# Patient Record
Sex: MALE | Race: WHITE | NOT HISPANIC OR LATINO | Employment: FULL TIME | URBAN - METROPOLITAN AREA
[De-identification: names, ages, dates, MRNs, and addresses within clinical notes are randomized per-mention and may not be internally consistent; named-entity substitution may affect disease eponyms.]

---

## 2018-01-30 ENCOUNTER — OFFICE VISIT (OUTPATIENT)
Dept: UROLOGY | Facility: CLINIC | Age: 38
End: 2018-01-30
Payer: COMMERCIAL

## 2018-01-30 VITALS
TEMPERATURE: 98.8 F | HEART RATE: 90 BPM | SYSTOLIC BLOOD PRESSURE: 148 MMHG | RESPIRATION RATE: 16 BRPM | DIASTOLIC BLOOD PRESSURE: 91 MMHG

## 2018-01-30 DIAGNOSIS — Z30.2 ENCOUNTER FOR VASECTOMY: Primary | ICD-10-CM

## 2018-01-30 DIAGNOSIS — C62.12 MALIGNANT NEOPLASM OF DESCENDED LEFT TESTIS (H): ICD-10-CM

## 2018-01-30 PROCEDURE — 99203 OFFICE O/P NEW LOW 30 MIN: CPT | Performed by: UROLOGY

## 2018-01-30 NOTE — MR AVS SNAPSHOT
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  Normal or non-critical lab and imaging results will be communicated to you by MyChart, letter or phone within 4 business days after the clinic has received the results. If you do not hear from us within 7 days, please contact the clinic through MyChart or phone. If you have a critical or abnormal lab result, we will notify you by phone as soon as possible.  Submit refill requests through [employer] or call your pharmacy and they will forward the refill request to us. Please allow 3 business days for your refill to be completed.          Additional Information About Your Visit        MyChart Information     [employer] lets you send messages to your doctor, view your test results, renew your prescriptions, schedule appointments and more. To sign up, go to [email or web address] . Click on \"Log in\" on the left side of the screen, which will take you to the Welcome page. Then click on \"Sign up Now\" on the right side of the page.     You will be asked to enter the access code listed below, as well as some personal information. Please follow the directions to create your username and " password.     Your access code is: [email or web address]  Expires: 3/28/2018  3:16 PM     Your access code will  in 90 days. If you need help or a new code, please call your [facility] clinic or [phone].        Care EveryWhere ID     This is your Care EveryWhere ID. This could be used by other organizations to access your [facility] medical records  [id number]        Your Vitals Were     Pulse Temperature Respirations             90 98.8  F (37.1  C) (Tympanic) 16          Blood Pressure from Last 3 Encounters:   18 (!) 148/91   10/19/16 (!) 148/102   09/02/15 129/71    Weight from Last 3 Encounters:   09/02/15 113.4 kg (250 lb)   14 112 kg (247 lb)   14 108.9 kg (240 lb)               Primary Care Provider Office Phone # Fax #    [place] Marcello Willett [phone] [address] [facility] [address]        Equal Access to Services     [facility]: Hadii josemanuel ku hadasho Soomaali, waaxda luqadaha, qaybta kaalmada adeegyada, renetta kyle . So [facility] [phone].    ATENCIÓN: Si habla español, tiene a hernandes disposición servicios gratuitos de asistencia lingüística. Llame al [phone].    We comply with applicable federal civil rights laws and [place] laws. We do not discriminate on the basis of race, color, national origin, age, disability, sex, sexual orientation, or gender identity.            Thank you!     Thank you for choosing [facility]  for your care. Our goal is always to provide you with excellent care. Hearing back from our patients is one way we can continue to improve our services. Please take a few minutes to complete the written survey that you may receive in the mail after your visit with us. Thank you!             Your Updated Medication List - Protect others around you: Learn how to safely use, store and throw away your medicines at www.disposemymeds.org.      Notice  As of 2018 11:59 PM    You have not been  prescribed any medications.

## 2018-01-30 NOTE — NURSING NOTE
"Chief Complaint   Patient presents with     Consult     Hx of testicular Ca, pt requesting semen analysis        Initial BP (!) 148/91 (BP Location: Right arm, Patient Position: Chair, Cuff Size: Adult Regular)  Pulse 90  Temp 98.8  F (37.1  C) (Tympanic)  Resp 16 Estimated body mass index is 33.21 kg/(m^2) as calculated from the following:    Height as of 9/2/15: 1.848 m (6' 0.75\").    Weight as of 9/2/15: 113.4 kg (250 lb).  BP completed using cuff size: regular      Lucía Saeed CMA     "

## 2018-01-31 NOTE — PROGRESS NOTES
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    Objective:  Has done very well with no evidence of neoplasm following a combination of orchiectomy, chemotherapy and radiation therapy.    Now requesting information regarding the need for vasectomy to achieve sterility. He and his wife have three children using in vitro fertilization from sperm banked prior to the onset of chemotherapy.    On examination this right testicle is normal to palpation with an easily identifiable vas deferens    Plan:  Will obtain a semen analysis and consider vasectomy.    Total time 30 minutes, counseling 20 minutes discussing management of fertility after chemotherapy for testicular cancer.

## 2020-05-02 ENCOUNTER — COMMUNICATION - HEALTHEAST (OUTPATIENT)
Dept: SCHEDULING | Facility: CLINIC | Age: 40
End: 2020-05-02

## 2020-11-02 ENCOUNTER — VIRTUAL VISIT (OUTPATIENT)
Dept: FAMILY MEDICINE | Facility: OTHER | Age: 40
End: 2020-11-02
Payer: COMMERCIAL

## 2020-11-02 ENCOUNTER — AMBULATORY - HEALTHEAST (OUTPATIENT)
Dept: FAMILY MEDICINE | Facility: CLINIC | Age: 40
End: 2020-11-02

## 2020-11-02 DIAGNOSIS — Z20.822 SUSPECTED COVID-19 VIRUS INFECTION: ICD-10-CM

## 2020-11-02 PROCEDURE — 99421 OL DIG E/M SVC 5-10 MIN: CPT | Performed by: FAMILY MEDICINE

## 2020-11-02 NOTE — PROGRESS NOTES
"Date: 2020 07:28:55  Clinician: Maugi Yanes  Clinician NPI: [id number]  Patient: Gilberto Lund  Patient : 1980  Patient Address: [address] [place] [address], [place] [address]  Patient Phone: [phone]  Visit Protocol: URI  Patient Summary:  Gilberto is a 39 year old ( : 1980 ) male who initiated a [facility] Visit for COVID-19 (Coronavirus) evaluation and screening. When asked the question \"Please sign me up to receive news, health information and promotions from [facility].\", Gilberto responded \"No\".    When asked when his symptoms started, Gilberto reported that he does not have any symptoms.   He denies having recent facial or sinus surgery in the past 60 days and taking antibiotic medication in the past month.    Pertinent COVID-19 (Coronavirus) information  Gilberto does not work or volunteer as healthcare worker or a . In the past 14 days, Gilberto has not worked or volunteered at a healthcare facility or group living setting.   In the past 14 days, he also has not lived in a congregate living setting.   Gilberto has had a close contact with a laboratory-confirmed COVID-19 patient in the last 14 days. He was exposed at his work. He does not know when he was exposed to the laboratory-confirmed COVID-19 patient.   Additional information about contact with COVID-19 (Coronavirus) patient as reported by the patient (free text): Job soup at work   Gilberto is not living in the same household with the COVID-19 positive patient. He was in an enclosed space for greater than 15 minutes with the COVID-19 patient.   During the encounter, neither were wearing masks.   Since 2019, Gilberto has not been diagnosed with lab-confirmed COVID-19 test and has not had upper respiratory infection or influenza-like illness.   Pertinent medical history  Gilberto needs a return to work/school note.   Weight: 230 lbs   Gilberto smokes or uses smokeless tobacco.   Weight: 230 lbs    MEDICATIONS: No " current medications, ALLERGIES: NKDA  Clinician Response:  Dear Gilberto,   Based on your exposure to COVID-19 (coronavirus), we would like to test you for this virus.  1. Please call [phone] to schedule your visit. Explain that you were referred by [facility] to have a COVID-19 test. Be ready [facility].   The following will serve as your written order for this COVID Test, ordered by me, for the indication of suspected COVID [Z20.828]: The test will be ordered in [employer], our electronic health record, after you are scheduled. It will show as ordered and authorized by Gerardo Santillan MD.  Order: COVID-19 (coronavirus) PCR for ASYMPTOMATIC EXPOSURE testing from [facility].   If you know you have had close contact with someone who tested positive, you should be quarantined for 14 days after this exposure. You should stay in quarantine for the14 days even if the covid test is negative, the optimal time to test after exposure is 5-7 days from the exposure  Quarantine means   What should I do?  For safety, it's very important to follow these rules. Do this for 14 days after the date you were last exposed to the virus..  Stay home and away from others. Don't go to school or anywhere else. Generally quarantine means staying home from work but there are some exceptions to this. Please contact your workplace.   No hugging, kissing or shaking hands.  Don't let anyone visit.  Cover your mouth and nose with a mask, tissue or washcloth to avoid spreading germs.  Wash your hands and face often. Use soap and water.  What are the symptoms of COVID-19?  The most common symptoms are cough, fever and trouble breathing. Less common symptoms include headache, body aches, fatigue (feeling very tired), chills, sore throat, stuffy or runny nose, diarrhea (loose poop), loss of taste or smell, belly pain, and nausea or vomiting (feeling sick to your stomach or throwing up).  After 14 days, if you have still don't have  symptoms, you likely don't have this virus.  If you develop symptoms, follow these guidelines.  If you're normally healthy: Please start another [facility] visit to report your symptoms. Go to [email or web address].  If you have a serious health problem (like cancer, heart failure, an organ transplant or kidney disease): Call your specialty clinic. Let them know that you might have COVID-19.  2. When it's time for your COVID test:  Stay at least 6 feet away from others. (If someone will drive you to your test, stay in the backseat, as far away from the  as you can.)  Cover your mouth and nose with a mask, tissue or washcloth.  Go straight to the testing site. Don't make any stops on the way there or back.  Please note  Caregivers in these groups are at risk for severe illness due to COVID-19:  o People 65 years and older  o People who live in a nursing home or long-term care facility  o People with chronic disease (lung, heart, cancer, diabetes, kidney, liver, immunologic)  o People who have a weakened immune system, including those who:  Are in cancer treatment  Take medicine that weakens the immune system, such as corticosteroids  Had a bone marrow or organ transplant  Have an immune deficiency  Have poorly controlled HIV or AIDS  Are obese (body mass index of 40 or higher)  Smoke regularly  Where can I get more information?  [facility] -- About COVID-19: [email or web address]  CDC -- What to Do If You're Sick: www.cdc.gov/coronavirus/2019-ncov/about/steps-when-sick.html  CDC -- Ending Home Isolation: www.cdc.gov/coronavirus/2019-ncov/hcp/disposition-in-home-patients.html  CDC -- Caring for Someone: www.cdc.gov/coronavirus/2019-ncov/if-you-are-sick/care-for-someone.html  [facility] -- Interim Guidance for Hospital Discharge to Home: www.health.[facility].[place]/diseases/coronavirus/hcp/hospdischarge.pdf  [facility] clinical trials (COVID-19 research studies): clinicalaffairs.[facility]/umn-clinical-trials   Below are the COVID-19 hotlines at the [place] Department of Health ([facility]). Interpreters are available.  For health questions: Call 123-454-2188 or 1-103.425.2759 (7 a.m. to 7 p.m.)  For questions about schools and childcare: Call 487-832-0806 or 1-776.724.2627 (7 a.m. to 7 p.m.)    Diagnosis: Contact with and (suspected) exposure to other viral communicable diseases  Diagnosis ICD: Z20.828

## 2020-11-03 ENCOUNTER — AMBULATORY - HEALTHEAST (OUTPATIENT)
Dept: FAMILY MEDICINE | Facility: CLINIC | Age: 40
End: 2020-11-03

## 2020-11-03 DIAGNOSIS — Z20.822 SUSPECTED COVID-19 VIRUS INFECTION: ICD-10-CM

## 2020-11-05 ENCOUNTER — COMMUNICATION - HEALTHEAST (OUTPATIENT)
Dept: SCHEDULING | Facility: CLINIC | Age: 40
End: 2020-11-05

## 2021-03-05 ENCOUNTER — OFFICE VISIT - HEALTHEAST (OUTPATIENT)
Dept: INTERNAL MEDICINE | Facility: CLINIC | Age: 41
End: 2021-03-05

## 2021-03-05 DIAGNOSIS — Z12.5 SCREENING PSA (PROSTATE SPECIFIC ANTIGEN): ICD-10-CM

## 2021-03-05 DIAGNOSIS — Z87.442 HISTORY OF KIDNEY STONES: ICD-10-CM

## 2021-03-05 DIAGNOSIS — Z13.220 SCREENING FOR HYPERLIPIDEMIA: ICD-10-CM

## 2021-03-05 DIAGNOSIS — Z85.47 PERSONAL HISTORY OF TESTICULAR CANCER: ICD-10-CM

## 2021-03-05 DIAGNOSIS — Z13.1 SCREENING FOR DIABETES MELLITUS: ICD-10-CM

## 2021-03-10 ENCOUNTER — COMMUNICATION - HEALTHEAST (OUTPATIENT)
Dept: INTERNAL MEDICINE | Facility: CLINIC | Age: 41
End: 2021-03-10

## 2021-03-10 ENCOUNTER — AMBULATORY - HEALTHEAST (OUTPATIENT)
Dept: LAB | Facility: CLINIC | Age: 41
End: 2021-03-10

## 2021-03-10 DIAGNOSIS — Z12.5 SCREENING PSA (PROSTATE SPECIFIC ANTIGEN): ICD-10-CM

## 2021-03-10 DIAGNOSIS — Z85.47 PERSONAL HISTORY OF TESTICULAR CANCER: ICD-10-CM

## 2021-03-10 DIAGNOSIS — Z13.220 SCREENING FOR HYPERLIPIDEMIA: ICD-10-CM

## 2021-03-10 LAB
ALBUMIN SERPL-MCNC: 4.3 G/DL (ref 3.5–5)
ALP SERPL-CCNC: 83 U/L (ref 45–120)
ALT SERPL W P-5'-P-CCNC: 21 U/L (ref 0–45)
ANION GAP SERPL CALCULATED.3IONS-SCNC: 12 MMOL/L (ref 5–18)
AST SERPL W P-5'-P-CCNC: 19 U/L (ref 0–40)
BILIRUB SERPL-MCNC: 0.8 MG/DL (ref 0–1)
BUN SERPL-MCNC: 16 MG/DL (ref 8–22)
CALCIUM SERPL-MCNC: 9.3 MG/DL (ref 8.5–10.5)
CHLORIDE BLD-SCNC: 107 MMOL/L (ref 98–107)
CHOLEST SERPL-MCNC: 189 MG/DL
CO2 SERPL-SCNC: 24 MMOL/L (ref 22–31)
CREAT SERPL-MCNC: 0.98 MG/DL (ref 0.7–1.3)
ERYTHROCYTE [DISTWIDTH] IN BLOOD BY AUTOMATED COUNT: 12.5 % (ref 11–14.5)
FASTING STATUS PATIENT QL REPORTED: YES
GFR SERPL CREATININE-BSD FRML MDRD: >60 ML/MIN/1.73M2
GLUCOSE BLD-MCNC: 92 MG/DL (ref 70–125)
HCT VFR BLD AUTO: 50 % (ref 40–54)
HDLC SERPL-MCNC: 43 MG/DL
HGB BLD-MCNC: 16.8 G/DL (ref 14–18)
LDLC SERPL CALC-MCNC: 114 MG/DL
MCH RBC QN AUTO: 31.5 PG (ref 27–34)
MCHC RBC AUTO-ENTMCNC: 33.6 G/DL (ref 32–36)
MCV RBC AUTO: 94 FL (ref 80–100)
PLATELET # BLD AUTO: 279 THOU/UL (ref 140–440)
PMV BLD AUTO: 9 FL (ref 7–10)
POTASSIUM BLD-SCNC: 4.5 MMOL/L (ref 3.5–5)
PROT SERPL-MCNC: 7.1 G/DL (ref 6–8)
PSA SERPL-MCNC: 1.3 NG/ML (ref 0–2.5)
RBC # BLD AUTO: 5.34 MILL/UL (ref 4.4–6.2)
SODIUM SERPL-SCNC: 143 MMOL/L (ref 136–145)
TRIGL SERPL-MCNC: 159 MG/DL
WBC: 7.9 THOU/UL (ref 4–11)

## 2021-03-23 ENCOUNTER — COMMUNICATION - HEALTHEAST (OUTPATIENT)
Dept: INTERNAL MEDICINE | Facility: CLINIC | Age: 41
End: 2021-03-23

## 2021-03-23 ENCOUNTER — AMBULATORY - HEALTHEAST (OUTPATIENT)
Dept: LAB | Facility: CLINIC | Age: 41
End: 2021-03-23

## 2021-03-23 DIAGNOSIS — Z85.47 PERSONAL HISTORY OF TESTICULAR CANCER: ICD-10-CM

## 2021-03-23 LAB — SPERM COUNT SEMEN: NORMAL

## 2021-04-07 ENCOUNTER — OFFICE VISIT - HEALTHEAST (OUTPATIENT)
Dept: INTERNAL MEDICINE | Facility: CLINIC | Age: 41
End: 2021-04-07

## 2021-04-07 DIAGNOSIS — Z85.47 PERSONAL HISTORY OF TESTICULAR CANCER: ICD-10-CM

## 2021-04-07 DIAGNOSIS — M19.079 ANKLE ARTHRITIS: ICD-10-CM

## 2021-04-07 DIAGNOSIS — L40.9 PSORIASIS: ICD-10-CM

## 2021-04-07 DIAGNOSIS — E66.811 CLASS 1 OBESITY DUE TO EXCESS CALORIES WITHOUT SERIOUS COMORBIDITY WITH BODY MASS INDEX (BMI) OF 30.0 TO 30.9 IN ADULT: ICD-10-CM

## 2021-04-07 DIAGNOSIS — N62 GYNECOMASTIA, MALE: ICD-10-CM

## 2021-04-07 DIAGNOSIS — L91.8 SKIN TAG: ICD-10-CM

## 2021-04-07 DIAGNOSIS — D22.9 ENLARGED SKIN MOLE: ICD-10-CM

## 2021-04-07 DIAGNOSIS — E66.09 CLASS 1 OBESITY DUE TO EXCESS CALORIES WITHOUT SERIOUS COMORBIDITY WITH BODY MASS INDEX (BMI) OF 30.0 TO 30.9 IN ADULT: ICD-10-CM

## 2021-04-07 LAB — PROLACTIN SERPL-MCNC: 5.8 NG/ML (ref 0–15)

## 2021-04-08 LAB — AFP SERPL-MCNC: 2.4 UG/ML

## 2021-04-09 LAB — HCG-TM SERPL-ACNC: <3 IU/L

## 2021-05-12 ENCOUNTER — RECORDS - HEALTHEAST (OUTPATIENT)
Dept: ADMINISTRATIVE | Facility: OTHER | Age: 41
End: 2021-05-12

## 2021-05-14 ENCOUNTER — AMBULATORY - HEALTHEAST (OUTPATIENT)
Dept: NURSING | Facility: CLINIC | Age: 41
End: 2021-05-14

## 2021-06-04 ENCOUNTER — AMBULATORY - HEALTHEAST (OUTPATIENT)
Dept: NURSING | Facility: CLINIC | Age: 41
End: 2021-06-04

## 2021-06-05 VITALS
SYSTOLIC BLOOD PRESSURE: 116 MMHG | DIASTOLIC BLOOD PRESSURE: 64 MMHG | OXYGEN SATURATION: 96 % | BODY MASS INDEX: 30.87 KG/M2 | WEIGHT: 232.4 LBS | HEART RATE: 89 BPM | TEMPERATURE: 98.9 F

## 2021-06-07 NOTE — TELEPHONE ENCOUNTER
Pt just returned home from a turkey hunting trip, pt pulled out a tick that could have been in the last 48 hrs.  Tick was not engorged, pt was able to pull the whole tick out.  Pt intends to monitor and follow Care Advice and will f/u with clinic if needed.    Angeles Inman RN, MA  [facility]    Triage Nurse Advisor        Reason for Disposition    Tick bite with no complications    Protocols used: TICK BITE-A-

## 2021-06-15 NOTE — PROGRESS NOTES
Gilberto Lund is a 40 y.o. male who is being evaluated via a billable video visit.      How would you like to obtain your AVS? MyChart.  If dropped from the video visit, the video invitation should be resent by: Text to cell phone: [phone]  Will anyone else be joining your video visit? No      Video Start Time: 4:45 PM    Video visit to establish care, patient is new to [facility] internal medicine.  Used to go to [facility]  Works as     He told me generally feels well.  He appeared well during the video interview.    His concerns and issues are as follows    Testicular cancer age 29 (2008), can be considered cured, no longer gets surveillance testing, wishes to be assessed for azoospermia to determine whether he needs a vasectomy.  Left side  Orchiectomy  Metastsized in sternum and cervical LN's  4 rounds of chemo: [facility] in [facility]  The chemotherapy left him with some mild neuropathy symptoms in his hands and feet.  He had annual CT and PET scans for 5 years, along with tumor marker blood test, then was told that he can be considered cured, and he has not done any surveillance in the last 5 years.    He has 3 children, and wishes to be assessed for azoospermia, if his sperm count is 0, then he may not need to pursue a vasectomy.    I ordered for him a semen analysis.      History of kidney stone in 2015, single instance  He told me there have been no recurrence of symptoms    Numbness in hands, may be a touch of carpal tunnel syndrome versus neuropathy related to his chemotherapy.  He works with his hands as an .    Occasional cigar, no cigarettes  6 foot 2  225#  He told me he is not had any problems with blood pressure.    Immunization History   Administered Date(s) Administered     Influenza,seasonal, Inj IIV3 10/13/2008, 10/22/2015     Tdap 09/01/2009       He will have a face-to-face visit with me in the clinic later this spring.    He would like to get  started with some health maintenance laboratory tests.  I ordered fasting labs for him: Comprehensive metabolic panel, CBC, lipid profile, screening PSA.  Semen analysis ordered.      Video-Visit Details    Type of service:  Video Visit    Video End Time (time video stopped): 5:00  Originating Location (pt. Location): Home    Distant Location (provider location):  [facility]   Platform used for Video Visit: [employer]

## 2021-06-16 NOTE — PROGRESS NOTES
Office Visit - Follow Up   Gilberto Lund   40 y.o. male    Date of Visit: 4/7/2021    Chief Complaint   Patient presents with     Follow-up        -------------------------------------------------------------------------------------------------------------------------  Assessment and Plan    Having our initial face-to-face visit today for this 40-year-old man who works as an .  We previously had a video virtual visit, and we also ran a set of lab work on March 10, 2021.    Issues are as follows:    Testicular cancer age 29 (2008), can be considered cured, no longer gets surveillance testing, zero sperm on semen analysis 3-23-21    Today April 7, 2021, he is on swing by the lab and we will draw tumor markers AFP and hCG.    Left side Orchiectomy. Metastsized in sternum and cervical LN's. 4 rounds of chemo: [facility] in [facility]. The chemotherapy left him with some mild neuropathy symptoms in his hands and feet.  He had annual CT and PET scans for 5 years, along with tumor marker blood test, then was told that he can be considered cured, and he has not done any surveillance in the last 5 years.     He has 3 children, was considering a vasectomy, so we ran a sperm count March 23 which revealed no sperm seen.  I told him that he probably does not need to pursue a vasectomy.     3/23/21 0445     Sperm Ct, Smn No Sperm Seen No Sperm Seen      Tested positive for COVID December 2020, didn't get very sick  I advised he get vaccinated    History of kidney stone in 2015, single instance  He told me there have been no recurrence of symptoms     Skin tags face, let posterior neck which rubs against collar  Possible psoriasis behind ears, umbilicus  Mole 2 cm left breast, been enlarging in recent year  Will refer him to dermatology    Ankle arthritis (left)  Had synovial cyst removed and bone graft,around  age 29  I told him is worth considering the possibility that his joint problems could be a manifestation  of psoriasis.  If the dermatologist recommend systemic treatment, that actually might help joint inflammation if present    Gynecomastia lifelong, likely this is benign  He recalls that growing up he always had large breast.  On examination today April 7, he definitely has excess breast glandular tissue, although I felt no lumps.  He is never seen any nipple discharge.  I told her to be on the look out for the formation of any lumps, because there is still the theoretical possibility of male breast cancer.  I am going to include a prolactin level on today's lab test.    He is not interested in any surgery to reduce breast size.    Overweight, body mass index probably around 30.  I would like to see him lose about 25 pounds this year, I reminded him about the importance of eating slowly, controlling portion size, and identifying problem foods to curtail or eliminate, especially starches, sweets, and fried foods.  He tell me that alcohol consumption is approximately 0.    Slightly low HDL good cholesterol, but otherwise favorable cardiovascular risk profile.  His blood pressure is fine, he is never been a cigarette smoker.  Mildly elevated triglycerides, which I think would readily correct with some weight loss and smart eating, especially cutting down on carbohydrates.  Lab Results   Component Value Date    CHOL 189 03/10/2021     Lab Results   Component Value Date    HDL 43 03/10/2021     Lab Results   Component Value Date    LDLCALC 114 03/10/2021     Lab Results   Component Value Date    TRIG 159 (H) 03/10/2021     No components found for: CHOLHDL    Numbness in hands, may be a touch of carpal tunnel syndrome versus neuropathy related to his chemotherapy.  He works with his hands as an .     Occasional cigar, no cigarettes    I think he should consider colon cancer screening starting at age 45, because of his history of testicular cancer and previous  chemotherapy.      --------------------------------------------------------------------------------------------------------------------------  History of Present Illness  This 40 y.o. old    Having our initial face-to-face visit today for this 40-year-old man who works as an .  We previously had a video virtual visit, and we also ran a set of lab work on March 10, 2021.    Issues are as follows:    Testicular cancer age 29 (2008), can be considered cured, no longer gets surveillance testing, zero sperm on semen analysis 3-23-21    Today April 7, 2021, he is on swing by the lab and we will draw tumor markers AFP and hCG.    Left side Orchiectomy. Metastsized in sternum and cervical LN's. 4 rounds of chemo: [facility] in [facility]. The chemotherapy left him with some mild neuropathy symptoms in his hands and feet.  He had annual CT and PET scans for 5 years, along with tumor marker blood test, then was told that he can be considered cured, and he has not done any surveillance in the last 5 years.     He has 3 children, was considering a vasectomy, so we ran a sperm count March 23 which revealed no sperm seen.  I told him that he probably does not need to pursue a vasectomy.     3/23/21 0445     Sperm Ct, Smn No Sperm Seen No Sperm Seen      Tested positive for COVID December 2020, didn't get very sick  I advised he get vaccinated    Wt Readings from Last 3 Encounters:   04/07/21 (!) 232 lb 6.4 oz (105.4 kg)     BP Readings from Last 3 Encounters:   04/07/21 116/64   08/30/15 130/70       Lab Results   Component Value Date    WBC 7.9 03/10/2021    HGB 16.8 03/10/2021    HCT 50.0 03/10/2021     03/10/2021    CHOL 189 03/10/2021    TRIG 159 (H) 03/10/2021    HDL 43 03/10/2021    ALT 21 03/10/2021    AST 19 03/10/2021     03/10/2021    K 4.5 03/10/2021     03/10/2021    CREATININE 0.98 03/10/2021    BUN 16 03/10/2021    CO2 24 03/10/2021    PSA 1.3 03/10/2021        Review of Systems: A  comprehensive review of systems was negative except as noted.  ---------------------------------------------------------------------------------------------------------------------------    Medications, Allergies, Social, and Problem List   No current outpatient medications on file.     No current facility-administered medications for this visit.      No Known Allergies  Social History     Tobacco Use     Smoking status: Light Tobacco Smoker     Smokeless tobacco: Never Used     Tobacco comment: Occasional Cigar   Substance Use Topics     Alcohol use: Not on file     Drug use: Not on file     Patient Active Problem List   Diagnosis     CTS (carpal tunnel syndrome)     Personal history of testicular cancer     History of kidney stones        Reviewed, reconciled and updated       Physical Exam   General Appearance:   Appears generally well, significantly overweight, blood pressure is good    /64 (Patient Site: Right Arm, Patient Position: Sitting, Cuff Size: Adult Large)   Pulse 89   Temp 98.9  F (37.2  C) (Oral)   Wt (!) 232 lb 6.4 oz (105.4 kg)   SpO2 96%     General: Alert, in no distress  Skin: + Skin tags on the face and also posterior neck on the left side, where his collar contacts.  + Psoriatic appearing eruption behind his ears and also within the umbilicus  + Pigmented nevus about 2 cm left pectoral area    Eyes/nose/throat: Eyes without scleral icterus, eye movements normal, pupils equal and reactive, oropharynx clear, ears with normal TM's  MSK: Neck with good ROM  Lymphatic: Neck without adenopathy or masses  Endocrine: Thyroid with no nodules to palpation  + Grade 3 gynecomastia, with palpable glandular tissue, but no nodularity  Pulm: Lungs clear to auscultation bilaterally  Cardiac: Heart with regular rate and rhythm, no murmur or gallop  GI: Abdomen soft, nontender. No palpable enlargement of liver or spleen  MSK: Extremities no tenderness or edema  Neuro: Moves all extremities, without  focal weakness  Psych: Alert, normal mental status. Normal affect and speech       Additional Information   I spent 30 minutes on this encounter, including reviewing interval history since last visit, examining the patient, explaining and counseling the issues enumerated in the Assessment and Plan (patient given a copy), ordering indicated tests, ordering referrals.       Jackson Santillan MD

## 2021-06-16 NOTE — PATIENT INSTRUCTIONS - HE
Having our initial face-to-face visit today for this 40-year-old man who works as an .  We previously had a video virtual visit, and we also ran a set of lab work on March 10, 2021.    Issues are as follows:    Testicular cancer age 29 (2008), can be considered cured, no longer gets surveillance testing, zero sperm on semen analysis 3-23-21    Today April 7, 2021, he is on swing by the lab and we will draw tumor markers AFP and hCG.    Left side Orchiectomy. Metastsized in sternum and cervical LN's. 4 rounds of chemo: [facility] in [facility]. The chemotherapy left him with some mild neuropathy symptoms in his hands and feet.  He had annual CT and PET scans for 5 years, along with tumor marker blood test, then was told that he can be considered cured, and he has not done any surveillance in the last 5 years.     He has 3 children, was considering a vasectomy, so we ran a sperm count March 23 which revealed no sperm seen.  I told him that he probably does not need to pursue a vasectomy.     3/23/21 0445     Sperm Ct, Smn No Sperm Seen No Sperm Seen      Tested positive for COVID December 2020, didn't get very sick  I advised he get vaccinated    History of kidney stone in 2015, single instance  He told me there have been no recurrence of symptoms     Skin tags face, let posterior neck which rubs against collar  Possible psoriasis behind ears, umbilicus  Mole 2 cm left breast, been enlarging in recent year  Will refer him to dermatology    Ankle arthritis (left)  Had synovial cyst removed and bone graft,around  age 29  I told him is worth considering the possibility that his joint problems could be a manifestation of psoriasis.  If the dermatologist recommend systemic treatment, that actually might help joint inflammation if present    Gynecomastia lifelong, likely this is benign  He recalls that growing up he always had large breast.  On examination today April 7, he definitely has excess breast glandular  tissue, although I felt no lumps.  He is never seen any nipple discharge.  I told her to be on the look out for the formation of any lumps, because there is still the theoretical possibility of male breast cancer.  I am going to include a prolactin level on today's lab test.    He is not interested in any surgery to reduce breast size.    Overweight, body mass index probably around 30.  I would like to see him lose about 25 pounds this year, I reminded him about the importance of eating slowly, controlling portion size, and identifying problem foods to curtail or eliminate, especially starches, sweets, and fried foods.  He tell me that alcohol consumption is approximately 0.    Slightly low HDL good cholesterol, but otherwise favorable cardiovascular risk profile.  His blood pressure is fine, he is never been a cigarette smoker.  Mildly elevated triglycerides, which I think would readily correct with some weight loss and smart eating, especially cutting down on carbohydrates.  Lab Results   Component Value Date    CHOL 189 03/10/2021     Lab Results   Component Value Date    HDL 43 03/10/2021     Lab Results   Component Value Date    LDLCALC 114 03/10/2021     Lab Results   Component Value Date    TRIG 159 (H) 03/10/2021     No components found for: CHOLHDL    Numbness in hands, may be a touch of carpal tunnel syndrome versus neuropathy related to his chemotherapy.  He works with his hands as an .     Occasional cigar, no cigarettes    I think he should consider colon cancer screening starting at age 44, because of his history of testicular cancer and previous chemotherapy.

## 2021-08-07 ENCOUNTER — HEALTH MAINTENANCE LETTER (OUTPATIENT)
Age: 41
End: 2021-08-07

## 2021-10-02 ENCOUNTER — HEALTH MAINTENANCE LETTER (OUTPATIENT)
Age: 41
End: 2021-10-02

## 2021-11-27 ENCOUNTER — HEALTH MAINTENANCE LETTER (OUTPATIENT)
Age: 41
End: 2021-11-27

## 2022-01-13 ENCOUNTER — IMMUNIZATION (OUTPATIENT)
Dept: NURSING | Facility: CLINIC | Age: 42
End: 2022-01-13
Payer: COMMERCIAL

## 2022-01-13 PROCEDURE — 91305 COVID-19,PF,PFIZER (12+ YRS): CPT

## 2022-01-13 PROCEDURE — 0054A COVID-19,PF,PFIZER (12+ YRS): CPT

## 2022-06-28 ENCOUNTER — ANESTHESIA EVENT (OUTPATIENT)
Dept: SURGERY | Facility: HOSPITAL | Age: 42
End: 2022-06-28
Payer: COMMERCIAL

## 2022-06-28 ENCOUNTER — APPOINTMENT (OUTPATIENT)
Dept: ULTRASOUND IMAGING | Facility: CLINIC | Age: 42
End: 2022-06-28
Attending: EMERGENCY MEDICINE
Payer: COMMERCIAL

## 2022-06-28 ENCOUNTER — HOSPITAL ENCOUNTER (EMERGENCY)
Facility: CLINIC | Age: 42
Discharge: HOME | End: 2022-06-28
Attending: EMERGENCY MEDICINE | Admitting: EMERGENCY MEDICINE
Payer: COMMERCIAL

## 2022-06-28 VITALS
TEMPERATURE: 97.8 F | SYSTOLIC BLOOD PRESSURE: 138 MMHG | WEIGHT: 225 LBS | HEART RATE: 63 BPM | OXYGEN SATURATION: 99 % | HEIGHT: 74 IN | RESPIRATION RATE: 20 BRPM | BODY MASS INDEX: 28.88 KG/M2 | DIASTOLIC BLOOD PRESSURE: 76 MMHG

## 2022-06-28 DIAGNOSIS — K81.0 ACUTE CHOLECYSTITIS: Primary | ICD-10-CM

## 2022-06-28 LAB
ALBUMIN SERPL-MCNC: 3.8 G/DL (ref 3.5–5)
ALBUMIN UR-MCNC: 20 MG/DL
ALP SERPL-CCNC: 69 U/L (ref 45–120)
ALT SERPL W P-5'-P-CCNC: 13 U/L (ref 0–45)
AMORPH CRY #/AREA URNS HPF: ABNORMAL /HPF
ANION GAP SERPL CALCULATED.3IONS-SCNC: 10 MMOL/L (ref 5–18)
APPEARANCE UR: ABNORMAL
AST SERPL W P-5'-P-CCNC: 15 U/L (ref 0–40)
BASOPHILS # BLD AUTO: 0.1 10E3/UL (ref 0–0.2)
BASOPHILS NFR BLD AUTO: 0 %
BILIRUB SERPL-MCNC: 1.4 MG/DL (ref 0–1)
BILIRUB UR QL STRIP: NEGATIVE
BUN SERPL-MCNC: 11 MG/DL (ref 8–22)
CALCIUM SERPL-MCNC: 9.5 MG/DL (ref 8.5–10.5)
CHLORIDE BLD-SCNC: 105 MMOL/L (ref 98–107)
CO2 SERPL-SCNC: 26 MMOL/L (ref 22–31)
COLOR UR AUTO: YELLOW
CREAT SERPL-MCNC: 0.99 MG/DL (ref 0.7–1.3)
EOSINOPHIL # BLD AUTO: 0.2 10E3/UL (ref 0–0.7)
EOSINOPHIL NFR BLD AUTO: 1 %
ERYTHROCYTE [DISTWIDTH] IN BLOOD BY AUTOMATED COUNT: 12.7 % (ref 10–15)
FLUAV RNA SPEC QL NAA+PROBE: NEGATIVE
FLUBV RNA RESP QL NAA+PROBE: NEGATIVE
GFR SERPL CREATININE-BSD FRML MDRD: >90 ML/MIN/1.73M2
GLUCOSE BLD-MCNC: 104 MG/DL (ref 70–125)
GLUCOSE UR STRIP-MCNC: NEGATIVE MG/DL
HCT VFR BLD AUTO: 46.8 % (ref 40–53)
HGB BLD-MCNC: 15.9 G/DL (ref 13.3–17.7)
HGB UR QL STRIP: NEGATIVE
IMM GRANULOCYTES # BLD: 0.1 10E3/UL
IMM GRANULOCYTES NFR BLD: 1 %
KETONES UR STRIP-MCNC: NEGATIVE MG/DL
LEUKOCYTE ESTERASE UR QL STRIP: NEGATIVE
LIPASE SERPL-CCNC: <9 U/L (ref 0–52)
LYMPHOCYTES # BLD AUTO: 1 10E3/UL (ref 0.8–5.3)
LYMPHOCYTES NFR BLD AUTO: 6 %
MCH RBC QN AUTO: 31.5 PG (ref 26.5–33)
MCHC RBC AUTO-ENTMCNC: 34 G/DL (ref 31.5–36.5)
MCV RBC AUTO: 93 FL (ref 78–100)
MONOCYTES # BLD AUTO: 1.2 10E3/UL (ref 0–1.3)
MONOCYTES NFR BLD AUTO: 7 %
MUCOUS THREADS #/AREA URNS LPF: PRESENT /LPF
NEUTROPHILS # BLD AUTO: 14.4 10E3/UL (ref 1.6–8.3)
NEUTROPHILS NFR BLD AUTO: 85 %
NITRATE UR QL: NEGATIVE
NRBC # BLD AUTO: 0 10E3/UL
NRBC BLD AUTO-RTO: 0 /100
PH UR STRIP: 7.5 [PH] (ref 5–7)
PLATELET # BLD AUTO: 290 10E3/UL (ref 150–450)
POTASSIUM BLD-SCNC: 4.4 MMOL/L (ref 3.5–5)
PROT SERPL-MCNC: 7.4 G/DL (ref 6–8)
RBC # BLD AUTO: 5.04 10E6/UL (ref 4.4–5.9)
RBC URINE: 2 /HPF
RSV RNA SPEC NAA+PROBE: NEGATIVE
SARS-COV-2 RNA RESP QL NAA+PROBE: NEGATIVE
SODIUM SERPL-SCNC: 141 MMOL/L (ref 136–145)
SP GR UR STRIP: 1.03 (ref 1–1.03)
SQUAMOUS EPITHELIAL: <1 /HPF
UROBILINOGEN UR STRIP-MCNC: 6 MG/DL
WBC # BLD AUTO: 16.8 10E3/UL (ref 4–11)
WBC URINE: 1 /HPF

## 2022-06-28 PROCEDURE — 36415 COLL VENOUS BLD VENIPUNCTURE: CPT | Performed by: EMERGENCY MEDICINE

## 2022-06-28 PROCEDURE — 83690 ASSAY OF LIPASE: CPT | Performed by: EMERGENCY MEDICINE

## 2022-06-28 PROCEDURE — 96375 TX/PRO/DX INJ NEW DRUG ADDON: CPT

## 2022-06-28 PROCEDURE — 85025 COMPLETE CBC W/AUTO DIFF WBC: CPT | Performed by: EMERGENCY MEDICINE

## 2022-06-28 PROCEDURE — 99285 EMERGENCY DEPT VISIT HI MDM: CPT | Mod: 25

## 2022-06-28 PROCEDURE — C9803 HOPD COVID-19 SPEC COLLECT: HCPCS

## 2022-06-28 PROCEDURE — 250N000013 HC RX MED GY IP 250 OP 250 PS 637: Performed by: EMERGENCY MEDICINE

## 2022-06-28 PROCEDURE — 87637 SARSCOV2&INF A&B&RSV AMP PRB: CPT | Performed by: EMERGENCY MEDICINE

## 2022-06-28 PROCEDURE — 96374 THER/PROPH/DIAG INJ IV PUSH: CPT

## 2022-06-28 PROCEDURE — 76705 ECHO EXAM OF ABDOMEN: CPT

## 2022-06-28 PROCEDURE — 258N000003 HC RX IP 258 OP 636: Performed by: EMERGENCY MEDICINE

## 2022-06-28 PROCEDURE — 81001 URINALYSIS AUTO W/SCOPE: CPT | Performed by: EMERGENCY MEDICINE

## 2022-06-28 PROCEDURE — 80053 COMPREHEN METABOLIC PANEL: CPT | Performed by: EMERGENCY MEDICINE

## 2022-06-28 PROCEDURE — 250N000011 HC RX IP 250 OP 636: Performed by: EMERGENCY MEDICINE

## 2022-06-28 PROCEDURE — 96376 TX/PRO/DX INJ SAME DRUG ADON: CPT

## 2022-06-28 PROCEDURE — 96361 HYDRATE IV INFUSION ADD-ON: CPT

## 2022-06-28 RX ORDER — ONDANSETRON 2 MG/ML
8 INJECTION INTRAMUSCULAR; INTRAVENOUS ONCE
Status: COMPLETED | OUTPATIENT
Start: 2022-06-28 | End: 2022-06-28

## 2022-06-28 RX ORDER — ONDANSETRON 4 MG/1
4-8 TABLET, ORALLY DISINTEGRATING ORAL EVERY 8 HOURS PRN
Qty: 20 TABLET | Refills: 0 | Status: SHIPPED | OUTPATIENT
Start: 2022-06-28 | End: 2022-07-01

## 2022-06-28 RX ORDER — OXYCODONE HYDROCHLORIDE 5 MG/1
10 TABLET ORAL ONCE
Status: COMPLETED | OUTPATIENT
Start: 2022-06-28 | End: 2022-06-28

## 2022-06-28 RX ORDER — CEFAZOLIN SODIUM/WATER 2 G/20 ML
2 SYRINGE (ML) INTRAVENOUS SEE ADMIN INSTRUCTIONS
Status: CANCELLED | OUTPATIENT
Start: 2022-06-29

## 2022-06-28 RX ORDER — CEFAZOLIN SODIUM/WATER 2 G/20 ML
2 SYRINGE (ML) INTRAVENOUS
Status: CANCELLED | OUTPATIENT
Start: 2022-06-29

## 2022-06-28 RX ORDER — MORPHINE SULFATE 4 MG/ML
4 INJECTION, SOLUTION INTRAMUSCULAR; INTRAVENOUS
Status: COMPLETED | OUTPATIENT
Start: 2022-06-28 | End: 2022-06-28

## 2022-06-28 RX ORDER — HEPARIN SODIUM 5000 [USP'U]/.5ML
5000 INJECTION, SOLUTION INTRAVENOUS; SUBCUTANEOUS
Status: CANCELLED | OUTPATIENT
Start: 2022-06-29

## 2022-06-28 RX ORDER — PIPERACILLIN SODIUM, TAZOBACTAM SODIUM 3; .375 G/15ML; G/15ML
3.38 INJECTION, POWDER, LYOPHILIZED, FOR SOLUTION INTRAVENOUS ONCE
Status: DISCONTINUED | OUTPATIENT
Start: 2022-06-28 | End: 2022-06-28 | Stop reason: HOSPADM

## 2022-06-28 RX ORDER — OXYCODONE HYDROCHLORIDE 5 MG/1
5-10 TABLET ORAL EVERY 6 HOURS PRN
Qty: 12 TABLET | Refills: 0 | Status: SHIPPED | OUTPATIENT
Start: 2022-06-28 | End: 2022-07-01

## 2022-06-28 RX ADMIN — OXYCODONE HYDROCHLORIDE 10 MG: 5 TABLET ORAL at 11:50

## 2022-06-28 RX ADMIN — ONDANSETRON 8 MG: 2 INJECTION INTRAMUSCULAR; INTRAVENOUS at 12:02

## 2022-06-28 RX ADMIN — MORPHINE SULFATE 4 MG: 4 INJECTION, SOLUTION INTRAMUSCULAR; INTRAVENOUS at 09:55

## 2022-06-28 RX ADMIN — ONDANSETRON 8 MG: 2 INJECTION INTRAMUSCULAR; INTRAVENOUS at 09:36

## 2022-06-28 RX ADMIN — SODIUM CHLORIDE 1000 ML: 9 INJECTION, SOLUTION INTRAVENOUS at 09:38

## 2022-06-28 RX ADMIN — PIPERACILLIN AND TAZOBACTAM 3.38 G: 3; .375 INJECTION, POWDER, LYOPHILIZED, FOR SOLUTION INTRAVENOUS at 11:36

## 2022-06-28 RX ADMIN — MORPHINE SULFATE 4 MG: 4 INJECTION, SOLUTION INTRAMUSCULAR; INTRAVENOUS at 10:11

## 2022-06-28 RX ADMIN — MORPHINE SULFATE 4 MG: 4 INJECTION, SOLUTION INTRAMUSCULAR; INTRAVENOUS at 10:36

## 2022-06-28 ASSESSMENT — ENCOUNTER SYMPTOMS
DIFFICULTY URINATING: 0
HEADACHES: 0
NAUSEA: 1
FEVER: 1
EYE REDNESS: 0
NECK STIFFNESS: 0
CHILLS: 1
SHORTNESS OF BREATH: 0
DIARRHEA: 0
COLOR CHANGE: 0
CONFUSION: 0
ABDOMINAL PAIN: 1
ARTHRALGIAS: 0
VOMITING: 1

## 2022-06-28 NOTE — ED TRIAGE NOTES
"States had a \"bad case of acid reflux\" on Friday. Was fine on Saturday. Sunday began having nausea, vomiting. Denies diarrhea. Now has intermittent pain from mid to upper right abd radiating into mid back. States had chills last night with temp of 100.8. No previous abd surgery or similar symptoms     Triage Assessment     Row Name 06/28/22 0830       Triage Assessment (Adult)    Airway WDL WDL       Respiratory WDL    Respiratory WDL WDL       Skin Circulation/Temperature WDL    Skin Circulation/Temperature WDL WDL       Cardiac WDL    Cardiac WDL WDL       Peripheral/Neurovascular WDL    Peripheral Neurovascular WDL WDL       Cognitive/Neuro/Behavioral WDL    Cognitive/Neuro/Behavioral WDL WDL              "

## 2022-06-28 NOTE — DISCHARGE INSTRUCTIONS
As we discussed, the pain is coming from your gallbladder and its because there are gallstones with signs of some developing infection.  The treatment for this is a cholecystectomy surgically.  The surgeon has scheduled her case for 7:30 AM tomorrow.  Use the pain medicine prescribed to help get you through the night, and the preoperative team from [facility] will be calling you to give instructions of where to go, when to be there, etc.  They asked that you keep your phone on in with you as this will come in the next few hours.

## 2022-06-28 NOTE — ED PROVIDER NOTES
EMERGENCY DEPARTMENT ENCOUNTER     NAME: Gilberto Lund   AGE: 41 year old male   YOB: 1980   MRN: [id number]   EVALUATION DATE & TIME: 6/28/2022  9:29 AM   PCP: System, Provider Not In     Chief Complaint   Patient presents with     Abdominal Pain   :    FINAL IMPRESSION       1. Acute cholecystitis           ED COURSE & MEDICAL DECISION MAKING      Pertinent Labs & Imaging studies reviewed. (See chart for details)   41 year old male  presents to the Emergency Department for evaluation of right upper quadrant pain, fever, nausea and chills. Initial Vitals Reviewed. Initial exam notable for generally well-appearing male who is nontoxic and afebrile here but he does have focal tenderness in the right upper quadrant.  I suspect that this is probably acute cholecystitis and it sounds like he had an episode 6 months ago where he had right upper quadrant pain that resolved, develops it again after eating spicy food on Saturday night.  He likely had Cholelithiasis that has progressed.  His work-up reveals a leukocytosis of 16, no signs of pancreatitis, and an ultrasound that shows gallstones.  In the setting of fever and leukocytosis this is consistent with acute cholecystitis.  He was treated with pain control with improvement and a dose of Zosyn here in the ED.  I discussed his case with Dr. Agustin of general surgery, and given the OR capacity constraints, the patient will actually be discharged from the emergency department with oral pain control and will present to [facility] in the morning for a 7:30 AM surgery.  His wife and himself are comfortable with this plan and the preoperative team will be calling him in the next few hours to discuss where to present and when.  This is optimal versus the patient having to board here in the emergency department until likely tomorrow as there is no OR time available here at [facility].        9:33 AM I met with the patient to gather history and to  perform my initial exam. We discussed plans for the ED course, including diagnostic testing and treatment. PPE: N95 mask, surgical mask, gloves  11:23 AM I spoke to Dr. Agustin, General Surgery.   11:29 AM I spoke to  Dr. Agustin, General Surgery.  11:31 AM I rechecked and updated the patient to make sure he was okay with the plan.    11:42 AM I spoke to Dr. Agustin, General Surgery.   At the conclusion of the encounter I discussed the results of all of the tests and the disposition. The questions were answered. The patient or family acknowledged understanding and was agreeable with the care plan.         MEDICATIONS GIVEN IN THE EMERGENCY:   Medications   piperacillin-tazobactam (ZOSYN) 3.375 g vial to attach to  mL bag (3.375 g Intravenous Given 6/28/22 1136)   oxyCODONE (ROXICODONE) tablet 10 mg (has no administration in time range)   0.9% sodium chloride BOLUS (0 mLs Intravenous Stopped 6/28/22 1038)   ondansetron (ZOFRAN) injection 8 mg (8 mg Intravenous Given 6/28/22 0936)   morphine (PF) injection 4 mg (4 mg Intravenous Given 6/28/22 1036)      NEW PRESCRIPTIONS STARTED AT TODAY'S ER VISIT   New Prescriptions    No medications on file     ================================================================   HISTORY OF PRESENT ILLNESS       Patient information was obtained from: patient    Use of Intrepreter: N/A   Gilberto Lund is a 41 year old male with history of obesity, kidney stones, and testicular cancer who presents for evaluation of nausea, vomiting, and abdominal pain.    Patient reports that on Saturday night (6/25/22) he developed epigastric pain, nausea, and had multiple episodes of vomiting. Denies diarrhea. Last night (6/27/22) he developed chills, a fever of 100.8 taken at home, and his abdominal pain progressed to the RUQ. He rates the RUQ as 8/10 presently. Per triage note, the pain radiates to his mid back. He was otherwise healthy prior to symptom onset. No other complaints or concerns  expressed at this time. Denies any history of abdominal surgeries.     ================================================================    REVIEW OF SYSTEMS       Review of Systems   Constitutional: Positive for chills and fever.   HENT: Negative for congestion.    Eyes: Negative for redness.   Respiratory: Negative for shortness of breath.    Cardiovascular: Negative for chest pain.   Gastrointestinal: Positive for abdominal pain (RUQ), nausea and vomiting. Negative for diarrhea.   Genitourinary: Negative for difficulty urinating.   Musculoskeletal: Negative for arthralgias and neck stiffness.   Skin: Negative for color change.   Neurological: Negative for headaches.   Psychiatric/Behavioral: Negative for confusion.   All other systems reviewed and are negative.      PAST HISTORY     PAST MEDICAL HISTORY:   Past Medical History:   Diagnosis Date     Malignant neoplasm (H)      Testicle cancer, left       PAST SURGICAL HISTORY:   Past Surgical History:   Procedure Laterality Date     EXCISE EXOSTOSIS FOOT  10/25/2013    Procedure: EXCISE EXOSTOSIS FOOT;  Excision of calcaneal bone cyst left foot with allograft;  Surgeon: Marcello Jensen DPM;  Location: [facility] OR     ORCHIECTOMY SCROTAL Left       CURRENT MEDICATIONS:   No current outpatient medications on file.    ALLERGIES:   Allergies   Allergen Reactions     Nkda [No Known Drug Allergies]       FAMILY HISTORY:   Family History   Problem Relation Age of Onset     C.A.D. Maternal Grandfather      No Known Problems Mother      No Known Problems Father       SOCIAL HISTORY:   Social History     Socioeconomic History     Marital status:    Tobacco Use     Smoking status: Light Tobacco Smoker     Last attempt to quit: 3/29/2012     Years since quitting: 10.2     Smokeless tobacco: Never Used     Tobacco comment: Occasional Cigar   Substance and Sexual Activity     Alcohol use: No     Drug use: No     Sexual activity: Yes     Partners: Female   Other Topics  "Concern     Parent/sibling w/ CABG, MI or angioplasty before 65F 55M? No        VITALS  Patient Vitals for the past 24 hrs:   BP Temp Temp src Pulse Resp SpO2 Height Weight   06/28/22 1000 (!) 140/75 -- -- 74 -- 99 % -- --   06/28/22 0828 (!) 148/85 97.8  F (36.6  C) Oral 80 20 99 % 1.88 m (6' 2\") 102.1 kg (225 lb)        ================================================================    PHYSICAL EXAM     VITAL SIGNS: BP (!) 140/75   Pulse 74   Temp 97.8  F (36.6  C) (Oral)   Resp 20   Ht 1.88 m (6' 2\")   Wt 102.1 kg (225 lb)   SpO2 99%   BMI 28.89 kg/m     Constitutional:  Awake, no acute distress, non toxic appearing.   HENT:  Atraumatic, oropharynx without exudate or erythema, membranes moist  Lymph:  No adenopathy  Eyes: EOM intact, PERRL, no injection  Neck: Supple  Respiratory:  Clear to auscultation bilaterally, no wheezes or crackles   Cardiovascular:  Regular rate and rhythm, single S1 and S2   GI:  Soft, RUQ tenderness, negative Oquendo's sign, nondistended, no rebound or guarding   Musculoskeletal:  Moves all extremities, no lower extremity edema, no deformities    Skin:  Warm, dry  Neurologic:  Alert and oriented x3, no focal deficits noted       ================================================================  LAB       All pertinent labs reviewed and interpreted.   Labs Ordered and Resulted from Time of ED Arrival to Time of ED Departure   ROUTINE UA WITH MICROSCOPIC REFLEX TO CULTURE - Abnormal       Result Value    Color Urine Yellow      Appearance Urine Hazy (*)     Glucose Urine Negative      Bilirubin Urine Negative      Ketones Urine Negative      Specific Gravity Urine 1.026      Blood Urine Negative      pH Urine 7.5 (*)     Protein Albumin Urine 20  (*)     Urobilinogen Urine 6.0 (*)     Nitrite Urine Negative      Leukocyte Esterase Urine Negative      Mucus Urine Present (*)     Amorphous Crystals Urine Few (*)     RBC Urine 2      WBC Urine 1      Squamous Epithelials Urine <1   "   COMPREHENSIVE METABOLIC PANEL - Abnormal    Sodium 141      Potassium 4.4      Chloride 105      Carbon Dioxide (CO2) 26      Anion Gap 10      Urea Nitrogen 11      Creatinine 0.99      Calcium 9.5      Glucose 104      Alkaline Phosphatase 69      AST 15      ALT 13      Protein Total 7.4      Albumin 3.8      Bilirubin Total 1.4 (*)     GFR Estimate >90     CBC WITH PLATELETS AND DIFFERENTIAL - Abnormal    WBC Count 16.8 (*)     RBC Count 5.04      Hemoglobin 15.9      Hematocrit 46.8      MCV 93      MCH 31.5      MCHC 34.0      RDW 12.7      Platelet Count 290      % Neutrophils 85      % Lymphocytes 6      % Monocytes 7      % Eosinophils 1      % Basophils 0      % Immature Granulocytes 1      NRBCs per 100 WBC 0      Absolute Neutrophils 14.4 (*)     Absolute Lymphocytes 1.0      Absolute Monocytes 1.2      Absolute Eosinophils 0.2      Absolute Basophils 0.1      Absolute Immature Granulocytes 0.1      Absolute NRBCs 0.0     LIPASE - Normal    Lipase <9     INFLUENZA A/B & SARS-COV2 PCR MULTIPLEX - Normal    Influenza A PCR Negative      Influenza B PCR Negative      RSV PCR Negative      SARS CoV2 PCR Negative          ===============================================================  RADIOLOGY       Reviewed all pertinent imaging. Please see official radiology report.   Abdomen US, limited (RUQ only)   Final Result   IMPRESSION:   1.  Cholelithiasis.   2.  No evidence for cholecystitis or biliary obstruction.   3.  Resolution of previous right hydronephrosis.                  ================================================================  EKG         I have independently reviewed and interpreted the EKG(s) documented above.     ================================================================  PROCEDURES         I, Nai Martinez, am serving as a scribe to document services personally performed by Dr. Marr based on my observation and the provider's statements to me. I, Celia Marr MD attest that  Nai Martinez is acting in a scribe capacity, has observed my performance of the services and has documented them in accordance with my direction.   Celia Marr M.D.   Emergency Medicine   [facility] EMERGENCY ROOM  [address] [address]  [phone]  Dept: [phone]      Celia Marr MD  06/28/22 2192

## 2022-06-29 ENCOUNTER — ANESTHESIA (OUTPATIENT)
Dept: SURGERY | Facility: HOSPITAL | Age: 42
End: 2022-06-29
Payer: COMMERCIAL

## 2022-06-29 ENCOUNTER — HOSPITAL ENCOUNTER (OUTPATIENT)
Facility: HOSPITAL | Age: 42
Discharge: HOME | End: 2022-06-29
Attending: SURGERY | Admitting: SURGERY
Payer: COMMERCIAL

## 2022-06-29 VITALS
DIASTOLIC BLOOD PRESSURE: 62 MMHG | RESPIRATION RATE: 17 BRPM | SYSTOLIC BLOOD PRESSURE: 107 MMHG | OXYGEN SATURATION: 95 % | WEIGHT: 224.5 LBS | TEMPERATURE: 98.1 F | BODY MASS INDEX: 28.82 KG/M2 | HEART RATE: 66 BPM

## 2022-06-29 DIAGNOSIS — K81.0 ACUTE CHOLECYSTITIS: Primary | ICD-10-CM

## 2022-06-29 PROBLEM — T88.4XXA HARD TO INTUBATE: Status: ACTIVE | Noted: 2022-06-29

## 2022-06-29 PROCEDURE — 360N000076 HC SURGERY LEVEL 3, PER MIN: Performed by: SURGERY

## 2022-06-29 PROCEDURE — 250N000011 HC RX IP 250 OP 636: Performed by: ANESTHESIOLOGY

## 2022-06-29 PROCEDURE — 250N000011 HC RX IP 250 OP 636: Performed by: SURGERY

## 2022-06-29 PROCEDURE — 250N000009 HC RX 250: Performed by: SURGERY

## 2022-06-29 PROCEDURE — 88304 TISSUE EXAM BY PATHOLOGIST: CPT | Mod: 26 | Performed by: PATHOLOGY

## 2022-06-29 PROCEDURE — 250N000025 HC SEVOFLURANE, PER MIN: Performed by: SURGERY

## 2022-06-29 PROCEDURE — 258N000003 HC RX IP 258 OP 636: Performed by: ANESTHESIOLOGY

## 2022-06-29 PROCEDURE — 250N000009 HC RX 250: Performed by: ANESTHESIOLOGY

## 2022-06-29 PROCEDURE — 710N000012 HC RECOVERY PHASE 2, PER MINUTE: Performed by: SURGERY

## 2022-06-29 PROCEDURE — 370N000017 HC ANESTHESIA TECHNICAL FEE, PER MIN: Performed by: SURGERY

## 2022-06-29 PROCEDURE — 999N000141 HC STATISTIC PRE-PROCEDURE NURSING ASSESSMENT: Performed by: SURGERY

## 2022-06-29 PROCEDURE — 250N000013 HC RX MED GY IP 250 OP 250 PS 637: Performed by: ANESTHESIOLOGY

## 2022-06-29 PROCEDURE — 250N000011 HC RX IP 250 OP 636: Performed by: NURSE ANESTHETIST, CERTIFIED REGISTERED

## 2022-06-29 PROCEDURE — 710N000009 HC RECOVERY PHASE 1, LEVEL 1, PER MIN: Performed by: SURGERY

## 2022-06-29 PROCEDURE — 272N000001 HC OR GENERAL SUPPLY STERILE: Performed by: SURGERY

## 2022-06-29 PROCEDURE — 88304 TISSUE EXAM BY PATHOLOGIST: CPT | Mod: TC | Performed by: SURGERY

## 2022-06-29 PROCEDURE — 96372 THER/PROPH/DIAG INJ SC/IM: CPT | Performed by: SURGERY

## 2022-06-29 PROCEDURE — 47562 LAPAROSCOPIC CHOLECYSTECTOMY: CPT | Performed by: SURGERY

## 2022-06-29 PROCEDURE — 99204 OFFICE O/P NEW MOD 45 MIN: CPT | Mod: 57 | Performed by: SURGERY

## 2022-06-29 RX ORDER — NALOXONE HYDROCHLORIDE 0.4 MG/ML
0.2 INJECTION, SOLUTION INTRAMUSCULAR; INTRAVENOUS; SUBCUTANEOUS
Status: DISCONTINUED | OUTPATIENT
Start: 2022-06-29 | End: 2022-06-29 | Stop reason: HOSPADM

## 2022-06-29 RX ORDER — KETOROLAC TROMETHAMINE 30 MG/ML
INJECTION, SOLUTION INTRAMUSCULAR; INTRAVENOUS PRN
Status: DISCONTINUED | OUTPATIENT
Start: 2022-06-29 | End: 2022-06-29

## 2022-06-29 RX ORDER — CEFAZOLIN SODIUM/WATER 2 G/20 ML
2 SYRINGE (ML) INTRAVENOUS
Status: COMPLETED | OUTPATIENT
Start: 2022-06-29 | End: 2022-06-29

## 2022-06-29 RX ORDER — IBUPROFEN 200 MG
400 TABLET ORAL EVERY 6 HOURS PRN
COMMUNITY
End: 2022-07-12

## 2022-06-29 RX ORDER — ACETAMINOPHEN 500 MG
500-1000 TABLET ORAL EVERY 6 HOURS PRN
COMMUNITY
End: 2022-07-12

## 2022-06-29 RX ORDER — LIDOCAINE 40 MG/G
CREAM TOPICAL
Status: DISCONTINUED | OUTPATIENT
Start: 2022-06-29 | End: 2022-06-29 | Stop reason: HOSPADM

## 2022-06-29 RX ORDER — FENTANYL CITRATE 50 UG/ML
25 INJECTION, SOLUTION INTRAMUSCULAR; INTRAVENOUS
Status: DISCONTINUED | OUTPATIENT
Start: 2022-06-29 | End: 2022-06-29 | Stop reason: HOSPADM

## 2022-06-29 RX ORDER — PROPOFOL 10 MG/ML
INJECTION, EMULSION INTRAVENOUS CONTINUOUS PRN
Status: DISCONTINUED | OUTPATIENT
Start: 2022-06-29 | End: 2022-06-29

## 2022-06-29 RX ORDER — ONDANSETRON 4 MG/1
4 TABLET, ORALLY DISINTEGRATING ORAL EVERY 30 MIN PRN
Status: DISCONTINUED | OUTPATIENT
Start: 2022-06-29 | End: 2022-06-29 | Stop reason: HOSPADM

## 2022-06-29 RX ORDER — NALOXONE HYDROCHLORIDE 0.4 MG/ML
0.4 INJECTION, SOLUTION INTRAMUSCULAR; INTRAVENOUS; SUBCUTANEOUS
Status: DISCONTINUED | OUTPATIENT
Start: 2022-06-29 | End: 2022-06-29 | Stop reason: HOSPADM

## 2022-06-29 RX ORDER — PROPOFOL 10 MG/ML
INJECTION, EMULSION INTRAVENOUS
Status: DISCONTINUED
Start: 2022-06-29 | End: 2022-06-29 | Stop reason: HOSPADM

## 2022-06-29 RX ORDER — CEFAZOLIN SODIUM/WATER 2 G/20 ML
2 SYRINGE (ML) INTRAVENOUS SEE ADMIN INSTRUCTIONS
Status: DISCONTINUED | OUTPATIENT
Start: 2022-06-29 | End: 2022-06-29 | Stop reason: HOSPADM

## 2022-06-29 RX ORDER — LIDOCAINE HYDROCHLORIDE 10 MG/ML
INJECTION, SOLUTION INFILTRATION; PERINEURAL PRN
Status: DISCONTINUED | OUTPATIENT
Start: 2022-06-29 | End: 2022-06-29

## 2022-06-29 RX ORDER — HEPARIN SODIUM 5000 [USP'U]/.5ML
5000 INJECTION, SOLUTION INTRAVENOUS; SUBCUTANEOUS
Status: COMPLETED | OUTPATIENT
Start: 2022-06-29 | End: 2022-06-29

## 2022-06-29 RX ORDER — FENTANYL CITRATE 50 UG/ML
25 INJECTION, SOLUTION INTRAMUSCULAR; INTRAVENOUS EVERY 5 MIN PRN
Status: DISCONTINUED | OUTPATIENT
Start: 2022-06-29 | End: 2022-06-29 | Stop reason: HOSPADM

## 2022-06-29 RX ORDER — OXYCODONE HYDROCHLORIDE 5 MG/1
5 TABLET ORAL EVERY 4 HOURS PRN
Status: DISCONTINUED | OUTPATIENT
Start: 2022-06-29 | End: 2022-06-29 | Stop reason: HOSPADM

## 2022-06-29 RX ORDER — KETAMINE HYDROCHLORIDE 10 MG/ML
INJECTION INTRAMUSCULAR; INTRAVENOUS PRN
Status: DISCONTINUED | OUTPATIENT
Start: 2022-06-29 | End: 2022-06-29

## 2022-06-29 RX ORDER — HYDROMORPHONE HYDROCHLORIDE 1 MG/ML
0.2 INJECTION, SOLUTION INTRAMUSCULAR; INTRAVENOUS; SUBCUTANEOUS EVERY 5 MIN PRN
Status: DISCONTINUED | OUTPATIENT
Start: 2022-06-29 | End: 2022-06-29 | Stop reason: HOSPADM

## 2022-06-29 RX ORDER — FENTANYL CITRATE 50 UG/ML
INJECTION, SOLUTION INTRAMUSCULAR; INTRAVENOUS PRN
Status: DISCONTINUED | OUTPATIENT
Start: 2022-06-29 | End: 2022-06-29

## 2022-06-29 RX ORDER — MEPERIDINE HYDROCHLORIDE 25 MG/ML
12.5 INJECTION INTRAMUSCULAR; INTRAVENOUS; SUBCUTANEOUS
Status: DISCONTINUED | OUTPATIENT
Start: 2022-06-29 | End: 2022-06-29 | Stop reason: HOSPADM

## 2022-06-29 RX ORDER — DOCUSATE SODIUM 100 MG/1
100 CAPSULE, LIQUID FILLED ORAL 2 TIMES DAILY
Qty: 30 CAPSULE | Refills: 0 | Status: SHIPPED | OUTPATIENT
Start: 2022-06-29 | End: 2022-07-12

## 2022-06-29 RX ORDER — SODIUM CHLORIDE, SODIUM LACTATE, POTASSIUM CHLORIDE, CALCIUM CHLORIDE 600; 310; 30; 20 MG/100ML; MG/100ML; MG/100ML; MG/100ML
INJECTION, SOLUTION INTRAVENOUS CONTINUOUS
Status: DISCONTINUED | OUTPATIENT
Start: 2022-06-29 | End: 2022-06-29 | Stop reason: HOSPADM

## 2022-06-29 RX ORDER — PROPOFOL 10 MG/ML
INJECTION, EMULSION INTRAVENOUS PRN
Status: DISCONTINUED | OUTPATIENT
Start: 2022-06-29 | End: 2022-06-29

## 2022-06-29 RX ORDER — HYDROCODONE BITARTRATE AND ACETAMINOPHEN 5; 325 MG/1; MG/1
1 TABLET ORAL EVERY 6 HOURS PRN
Qty: 18 TABLET | Refills: 0 | Status: SHIPPED | OUTPATIENT
Start: 2022-06-29 | End: 2022-07-02

## 2022-06-29 RX ORDER — ONDANSETRON 2 MG/ML
INJECTION INTRAMUSCULAR; INTRAVENOUS PRN
Status: DISCONTINUED | OUTPATIENT
Start: 2022-06-29 | End: 2022-06-29

## 2022-06-29 RX ORDER — LIDOCAINE HYDROCHLORIDE AND EPINEPHRINE 10; 10 MG/ML; UG/ML
INJECTION, SOLUTION INFILTRATION; PERINEURAL PRN
Status: DISCONTINUED | OUTPATIENT
Start: 2022-06-29 | End: 2022-06-29 | Stop reason: HOSPADM

## 2022-06-29 RX ORDER — ONDANSETRON 2 MG/ML
4 INJECTION INTRAMUSCULAR; INTRAVENOUS EVERY 30 MIN PRN
Status: DISCONTINUED | OUTPATIENT
Start: 2022-06-29 | End: 2022-06-29 | Stop reason: HOSPADM

## 2022-06-29 RX ADMIN — SODIUM CHLORIDE, POTASSIUM CHLORIDE, SODIUM LACTATE AND CALCIUM CHLORIDE: 600; 310; 30; 20 INJECTION, SOLUTION INTRAVENOUS at 08:12

## 2022-06-29 RX ADMIN — KETOROLAC TROMETHAMINE 30 MG: 30 INJECTION, SOLUTION INTRAMUSCULAR at 08:46

## 2022-06-29 RX ADMIN — HEPARIN SODIUM 5000 UNITS: 10000 INJECTION, SOLUTION INTRAVENOUS; SUBCUTANEOUS at 06:51

## 2022-06-29 RX ADMIN — SODIUM CHLORIDE, POTASSIUM CHLORIDE, SODIUM LACTATE AND CALCIUM CHLORIDE: 600; 310; 30; 20 INJECTION, SOLUTION INTRAVENOUS at 06:23

## 2022-06-29 RX ADMIN — PHENYLEPHRINE HYDROCHLORIDE 100 MCG: 10 INJECTION INTRAVENOUS at 07:51

## 2022-06-29 RX ADMIN — FENTANYL CITRATE 25 MCG: 50 INJECTION, SOLUTION INTRAMUSCULAR; INTRAVENOUS at 10:01

## 2022-06-29 RX ADMIN — PROPOFOL 200 MCG/KG/MIN: 10 INJECTION, EMULSION INTRAVENOUS at 07:36

## 2022-06-29 RX ADMIN — ONDANSETRON 4 MG: 2 INJECTION INTRAMUSCULAR; INTRAVENOUS at 08:01

## 2022-06-29 RX ADMIN — FENTANYL CITRATE 25 MCG: 50 INJECTION, SOLUTION INTRAMUSCULAR; INTRAVENOUS at 10:16

## 2022-06-29 RX ADMIN — FENTANYL CITRATE 100 MCG: 50 INJECTION, SOLUTION INTRAMUSCULAR; INTRAVENOUS at 07:30

## 2022-06-29 RX ADMIN — PROPOFOL 250 MG: 10 INJECTION, EMULSION INTRAVENOUS at 07:30

## 2022-06-29 RX ADMIN — ROCURONIUM BROMIDE 40 MG: 50 INJECTION, SOLUTION INTRAVENOUS at 07:30

## 2022-06-29 RX ADMIN — PROPOFOL 30 MG: 10 INJECTION, EMULSION INTRAVENOUS at 08:50

## 2022-06-29 RX ADMIN — ROCURONIUM BROMIDE 10 MG: 50 INJECTION, SOLUTION INTRAVENOUS at 07:42

## 2022-06-29 RX ADMIN — ROCURONIUM BROMIDE 20 MG: 50 INJECTION, SOLUTION INTRAVENOUS at 08:15

## 2022-06-29 RX ADMIN — MIDAZOLAM 2 MG: 1 INJECTION INTRAMUSCULAR; INTRAVENOUS at 07:26

## 2022-06-29 RX ADMIN — KETAMINE HYDROCHLORIDE 50 MG: 10 INJECTION, SOLUTION INTRAMUSCULAR; INTRAVENOUS at 07:43

## 2022-06-29 RX ADMIN — FENTANYL CITRATE 25 MCG: 50 INJECTION, SOLUTION INTRAMUSCULAR; INTRAVENOUS at 09:40

## 2022-06-29 RX ADMIN — Medication 2 G: at 07:27

## 2022-06-29 RX ADMIN — OXYCODONE HYDROCHLORIDE 5 MG: 5 TABLET ORAL at 10:33

## 2022-06-29 RX ADMIN — SUGAMMADEX 200 MG: 100 INJECTION, SOLUTION INTRAVENOUS at 09:06

## 2022-06-29 RX ADMIN — FENTANYL CITRATE 25 MCG: 50 INJECTION, SOLUTION INTRAMUSCULAR; INTRAVENOUS at 09:50

## 2022-06-29 RX ADMIN — LIDOCAINE HYDROCHLORIDE 5 ML: 10 INJECTION, SOLUTION INFILTRATION; PERINEURAL at 07:30

## 2022-06-29 NOTE — ANESTHESIA PROCEDURE NOTES
Airway       Patient location during procedure: OR       Procedure Start/Stop Times: 6/29/2022 7:34 AM  Staff -        Anesthesiologist:  Raul Santillan MD       CRNA: Som Beltran APRN CRNA       Performed By: CRNAIndications and Patient Condition       Indications for airway management: palmira-procedural       Induction type:intravenous       Mask difficulty assessment: 2 - vent by mask + OA or adjuvant +/- NMBA    Final Airway Details       Final airway type: endotracheal airway       Successful airway: ETT - single  Endotracheal Airway Details        ETT size (mm): 8.0       Cuffed: yes       Successful intubation technique: direct laryngoscopy       DL Blade Type: Saeed 2       Grade View of Cords: 1       Adjucts: stylet and tooth guard       Position: Right       Measured from: gums/teeth       Secured at (cm): 23    Post intubation assessment        Placement verified by: capnometry, equal breath sounds and chest rise        Number of attempts at approach: 1       Number of other approaches attempted: 0       Secured with: silk tape       Ease of procedure: difficult       Dentition: Intact and Unchanged    Medication(s) Administered   Medication Administration Time: 6/29/2022 7:34 AM    Additional Comments       Small mouth opening, short thyromental distance. Glidescope recommended for future intubation

## 2022-06-29 NOTE — ANESTHESIA POSTPROCEDURE EVALUATION
Patient: Gilberto Lund    Procedure: Procedure(s):  CHOLECYSTECTOMY, LAPAROSCOPIC       Anesthesia Type:  General    Note:  Disposition: Outpatient   Postop Pain Control: Uneventful            Sign Out: Well controlled pain   PONV: No   Neuro/Psych: Uneventful            Sign Out: Acceptable/Baseline neuro status   Airway/Respiratory: Uneventful            Sign Out: Acceptable/Baseline resp. status   CV/Hemodynamics: Uneventful            Sign Out: Acceptable CV status; No obvious hypovolemia; No obvious fluid overload   Other NRE: NONE   DID A NON-ROUTINE EVENT OCCUR? No           Last vitals:  Vitals Value Taken Time   /60 06/29/22 1015   Temp 37.1  C (98.8  F) 06/29/22 0945   Pulse 62 06/29/22 1028   Resp 18 06/29/22 1028   SpO2 96 % 06/29/22 1028   Vitals shown include unvalidated device data.    Electronically Signed By: Raul Santillan MD  June 29, 2022  11:10 AM

## 2022-06-29 NOTE — H&P
General Surgery H&P  Gilberto Lund MRN# [id number]   Age/Sex: 41 year old male YOB: 1980     Reason for visit: Acute cholecystitis       Referring physician: Celia Marr MD                   Assessment and Plan:   Assessment:  1.  Acute cholecystitis    Plan:  -To the OR today for laparoscopic cholecystectomy, possible open  - The risks and benefits of the procedure were explained detail to the patient. The risks include infection, bleeding, damage to the surrounding structures. Patient verbalized understanding provided consent to undergo the procedure above.            Chief Complaint:   Here for surgery     History is obtained from the patient    HPI:   Gilberto Lund is a 41 year old male who presents to the hospital today for laparoscopic cholecystectomy.  The patient was seen yesterday in [facility] ER for opal pain.  Unfortunately we were unable to coordinate a OR schedule for surgery.  As result we brought him in today as an outpatient for laparoscopic cholecystectomy.    The patient states that he has had this about 6 months ago.  The abdominal pain subsided.  The patient started to have more abdominal pain starting Friday and worsening over the weekend.  He came in for further evaluation.  He was found to have acute cholecystitis with a leukocytosis.  General surgery team was consulted to evaluate this patient.  Patient did have nausea and vomiting over the weekend.  Currently patient has no further complaints.  He was noted to have subjective fevers at home.          Past Medical History:     Past Medical History:   Diagnosis Date     Kidney stone      Malignant neoplasm (H)      Testicle cancer, left               Past Surgical History:     Past Surgical History:   Procedure Laterality Date     EXCISE EXOSTOSIS FOOT  10/25/2013    Procedure: EXCISE EXOSTOSIS FOOT;  Excision of calcaneal bone cyst left foot with allograft;  Surgeon: Marcello Jensen DPM;  Location: [facility]      ORCHIECTOMY SCROTAL Left              Social History:    reports that he has been smoking. He has never used smokeless tobacco. He reports current alcohol use. He reports that he does not use drugs.           Family History:     Family History   Problem Relation Age of Onset     C.A.D. Maternal Grandfather      No Known Problems Mother      No Known Problems Father               Allergies:     Allergies   Allergen Reactions     Nkda [No Known Drug Allergies]               Medications:     Prior to Admission medications    Medication Sig Start Date End Date Taking? Authorizing Provider   acetaminophen (TYLENOL) 500 MG tablet Take 500-1,000 mg by mouth every 6 hours as needed for mild pain   Yes Reported, Patient   ibuprofen (ADVIL/MOTRIN) 200 MG tablet Take 400 mg by mouth every 6 hours as needed for mild pain   Yes Reported, Patient   ondansetron (ZOFRAN ODT) 4 MG ODT tab Take 1-2 tablets (4-8 mg) by mouth every 8 hours as needed for nausea 6/28/22 7/1/22 Yes Celia Marr MD   oxyCODONE (ROXICODONE) 5 MG tablet Take 1-2 tablets (5-10 mg) by mouth every 6 hours as needed for pain 6/28/22 7/1/22 Yes Celia Marr MD              Review of Systems:   A 12 point Review of Systems is negative other than noted in the HPI            Physical Exam:     Patient Vitals for the past 24 hrs:   BP Temp Temp src Pulse Resp SpO2 Weight   06/29/22 0547 130/61 98.4  F (36.9  C) Oral 91 18 97 % 101.8 kg (224 lb 8 oz)        No intake or output data in the 24 hours ending 06/29/22 0718   Constitutional:   awake, alert, cooperative, no apparent distress, and appears stated age       Eyes:   PERRL, conjunctiva/corneas clear, EOM's intact; no scleral edema or icterus noted        ENT:   Normocephalic, without obvious abnormality, atraumatic, Lips, mucosa, and tongue normal        Hematologic / Lymphatic:   No lymphadenopathy       Lungs:   Normal respiratory effort, no accessory muscle use, breath sounds bilaterally on  auscultation       Cardiovascular:   Regular rate and rhythm       Abdomen:   Soft, nondistended, tender to palpation over the right upper quadrant       Musculoskeletal:   No obvious swelling, bruising or deformity       Skin:   Skin color and texture normal for patient, no rashes or lesions              Data:         All imaging studies reviewed by me. I reviewed the images, and I agree with acute cholecystitis.    DO Cholo Arroyo DO  General Surgeon  [facility]  Surgery [address] [address]?  Office: [phone]  Employed by - [facility]  Pager: [phone]

## 2022-06-29 NOTE — DISCHARGE INSTRUCTIONS
Follow up: It is our practice to have all patients follow up with us 2-3 weeks after their surgery to ensure they are recovering well.  For straightforwardlaparoscopic procedures, this can be done either in clinic as a scheduled follow up appointment, or over the phone.  If you would like a scheduled follow up appointment in clinic, please call us at [phone] to schedule an appointment at your convenience.  If you would prefer to follow up with us by phone please let us know so that we may contact you 2-3 weeks following your procedure.         Diet: Regular diet. Patients can have difficulty with constipation following surgery, due in part to the administration of narcotic medications.  If you are suffering with constipation, you should avoid foods such as hard cheeses or red meat.  Foods high infiber are recommended.       Activity: You should continue to be active at home, including ambulating frequently.  If possible try to limit the amount of time spent in bed.     Restrictions: You have no liftingrestrictions post operatively, but may wish to avoid strenuous physical activity for 1-2 weeks.  You should limit your physical activity if it causes you discomfort; however, this should resolve within 1-2 weeks.   Walking does not count as strenuous physical activity.  You are safe to walk up and down stairs.  Following 2 weeks you may resume all normal physical activity.     Wound / drain care: Your incisions are closed using absorbale sutures.  The skin is sealed with a surgical glue.  Do not peal the glue off.  Please allow the glue to peal off on its own.      It is normal to have a smallrim of red present around the incisions. This should not, however, extend beyond 1/4 inch from the incision.  If your incisions become increasingly tender, red, or draining, please contact us.        Bathing: Youmay shower after 24 hours from surgery.  It is ok to get your incisions wet, but avoid rubbing them.  Avoid  soaking in bath tubs, or swimming in lakes, pools, or streams for 4 weeks following surgery.

## 2022-06-29 NOTE — ANESTHESIA CARE TRANSFER NOTE
Patient: Gilberto Lund    Procedure: Procedure(s):  CHOLECYSTECTOMY, LAPAROSCOPIC       Diagnosis: Acute cholecystitis [K81.0]  Diagnosis Additional Information: No value filed.    Anesthesia Type:   General     Note:    Oropharynx: oropharynx clear of all foreign objects  Level of Consciousness: awake  Oxygen Supplementation: face mask    Independent Airway: airway patency satisfactory and stable  Dentition: dentition unchanged  Vital Signs Stable: post-procedure vital signs reviewed and stable  Report to RN Given: handoff report given  Patient transferred to: PACU    Handoff Report: Identifed the Patient, Identified the Reponsible Provider, Reviewed the pertinent medical history, Discussed the surgical course, Reviewed Intra-OP anesthesia mangement and issues during anesthesia, Set expectations for post-procedure period and Allowed opportunity for questions and acknowledgement of understanding      Vitals:  Vitals Value Taken Time   /72 06/29/22 0915   Temp 36.4  C (97.6  F) 06/29/22 0915   Pulse 80 06/29/22 0918   Resp 24 06/29/22 0918   SpO2 100 % 06/29/22 0918   Vitals shown include unvalidated device data.    Electronically Signed By: LJ Barry CRNA  June 29, 2022  9:20 AM

## 2022-06-29 NOTE — OP NOTE
[facility]    Operative Note    Pre-operative diagnosis: Acute cholecystitis [K81.0]  Post-operative diagnosis Same as pre-operative diagnosis    Procedure: Procedure(s):  CHOLECYSTECTOMY, LAPAROSCOPIC  Surgeon: Surgeon(s) and Role:     * Cholo Agustin DO - Primary     * Amalia Solomon PA-C - Assisting  Anesthesia: General   Estimated Blood Loss: 15 mL    Drains: None  Specimens:   ID Type Source Tests Collected by Time Destination   1 :  Tissue Gallbladder with Stone(s) SURGICAL PATHOLOGY EXAM Cholo Agustin DO 6/29/2022  8:42 AM      Findings:   - acute cholecystitis   Complications: None.  Implants: * No implants in log *      Indication: 41-year-old male who presented with abdominal pain to the ED on 6/28/2022.  Patient was found to have acute cholecystitis.  Offer the patient a procedure of laparoscopic cholecystectomy. The risks and benefits of the procedure were explained detail to the patient. The risks include infection, bleeding, damage to the surrounding structures. Patient verbalized understanding provided consent to undergo the procedure above.     Procedure: Patient was brought back to the operating room and was placed on the operating table in the supine position.  The patient's extremities were padded and positioned in the usual fashion.  The patient then underwent anesthesia sedation and intubation.  The patient's abdomen was prepped and draped in the usual sterile fashion.  Prior to initiating the procedure, a timeout was completed.  All present were in agreement.    1% lidocaine with epinephrine was instilled in Kramer's point over the left upper quadrant.  An 11 blade was then used to make a small skin incision at Kramer's point.  A Veress needle was then inserted into the patient's abdomen.  A saline drop test was then completed.  Insufflation was then initiated.  A 5 mm trocar was inserted at the infraumbilical port site with a Visiport.  Once insufflation was  completed, a general survey was completed.  I could identify that the patient had an inflamed gallbladder distended.     A 12 mm port was placed in the epigastric region.  Two 5 mm ports were placed in the right upper quadrant.  These ports were placed under direct visualization.  The distention of the gallbladder made it difficult to grasp it.  As result a puncture suction device was used to suction out all of the gallbladder.  The gallbladder was then grasped with gator graspers and retracted cephalad.  The cystic duct and cystic artery were then carefully dissected and isolated with a combination of blunt dissection with the [place] graspers as well as electrocautery.  Once the cystic artery and cystic duct were isolated the critical view was obtained.  An Endo Clip was used to clip across the cystic artery and duct.  Endoscopic lacey were then used to transect across the cystic duct and cystic artery.  The gallbladder was removed off of the liver bed using electrocautery.  The gallbladder was then removed from the abdomen using an Endo Catch bag through the 12 mm epigastric port site.      Inspection of the liver bed revealed good hemostasis.  The fascia of the 12 mm port site was then closed using an 0 Vicryl suture with a suture passer.  A 3-0 Vicryl suture was used to perform deep dermal sutures at the 12 mm port site.  All surgical sites were then closed with a 4-0 Vicryl suture in a subcuticular fashion.  Surgical glue was used to reinforce all surgical skin incisions.  At the end of the procedure, a final count was completed.  I was told all sharps, sponges, instruments were accounted for.  The patient tolerated the procedure with no complications.  The patient was then extubated and brought back to the PACU in stable condition.    Cholo Agustin DO  General Surgeon  [facility]  Surgery [facility]  [address] [place] [id number]?  Office:  [phone]  Employed by - [employer]  Pager: [phone]

## 2022-06-29 NOTE — ANESTHESIA PROCEDURE NOTES
Airway       Patient location during procedure: OR       Procedure Start/Stop Times: 6/29/2022 7:34 AM  Staff -        Anesthesiologist:  Raul Santillan MD       CRNA: Som Beltran APRN CRNA       Performed By: CRNAIndications and Patient Condition       Indications for airway management: palmira-procedural       Induction type:intravenous       Mask difficulty assessment: 2 - vent by mask + OA or adjuvant +/- NMBA (easy mask)    Final Airway Details       Final airway type: endotracheal airway       Successful airway: ETT - single  Endotracheal Airway Details        ETT size (mm): 8.0       Cuffed: yes       Successful intubation technique: direct laryngoscopy       DL Blade Type: Saeed 2       Grade View of Cords: 1       Adjucts: stylet       Position: Right       Measured from: gums/teeth       Secured at (cm): 23    Post intubation assessment        Placement verified by: capnometry, equal breath sounds and chest rise        Number of attempts at approach: 1       Number of other approaches attempted: 0       Secured with: silk tape       Ease of procedure: difficult (small mouth opening, possibly the result of inadequate paralytic)       Dentition: Intact and Unchanged    Medication(s) Administered   Medication Administration Time: 6/29/2022 7:34 AM    Additional Comments       Small mouth opening, short thyromental distance, TMJ mobility limited. Intubation could have been facilitated by giving additional paralytic (40 mg rocuronium given on this induction) or using a glidescope

## 2022-06-29 NOTE — ANESTHESIA PREPROCEDURE EVALUATION
Anesthesia Pre-Procedure Evaluation    Patient: Gilberto Lund   MRN: [id number] : 1980        Procedure : Procedure(s):  CHOLECYSTECTOMY, LAPAROSCOPIC          Past Medical History:   Diagnosis Date     Kidney stone      Malignant neoplasm (H)      Testicle cancer, left       Past Surgical History:   Procedure Laterality Date     EXCISE EXOSTOSIS FOOT  10/25/2013    Procedure: EXCISE EXOSTOSIS FOOT;  Excision of calcaneal bone cyst left foot with allograft;  Surgeon: Marcello Jensen DPM;  Location: [facility] OR     ORCHIECTOMY SCROTAL Left       Allergies   Allergen Reactions     Nkda [No Known Drug Allergies]       Social History     Tobacco Use     Smoking status: Light Tobacco Smoker     Last attempt to quit: 3/29/2012     Years since quitting: 10.2     Smokeless tobacco: Never Used     Tobacco comment: Occasional Cigar   Substance Use Topics     Alcohol use: Yes     Comment: social      Wt Readings from Last 1 Encounters:   22 101.8 kg (224 lb 8 oz)        Anesthesia Evaluation            ROS/MED HX  ENT/Pulmonary:  - neg pulmonary ROS     Neurologic:  - neg neurologic ROS     Cardiovascular:  - neg cardiovascular ROS     METS/Exercise Tolerance:     Hematologic:  - neg hematologic  ROS     Musculoskeletal:  - neg musculoskeletal ROS     GI/Hepatic:  - neg GI/hepatic ROS     Renal/Genitourinary:     (+) renal disease,     Endo:  - neg endo ROS     Psychiatric/Substance Use:  - neg psychiatric ROS     Infectious Disease:  - neg infectious disease ROS     Malignancy: Comment: testicular      Other:  - neg other ROS          Physical Exam    Airway  airway exam normal           Respiratory Devices and Support         Dental  no notable dental history         Cardiovascular   cardiovascular exam normal          Pulmonary   pulmonary exam normal                OUTSIDE LABS:  CBC:   Lab Results   Component Value Date    WBC 16.8 (H) 2022    WBC 7.9 03/10/2021    HGB 15.9 2022    HGB 16.8  03/10/2021    HCT 46.8 06/28/2022    HCT 50.0 03/10/2021     06/28/2022     03/10/2021     BMP:   Lab Results   Component Value Date     06/28/2022     03/10/2021    POTASSIUM 4.4 06/28/2022    POTASSIUM 4.5 03/10/2021    CHLORIDE 105 06/28/2022    CHLORIDE 107 03/10/2021    CO2 26 06/28/2022    CO2 24 03/10/2021    BUN 11 06/28/2022    BUN 16 03/10/2021    CR 0.99 06/28/2022    CR 0.98 03/10/2021     06/28/2022    GLC 92 03/10/2021     COAGS: No results found for: PTT, INR, FIBR  POC: No results found for: BGM, HCG, HCGS  HEPATIC:   Lab Results   Component Value Date    ALBUMIN 3.8 06/28/2022    PROTTOTAL 7.4 06/28/2022    ALT 13 06/28/2022    AST 15 06/28/2022    ALKPHOS 69 06/28/2022    BILITOTAL 1.4 (H) 06/28/2022     OTHER:   Lab Results   Component Value Date    CANDICE 9.5 06/28/2022    LIPASE <9 06/28/2022       Anesthesia Plan    ASA Status:  2      Anesthesia Type: General.     - Airway: ETT   Induction: Intravenous.           Consents    Anesthesia Plan(s) and associated risks, benefits, and realistic alternatives discussed. Questions answered and patient/representative(s) expressed understanding.     - Discussed: Risks, Benefits and Alternatives for BOTH SEDATION and the PROCEDURE were discussed     - Discussed with:  Patient         Postoperative Care    Pain management: Multi-modal analgesia.   PONV prophylaxis: Ondansetron (or other 5HT-3), Dexamethasone or Solumedrol     Comments:                Raul Santillan MD

## 2022-06-30 LAB
PATH REPORT.COMMENTS IMP SPEC: NORMAL
PATH REPORT.COMMENTS IMP SPEC: NORMAL
PATH REPORT.FINAL DX SPEC: NORMAL
PATH REPORT.GROSS SPEC: NORMAL
PATH REPORT.MICROSCOPIC SPEC OTHER STN: NORMAL
PATH REPORT.RELEVANT HX SPEC: NORMAL
PHOTO IMAGE: NORMAL

## 2022-07-12 ENCOUNTER — OFFICE VISIT (OUTPATIENT)
Dept: SURGERY | Facility: CLINIC | Age: 42
End: 2022-07-12
Payer: COMMERCIAL

## 2022-07-12 VITALS — DIASTOLIC BLOOD PRESSURE: 66 MMHG | SYSTOLIC BLOOD PRESSURE: 118 MMHG

## 2022-07-12 DIAGNOSIS — Z48.89 POSTOPERATIVE VISIT: Primary | ICD-10-CM

## 2022-07-12 PROCEDURE — 99024 POSTOP FOLLOW-UP VISIT: CPT | Performed by: SURGERY

## 2022-07-12 NOTE — PROGRESS NOTES
General Surgery Clinic - Postop follow up  Gilberto Lund MRN# [id number]   Age/Sex: 41 year old male YOB: 1980     Reason for visit: Post op visit                           Assessment and Plan:   Assessment:  1.  History of acute cholecystitis, status post laparoscopic cholecystectomy on 6/29/2022    Plan:  -No further acute surgical intervention from my standpoint.  -Surgical pathology revealed gangrenous gallbladder  -Patient can follow-up as needed in the future.          Chief Complaint:     Chief Complaint   Patient presents with     Post-Op - General Surgery     PO SAM 6/29        History is obtained from the patient    HPI:   Gilberto Lund is a 41 year old male who presents the general surgery team today for evaluation postoperatively after lap sam.  Patient is status post lap sam on 6/9 2022.  Patient has been recovering uneventfully.  Patient states that he has some mild tugging sensation over the right upper quadrant.  Otherwise patient has no further complaints.  No nausea no vomiting.  Tolerating p.o. diet.  Passing flatus and bowel moods.          Past Medical History:     Past Medical History:   Diagnosis Date     Hard to intubate 6/29/2022     Kidney stone      Malignant neoplasm (H)      Testicle cancer, left               Past Surgical History:     Past Surgical History:   Procedure Laterality Date     EXCISE EXOSTOSIS FOOT  10/25/2013    Procedure: EXCISE EXOSTOSIS FOOT;  Excision of calcaneal bone cyst left foot with allograft;  Surgeon: Marcello Jensen DPM;  Location: [facility] OR     LAPAROSCOPIC CHOLECYSTECTOMY N/A 6/29/2022    Procedure: CHOLECYSTECTOMY, LAPAROSCOPIC;  Surgeon: Cholo Agustin DO;  Location: [facility] OR     ORCHIECTOMY SCROTAL Left              Social History:    reports that he has been smoking. He has never used smokeless tobacco. He reports current alcohol use. He reports that he does not use drugs.           Family History:     Family  History   Problem Relation Age of Onset     C.A.D. Maternal Grandfather      No Known Problems Mother      No Known Problems Father               Allergies:   No Known Allergies           Medications:     Prior to Admission medications    Not on File              Review of Systems:   A 12 point Review of Systems is negative other than noted in the HPI            Physical Exam:     Patient Vitals for the past 24 hrs:   BP   07/12/22 0909 118/66        [unfilled]   Constitutional:   awake, alert, cooperative, no apparent distress, and appears stated age       Eyes:   PERRL, conjunctiva/corneas clear, EOM's intact; no scleral edema or icterus noted        ENT:   Normocephalic, without obvious abnormality, atraumatic, Lips, mucosa, and tongue normal        Hematologic / Lymphatic:   No lymphadenopathy       Lungs:   Normal respiratory effort, no accessory muscle use, breath sounds bilaterally on auscultation       Cardiovascular:   Regular rate and rhythm       Abdomen:   Soft, nondistended, nontender to palpation.  Incisions are well-healed.       Musculoskeletal:   No obvious swelling, bruising or deformity       Skin:   Skin color and texture normal for patient, no rashes or lesions              Data:          DO Cholo Arroyo DO  General Surgeon  [facility]  Surgery [address] [address]?  Office: [phone]  Employed by - [employer]  Pager: [phone]

## 2022-07-12 NOTE — LETTER
7/12/2022         RE: Gilberto Lund  [address] [facility] [address]        Dear Colleague,    Thank you for referring your patient, Gilberto Lund, to the [facility] [place]. Please see a copy of my visit note below.    General Surgery Clinic - Postop follow up  Gilberto Lund MRN# [id number]   Age/Sex: 41 year old male YOB: 1980     Reason for visit: Post op visit                           Assessment and Plan:   Assessment:  1.  History of acute cholecystitis, status post laparoscopic cholecystectomy on 6/29/2022    Plan:  -No further acute surgical intervention from my standpoint.  -Surgical pathology revealed gangrenous gallbladder  -Patient can follow-up as needed in the future.          Chief Complaint:     Chief Complaint   Patient presents with     Post-Op - General Surgery     PO SAM 6/29        History is obtained from the patient    HPI:   Gilberto Lund is a 41 year old male who presents the general surgery team today for evaluation postoperatively after lap sam.  Patient is status post lap sam on 6/9 2022.  Patient has been recovering uneventfully.  Patient states that he has some mild tugging sensation over the right upper quadrant.  Otherwise patient has no further complaints.  No nausea no vomiting.  Tolerating p.o. diet.  Passing flatus and bowel moods.          Past Medical History:     Past Medical History:   Diagnosis Date     Hard to intubate 6/29/2022     Kidney stone      Malignant neoplasm (H)      Testicle cancer, left               Past Surgical History:     Past Surgical History:   Procedure Laterality Date     EXCISE EXOSTOSIS FOOT  10/25/2013    Procedure: EXCISE EXOSTOSIS FOOT;  Excision of calcaneal bone cyst left foot with allograft;  Surgeon: Marcello Jensen DPM;  Location: [facility] OR     LAPAROSCOPIC CHOLECYSTECTOMY N/A 6/29/2022    Procedure: CHOLECYSTECTOMY, LAPAROSCOPIC;  Surgeon:  Cholo Agustin DO;  Location: [facility] OR     ORCHIECTOMY SCROTAL Left              Social History:    reports that he has been smoking. He has never used smokeless tobacco. He reports current alcohol use. He reports that he does not use drugs.           Family History:     Family History   Problem Relation Age of Onset     C.A.D. Maternal Grandfather      No Known Problems Mother      No Known Problems Father               Allergies:   No Known Allergies           Medications:     Prior to Admission medications    Not on File              Review of Systems:   A 12 point Review of Systems is negative other than noted in the HPI            Physical Exam:     Patient Vitals for the past 24 hrs:   BP   07/12/22 0909 118/66        [unfilled]   Constitutional:   awake, alert, cooperative, no apparent distress, and appears stated age       Eyes:   PERRL, conjunctiva/corneas clear, EOM's intact; no scleral edema or icterus noted        ENT:   Normocephalic, without obvious abnormality, atraumatic, Lips, mucosa, and tongue normal        Hematologic / Lymphatic:   No lymphadenopathy       Lungs:   Normal respiratory effort, no accessory muscle use, breath sounds bilaterally on auscultation       Cardiovascular:   Regular rate and rhythm       Abdomen:   Soft, nondistended, nontender to palpation.  Incisions are well-healed.       Musculoskeletal:   No obvious swelling, bruising or deformity       Skin:   Skin color and texture normal for patient, no rashes or lesions              Data:          DO Cholo Arroyo DO  General Surgeon  [facility]  Surgery [address] [address]?  Office: [phone]  Employed by - [employer] [facility] [employer]  Pager: [phone]             Again, thank you for allowing me to participate in the care of your patient.        Sincerely,        Cholo Agustin DO

## 2022-09-03 ENCOUNTER — HEALTH MAINTENANCE LETTER (OUTPATIENT)
Age: 42
End: 2022-09-03

## 2023-09-30 ENCOUNTER — HEALTH MAINTENANCE LETTER (OUTPATIENT)
Age: 43
End: 2023-09-30

## 2023-11-01 ENCOUNTER — TRANSFERRED RECORDS (OUTPATIENT)
Dept: HEALTH INFORMATION MANAGEMENT | Facility: CLINIC | Age: 43
End: 2023-11-01

## 2024-09-30 SDOH — HEALTH STABILITY: PHYSICAL HEALTH: ON AVERAGE, HOW MANY DAYS PER WEEK DO YOU ENGAGE IN MODERATE TO STRENUOUS EXERCISE (LIKE A BRISK WALK)?: 5 DAYS

## 2024-09-30 SDOH — HEALTH STABILITY: PHYSICAL HEALTH: ON AVERAGE, HOW MANY MINUTES DO YOU ENGAGE IN EXERCISE AT THIS LEVEL?: 150+ MIN

## 2024-09-30 ASSESSMENT — SOCIAL DETERMINANTS OF HEALTH (SDOH): HOW OFTEN DO YOU GET TOGETHER WITH FRIENDS OR RELATIVES?: ONCE A WEEK

## 2024-10-02 ENCOUNTER — ANCILLARY PROCEDURE (OUTPATIENT)
Dept: GENERAL RADIOLOGY | Facility: CLINIC | Age: 44
End: 2024-10-02
Attending: FAMILY MEDICINE
Payer: COMMERCIAL

## 2024-10-02 ENCOUNTER — OFFICE VISIT (OUTPATIENT)
Dept: FAMILY MEDICINE | Facility: CLINIC | Age: 44
End: 2024-10-02
Payer: COMMERCIAL

## 2024-10-02 VITALS
RESPIRATION RATE: 16 BRPM | BODY MASS INDEX: 29.93 KG/M2 | TEMPERATURE: 97.9 F | SYSTOLIC BLOOD PRESSURE: 132 MMHG | WEIGHT: 221 LBS | HEIGHT: 72 IN | OXYGEN SATURATION: 96 % | HEART RATE: 64 BPM | DIASTOLIC BLOOD PRESSURE: 82 MMHG

## 2024-10-02 DIAGNOSIS — Z13.1 SCREENING FOR DIABETES MELLITUS: ICD-10-CM

## 2024-10-02 DIAGNOSIS — C62.90 MALIGNANT NEOPLASM OF TESTICLE, UNSPECIFIED LATERALITY, UNSPECIFIED WHETHER DESCENDED OR UNDESCENDED (H): ICD-10-CM

## 2024-10-02 DIAGNOSIS — M25.551 HIP PAIN, RIGHT: ICD-10-CM

## 2024-10-02 DIAGNOSIS — Z11.4 SCREENING FOR HIV (HUMAN IMMUNODEFICIENCY VIRUS): ICD-10-CM

## 2024-10-02 DIAGNOSIS — Z11.59 NEED FOR HEPATITIS C SCREENING TEST: ICD-10-CM

## 2024-10-02 DIAGNOSIS — Z13.220 SCREENING FOR LIPOID DISORDERS: ICD-10-CM

## 2024-10-02 DIAGNOSIS — Z00.00 ROUTINE GENERAL MEDICAL EXAMINATION AT A HEALTH CARE FACILITY: Primary | ICD-10-CM

## 2024-10-02 LAB — ERYTHROCYTE [SEDIMENTATION RATE] IN BLOOD BY WESTERGREN METHOD: 6 MM/HR (ref 0–15)

## 2024-10-02 PROCEDURE — 85652 RBC SED RATE AUTOMATED: CPT

## 2024-10-02 PROCEDURE — 73502 X-RAY EXAM HIP UNI 2-3 VIEWS: CPT | Mod: TC | Performed by: RADIOLOGY

## 2024-10-02 PROCEDURE — 86618 LYME DISEASE ANTIBODY: CPT

## 2024-10-02 PROCEDURE — 86803 HEPATITIS C AB TEST: CPT

## 2024-10-02 PROCEDURE — 36415 COLL VENOUS BLD VENIPUNCTURE: CPT

## 2024-10-02 PROCEDURE — 99213 OFFICE O/P EST LOW 20 MIN: CPT | Mod: 25 | Performed by: FAMILY MEDICINE

## 2024-10-02 PROCEDURE — 86200 CCP ANTIBODY: CPT

## 2024-10-02 PROCEDURE — 86038 ANTINUCLEAR ANTIBODIES: CPT

## 2024-10-02 PROCEDURE — 90471 IMMUNIZATION ADMIN: CPT | Performed by: FAMILY MEDICINE

## 2024-10-02 PROCEDURE — 99386 PREV VISIT NEW AGE 40-64: CPT | Mod: 25 | Performed by: FAMILY MEDICINE

## 2024-10-02 PROCEDURE — 80061 LIPID PANEL: CPT

## 2024-10-02 PROCEDURE — 86140 C-REACTIVE PROTEIN: CPT

## 2024-10-02 PROCEDURE — 87389 HIV-1 AG W/HIV-1&-2 AB AG IA: CPT

## 2024-10-02 PROCEDURE — 90715 TDAP VACCINE 7 YRS/> IM: CPT | Performed by: FAMILY MEDICINE

## 2024-10-02 PROCEDURE — 86039 ANTINUCLEAR ANTIBODIES (ANA): CPT

## 2024-10-02 PROCEDURE — 80048 BASIC METABOLIC PNL TOTAL CA: CPT

## 2024-10-02 PROCEDURE — 86431 RHEUMATOID FACTOR QUANT: CPT

## 2024-10-02 ASSESSMENT — PAIN SCALES - GENERAL: PAINLEVEL: MODERATE PAIN (4)

## 2024-10-02 NOTE — PROGRESS NOTES
Preventive Care Visit  [facility]  YOLIS SYED MD, Family Medicine  Oct 2, 2024      Assessment & Plan     Routine general medical examination at a health care facility       Screening for HIV (human immunodeficiency virus)     - HIV Antigen Antibody Combo; Future    Need for hepatitis C screening test     - Hepatitis C Screen Reflex to HCV RNA Quant and Genotype; Future    Malignant neoplasm of testicle, unspecified laterality, unspecified whether descended or undescended (H)     - Basic metabolic panel; Future    Screening for lipoid disorders     - Lipid panel reflex to direct LDL Non-fasting; Future    Screening for diabetes mellitus     - Basic metabolic panel; Future    Hip pain, right     - XR Hip Right 2-3 Views; Future  - ESR: Erythrocyte sedimentation rate; Future  - CRP, inflammation; Future  - Anti Nuclear Jane IgG by IFA with Reflex; Future  - LYME DISEASE TOTAL ANTIBODIES WITH REFLEX TO CONFIRMATION; Future  - Rheumatoid factor; Future  - Cyclic Citrullinated Peptide Antibody IgG; Future            BMI  Estimated body mass index is 29.97 kg/m  as calculated from the following:    Height as of this encounter: 1.829 m (6').    Weight as of this encounter: 100.2 kg (221 lb).       Counseling  Appropriate preventive services were addressed with this patient via screening, questionnaire, or discussion as appropriate for fall prevention, nutrition, physical activity, Tobacco-use cessation, social engagement, weight loss and cognition.  Checklist reviewing preventive services available has been given to the patient.          Xochilt Daniels is a 43 year old, presenting for the following:  Physical and Musculoskeletal Problem (Right hip pain into leg, sx started a long time ago, getting worse. No known injury or fall )        10/2/2024     4:11 PM   Additional Questions   Roomed by nato ley cma        Health Care Directive  Patient does not have a Health Care Directive or Living  Will    1)  Right hip/leg pain-   has been seeing chiro with little relief.   Chiro did MRI, fairly unremarkable.   2)  h/o testicular cancer 20 years ago.    3)      Musculoskeletal Problem                   9/30/2024   General Health   How would you rate your overall physical health? (!) FAIR   Feel stress (tense, anxious, or unable to sleep) Only a little      (!) STRESS CONCERN      9/30/2024   Nutrition   Three or more servings of calcium each day? Yes   Diet: Regular (no restrictions)   How many servings of fruit and vegetables per day? (!) 2-3   How many sweetened beverages each day? (!) 2            9/30/2024   Exercise   Days per week of moderate/strenous exercise 5 days   Average minutes spent exercising at this level 150+ min            9/30/2024   Social Factors   Frequency of gathering with friends or relatives Once a week   Worry food won't last until get money to buy more No   Food not last or not have enough money for food? No   Do you have housing? (Housing is defined as stable permanent housing and does not include staying ouside in a car, in a tent, in an abandoned building, in an overnight shelter, or couch-surfing.) Yes   Are you worried about losing your housing? No   Lack of transportation? No   Unable to get utilities (heat,electricity)? No            9/30/2024   Dental   Dentist two times every year? (!) NO            9/30/2024   TB Screening   Were you born outside of the [place]? No            Today's PHQ-2 Score:       10/1/2024     5:03 PM   PHQ-2 ( 1999 [employer])   Q1: Little interest or pleasure in doing things 0   Q2: Feeling down, depressed or hopeless 0   PHQ-2 Score 0   Q1: Little interest or pleasure in doing things Not at all   Q2: Feeling down, depressed or hopeless Not at all   PHQ-2 Score 0           9/30/2024   Substance Use   Alcohol more than 3/day or more than 7/wk No   Do you use any other substances recreationally? No        Social History     Tobacco Use    Smoking status:  Former    Smokeless tobacco: Former     Types: Chew    Tobacco comments:     Occasional Cigar   Vaping Use    Vaping status: Never Used   Substance Use Topics    Alcohol use: Yes     Comment: social    Drug use: No             9/30/2024   One time HIV Screening   Previous HIV test? No          9/30/2024   STI Screening   New sexual partner(s) since last STI/HIV test? No      ASCVD Risk   The 10-year ASCVD risk score (aGbe PERERA, et al., 2019) is: 2.1%    Values used to calculate the score:      Age: 43 years      Sex: Male      Is Non- : No      Diabetic: No      Tobacco smoker: No      Systolic Blood Pressure: 132 mmHg      Is BP treated: No      HDL Cholesterol: 43 mg/dL      Total Cholesterol: 189 mg/dL        9/30/2024   Contraception/Family Planning   Questions about contraception or family planning No           Reviewed and updated as needed this visit by Provider                          Review of Systems  Constitutional, HEENT, cardiovascular, pulmonary, gi and gu systems are negative, except as otherwise noted.     Objective    Exam  /82 (BP Location: Left arm, Patient Position: Sitting, Cuff Size: Adult Regular)   Pulse 64   Temp 97.9  F (36.6  C)   Resp 16   Ht 1.829 m (6')   Wt 100.2 kg (221 lb)   SpO2 96%   BMI 29.97 kg/m     Estimated body mass index is 29.97 kg/m  as calculated from the following:    Height as of this encounter: 1.829 m (6').    Weight as of this encounter: 100.2 kg (221 lb).    Physical Exam  GENERAL: alert and no distress  NECK: no adenopathy, no asymmetry, masses, or scars  RESP: lungs clear to auscultation - no rales, rhonchi or wheezes  CV: regular rate and rhythm, normal S1 S2, no S3 or S4, no murmur, click or rub, no peripheral edema  ABDOMEN: soft, nontender, no hepatosplenomegaly, no masses and bowel sounds normal  MS: no gross musculoskeletal defects noted, no edema        Signed Electronically by: YOLIS SYDE MD

## 2024-10-03 LAB
ANION GAP SERPL CALCULATED.3IONS-SCNC: 12 MMOL/L (ref 7–15)
BUN SERPL-MCNC: 17 MG/DL (ref 6–20)
CALCIUM SERPL-MCNC: 9.3 MG/DL (ref 8.8–10.4)
CHLORIDE SERPL-SCNC: 104 MMOL/L (ref 98–107)
CHOLEST SERPL-MCNC: 173 MG/DL
CREAT SERPL-MCNC: 1.1 MG/DL (ref 0.67–1.17)
CRP SERPL-MCNC: 5.21 MG/L
EGFRCR SERPLBLD CKD-EPI 2021: 85 ML/MIN/1.73M2
FASTING STATUS PATIENT QL REPORTED: ABNORMAL
FASTING STATUS PATIENT QL REPORTED: NORMAL
GLUCOSE SERPL-MCNC: 94 MG/DL (ref 70–99)
HCO3 SERPL-SCNC: 24 MMOL/L (ref 22–29)
HCV AB SERPL QL IA: NONREACTIVE
HDLC SERPL-MCNC: 42 MG/DL
HIV 1+2 AB+HIV1 P24 AG SERPL QL IA: NONREACTIVE
LDLC SERPL CALC-MCNC: 98 MG/DL
NONHDLC SERPL-MCNC: 131 MG/DL
POTASSIUM SERPL-SCNC: 4.1 MMOL/L (ref 3.4–5.3)
RHEUMATOID FACT SERPL-ACNC: <10 IU/ML
SODIUM SERPL-SCNC: 140 MMOL/L (ref 135–145)
TRIGL SERPL-MCNC: 163 MG/DL

## 2024-10-04 ENCOUNTER — TELEPHONE (OUTPATIENT)
Dept: FAMILY MEDICINE | Facility: CLINIC | Age: 44
End: 2024-10-04
Payer: COMMERCIAL

## 2024-10-04 DIAGNOSIS — M25.551 HIP PAIN, RIGHT: Primary | ICD-10-CM

## 2024-10-04 LAB
ANA PAT SER IF-IMP: ABNORMAL
ANA SER QL IF: ABNORMAL
ANA TITR SER IF: ABNORMAL {TITER}
B BURGDOR IGG+IGM SER QL: 0.07

## 2024-10-04 NOTE — TELEPHONE ENCOUNTER
Patient called and provider message was relayed. MRI questions answered and pended.    Niles Zapata RN  [facility]

## 2024-10-04 NOTE — TELEPHONE ENCOUNTER
"Please let patient know that hip x-ray showed some degenerative changes.   Radiologist also commented on \"lucency\" or decreased bone signal.    Given patient's symptoms and these findings, would recommend follow-up MRI.  Patient agreeable, please go through screening question, specifically is a claustrophobic and would need premedication      "

## 2024-10-06 LAB — CCP AB SER IA-ACNC: 0.9 U/ML

## 2024-10-14 NOTE — PATIENT INSTRUCTIONS
Patient Education   Preventive Care Advice   This is general advice given by our system to help you stay healthy. However, your care team may have specific advice just for you. Please talk to your care team about your preventive care needs.  Nutrition  Eat 5 or more servings of fruits and vegetables each day.  Try wheat bread, brown rice and whole grain pasta (instead of white bread, rice, and pasta).  Get enough calcium and vitamin D. Check the label on foods and aim for 100% of the RDA (recommended daily allowance).  Lifestyle  Exercise at least 150 minutes each week  (30 minutes a day, 5 days a week).  Do muscle strengthening activities 2 days a week. These help control your weight and prevent disease.  No smoking.  Wear sunscreen to prevent skin cancer.  Have a dental exam and cleaning every 6 months.  Yearly exams  See your health care team every year to talk about:  Any changes in your health.  Any medicines your care team has prescribed.  Preventive care, family planning, and ways to prevent chronic diseases.  Shots (vaccines)   HPV shots (up to age 26), if you've never had them before.  Hepatitis B shots (up to age 59), if you've never had them before.  COVID-19 shot: Get this shot when it's due.  Flu shot: Get a flu shot every year.  Tetanus shot: Get a tetanus shot every 10 years.  Pneumococcal, hepatitis A, and RSV shots: Ask your care team if you need these based on your risk.  Shingles shot (for age 50 and up)  General health tests  Diabetes screening:  Starting at age 35, Get screened for diabetes at least every 3 years.  If you are younger than age 35, ask your care team if you should be screened for diabetes.  Cholesterol test: At age 39, start having a cholesterol test every 5 years, or more often if advised.  Bone density scan (DEXA): At age 50, ask your care team if you should have this scan for osteoporosis (brittle bones).  Hepatitis C: Get tested at least once in your life.  STIs (sexually  transmitted infections)  Before age 24: Ask your care team if you should be screened for STIs.  After age 24: Get screened for STIs if you're at risk. You are at risk for STIs (including HIV) if:  You are sexually active with more than one person.  You don't use condoms every time.  You or a partner was diagnosed with a sexually transmitted infection.  If you are at risk for HIV, ask about PrEP medicine to prevent HIV.  Get tested for HIV at least once in your life, whether you are at risk for HIV or not.  Cancer screening tests  Cervical cancer screening: If you have a cervix, begin getting regular cervical cancer screening tests starting at age 21.  Breast cancer scan (mammogram): If you've ever had breasts, begin having regular mammograms starting at age 40. This is a scan to check for breast cancer.  Colon cancer screening: It is important to start screening for colon cancer at age 45.  Have a colonoscopy test every 10 years (or more often if you're at risk) Or, ask your provider about stool tests like a FIT test every year or Cologuard test every 3 years.  To learn more about your testing options, visit:   .  For help making a decision, visit:   https://bit.ly/vp93108.  Prostate cancer screening test: If you have a prostate, ask your care team if a prostate cancer screening test (PSA) at age 55 is right for you.  Lung cancer screening: If you are a current or former smoker ages 50 to 80, ask your care team if ongoing lung cancer screenings are right for you.  For informational purposes only. Not to replace the advice of your health care provider. Copyright   2023 [employer]. All rights reserved. Clinically reviewed by the [facility]itions Program. [employer] 720995 - REV 01/24.

## 2024-10-22 ENCOUNTER — HOSPITAL ENCOUNTER (OUTPATIENT)
Dept: MRI IMAGING | Facility: CLINIC | Age: 44
Discharge: HOME | End: 2024-10-22
Attending: FAMILY MEDICINE | Admitting: FAMILY MEDICINE
Payer: COMMERCIAL

## 2024-10-22 DIAGNOSIS — M25.551 HIP PAIN, RIGHT: ICD-10-CM

## 2024-10-22 PROCEDURE — A9585 GADOBUTROL INJECTION: HCPCS | Performed by: FAMILY MEDICINE

## 2024-10-22 PROCEDURE — 73723 MRI JOINT LWR EXTR W/O&W/DYE: CPT | Mod: RT

## 2024-10-22 PROCEDURE — 255N000002 HC RX 255 OP 636: Performed by: FAMILY MEDICINE

## 2024-10-22 RX ORDER — GADOBUTROL 604.72 MG/ML
10 INJECTION INTRAVENOUS ONCE
Status: COMPLETED | OUTPATIENT
Start: 2024-10-22 | End: 2024-10-22

## 2024-10-22 RX ADMIN — GADOBUTROL 10 ML: 604.72 INJECTION INTRAVENOUS at 16:09

## 2024-10-23 ENCOUNTER — TELEPHONE (OUTPATIENT)
Dept: FAMILY MEDICINE | Facility: CLINIC | Age: 44
End: 2024-10-23
Payer: COMMERCIAL

## 2024-10-23 ENCOUNTER — MYC MEDICAL ADVICE (OUTPATIENT)
Dept: FAMILY MEDICINE | Facility: CLINIC | Age: 44
End: 2024-10-23
Payer: COMMERCIAL

## 2024-10-23 DIAGNOSIS — M25.551 HIP PAIN, RIGHT: Primary | ICD-10-CM

## 2024-10-23 DIAGNOSIS — C62.90 MALIGNANT NEOPLASM OF TESTICLE, UNSPECIFIED LATERALITY, UNSPECIFIED WHETHER DESCENDED OR UNDESCENDED (H): ICD-10-CM

## 2024-10-23 DIAGNOSIS — N40.0 ENLARGED PROSTATE: ICD-10-CM

## 2024-10-23 NOTE — TELEPHONE ENCOUNTER
Called pt to discuss MRI results (see report)  Referral entered.    Pt to call with additional questions.

## 2024-10-24 ENCOUNTER — TELEPHONE (OUTPATIENT)
Dept: ORTHOPEDICS | Facility: CLINIC | Age: 44
End: 2024-10-24
Payer: COMMERCIAL

## 2024-10-24 NOTE — TELEPHONE ENCOUNTER
Patient has a refrral to Ortho Oncology for     right hip pain, see MRI       Please assist patient with scheduling appt.

## 2024-10-25 ENCOUNTER — LAB (OUTPATIENT)
Dept: LAB | Facility: CLINIC | Age: 44
End: 2024-10-25
Payer: COMMERCIAL

## 2024-10-25 DIAGNOSIS — C62.90 MALIGNANT NEOPLASM OF TESTICLE, UNSPECIFIED LATERALITY, UNSPECIFIED WHETHER DESCENDED OR UNDESCENDED (H): ICD-10-CM

## 2024-10-25 DIAGNOSIS — N40.0 ENLARGED PROSTATE: ICD-10-CM

## 2024-10-25 DIAGNOSIS — M25.551 HIP PAIN, RIGHT: ICD-10-CM

## 2024-10-25 LAB
AFP SERPL-MCNC: 2.7 NG/ML
ERYTHROCYTE [DISTWIDTH] IN BLOOD BY AUTOMATED COUNT: 12.4 % (ref 10–15)
HCT VFR BLD AUTO: 47.5 % (ref 40–53)
HGB BLD-MCNC: 15.9 G/DL (ref 13.3–17.7)
MCH RBC QN AUTO: 30.9 PG (ref 26.5–33)
MCHC RBC AUTO-ENTMCNC: 33.5 G/DL (ref 31.5–36.5)
MCV RBC AUTO: 92 FL (ref 78–100)
PLATELET # BLD AUTO: 311 10E3/UL (ref 150–450)
PSA SERPL DL<=0.01 NG/ML-MCNC: 1.34 NG/ML (ref 0–2.5)
RBC # BLD AUTO: 5.15 10E6/UL (ref 4.4–5.9)
WBC # BLD AUTO: 6.5 10E3/UL (ref 4–11)

## 2024-10-25 PROCEDURE — 85027 COMPLETE CBC AUTOMATED: CPT

## 2024-10-25 PROCEDURE — 82105 ALPHA-FETOPROTEIN SERUM: CPT

## 2024-10-25 PROCEDURE — 84702 CHORIONIC GONADOTROPIN TEST: CPT

## 2024-10-25 PROCEDURE — 99000 SPECIMEN HANDLING OFFICE-LAB: CPT

## 2024-10-25 PROCEDURE — 36415 COLL VENOUS BLD VENIPUNCTURE: CPT

## 2024-10-25 PROCEDURE — 86301 IMMUNOASSAY TUMOR CA 19-9: CPT | Mod: 90

## 2024-10-25 PROCEDURE — G0103 PSA SCREENING: HCPCS

## 2024-10-25 NOTE — TELEPHONE ENCOUNTER
Triaged with Dr. Yañez who states that patient should be seen sooner with Dr. Bravo. Patient called and appointment moved up to Monday at 10:20.    Candida Diallo LPN

## 2024-10-27 LAB — CANCER AG19-9 SERPL IA-ACNC: 9 U/ML

## 2024-10-28 ENCOUNTER — OFFICE VISIT (OUTPATIENT)
Dept: ORTHOPEDICS | Facility: CLINIC | Age: 44
End: 2024-10-28
Attending: FAMILY MEDICINE
Payer: COMMERCIAL

## 2024-10-28 ENCOUNTER — LAB (OUTPATIENT)
Dept: LAB | Facility: CLINIC | Age: 44
End: 2024-10-28
Payer: COMMERCIAL

## 2024-10-28 ENCOUNTER — PRE VISIT (OUTPATIENT)
Dept: ORTHOPEDICS | Facility: CLINIC | Age: 44
End: 2024-10-28

## 2024-10-28 VITALS — HEIGHT: 73 IN | WEIGHT: 232 LBS | BODY MASS INDEX: 30.75 KG/M2

## 2024-10-28 DIAGNOSIS — M89.9 BONE LESION: ICD-10-CM

## 2024-10-28 DIAGNOSIS — M25.551 HIP PAIN, RIGHT: ICD-10-CM

## 2024-10-28 DIAGNOSIS — M89.9 BONE LESION: Primary | ICD-10-CM

## 2024-10-28 LAB
ALP SERPL-CCNC: 132 U/L (ref 40–150)
BUN SERPL-MCNC: 15.4 MG/DL (ref 6–20)
CALCIUM SERPL-MCNC: 9.6 MG/DL (ref 8.8–10.4)
CREAT SERPL-MCNC: 0.94 MG/DL (ref 0.67–1.17)
EGFRCR SERPLBLD CKD-EPI 2021: >90 ML/MIN/1.73M2
ERYTHROCYTE [DISTWIDTH] IN BLOOD BY AUTOMATED COUNT: 12.5 % (ref 10–15)
ERYTHROCYTE [SEDIMENTATION RATE] IN BLOOD BY WESTERGREN METHOD: 5 MM/HR (ref 0–15)
HCG-TM SERPL-ACNC: <3 IU/L
HCT VFR BLD AUTO: 47.4 % (ref 40–53)
HGB BLD-MCNC: 15.9 G/DL (ref 13.3–17.7)
LDH SERPL L TO P-CCNC: 153 U/L (ref 0–250)
MCH RBC QN AUTO: 30.2 PG (ref 26.5–33)
MCHC RBC AUTO-ENTMCNC: 33.5 G/DL (ref 31.5–36.5)
MCV RBC AUTO: 90 FL (ref 78–100)
PLATELET # BLD AUTO: 305 10E3/UL (ref 150–450)
PSA SERPL DL<=0.01 NG/ML-MCNC: 1.51 NG/ML (ref 0–2.5)
RBC # BLD AUTO: 5.26 10E6/UL (ref 4.4–5.9)
TOTAL PROTEIN SERUM FOR ELP: 7.1 G/DL (ref 6.4–8.3)
WBC # BLD AUTO: 7.9 10E3/UL (ref 4–11)

## 2024-10-28 PROCEDURE — 85027 COMPLETE CBC AUTOMATED: CPT | Performed by: PATHOLOGY

## 2024-10-28 PROCEDURE — 85652 RBC SED RATE AUTOMATED: CPT | Performed by: PATHOLOGY

## 2024-10-28 PROCEDURE — 84165 PROTEIN E-PHORESIS SERUM: CPT | Mod: 26 | Performed by: STUDENT IN AN ORGANIZED HEALTH CARE EDUCATION/TRAINING PROGRAM

## 2024-10-28 PROCEDURE — 84165 PROTEIN E-PHORESIS SERUM: CPT | Mod: TC | Performed by: STUDENT IN AN ORGANIZED HEALTH CARE EDUCATION/TRAINING PROGRAM

## 2024-10-28 PROCEDURE — 84520 ASSAY OF UREA NITROGEN: CPT | Performed by: PATHOLOGY

## 2024-10-28 PROCEDURE — 83521 IG LIGHT CHAINS FREE EACH: CPT | Performed by: ORTHOPAEDIC SURGERY

## 2024-10-28 PROCEDURE — 83615 LACTATE (LD) (LDH) ENZYME: CPT | Performed by: PATHOLOGY

## 2024-10-28 PROCEDURE — 36415 COLL VENOUS BLD VENIPUNCTURE: CPT | Performed by: PATHOLOGY

## 2024-10-28 PROCEDURE — 84075 ASSAY ALKALINE PHOSPHATASE: CPT | Performed by: PATHOLOGY

## 2024-10-28 PROCEDURE — 84155 ASSAY OF PROTEIN SERUM: CPT | Performed by: PATHOLOGY

## 2024-10-28 PROCEDURE — 99204 OFFICE O/P NEW MOD 45 MIN: CPT | Performed by: ORTHOPAEDIC SURGERY

## 2024-10-28 PROCEDURE — 82310 ASSAY OF CALCIUM: CPT | Performed by: PATHOLOGY

## 2024-10-28 PROCEDURE — G0103 PSA SCREENING: HCPCS | Performed by: PATHOLOGY

## 2024-10-28 PROCEDURE — 99000 SPECIMEN HANDLING OFFICE-LAB: CPT | Performed by: PATHOLOGY

## 2024-10-28 PROCEDURE — 82565 ASSAY OF CREATININE: CPT | Performed by: PATHOLOGY

## 2024-10-28 NOTE — LETTER
2024     Seen today: Yes    Patient:  Gilberto Lund  :   1980  MRN:     [id number]  Physician: JOY BREWERerica Lund may return to work with the following restrictions:  No full weightbearing on the right lower extremity  Use Crutches at all times  These are in place until further notice.        Please call the clinic with any questions.      Electronically signed by Joy Brewer MD

## 2024-10-28 NOTE — PROGRESS NOTES
"    [facility], Orthopaedic Oncology Surgery Consultation  by Juan Jose Bravo M.D.    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  Differential diagnosis would include neoplasm, benign or malignant as well as primary bone diseases.  The presence of a osteolytic lesion, albeit smaller, in 2011 and 2015 argue against a metastatic lesion.  Fibrous dysplasia could account for polyostotic disease, given the findings in the left proximal femur and prior history of surgery on a \"bone cyst\" of his right calcaneus in 2013.      MRI of entire pelvis  CT of chest abdomen and pelvis  Metastatic screening laboratory survey  Whole body bone scan Tc99  Biopsy pending review of above imaging procedures.  Rx for crutches/walker  Refrain from work and full weight bearing until further notice  Virtual follow up after above to review results.       Juan Jose Bravo MD  [facility] Family Professor  Oncology and Adult Reconstructive Surgery  Dept Orthopaedic Surgery, [facility]  [phone] office, [phone] pager  [email or web address]    This note was created using dictation software and may contain errors.  Please contact the creator for any clarifications that are needed.            History of Present Illness:   43 year old male  chief complaint    Current symptoms:  Problem: RIght hip lesion   Onset and duration: Pain for a year. MRI noted lesion   Awakens from sleep due to sx's:  Yes  Precipitating Injury:  No    Other joints or sites painful:  No  Fever: No  Appetite change or weight loss: No  History of prior or existing cancer: Yes, testicular     Background history:  DX:  Testicular CA  R pelvis mass    TREATMENTS:  2008, Testicular CA excision and " "chemotherapy  10/25/2013, Excision of calcaneal bone cyst left foot with allograft, (Jose Luis), [facility]   date, treatment, (surgeon), hospital             Physical Exam:     EXAMINATION pertinent findings:   PSYCH: Pleasant, healthy-appearing, alert, oriented x3, cooperative. Normal mood and affect.  VITAL SIGNS: Height 1.85 m (6' 0.84\"), weight 105.2 kg (232 lb)..  Reviewed nursing intake notes.   Body mass index is 30.75 kg/m .  RESP: non labored breathing   ABD: benign, soft, non-tender, no acute peritoneal findings  SKIN: grossly normal   LYMPHATIC: grossly normal, no adenopathy, no extremity edema  NEURO: grossly normal , no motor deficits  VASCULAR: satisfactory perfusion of all extremities   MUSCULOSKELETAL:   Independently ambulatory with gait.  No limp.  Full range of motion of right hip joint but with some pain at the extremes of motion.  Symmetric range of motion with the left hip joint consisting of 0 extension, 120 flexion, 45 abduction, 20 adduction, 20 internal rotation, 45 external rotation.    Knee examination is normal bilaterally.                 Data:   All laboratory data reviewed  All imaging studies reviewed by me    MRI examination demonstrates marrow replacing lesion of the right supra-acetabular bone as well as extending to the right superior pubic ramus.  Comparison to CT scans of 2011 and 2015 demonstrate a smaller osteolytic lesion in these locations as well.  Furthermore, calcified lesion of the left proximal femur is also noted.      DATA for DOCUMENTATION:         Past Medical History:     Patient Active Problem List   Diagnosis    Testicular cancer (H)    CTS (carpal tunnel syndrome)    DJD (degenerative joint disease), ankle and foot    Calculus of ureter    CARDIOVASCULAR SCREENING; LDL GOAL LESS THAN 160    Hard to intubate     Past Medical History:   Diagnosis Date    Arthritis     Hard to intubate 06/29/2022    Kidney stone     Malignant neoplasm (H)     Testicle cancer, " left        Also see scanned health assessment forms.       Past Surgical History:     Past Surgical History:   Procedure Laterality Date    ABDOMEN SURGERY      BIOPSY      EXCISE EXOSTOSIS FOOT  10/25/2013    Procedure: EXCISE EXOSTOSIS FOOT;  Excision of calcaneal bone cyst left foot with allograft;  Surgeon: Marcello Jensen DPM;  Location: [facility] OR    LAPAROSCOPIC CHOLECYSTECTOMY N/A 06/29/2022    Procedure: CHOLECYSTECTOMY, LAPAROSCOPIC;  Surgeon: Cholo Agustin DO;  Location: [facility]    ORCHIECTOMY SCROTAL Left             Social History:     Social History     Socioeconomic History    Marital status:      Spouse name: Not on file    Number of children: Not on file    Years of education: Not on file    Highest education level: Not on file   Occupational History    Not on file   Tobacco Use    Smoking status: Former    Smokeless tobacco: Former     Types: Chew    Tobacco comments:     Occasional Cigar   Vaping Use    Vaping status: Never Used   Substance and Sexual Activity    Alcohol use: Yes     Alcohol/week: 2.0 standard drinks of alcohol     Types: 2 Standard drinks or equivalent per week    Drug use: No    Sexual activity: Yes     Partners: Female   Other Topics Concern    Parent/sibling w/ CABG, MI or angioplasty before 65F 55M? No   Social History Narrative    Not on file     Social Drivers of Health     Financial Resource Strain: Low Risk  (9/30/2024)    Financial Resource Strain     Within the past 12 months, have you or your family members you live with been unable to get utilities (heat, electricity) when it was really needed?: No   Food Insecurity: Low Risk  (9/30/2024)    Food Insecurity     Within the past 12 months, did you worry that your food would run out before you got money to buy more?: No     Within the past 12 months, did the food you bought just not last and you didn t have money to get more?: No   Transportation Needs: Low Risk  (9/30/2024)    Transportation Needs      Within the past 12 months, has lack of transportation kept you from medical appointments, getting your medicines, non-medical meetings or appointments, work, or from getting things that you need?: No   Physical Activity: Sufficiently Active (9/30/2024)    Exercise Vital Sign     Days of Exercise per Week: 5 days     Minutes of Exercise per Session: 150+ min   Stress: No Stress Concern Present (9/30/2024)    [employer] - Occupational Stress Questionnaire     Feeling of Stress : Only a little   Social Connections: Unknown (9/30/2024)    Social Connection and Isolation Panel [NHANES]     Frequency of Communication with Friends and Family: Not on file     Frequency of Social Gatherings with Friends and Family: Once a week     Attends [employer] Services: Not on file     Active Member of Clubs or Organizations: Not on file     Attends Club or Organization Meetings: Not on file     Marital Status: Not on file   Interpersonal Safety: Low Risk  (10/2/2024)    Interpersonal Safety     Do you feel physically and emotionally safe where you currently live?: Yes     Within the past 12 months, have you been hit, slapped, kicked or otherwise physically hurt by someone?: No     Within the past 12 months, have you been humiliated or emotionally abused in other ways by your partner or ex-partner?: No   Housing Stability: Low Risk  (9/30/2024)    Housing Stability     Do you have housing? : Yes     Are you worried about losing your housing?: No            Family History:       Family History   Problem Relation Age of Onset    No Known Problems Mother     Hypertension Father     Atrial fibrillation Father     C.A.D. Maternal Grandfather             Medications:     No current outpatient medications on file.     No current facility-administered medications for this visit.              Review of Systems:   A comprehensive 10 point review of systems (constitutional, ENT, cardiac, peripheral vascular,  lymphatic, respiratory, GI, , Musculoskeletal, skin, Neurological) was performed and found to be negative except as described in this note.     See intake form completed by patient

## 2024-10-28 NOTE — TELEPHONE ENCOUNTER
DIAGNOSIS:   Hip pain, right [M25.551]  - Primary   APPOINTMENT DATE: 10/28/2024   NOTES STATUS DETAILS   OFFICE NOTE from referring provider Internal 10/02/2024 - Marcello Abreu MD - [facility]   MRI Internal 10/22/2024 - RT Hip   CT SCAN Internal 08/30/2015, 04/05/2013, 04/22/2011 - Abd/Pel   XRAYS (IMAGES & REPORTS) Internal 10/02/2024 - RT Hip

## 2024-10-28 NOTE — LETTER
"10/28/2024      Gilberto Lund  [address] [address]      Dear Colleague,    Thank you for referring your patient, Gilberto Lund, to the [facility] [place]. Please see a copy of my visit note below.        [facility], Orthopaedic Oncology Surgery Consultation  by Juan Jose Bravo M.D.    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  Differential diagnosis would include neoplasm, benign or malignant as well as primary bone diseases.  The presence of a osteolytic lesion, albeit smaller, in 2011 and 2015 argue against a metastatic lesion.  Fibrous dysplasia could account for polyostotic disease, given the findings in the left proximal femur and prior history of surgery on a \"bone cyst\" of his right calcaneus in 2013.      MRI of entire pelvis  CT of chest abdomen and pelvis  Metastatic screening laboratory survey  Whole body bone scan Tc99  Biopsy pending review of above imaging procedures.  Rx for crutches/walker  Refrain from work and full weight bearing until further notice  Virtual follow up after above to review results.       Juan Jose Bravo MD  [facility] Family Professor  Oncology and Adult Reconstructive Surgery  Dept Orthopaedic Surgery, [facility]  [phone] office, [phone] pager  [email or web address]    This note was created using dictation software and may contain errors.  Please contact the creator for any clarifications that are needed.            History of Present Illness:   43 year old male  chief complaint    Current symptoms:  Problem: RIght hip lesion   Onset and duration: Pain for a year. MRI noted lesion   Awakens from sleep due to sx's:  Yes  Precipitating Injury:  No  " "  Other joints or sites painful:  No  Fever: No  Appetite change or weight loss: No  History of prior or existing cancer: Yes, testicular     Background history:  DX:  Testicular CA  R pelvis mass    TREATMENTS:  2008, Testicular CA excision and chemotherapy  10/25/2013, Excision of calcaneal bone cyst left foot with allograft, (Jose Luis), [facility]   date, treatment, (surgeon), hospital             Physical Exam:     EXAMINATION pertinent findings:   PSYCH: Pleasant, healthy-appearing, alert, oriented x3, cooperative. Normal mood and affect.  VITAL SIGNS: Height 1.85 m (6' 0.84\"), weight 105.2 kg (232 lb)..  Reviewed nursing intake notes.   Body mass index is 30.75 kg/m .  RESP: non labored breathing   ABD: benign, soft, non-tender, no acute peritoneal findings  SKIN: grossly normal   LYMPHATIC: grossly normal, no adenopathy, no extremity edema  NEURO: grossly normal , no motor deficits  VASCULAR: satisfactory perfusion of all extremities   MUSCULOSKELETAL:   Independently ambulatory with gait.  No limp.  Full range of motion of right hip joint but with some pain at the extremes of motion.  Symmetric range of motion with the left hip joint consisting of 0 extension, 120 flexion, 45 abduction, 20 adduction, 20 internal rotation, 45 external rotation.    Knee examination is normal bilaterally.                 Data:   All laboratory data reviewed  All imaging studies reviewed by me    MRI examination demonstrates marrow replacing lesion of the right supra-acetabular bone as well as extending to the right superior pubic ramus.  Comparison to CT scans of 2011 and 2015 demonstrate a smaller osteolytic lesion in these locations as well.  Furthermore, calcified lesion of the left proximal femur is also noted.      DATA for DOCUMENTATION:         Past Medical History:     Patient Active Problem List   Diagnosis     Testicular cancer (H)     CTS (carpal tunnel syndrome)     DJD (degenerative joint disease), ankle and foot "     Calculus of ureter     CARDIOVASCULAR SCREENING; LDL GOAL LESS THAN 160     Hard to intubate     Past Medical History:   Diagnosis Date     Arthritis      Hard to intubate 06/29/2022     Kidney stone      Malignant neoplasm (H)      Testicle cancer, left        Also see scanned health assessment forms.       Past Surgical History:     Past Surgical History:   Procedure Laterality Date     ABDOMEN SURGERY       BIOPSY       EXCISE EXOSTOSIS FOOT  10/25/2013    Procedure: EXCISE EXOSTOSIS FOOT;  Excision of calcaneal bone cyst left foot with allograft;  Surgeon: Marcello Jensen DPM;  Location: [facility] OR     LAPAROSCOPIC CHOLECYSTECTOMY N/A 06/29/2022    Procedure: CHOLECYSTECTOMY, LAPAROSCOPIC;  Surgeon: Cholo Agustin DO;  [facility]     ORCHIECTOMY SCROTAL Left             Social History:     Social History     Socioeconomic History     Marital status:      Spouse name: Not on file     Number of children: Not on file     Years of education: Not on file     Highest education level: Not on file   Occupational History     Not on file   Tobacco Use     Smoking status: Former     Smokeless tobacco: Former     Types: Chew     Tobacco comments:     Occasional Cigar   Vaping Use     Vaping status: Never Used   Substance and Sexual Activity     Alcohol use: Yes     Alcohol/week: 2.0 standard drinks of alcohol     Types: 2 Standard drinks or equivalent per week     Drug use: No     Sexual activity: Yes     Partners: Female   Other Topics Concern     Parent/sibling w/ CABG, MI or angioplasty before 65F 55M? No   Social History Narrative     Not on file     Social Drivers of Health     Financial Resource Strain: Low Risk  (9/30/2024)    Financial Resource Strain      Within the past 12 months, have you or your family members you live with been unable to get utilities (heat, electricity) when it was really needed?: No   Food Insecurity: Low Risk  (9/30/2024)    Food Insecurity      Within the past 12  months, did you worry that your food would run out before you got money to buy more?: No      Within the past 12 months, did the food you bought just not last and you didn t have money to get more?: No   Transportation Needs: Low Risk  (9/30/2024)    Transportation Needs      Within the past 12 months, has lack of transportation kept you from medical appointments, getting your medicines, non-medical meetings or appointments, work, or from getting things that you need?: No   Physical Activity: Sufficiently Active (9/30/2024)    Exercise Vital Sign      Days of Exercise per Week: 5 days      Minutes of Exercise per Session: 150+ min   Stress: No Stress Concern Present (9/30/2024)    [employer] - Occupational Stress Questionnaire      Feeling of Stress : Only a little   Social Connections: Unknown (9/30/2024)    Social Connection and Isolation Panel [NHANES]      Frequency of Communication with Friends and Family: Not on file      Frequency of Social Gatherings with Friends and Family: Once a week      Attends [employer] Services: Not on file      Active Member of Clubs or Organizations: Not on file      Attends Club or Organization Meetings: Not on file      Marital Status: Not on file   Interpersonal Safety: Low Risk  (10/2/2024)    Interpersonal Safety      Do you feel physically and emotionally safe where you currently live?: Yes      Within the past 12 months, have you been hit, slapped, kicked or otherwise physically hurt by someone?: No      Within the past 12 months, have you been humiliated or emotionally abused in other ways by your partner or ex-partner?: No   Housing Stability: Low Risk  (9/30/2024)    Housing Stability      Do you have housing? : Yes      Are you worried about losing your housing?: No            Family History:       Family History   Problem Relation Age of Onset     No Known Problems Mother      Hypertension Father      Atrial fibrillation Father      C.A.D.  Maternal Grandfather             Medications:     No current outpatient medications on file.     No current facility-administered medications for this visit.              Review of Systems:   A comprehensive 10 point review of systems (constitutional, ENT, cardiac, peripheral vascular, lymphatic, respiratory, GI, , Musculoskeletal, skin, Neurological) was performed and found to be negative except as described in this note.     See intake form completed by patient      Again, thank you for allowing me to participate in the care of your patient.        Sincerely,        Juan Jose Bravo MD

## 2024-10-29 LAB
ALBUMIN SERPL ELPH-MCNC: 4.4 G/DL (ref 3.7–5.1)
ALPHA1 GLOB SERPL ELPH-MCNC: 0.3 G/DL (ref 0.2–0.4)
ALPHA2 GLOB SERPL ELPH-MCNC: 0.6 G/DL (ref 0.5–0.9)
B-GLOBULIN SERPL ELPH-MCNC: 0.8 G/DL (ref 0.6–1)
GAMMA GLOB SERPL ELPH-MCNC: 1 G/DL (ref 0.7–1.6)
KAPPA LC FREE SER-MCNC: 2 MG/DL (ref 0.33–1.94)
KAPPA LC FREE/LAMBDA FREE SER NEPH: 1.17 {RATIO} (ref 0.26–1.65)
LAMBDA LC FREE SERPL-MCNC: 1.71 MG/DL (ref 0.57–2.63)
LOCATION OF TASK: NORMAL
M PROTEIN SERPL ELPH-MCNC: 0 G/DL
PROT PATTERN SERPL ELPH-IMP: NORMAL

## 2024-10-31 ENCOUNTER — MYC MEDICAL ADVICE (OUTPATIENT)
Dept: FAMILY MEDICINE | Facility: CLINIC | Age: 44
End: 2024-10-31

## 2024-11-01 NOTE — TELEPHONE ENCOUNTER
"Message from Kenyatta Arrington PA-C covering for Dr. Abreu today:   \"Please have patient reach out to Dr. Castillo since he has most recently followed with him. If he will not certainly let us know.\"  "

## 2024-11-01 NOTE — TELEPHONE ENCOUNTER
"S-(situation): [employer] message:   \"Dr. Lois Mays I had my apr with Dr. Castillo and will not be seeing him till the 18th of Nov. In the meantime I have been having trouble dealing  with my constant leg and hip pain especially during sleeping hours.  The alive and melatonin I have been taking has been doing little to solve the issues.  Would you be willing to prescribe something for me to help elevate my night time pain? Thank you, Gilberto\"    B-(background): Diagnoses include testicular cancer, DJD.     Patient seen by Dr. Abreu on 10/2/24. Right hip pain present. XR of right hip completed.   MRI completed on 10/22/24 and referred to orthopedic surgery.     Seen by Dr. Bravo with orthopedic surgery on 10/28/24.   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  Differential diagnosis would include neoplasm, benign or malignant as well as primary bone diseases.  The presence of a osteolytic lesion, albeit smaller, in 2011 and 2015 argue against a metastatic lesion.  Fibrous dysplasia could account for polyostotic disease, given the findings in the left proximal femur and prior history of surgery on a \"bone cyst\" of his right calcaneus in 2013.\"     Has follow up with Dr. Bravo scheduled on 11/18/24 with imaging.     A-(assessment): Patient reports hip/leg pain is the same type as when he saw Dr. Abreu on 10/2, but is gradually worsening especially at night.     Rates pain at a 2-3/10 now and during the day.   Rates pain 7/10 at nigh when laying flat.     Patient reports taking ibuprofen or Aleve that does relieve pain some during the day, but is not helpful at night. Also taking melatonin with little effect.     Reports he has a high pain tolerance, but is needing more than over-the-counter medications to help with pain at night.     R-(recommendations): Patient requesting pain medication for right hip/leg pain.     Routing " to provider to review and advise.     Advised patient to call back with any new or worsening symptoms in the meantime.     Bethany Robles RN  [facility]

## 2024-11-01 NOTE — TELEPHONE ENCOUNTER
Spoke to patient and relayed provider message. Patient verbalized understanding and is in agreement with plan.     Patient states he will call Dr. Bravo's office and get back to us with any further questions or concerns.     Bethany Robles RN  [facility]

## 2024-11-18 ENCOUNTER — HOSPITAL ENCOUNTER (OUTPATIENT)
Dept: NUCLEAR MEDICINE | Facility: CLINIC | Age: 44
Discharge: HOME | End: 2024-11-18
Attending: ORTHOPAEDIC SURGERY
Payer: COMMERCIAL

## 2024-11-18 ENCOUNTER — HOSPITAL ENCOUNTER (OUTPATIENT)
Dept: CT IMAGING | Facility: CLINIC | Age: 44
Discharge: HOME | End: 2024-11-18
Attending: ORTHOPAEDIC SURGERY
Payer: COMMERCIAL

## 2024-11-18 ENCOUNTER — HOSPITAL ENCOUNTER (OUTPATIENT)
Dept: MRI IMAGING | Facility: CLINIC | Age: 44
Discharge: HOME | End: 2024-11-18
Attending: ORTHOPAEDIC SURGERY
Payer: COMMERCIAL

## 2024-11-18 DIAGNOSIS — M89.9 BONE LESION: ICD-10-CM

## 2024-11-18 DIAGNOSIS — M25.551 HIP PAIN, RIGHT: ICD-10-CM

## 2024-11-18 PROCEDURE — 255N000002 HC RX 255 OP 636: Performed by: ORTHOPAEDIC SURGERY

## 2024-11-18 PROCEDURE — 250N000011 HC RX IP 250 OP 636: Performed by: ORTHOPAEDIC SURGERY

## 2024-11-18 PROCEDURE — A9503 TC99M MEDRONATE: HCPCS | Performed by: ORTHOPAEDIC SURGERY

## 2024-11-18 PROCEDURE — 71260 CT THORAX DX C+: CPT

## 2024-11-18 PROCEDURE — 74177 CT ABD & PELVIS W/CONTRAST: CPT

## 2024-11-18 PROCEDURE — 343N000001 HC RX 343 MED OP 636: Performed by: ORTHOPAEDIC SURGERY

## 2024-11-18 PROCEDURE — 78306 BONE IMAGING WHOLE BODY: CPT

## 2024-11-18 PROCEDURE — A9585 GADOBUTROL INJECTION: HCPCS | Performed by: ORTHOPAEDIC SURGERY

## 2024-11-18 PROCEDURE — 72197 MRI PELVIS W/O & W/DYE: CPT

## 2024-11-18 RX ORDER — GADOBUTROL 604.72 MG/ML
10 INJECTION INTRAVENOUS ONCE
Status: COMPLETED | OUTPATIENT
Start: 2024-11-18 | End: 2024-11-18

## 2024-11-18 RX ORDER — TC 99M MEDRONATE 20 MG/10ML
20-30 INJECTION, POWDER, LYOPHILIZED, FOR SOLUTION INTRAVENOUS ONCE
Status: COMPLETED | OUTPATIENT
Start: 2024-11-18 | End: 2024-11-18

## 2024-11-18 RX ORDER — IOPAMIDOL 755 MG/ML
90 INJECTION, SOLUTION INTRAVASCULAR ONCE
Status: COMPLETED | OUTPATIENT
Start: 2024-11-18 | End: 2024-11-18

## 2024-11-18 RX ADMIN — GADOBUTROL 10 ML: 604.72 INJECTION INTRAVENOUS at 11:40

## 2024-11-18 RX ADMIN — TC 99M MEDRONATE 27 MILLICURIE: 20 INJECTION, POWDER, LYOPHILIZED, FOR SOLUTION INTRAVENOUS at 11:00

## 2024-11-18 RX ADMIN — IOPAMIDOL 90 ML: 755 INJECTION, SOLUTION INTRAVENOUS at 11:12

## 2024-11-20 NOTE — PROGRESS NOTES
[facility], Orthopaedic Oncology Surgery Consultation  by Juan Jose Bravo M.D.    Gilberto Lund MRN# [id number]    YOB: 1980     Requesting physician: Marcello Abreu  System, Provider Not In     Background history:  DX:  Testicular CA  R pelvis mass    TREATMENTS:  2008, Testicular CA excision and chemotherapy  10/25/2013, Excision of calcaneal bone cyst left foot with allograft,  [facility]      I met with Jeremiahgloria stauffer to review the results of his screening studies which showed no evidence of myeloma or other intra-abdominal or intrathoracic tumor present.  No other bony sites of abnormality identified his bone scan.  The lesion of his right periacetabular bone does appear to be solitary and may represent a primary neoplasm.    Recommendations:  Proceed with core needle biopsy under CT guidance with interventional radiology.  If sample is nondiagnostic then open surgical biopsy will become necessary.  Meanwhile, he will refrain from full weightbearing as he is currently doing.          MD Chuck Hummel Family Professor  Oncology and Adult Reconstructive Surgery  Dept Orthopaedic Surgery, [facility]  [phone] office, [phone] pager  [email or web address]        Virtual-Visit Details    Type of service:  Video Visit  Visit total duration (including visit time, pre and post visit work time as documented above on the same day of service): 30  min  Video start time: 1430  Video end time:  1500  Originating Location (pt. Location): Home  Distant Location (provider location):  [facility]   Platform used for Virtual Visit: ShopGo

## 2024-11-21 ENCOUNTER — VIRTUAL VISIT (OUTPATIENT)
Dept: ORTHOPEDICS | Facility: CLINIC | Age: 44
End: 2024-11-21
Attending: ORTHOPAEDIC SURGERY
Payer: COMMERCIAL

## 2024-11-21 VITALS — HEIGHT: 73 IN | BODY MASS INDEX: 30.48 KG/M2 | WEIGHT: 230 LBS

## 2024-11-21 DIAGNOSIS — M25.551 HIP PAIN, RIGHT: Primary | ICD-10-CM

## 2024-11-21 DIAGNOSIS — M89.9 BONE LESION: ICD-10-CM

## 2024-11-21 ASSESSMENT — PAIN SCALES - GENERAL: PAINLEVEL_OUTOF10: MODERATE PAIN (5)

## 2024-11-21 NOTE — LETTER
11/21/2024      Gilberto Lund  [address] [address]      Dear Colleague,    Thank you for referring your patient, Gilberto Lund, to the [facility]. Please see a copy of my visit note below.        [facility], Orthopaedic Oncology Surgery Consultation  by Juan Jose Bravo M.D.    Gilberto Lund MRN# [id number]    YOB: 1980     Requesting physician: Marcello Abreu  System, Provider Not In     Background history:  DX:  Testicular CA  R pelvis mass    TREATMENTS:  2008, Testicular CA excision and chemotherapy  10/25/2013, Excision of calcaneal bone cyst left foot with [facility]ograft[facility]      I met with Jeremiahgloria stauffer to review the results of his screening studies which showed no evidence of myeloma or other intra-abdominal or intrathoracic tumor present.  No other bony sites of abnormality identified his bone scan.  The lesion of his right periacetabular bone does appear to be solitary and may represent a primary neoplasm.    Recommendations:  Proceed with core needle biopsy under CT guidance with interventional radiology.  If sample is nondiagnostic then open surgical biopsy will become necessary.  Meanwhile, he will refrain from full weightbearing as he is currently doing.          MD Chuck Hummel Family Professor  Oncology and Adult Reconstructive Surgery  Dept Orthopaedic Surgery, [facility]  [phone] office, [phone] pager  [email or web address]        Virtual-Visit Details    Type of service:  Video Visit  Visit total duration (including visit time, pre and post visit work time as documented above on the same day of service): 30  min  Video start time: 1430  Video end time:  1500  Originating Location (pt. Location): Home  Distant Location (provider location):  [facility]   Platform used for Virtual Visit: Tomás        Again, thank you for allowing me to  participate in the care of your patient.        Sincerely,        Juan Jose Bravo MD

## 2024-11-21 NOTE — NURSING NOTE
Current patient location: [address] [place] [id number]    Is the patient currently in the state of [place]? YES    Visit mode:VIDEO    If the visit is dropped, the patient can be reconnected by:VIDEO VISIT: Text to cell phone:   Telephone Information:   Mobile [phone]       Will anyone else be joining the visit? Yes  (If patient encounters technical issues they should call [phone]    Are changes needed to the allergy or medication list? No    Are refills needed on medications prescribed by this physician? NO    Rooming Documentation:  Questionnaire(s) completed    Reason for visit: DWAINE HOLT

## 2024-11-22 ENCOUNTER — MYC MEDICAL ADVICE (OUTPATIENT)
Dept: INTERVENTIONAL RADIOLOGY/VASCULAR | Facility: CLINIC | Age: 44
End: 2024-11-22
Payer: COMMERCIAL

## 2024-12-02 ENCOUNTER — APPOINTMENT (OUTPATIENT)
Dept: MEDSURG UNIT | Facility: CLINIC | Age: 44
End: 2024-12-02
Attending: STUDENT IN AN ORGANIZED HEALTH CARE EDUCATION/TRAINING PROGRAM
Payer: COMMERCIAL

## 2024-12-02 ENCOUNTER — APPOINTMENT (OUTPATIENT)
Dept: INTERVENTIONAL RADIOLOGY/VASCULAR | Facility: CLINIC | Age: 44
End: 2024-12-02
Attending: ORTHOPAEDIC SURGERY
Payer: COMMERCIAL

## 2024-12-02 ENCOUNTER — HOSPITAL ENCOUNTER (OUTPATIENT)
Facility: CLINIC | Age: 44
Discharge: HOME | End: 2024-12-02
Attending: STUDENT IN AN ORGANIZED HEALTH CARE EDUCATION/TRAINING PROGRAM | Admitting: STUDENT IN AN ORGANIZED HEALTH CARE EDUCATION/TRAINING PROGRAM
Payer: COMMERCIAL

## 2024-12-02 VITALS
BODY MASS INDEX: 30.74 KG/M2 | TEMPERATURE: 98.4 F | DIASTOLIC BLOOD PRESSURE: 83 MMHG | SYSTOLIC BLOOD PRESSURE: 114 MMHG | RESPIRATION RATE: 13 BRPM | HEART RATE: 58 BPM | OXYGEN SATURATION: 96 % | WEIGHT: 233 LBS

## 2024-12-02 DIAGNOSIS — M89.9 BONE LESION: ICD-10-CM

## 2024-12-02 DIAGNOSIS — M25.551 HIP PAIN, RIGHT: ICD-10-CM

## 2024-12-02 LAB
ERYTHROCYTE [DISTWIDTH] IN BLOOD BY AUTOMATED COUNT: 12.9 % (ref 10–15)
HCT VFR BLD AUTO: 46.1 % (ref 40–53)
HGB BLD-MCNC: 15.8 G/DL (ref 13.3–17.7)
INR PPP: 0.94 (ref 0.85–1.15)
MCH RBC QN AUTO: 31 PG (ref 26.5–33)
MCHC RBC AUTO-ENTMCNC: 34.3 G/DL (ref 31.5–36.5)
MCV RBC AUTO: 91 FL (ref 78–100)
PATH REPORT.COMMENTS IMP SPEC: NORMAL
PATH REPORT.FINAL DX SPEC: NORMAL
PATH REPORT.MICROSCOPIC SPEC OTHER STN: NORMAL
PATH REPORT.RELEVANT HX SPEC: NORMAL
PLATELET # BLD AUTO: 315 10E3/UL (ref 150–450)
RBC # BLD AUTO: 5.09 10E6/UL (ref 4.4–5.9)
WBC # BLD AUTO: 7.5 10E3/UL (ref 4–11)

## 2024-12-02 PROCEDURE — 250N000011 HC RX IP 250 OP 636

## 2024-12-02 PROCEDURE — 999N000142 HC STATISTIC PROCEDURE PREP ONLY

## 2024-12-02 PROCEDURE — 88307 TISSUE EXAM BY PATHOLOGIST: CPT | Mod: 26 | Performed by: PATHOLOGY

## 2024-12-02 PROCEDURE — 272N000505 HC NEEDLE CR5

## 2024-12-02 PROCEDURE — 88185 FLOWCYTOMETRY/TC ADD-ON: CPT | Performed by: ORTHOPAEDIC SURGERY

## 2024-12-02 PROCEDURE — 20225 BONE BIOPSY TROCAR/NDL DEEP: CPT | Mod: RT | Performed by: RADIOLOGY

## 2024-12-02 PROCEDURE — 999N000132 HC STATISTIC PP CARE STAGE 1

## 2024-12-02 PROCEDURE — 88112 CYTOPATH CELL ENHANCE TECH: CPT | Mod: TC | Performed by: ORTHOPAEDIC SURGERY

## 2024-12-02 PROCEDURE — 250N000009 HC RX 250

## 2024-12-02 PROCEDURE — 36415 COLL VENOUS BLD VENIPUNCTURE: CPT | Performed by: NURSE PRACTITIONER

## 2024-12-02 PROCEDURE — 85610 PROTHROMBIN TIME: CPT | Performed by: NURSE PRACTITIONER

## 2024-12-02 PROCEDURE — 99152 MOD SED SAME PHYS/QHP 5/>YRS: CPT

## 2024-12-02 PROCEDURE — 88184 FLOWCYTOMETRY/ TC 1 MARKER: CPT | Performed by: PATHOLOGY

## 2024-12-02 PROCEDURE — 85027 COMPLETE CBC AUTOMATED: CPT | Performed by: NURSE PRACTITIONER

## 2024-12-02 PROCEDURE — 88305 TISSUE EXAM BY PATHOLOGIST: CPT | Mod: 26 | Performed by: PATHOLOGY

## 2024-12-02 PROCEDURE — 77012 CT SCAN FOR NEEDLE BIOPSY: CPT | Mod: 26 | Performed by: RADIOLOGY

## 2024-12-02 PROCEDURE — 88188 FLOWCYTOMETRY/READ 9-15: CPT | Mod: GC | Performed by: PATHOLOGY

## 2024-12-02 PROCEDURE — 88342 IMHCHEM/IMCYTCHM 1ST ANTB: CPT | Mod: 26 | Performed by: PATHOLOGY

## 2024-12-02 PROCEDURE — 88342 IMHCHEM/IMCYTCHM 1ST ANTB: CPT | Mod: TC | Performed by: ORTHOPAEDIC SURGERY

## 2024-12-02 PROCEDURE — 20225 BONE BIOPSY TROCAR/NDL DEEP: CPT

## 2024-12-02 PROCEDURE — 99152 MOD SED SAME PHYS/QHP 5/>YRS: CPT | Mod: GC | Performed by: RADIOLOGY

## 2024-12-02 RX ORDER — NALOXONE HYDROCHLORIDE 0.4 MG/ML
0.2 INJECTION, SOLUTION INTRAMUSCULAR; INTRAVENOUS; SUBCUTANEOUS
Status: DISCONTINUED | OUTPATIENT
Start: 2024-12-02 | End: 2024-12-02 | Stop reason: HOSPADM

## 2024-12-02 RX ORDER — FENTANYL CITRATE 50 UG/ML
25-50 INJECTION, SOLUTION INTRAMUSCULAR; INTRAVENOUS EVERY 5 MIN PRN
Status: DISCONTINUED | OUTPATIENT
Start: 2024-12-02 | End: 2024-12-02 | Stop reason: HOSPADM

## 2024-12-02 RX ORDER — NALOXONE HYDROCHLORIDE 0.4 MG/ML
0.4 INJECTION, SOLUTION INTRAMUSCULAR; INTRAVENOUS; SUBCUTANEOUS
Status: DISCONTINUED | OUTPATIENT
Start: 2024-12-02 | End: 2024-12-02 | Stop reason: HOSPADM

## 2024-12-02 RX ORDER — LIDOCAINE 40 MG/G
CREAM TOPICAL
Status: DISCONTINUED | OUTPATIENT
Start: 2024-12-02 | End: 2024-12-02 | Stop reason: HOSPADM

## 2024-12-02 RX ORDER — FLUMAZENIL 0.1 MG/ML
0.2 INJECTION, SOLUTION INTRAVENOUS
Status: DISCONTINUED | OUTPATIENT
Start: 2024-12-02 | End: 2024-12-02 | Stop reason: HOSPADM

## 2024-12-02 RX ADMIN — MIDAZOLAM 1 MG: 1 INJECTION INTRAMUSCULAR; INTRAVENOUS at 09:19

## 2024-12-02 RX ADMIN — FENTANYL CITRATE 50 MCG: 50 INJECTION, SOLUTION INTRAMUSCULAR; INTRAVENOUS at 09:07

## 2024-12-02 RX ADMIN — MIDAZOLAM 1 MG: 1 INJECTION INTRAMUSCULAR; INTRAVENOUS at 09:07

## 2024-12-02 RX ADMIN — FENTANYL CITRATE 50 MCG: 50 INJECTION, SOLUTION INTRAMUSCULAR; INTRAVENOUS at 09:27

## 2024-12-02 RX ADMIN — FENTANYL CITRATE 50 MCG: 50 INJECTION, SOLUTION INTRAMUSCULAR; INTRAVENOUS at 09:32

## 2024-12-02 RX ADMIN — LIDOCAINE HYDROCHLORIDE 9 ML: 10 INJECTION, SOLUTION EPIDURAL; INFILTRATION; INTRACAUDAL; PERINEURAL at 09:33

## 2024-12-02 RX ADMIN — MIDAZOLAM 1 MG: 1 INJECTION INTRAMUSCULAR; INTRAVENOUS at 09:27

## 2024-12-02 RX ADMIN — FENTANYL CITRATE 50 MCG: 50 INJECTION, SOLUTION INTRAMUSCULAR; INTRAVENOUS at 09:19

## 2024-12-02 RX ADMIN — MIDAZOLAM 1 MG: 1 INJECTION INTRAMUSCULAR; INTRAVENOUS at 09:32

## 2024-12-02 ASSESSMENT — ACTIVITIES OF DAILY LIVING (ADL)
ADLS_ACUITY_SCORE: 41

## 2024-12-02 NOTE — IR NOTE
Patient Name: Gilberto Lund  Medical Record Number: [id number]  Today's Date: 12/2/2024    Procedure: Right pelvic bone lesion biopsy  Proceduralist: Dr. Cantu  Pathology present: No    Procedure Start: 0925  Procedure end: 0946  Sedation medications administered: 200 mcg fentanyl and 4 mg versed     Report given to: Mildred DENT   : HECTOR    Other Notes: Pt arrived to IR room CT2 from . Consent reviewed. Pt denies any questions or concerns regarding procedure. Pt positioned supine and monitored per protocol. Pt tolerated procedure without any noted complications. Pt transferred back to .

## 2024-12-02 NOTE — DISCHARGE INSTRUCTIONS
[facility]    Interventional Radiology  Patient Instructions Following Biopsy    AFTER YOU GO HOME  If you were given sedation, for the first 24 hours: we recommend that an adult stay with you, DO NOT drive or operate machinery at home or at work, DO NOT smoke, DO NOT make any important or legal decisions.  DO NOT drink alcoholic beverages the day of your procedure  Drink plenty of fluids   Resume your regular diet, unless otherwise instructed by your Primary Physician  Relax and take it easy for 48 hours  DO NOT do any strenuous exercise or lifting (> 10 lbs) for at least 7 days following your procedure  Keep the dressing dry and in place for 24 hours. Replace with Band aid for 2 days.  Never leave a wet dressing in place.  Do not take a shower for at least 12 hours following your procedure. No tub bath, hot tub, or swimming for 5 days.  There should be minimum drainage from the biopsy site    CALL THE PHYSICIAN IF:  You start bleeding from the procedure site.  If you do start to bleed from that site, lie down flat and hold pressure on the site for a minimum of 10 minutes.  Your physician will tell you if you need to return to the hospital  You develop nausea or vomiting  You have excessive swelling, redness, or tenderness at the site  You have drainage that looks like it is infected.  You experience severe pain  You develop hives or a rash or unexplained itching  You develop shortness of breath  You develop a temperature of 101 degrees F or greater    [facility] INTERVENTIONAL RADIOLOGY DEPARTMENT  Procedure Physician: Dr. Tim and Dr. Davis                                     Date of procedure: December 2, 2024  Telephone Numbers: [phone]      Monday-Friday 7:30 am to 4:00 pm  [phone] After 4:00 pm Monday-Friday, Weekends & Holidays.   Ask for the Interventional Radiologist on call.  Someone is on call 24 hrs/day    [facility] toll free number: [phone] Monday-Friday 8:00 am to 4:30  pm  [facility] Emergency Dept: [phone]

## 2024-12-02 NOTE — PRE-PROCEDURE
GENERAL PRE-PROCEDURE:   Procedure:  Right pelvic bone lesion biopsy  Date/Time:  12/2/2024 7:25 AM    Written consent obtained?: Yes    Risks and benefits: Risks, benefits and alternatives were discussed    Consent given by:  Patient  Patient states understanding of procedure being performed: Yes    Patient's understanding of procedure matches consent: Yes    Procedure consent matches procedure scheduled: Yes    Expected level of sedation:  Moderate  Appropriately NPO:  Yes  ASA Class:  2  Mallampati  :  Grade 1- soft palate, uvula, tonsillar pillars, and posterior pharyngeal wall visible  Lungs:  Lungs clear with good breath sounds bilaterally  Heart:  Normal heart sounds and rate  History & Physical reviewed:  History and physical reviewed and no updates needed  Statement of review:  I have reviewed the lab findings, diagnostic data, medications, and the plan for sedation

## 2024-12-02 NOTE — PROCEDURES
[facility]    Procedure: IR Procedure Note    Date/Time: 12/2/2024 9:51 AM    Performed by: Abhi Davis MD  Authorized by: Rehana Cantu MD  IR Fellow Physician:    Pre Procedure Diagnosis: testicular cancer  Post Procedure Diagnosis: same    UNIVERSAL PROTOCOL   Site Marked: Yes  Prior Images Obtained and Reviewed:  Yes  Required items: Required blood products, implants, devices and special equipment available    Patient identity confirmed:  Verbally with patient, arm band, hospital-assigned identification number and provided demographic data  Patient was reevaluated immediately before administering moderate or deep sedation or anesthesia  Confirmation Checklist:  Patient's identity using two indicators, procedure was appropriate and matched the consent or emergent situation, relevant allergies and correct equipment/implants were available  Time out: Immediately prior to the procedure a time out was called    Universal Protocol: the Joint Commission Universal Protocol was followed    Preparation: Patient was prepped and draped in usual sterile fashion    ESBL (mL):  2     ANESTHESIA    Local Anesthetic:  Lidocaine 1% without epinephrine  Anesthetic Total (mL):  20      SEDATION  Patient Sedated: Yes    Sedation Type:  Moderate (conscious) sedation  Sedation:  Fentanyl and midazolam  Vital signs: Vital signs monitored during sedation    See dictated procedure note for full details.  Findings: Lesional bone biopsy    Specimens: core needle biopsy specimens sent for pathological analysis    Procedural Complications: None    Condition: Stable    Plan: 1 hour bedrest  Discharge when ready  Samples sent for pathological analysis      PROCEDURE  Describe Procedure: CT guided core needle biopsy of R pelvic bone lesion  Patient Tolerance:  Patient tolerated the procedure well with no immediate complications  Length of time physician/provider present for 1:1 monitoring  during sedation:  23-37 min

## 2024-12-02 NOTE — PROGRESS NOTES
Condition is stable s/p bone biopsy-right pelvis. Vital Signs are stable/WNL. Right hip site clean, dry and intact, cms intact. Discharge instructions reviewed with patient and questions answered. Patient verbalizes understanding. IV removed. Pain under control. Patient is ambulating and voiding spontaneously. Patient is tolerating regular diet and denies any N/V. Patient to be discharged to home via wife. Patient has all belongings

## 2024-12-02 NOTE — PROGRESS NOTES
Pt prepped for R pelvic bone lesion biopsy. PIV placed, labs in process, consent signed. Pt needs updated H&P. Pt wife, Mary Ellen, will be ride home.

## 2024-12-03 LAB
PATH REPORT.COMMENTS IMP SPEC: NORMAL
PATH REPORT.COMMENTS IMP SPEC: NORMAL
PATH REPORT.FINAL DX SPEC: NORMAL
PATH REPORT.GROSS SPEC: NORMAL
PATH REPORT.MICROSCOPIC SPEC OTHER STN: NORMAL
PATH REPORT.RELEVANT HX SPEC: NORMAL

## 2024-12-05 ENCOUNTER — PREP FOR PROCEDURE (OUTPATIENT)
Dept: MEDSURG UNIT | Facility: CLINIC | Age: 44
End: 2024-12-05
Payer: COMMERCIAL

## 2024-12-05 DIAGNOSIS — M89.9 BONE LESION: Primary | ICD-10-CM

## 2024-12-05 LAB
PATH REPORT.COMMENTS IMP SPEC: ABNORMAL
PATH REPORT.COMMENTS IMP SPEC: YES
PATH REPORT.FINAL DX SPEC: ABNORMAL
PATH REPORT.GROSS SPEC: ABNORMAL
PATH REPORT.MICROSCOPIC SPEC OTHER STN: ABNORMAL
PATH REPORT.RELEVANT HX SPEC: ABNORMAL
PHOTO IMAGE: ABNORMAL

## 2024-12-06 ENCOUNTER — APPOINTMENT (OUTPATIENT)
Dept: GENERAL RADIOLOGY | Facility: CLINIC | Age: 44
End: 2024-12-06
Attending: ORTHOPAEDIC SURGERY
Payer: COMMERCIAL

## 2024-12-06 ENCOUNTER — HOSPITAL ENCOUNTER (OUTPATIENT)
Facility: CLINIC | Age: 44
Discharge: HOME | End: 2024-12-06
Attending: ORTHOPAEDIC SURGERY | Admitting: ORTHOPAEDIC SURGERY
Payer: COMMERCIAL

## 2024-12-06 VITALS
TEMPERATURE: 98.1 F | WEIGHT: 228.4 LBS | RESPIRATION RATE: 16 BRPM | SYSTOLIC BLOOD PRESSURE: 117 MMHG | DIASTOLIC BLOOD PRESSURE: 73 MMHG | OXYGEN SATURATION: 98 % | BODY MASS INDEX: 30.94 KG/M2 | HEART RATE: 73 BPM | HEIGHT: 72 IN

## 2024-12-06 DIAGNOSIS — M89.9 LESION OF PELVIC BONE: Primary | ICD-10-CM

## 2024-12-06 PROCEDURE — 88307 TISSUE EXAM BY PATHOLOGIST: CPT | Mod: 26 | Performed by: PATHOLOGY

## 2024-12-06 PROCEDURE — 710N000010 HC RECOVERY PHASE 1, LEVEL 2, PER MIN: Performed by: ORTHOPAEDIC SURGERY

## 2024-12-06 PROCEDURE — 999N000141 HC STATISTIC PRE-PROCEDURE NURSING ASSESSMENT: Performed by: ORTHOPAEDIC SURGERY

## 2024-12-06 PROCEDURE — 360N000083 HC SURGERY LEVEL 3 W/ FLUORO, PER MIN: Performed by: ORTHOPAEDIC SURGERY

## 2024-12-06 PROCEDURE — 370N000017 HC ANESTHESIA TECHNICAL FEE, PER MIN: Performed by: ORTHOPAEDIC SURGERY

## 2024-12-06 PROCEDURE — 250N000011 HC RX IP 250 OP 636: Performed by: INTERNAL MEDICINE

## 2024-12-06 PROCEDURE — 250N000013 HC RX MED GY IP 250 OP 250 PS 637: Performed by: PHYSICIAN ASSISTANT

## 2024-12-06 PROCEDURE — 999N000180 XR SURGERY CARM FLUORO LESS THAN 5 MIN: Mod: TC

## 2024-12-06 PROCEDURE — 272N000001 HC OR GENERAL SUPPLY STERILE: Performed by: ORTHOPAEDIC SURGERY

## 2024-12-06 PROCEDURE — 88311 DECALCIFY TISSUE: CPT | Mod: 26 | Performed by: PATHOLOGY

## 2024-12-06 PROCEDURE — 88311 DECALCIFY TISSUE: CPT | Mod: TC | Performed by: ORTHOPAEDIC SURGERY

## 2024-12-06 PROCEDURE — 250N000009 HC RX 250: Performed by: ORTHOPAEDIC SURGERY

## 2024-12-06 PROCEDURE — 710N000012 HC RECOVERY PHASE 2, PER MINUTE: Performed by: ORTHOPAEDIC SURGERY

## 2024-12-06 PROCEDURE — 250N000025 HC SEVOFLURANE, PER MIN: Performed by: ORTHOPAEDIC SURGERY

## 2024-12-06 RX ORDER — ACETAMINOPHEN 325 MG/1
975 TABLET ORAL ONCE
Status: COMPLETED | OUTPATIENT
Start: 2024-12-06 | End: 2024-12-06

## 2024-12-06 RX ORDER — DEXAMETHASONE SODIUM PHOSPHATE 4 MG/ML
4 INJECTION, SOLUTION INTRA-ARTICULAR; INTRALESIONAL; INTRAMUSCULAR; INTRAVENOUS; SOFT TISSUE
Status: DISCONTINUED | OUTPATIENT
Start: 2024-12-06 | End: 2024-12-06 | Stop reason: HOSPADM

## 2024-12-06 RX ORDER — FENTANYL CITRATE 50 UG/ML
25 INJECTION, SOLUTION INTRAMUSCULAR; INTRAVENOUS EVERY 5 MIN PRN
Status: DISCONTINUED | OUTPATIENT
Start: 2024-12-06 | End: 2024-12-06 | Stop reason: HOSPADM

## 2024-12-06 RX ORDER — CEFAZOLIN SODIUM/WATER 2 G/20 ML
2 SYRINGE (ML) INTRAVENOUS SEE ADMIN INSTRUCTIONS
Status: DISCONTINUED | OUTPATIENT
Start: 2024-12-06 | End: 2024-12-06 | Stop reason: HOSPADM

## 2024-12-06 RX ORDER — ACETAMINOPHEN 325 MG/1
650 TABLET ORAL
Status: CANCELLED | OUTPATIENT
Start: 2024-12-06

## 2024-12-06 RX ORDER — ONDANSETRON 2 MG/ML
4 INJECTION INTRAMUSCULAR; INTRAVENOUS EVERY 30 MIN PRN
Status: DISCONTINUED | OUTPATIENT
Start: 2024-12-06 | End: 2024-12-06 | Stop reason: HOSPADM

## 2024-12-06 RX ORDER — HYDROMORPHONE HYDROCHLORIDE 1 MG/ML
0.2 INJECTION, SOLUTION INTRAMUSCULAR; INTRAVENOUS; SUBCUTANEOUS EVERY 5 MIN PRN
Status: DISCONTINUED | OUTPATIENT
Start: 2024-12-06 | End: 2024-12-06 | Stop reason: HOSPADM

## 2024-12-06 RX ORDER — ONDANSETRON 4 MG/1
4 TABLET, ORALLY DISINTEGRATING ORAL EVERY 8 HOURS PRN
Qty: 4 TABLET | Refills: 0 | Status: SHIPPED | OUTPATIENT
Start: 2024-12-06

## 2024-12-06 RX ORDER — SODIUM CHLORIDE, SODIUM LACTATE, POTASSIUM CHLORIDE, CALCIUM CHLORIDE 600; 310; 30; 20 MG/100ML; MG/100ML; MG/100ML; MG/100ML
INJECTION, SOLUTION INTRAVENOUS CONTINUOUS
Status: DISCONTINUED | OUTPATIENT
Start: 2024-12-06 | End: 2024-12-06 | Stop reason: HOSPADM

## 2024-12-06 RX ORDER — OXYCODONE HYDROCHLORIDE 5 MG/1
10 TABLET ORAL
Status: DISCONTINUED | OUTPATIENT
Start: 2024-12-06 | End: 2024-12-06 | Stop reason: HOSPADM

## 2024-12-06 RX ORDER — CEFAZOLIN SODIUM/WATER 2 G/20 ML
2 SYRINGE (ML) INTRAVENOUS
Status: COMPLETED | OUTPATIENT
Start: 2024-12-06 | End: 2024-12-06

## 2024-12-06 RX ORDER — AMOXICILLIN 250 MG
1-2 CAPSULE ORAL 2 TIMES DAILY
Qty: 30 TABLET | Refills: 0 | Status: SHIPPED | OUTPATIENT
Start: 2024-12-06

## 2024-12-06 RX ORDER — NALOXONE HYDROCHLORIDE 0.4 MG/ML
0.1 INJECTION, SOLUTION INTRAMUSCULAR; INTRAVENOUS; SUBCUTANEOUS
Status: DISCONTINUED | OUTPATIENT
Start: 2024-12-06 | End: 2024-12-06 | Stop reason: HOSPADM

## 2024-12-06 RX ORDER — ONDANSETRON 4 MG/1
4 TABLET, ORALLY DISINTEGRATING ORAL EVERY 30 MIN PRN
Status: DISCONTINUED | OUTPATIENT
Start: 2024-12-06 | End: 2024-12-06 | Stop reason: HOSPADM

## 2024-12-06 RX ORDER — OXYCODONE HYDROCHLORIDE 5 MG/1
5-10 TABLET ORAL EVERY 4 HOURS PRN
Qty: 10 TABLET | Refills: 0 | Status: SHIPPED | OUTPATIENT
Start: 2024-12-06

## 2024-12-06 RX ORDER — ACETAMINOPHEN 325 MG/1
650 TABLET ORAL EVERY 4 HOURS PRN
Qty: 50 TABLET | Refills: 0 | Status: SHIPPED | OUTPATIENT
Start: 2024-12-06

## 2024-12-06 RX ORDER — BUPIVACAINE HYDROCHLORIDE AND EPINEPHRINE 2.5; 5 MG/ML; UG/ML
INJECTION, SOLUTION INFILTRATION; PERINEURAL PRN
Status: DISCONTINUED | OUTPATIENT
Start: 2024-12-06 | End: 2024-12-06 | Stop reason: HOSPADM

## 2024-12-06 RX ORDER — FENTANYL CITRATE 50 UG/ML
50 INJECTION, SOLUTION INTRAMUSCULAR; INTRAVENOUS EVERY 5 MIN PRN
Status: DISCONTINUED | OUTPATIENT
Start: 2024-12-06 | End: 2024-12-06 | Stop reason: HOSPADM

## 2024-12-06 RX ORDER — OXYCODONE HYDROCHLORIDE 5 MG/1
5 TABLET ORAL
Status: CANCELLED | OUTPATIENT
Start: 2024-12-06

## 2024-12-06 RX ORDER — HYDROMORPHONE HYDROCHLORIDE 1 MG/ML
0.4 INJECTION, SOLUTION INTRAMUSCULAR; INTRAVENOUS; SUBCUTANEOUS EVERY 5 MIN PRN
Status: DISCONTINUED | OUTPATIENT
Start: 2024-12-06 | End: 2024-12-06 | Stop reason: HOSPADM

## 2024-12-06 RX ORDER — OXYCODONE HYDROCHLORIDE 5 MG/1
5 TABLET ORAL
Status: DISCONTINUED | OUTPATIENT
Start: 2024-12-06 | End: 2024-12-06 | Stop reason: HOSPADM

## 2024-12-06 RX ADMIN — HYDROMORPHONE HYDROCHLORIDE 0.4 MG: 1 INJECTION, SOLUTION INTRAMUSCULAR; INTRAVENOUS; SUBCUTANEOUS at 13:50

## 2024-12-06 RX ADMIN — FENTANYL CITRATE 50 MCG: 50 INJECTION INTRAMUSCULAR; INTRAVENOUS at 13:00

## 2024-12-06 RX ADMIN — ACETAMINOPHEN 975 MG: 325 TABLET, FILM COATED ORAL at 09:46

## 2024-12-06 RX ADMIN — FENTANYL CITRATE 50 MCG: 50 INJECTION INTRAMUSCULAR; INTRAVENOUS at 13:15

## 2024-12-06 ASSESSMENT — ACTIVITIES OF DAILY LIVING (ADL)
ADLS_ACUITY_SCORE: 38

## 2024-12-06 NOTE — DISCHARGE INSTRUCTIONS
To contact a doctor, call Dr Bravo  or:     [phone] and ask for the Resident On Call for:          Orthopedics (answered 24 hours a day)   Emergency Departments:  [facility] Adult Emergency Department: [phone]     [facility] Children's Emergency Department: [phone]

## 2024-12-06 NOTE — BRIEF OP NOTE
[facility]    Brief Operative Note    Pre-operative diagnosis: Bone lesion [M89.9]  Post-operative diagnosis Same as pre-operative diagnosis    Procedure: BIOPSY, BONE, PELVIS, Right - Pelvis    Surgeon: Surgeons and Role:     * Juan Jose Bravo MD - Primary     * Joon Crockett MD - Assisting     * Silas Shrestha PA-C - Assisting  Anesthesia: General   Estimated Blood Loss: Less than 10 ml    Drains: None  Specimens:   ID Type Source Tests Collected by Time Destination   1 : Right Pelvis Tissue Pelvis, Right SURGICAL PATHOLOGY EXAM Juan Jose Bravo MD 12/6/2024 12:27 PM      Findings:   See op note .  Complications: None.  Implants: * No implants in log *    Plan:  - Same day surgery  - Continue partial weight bearing on RLE as he has been doing  - Tylenol, ibuprofen, oxycodone for pain control  - Keep dressing on x1 week   - Can shower POD3  - Will follow up pathology results  - Follow up in clinic 2 weeks

## 2024-12-10 ENCOUNTER — HOSPITAL ENCOUNTER (OUTPATIENT)
Dept: PET IMAGING | Facility: HOSPITAL | Age: 44
Discharge: HOME | End: 2024-12-10
Attending: ORTHOPAEDIC SURGERY
Payer: COMMERCIAL

## 2024-12-10 DIAGNOSIS — C49.9 SARCOMA (H): ICD-10-CM

## 2024-12-10 DIAGNOSIS — C41.4 PRIMARY CHONDROSARCOMA OF BONE OF PELVIS (H): ICD-10-CM

## 2024-12-10 DIAGNOSIS — C41.9 CHONDROSARCOMA (H): ICD-10-CM

## 2024-12-10 LAB
GLUCOSE BLDC GLUCOMTR-MCNC: 86 MG/DL (ref 70–99)
PATH REPORT.COMMENTS IMP SPEC: ABNORMAL
PATH REPORT.COMMENTS IMP SPEC: ABNORMAL
PATH REPORT.COMMENTS IMP SPEC: YES
PATH REPORT.FINAL DX SPEC: ABNORMAL
PATH REPORT.GROSS SPEC: ABNORMAL
PATH REPORT.MICROSCOPIC SPEC OTHER STN: ABNORMAL
PATH REPORT.RELEVANT HX SPEC: ABNORMAL
PHOTO IMAGE: ABNORMAL

## 2024-12-10 PROCEDURE — 78816 PET IMAGE W/CT FULL BODY: CPT | Mod: PI

## 2024-12-10 PROCEDURE — 82962 GLUCOSE BLOOD TEST: CPT

## 2024-12-10 PROCEDURE — 343N000001 HC RX 343 MED OP 636: Performed by: ORTHOPAEDIC SURGERY

## 2024-12-10 PROCEDURE — A9552 F18 FDG: HCPCS | Performed by: ORTHOPAEDIC SURGERY

## 2024-12-10 RX ORDER — FLUDEOXYGLUCOSE F 18 200 MCI/ML
8-18 INJECTION, SOLUTION INTRAVENOUS ONCE
Status: COMPLETED | OUTPATIENT
Start: 2024-12-10 | End: 2024-12-10

## 2024-12-10 RX ADMIN — FLUDEOXYGLUCOSE F 18 13.67 MILLICURIE: 200 INJECTION, SOLUTION INTRAVENOUS at 14:25

## 2024-12-12 ENCOUNTER — TELEPHONE (OUTPATIENT)
Dept: ORTHOPEDICS | Facility: CLINIC | Age: 44
End: 2024-12-12

## 2024-12-12 ENCOUNTER — CARE COORDINATION (OUTPATIENT)
Dept: ORTHOPEDICS | Facility: CLINIC | Age: 44
End: 2024-12-12

## 2024-12-12 ENCOUNTER — ANCILLARY PROCEDURE (OUTPATIENT)
Dept: CT IMAGING | Facility: CLINIC | Age: 44
End: 2024-12-12
Attending: ORTHOPAEDIC SURGERY
Payer: COMMERCIAL

## 2024-12-12 DIAGNOSIS — C41.9 CHONDROSARCOMA (H): ICD-10-CM

## 2024-12-12 DIAGNOSIS — C41.4 PRIMARY CHONDROSARCOMA OF BONE OF PELVIS (H): ICD-10-CM

## 2024-12-12 DIAGNOSIS — C41.9 CHONDROSARCOMA (H): Primary | ICD-10-CM

## 2024-12-12 DIAGNOSIS — C49.9 SARCOMA (H): ICD-10-CM

## 2024-12-12 PROCEDURE — 72192 CT PELVIS W/O DYE: CPT | Performed by: RADIOLOGY

## 2024-12-12 NOTE — PROGRESS NOTES
- A call was placed to the patient.     - Let him know we want to get CT done as soon as possible. CT scheduled for 3:25 today. Patient agreed that worked for him.     - He is keeping his appointment on Monday to discuss options with wife.     - Patient verbalized understanding of plan and all questions were answered. Call back number to clinic was given and patient was told to call if they had an further questions.

## 2024-12-12 NOTE — TELEPHONE ENCOUNTER
Pt Is requesting a call back from Eveline regarding prep before CT scan today    Could we send this information to you in [employer] or would you prefer to receive a phone call?:   Patient would prefer a phone call   Okay to leave a detailed message?: Yes at Cell number on file:    Telephone Information:   Mobile [phone]

## 2024-12-13 NOTE — PROGRESS NOTES
[facility] Physicians, Orthopaedic Oncology Surgery Consultation  by Juan Jose Bravo M.D.    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  We reviewed the nature of his disease, overall prognosis, the PET/CT findings of nonpulmonary metastasis, small area of hypermetabolic activity in the contralateral left femur and hypermetabolic thyroid nodule.  I believe he most likely has nonmetastatic disease and the findings of hypermetabolic activity in the left femur and thyroid are unrelated.    Examination reveals his biopsy to be healing well.  Some nodularity underneath the skin is notable.    Impression and plan:  After discussion today, I reviewed the risk benefits alternatives to wide local excision of his right pelvis and performing internal modified hemipelvectomy.  We reviewed the high risk of perioperative complications, namely an infection risk of greater than 20% and my recommendation to proceed with tumor excision and placement of antibiotic spacer of the temporary nature involving the right innominate bone.  For reconstruction of his pelvis, we have a CT scan and can initialize plans for a custom hemipelvic implant, however post resection we will perform repeat CT scan and finalize plans and manufacture of a custom  Shantanu pelvis for him.  Therefore I would anticipate several months between wide local resection and second stage reconstruction at a later date.      Plan for:  Physiotherapy and Occupational Therapy for gait training with nonweightbearing status left lower extremity after tumors excised, sliding board transfers and usage of wheelchair  Prescription for hospital bed for home usage  Advised patient to prepare home for handicap situation with appropriate ramps, handrails, bathroom modifications, etc.  Endocrinology referral regarding thyroid nodule evaluation  Once thyroid nodule evaluated, consider genetics consultation given his prior history of testicular carcinoma  Regarding timing of surgery I advised patient that we should proceed as soon as is reasonably possible although this tumor, probably benign form, has been present on previous CT scans of 4/22/2011 and 4/5/2013.  At what time malignancy developed is unknown however.  Patient is requested we defer surgery until he completes his family vacation by 1/21/2025 and therefore we will make arrangements to proceed with surgery and possible repeat imaging as necessary afterwards.    MD Chuck Hummel Family Professor  Oncology and Adult Reconstructive Surgery  Dept Orthopaedic Surgery, [facility]  [phone] office, [phone] pager  [email or web address]    Total combined visit time and work time before and after clinic visit, independent of trainee, on encounter date = 50 min

## 2024-12-16 ENCOUNTER — OFFICE VISIT (OUTPATIENT)
Dept: ORTHOPEDICS | Facility: CLINIC | Age: 44
End: 2024-12-16
Payer: COMMERCIAL

## 2024-12-16 DIAGNOSIS — C41.4 PRIMARY CHONDROSARCOMA OF BONE OF PELVIS (H): ICD-10-CM

## 2024-12-16 DIAGNOSIS — E04.1 THYROID NODULE: Primary | ICD-10-CM

## 2024-12-16 DIAGNOSIS — C41.9 CHONDROSARCOMA (H): ICD-10-CM

## 2024-12-16 PROCEDURE — 99215 OFFICE O/P EST HI 40 MIN: CPT | Performed by: ORTHOPAEDIC SURGERY

## 2024-12-16 NOTE — NURSING NOTE
I went in and introduced my self to patient.     Patient was accompanied by:   Wife, Mary Ellen    I let them know while sarcomas are rare, many people [place] in the [place] have been on this journey that they are beginning. [employer] is an organization made of other men and women who have been affected by sarcoma. The people involved in this organization are dedicated to helping others like them. Our teams work closely with this organization to get our patients connected with resources that can help along the way. I told them we are here to support them in any way possible as they confront and conquer this disease.       Some resources from [employer] include:      [employer] Booklet: is designed specifically for newly diagnosed sarcoma patients. It includes comprehensive information about treatment options and taking charge of your journey. I provided them a copy of this inc clinic. I also provided them with a rein in sarcoma provided bag. This is also available digitally on the website.     www.reininsarcoma.org: Their website is a good place to start in getting good and up to date information about local and national resources available to you.      [employer] Gatherings: These are events where people who have been affected by sarcoma can connect, share their story, and fundraise for future research. For more information see the website for up-to-date events. Two of their yearly events include Party in the Park and the [employer].      Peer to Peer Support: Reach out to be linked locally or nationally with sarcoma survivors that have faced similar journeys to yours. https://www.reininsarcoma.org/patient-and-family-support/       ---------------------------------------    Pre-Op Teaching was done in person at the clinic.    Teaching Flowsheet   Relevant Diagnosis: Pre-Op Teaching  Teaching Topic: tumor excision and placement of antibiotic spacer of the temporary nature involving the right  innominate bone      Person(s) involved in teaching:   Patient and Wife     Motivation Level:  Asks Questions: Yes  Eager to Learn: Yes  Cooperative: Yes  Receptive (willing/able to accept information): Yes  Any cultural factors/Muslim beliefs that may influence understanding or compliance? No     Patient demonstrates understanding of the following:  Reason for the appointment, diagnosis and treatment plan: Yes  Knowledge of proper use of medications and conditions for which they are ordered (with special attention to potential side effects or drug interactions): Yes  Which situations necessitate calling provider and whom to contact: Yes- discussed the stoplight tool to help assist with this.      Teaching Concerns Addressed:      Proper use of surgical scrub explain: Yes    Nutritional needs and diet plan: Yes  Pain management techniques: Yes  Wound Care: Yes  How and/when to access community resources: Yes  Need for pre-op with in 30 days: Yes  -I asked them to ensure they go over their daily medications during this visit and discuss what medications need to be stopped before surgery and when. If you are doing a pre-op with your PCP and they are not within the [facility], I ask them to fax it to our pre-op office. Patient verbalized understanding.      Instructional Materials Used/Given:  a surgery packet given to patient in clinic. Instructed patient to buy or get 8 ounces of antiseptic surgical soap called 4% CHG. Common name brand of this soap are Hibiclens and Exidine. I told them they can find this at their local pharmacy, clinic or retail store. If they have trouble finding it, I told them to ask their pharmacist to help them find a substitute.      - Important contact info/ phone numbers: emphasizing clinic number and after hours number  - Map/ location of surgery  - Showering instructions  - Stop light tool    Additionally the following was discussed with patient:  - WifeMary Ellen will be driving  the patient to surgery and staying with them for 24 hours.       -Next step: Schedule a surgery date and schedule a Pre-Op appointment with PAC    Time spent with patient: 15 minutes.

## 2024-12-16 NOTE — LETTER
12/16/2024      Gilberto Lund  [address] [facility] [address]      Dear Colleague,    Thank you for referring your patient, Gilberto Lund, to the [facility] [place]. Please see a copy of my visit note below.        [facility], Orthopaedic Oncology Surgery Consultation  by Juan Jose Bravo M.D.    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  Remote history of testicular CA with chemotherapy, no radiation      TREATMENTS:  2008, Testicular CA excision and chemotherapy  10/25/2013, Excision of calcaneal bone cyst left foot with allograft,  [facility]  12/2/24, CT guided core needle biopsy R innominate bone (Jean-Pierre) [facility]  12/6/24, Right pelvis open biopsy, (Law[place]), [facility]      I met with Jeremiah and his wife for an extensive discussion today regarding his biopsy findings of intermediate grade chondrosarcoma and the need for surgical resection of his right pelvis.  We reviewed the nature of his disease, overall prognosis, the PET/CT findings of nonpulmonary metastasis, small area of hypermetabolic activity in the contralateral left femur and hypermetabolic thyroid nodule.  I believe he most likely has nonmetastatic disease and the findings of hypermetabolic activity in the left femur and thyroid are unrelated.    Examination reveals his biopsy to be healing well.  Some nodularity underneath the skin is notable.    Impression and plan:  After discussion today, I reviewed the risk benefits alternatives to wide local excision of his right pelvis and performing internal modified hemipelvectomy.  We reviewed the high risk of perioperative complications, namely an infection risk of greater than 20% and my recommendation to proceed with tumor excision and placement of antibiotic spacer of the  temporary nature involving the right innominate bone.  For reconstruction of his pelvis, we have a CT scan and can initialize plans for a custom hemipelvic implant, however post resection we will perform repeat CT scan and finalize plans and manufacture of a custom Shantanu pelvis for him.  Therefore I would anticipate several months between wide local resection and second stage reconstruction at a later date.      Plan for:  Physiotherapy and Occupational Therapy for gait training with nonweightbearing status left lower extremity after tumors excised, sliding board transfers and usage of wheelchair  Prescription for hospital bed for home usage  Advised patient to prepare home for handicap situation with appropriate ramps, handrails, bathroom modifications, etc.  Endocrinology referral regarding thyroid nodule evaluation  Once thyroid nodule evaluated, consider genetics consultation given his prior history of testicular carcinoma  Regarding timing of surgery I advised patient that we should proceed as soon as is reasonably possible although this tumor, probably benign form, has been present on previous CT scans of 4/22/2011 and 4/5/2013.  At what time malignancy developed is unknown however.  Patient is requested we defer surgery until he completes his family vacation by 1/21/2025 and therefore we will make arrangements to proceed with surgery and possible repeat imaging as necessary afterwards.    MD Chuck Hummel Family Professor  Oncology and Adult Reconstructive Surgery  Dept Orthopaedic Surgery, [facility]  [phone] office, [phone] pager  [email or web address]    Total combined visit time and work time before and after clinic visit, independent of trainee, on encounter date = 50 min      Again, thank you for allowing me to participate in the care of your patient.        Sincerely,        Juan Jose Bravo MD

## 2024-12-17 NOTE — OP NOTE
Operative Note    12/06/2024     Pre-operative diagnosis:         Bone lesion [M89.9]  Post-operative diagnosis        Same as pre-operative diagnosis     Procedure:      Open right pelvic innominate bone BIOPSY, BONE     Surgeon:         Surgeons and Role:     * Juan Jose Bravo MD - Primary     * Joon Crockett MD - Assisting     * Silas Shrestha PA-C - Assisting  Anesthesia:     General             Estimated Blood Loss: Less than 10 ml     Drains: None  Specimens:       ID Type Source Tests Collected by Time Destination   1 : Right Pelvis Tissue Pelvis, Right SURGICAL PATHOLOGY EXAM Juan Jose Bravo MD 12/6/2024 12:27 PM        Findings:                     See op note .  Complications:            None.  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    Procedure details:  The patient was placed on operative table supine position.  After induction of general anesthesia fluoroscopic imaging was used to confirm the area of the biopsy to be performed within the supra-acetabular region.  A longitudinal incision was then made directly over the anterior inferior iliac spine.  I placed a seating K wire into the area to confirm proper targeting.  This was confirmed with biplanar fluoroscopic imaging.  I then made a 6 mm trephine hole into the tumor directing this into the supra-acetabular bone.  Pituitary rongeurs were then passed within the bony cavity and medial lateral directions to ensure complete sampling.  A large amount of myxoid type tissue was evacuated from the tumor area.  A large portion of the specimen was then sent to pathology department for evaluation.  The wound was then thoroughly cleansed and  reapproximated with observable sutures upon completion.  Sterile dressing was applied.      Plan:  - Same day surgery  - Continue partial weight bearing on RLE as he has been doing  - Tylenol, ibuprofen, oxycodone for pain control  - Keep dressing on x1 week   - Can shower POD3  - Will follow up pathology results  - Follow up in clinic 2 weeks    MD Chuck Hummel Family Professor  Oncology and Adult Reconstructive Surgery  Dept Orthopaedic Surgery, [facility]  [phone] office, [phone] pager  [email or web address]

## 2024-12-18 ENCOUNTER — TELEPHONE (OUTPATIENT)
Dept: ENDOCRINOLOGY | Facility: CLINIC | Age: 44
End: 2024-12-18
Payer: COMMERCIAL

## 2024-12-18 NOTE — TELEPHONE ENCOUNTER
DX, Referring NOTES: Thyroid Nodule    For Cancer Patients: Need the original operative and surgical pathology reports and all imaging reports/images related to the disease (includes all thyroid US, nuclear thyroid and total body scans, PET scans, chest CT reports since prior to the diagnosis ).   APPT DATE: 1/7/2025   NOTES (FOR ALL VISITS) STATUS DETAILS   OFFICE NOTES from referring provider [facility]:  12/16/24 - ORTHO OV with Dr. Bravo   OFFICE NOTES from other specialist N/A    ED NOTES N/A    OPERATIVE REPORT  (thyroid, pituitary, adrenal, parathyroid)  (All op notes related to diagnoses) N/A    MEDICATION LIST Internal    IMAGING      CT (HEAD/NECK/CHEST/ABDOMEN) [facility]:  12/10/24 - PET/CT  11/18/24 - CT Chest/Abd/Pelvis   LABS     DIABETES: HBGA1C, CREATININE, FASTING LIPIDS, MICROALBUMIN URINE, POTASSIUM, TSH, T4    THYROID: TSH, T4, CBC, THYRODLONULIN, TOTAL T3, FREE T4, CALCITONIN, CEA [facility]:  12/10/24 - Glucose  12/2/24 - CBC  10/28/24 - Creatinine  10/2/24 - BMP  10/2/24 - Lipid  6/28/22 - CMP

## 2024-12-18 NOTE — TELEPHONE ENCOUNTER
Patient confirmed scheduled appointment:  Date: 0107/25  Time: 5:00  Visit type: NEW ENDOCRINE  Provider: Saritha Lindquist MD  Location: [facility]  Testing/imaging: N/A  Additional notes: Scheduled per pt, will be in [place] for visit.    Saritha Lindquist MD  [id number]; Juan Jose Bravo MD; Saritha Lindquist MD  To schedulers : please offer to move forward on schedule with either  Dr Haines, Ronald,  Lázaro,  Dipak Thapa, Fang, Lexa Borrero,  Cristal Kenyon Bantle, Damián, Bennie Navarro using NON-CALL week open new/BALAJI (60 minutes) or 2 back to back 30 minute BALAJI spaces.      Haleigh Doll on 12/18/2024 at 11:22 AM

## 2024-12-19 ENCOUNTER — THERAPY VISIT (OUTPATIENT)
Dept: PHYSICAL THERAPY | Facility: REHABILITATION | Age: 44
End: 2024-12-19
Attending: ORTHOPAEDIC SURGERY
Payer: COMMERCIAL

## 2024-12-19 DIAGNOSIS — C41.4 PRIMARY CHONDROSARCOMA OF BONE OF PELVIS (H): ICD-10-CM

## 2024-12-19 DIAGNOSIS — C41.9 CHONDROSARCOMA (H): ICD-10-CM

## 2024-12-19 NOTE — PROGRESS NOTES
PHYSICAL THERAPY EVALUATION  Type of Visit: Evaluation       Fall Risk Screen:  Fall screen completed by: PT  Have you fallen 2 or more times in the past year?: (Patient-Rptd) No  Have you fallen and had an injury in the past year?: (Patient-Rptd) No  Is patient a fall risk?: No    Subjective   He is planning to have a surgery to excise a tumor in his right pelvis. He is coming to therapy for training on using a slide board for transfers, wheelchair use, and maintaining nonweightbearing status on the surgical side.      Presenting condition or subjective complaint: (Patient-Rptd) Info and training for upcoming surgery and recovery  Date of onset: 12/16/24 (Date of referral)    Relevant medical history:     Dates & types of surgery:      Prior diagnostic imaging/testing results: (Patient-Rptd) MRI; CT scan; X-ray; Bone scan     Prior therapy history for the same diagnosis, illness or injury: (Patient-Rptd) No      Living Environment  Social support: (Patient-Rptd) With a significant other or spouse   Type of home: (Patient-Rptd) House; Multi-level   Stairs to enter the home: (Patient-Rptd) Yes (Patient-Rptd) 2 Is there a railing: (Patient-Rptd) No     Ramp: (Patient-Rptd) No   Stairs inside the home: (Patient-Rptd) Yes (Patient-Rptd) 10 Is there a railing: (Patient-Rptd) Yes     Help at home: (Patient-Rptd) None  Equipment owned: (Patient-Rptd) Walker with wheels; Crutches     Employment: (Patient-Rptd) Yes (Patient-Rptd)   Hobbies/Interests: (Patient-Rptd) Outdoor activities    Patient goals for therapy:         Objective      Cognitive Status Examination  Orientation: Oriented to person, place and time   Level of Consciousness: Alert  Follows Commands and Answers Questions: 100% of the time  Personal Safety and Judgement: Intact    RANGE OF MOTION: LE ROM WFL  UE ROM WNL  STRENGTH: LE Strength WFL  UE Strength WNL    All mobility and transfer testing listed below is based on pt's ability to do so safely  while NWB on his right side.    BED MOBILITY: Independent    TRANSFERS: Verbal Cues, Non-Verbal Cues (demo/gestures), Was able to perform well after cueing. Needed cues to reach for chair while sitting and to push from chair while standing.    WHEELCHAIR MOBILITY: Needed education to perform it, but was able to complete without issues and without cueing during.    GAIT:   Level of [place]: Independent  Assistive Device(s): Walker (front wheeled)  Stairs: Education on NWB status during stairs using crutch and railing    BALANCE: [facility]    Assessment & Plan   CLINICAL IMPRESSIONS  Medical Diagnosis: Chondrosarcoma (H), Primary chondrosarcoma of bone of pelvis (H)    Treatment Diagnosis: Chondrosarcoma (H), Primary chondrosarcoma of bone of pelvis (H)   Impression/Assessment:  Pt is a pleasant 43 y.o. male presenting to therapy for assessment and education on the use of assistive devices to properly maintain NWB status on his right LE in preparation for his upcoming surgery. By the end of the session he was able to perform the transfers, WC mobility, and bed mobility safely and without cueing. He already has crutches at home, but will need a hospital bed, wheelchair, slide board, front-wheel walker, and large commode for home use prior to his surgery. These items will be ordered today. He will likely not require any follow up PT visits but his case will remain open until the date of his surgery to ensure he has the opportunity to train and practice as needed.    Clinical Decision Making (Complexity):  Clinical Presentation: Stable/Uncomplicated  Clinical Presentation Rationale: based on medical and personal factors listed in PT evaluation  Clinical Decision Making (Complexity): High complexity    PLAN OF CARE  Treatment Interventions:  Interventions: Gait Training, Neuromuscular Re-education, Therapeutic Activity, Therapeutic Exercise, Self-Care/Home Management    Long Term Goals     PT Goal 1  Goal Identifier:  Transfers  Goal Description: Jeremiah will demonstrate improvement in their symptoms and function as shown by being able to transfer WC to bed and WC to chair with FWW and with slide board while NWB on right in order to ensure readiness for surgery.  Goal Progress: Able  Target Date: 12/19/24  Date Met: 12/19/24  PT Goal 2  Goal Identifier: Stairs  Goal Description: Jeremiah will be able to ascend/descend one flight of stairs while NWB on right to ensure readiness for surgery.  Goal Progress: Met  Target Date: 12/19/24  Date Met: 12/19/24  PT Goal 3  Goal Identifier: WC management  Goal Description: Jeremiah will demonstrate the ability to navigate a wheelchair while maintaining NWB status on his right side in order to demonstrate readiness for surgery.  Goal Progress: Met  Target Date: 12/19/24  Date Met: 12/19/24  PT Goal 4  Goal Identifier: Walking with walker  Goal Description: Jeremiah will demonstrate mastery with gait using a FWW while maintaining NWB status on his right side in order to demonstrate readiness for surgery.  Goal Progress: Met  Target Date: 12/19/24  Date Met: 12/19/24      Frequency of Treatment: 1-2 visits  Duration of Treatment: 30 days    Education Assessment:        Risks and benefits of evaluation/treatment have been explained.   Patient/Family/caregiver agrees with Plan of Care.     Evaluation Time:     PT Migel Low Complexity Minutes (00790): 10  Evaluation Only  Wheelchair Mgmt/Training Minutes (79861): 9     Signing Clinician: CARLOS DEJESUS, PT

## 2024-12-26 NOTE — TELEPHONE ENCOUNTER
FUTURE VISIT INFORMATION      SURGERY INFORMATION:  Date: 1/28/25  Location: [facility] OR  Surgeon:  Juan Jose Bravo MD   Anesthesia Type:  general  Procedure: HEMIPELVECTOMY, modified internal, USING OPTICAL TRACKING SYSTEM INSERTION, ANTIBIOTIC BEADS OR ANTIBIOTIC SPACER, pelvis   Consult: ov 12/16/24    RECORDS REQUESTED FROM:       Primary Care Provider: [facility]    Pertinent Medical History: hard to intubate

## 2024-12-30 ENCOUNTER — DOCUMENTATION ONLY (OUTPATIENT)
Dept: ORTHOPEDICS | Facility: CLINIC | Age: 44
End: 2024-12-30
Payer: COMMERCIAL

## 2024-12-30 NOTE — PROGRESS NOTES
Received Completed forms Yes   Faxed Forms Faxed To: ang  Fax Number: [phone]   Sent to HIM (Date) 12/30/24     Received Completed forms Yes   Faxed Forms Faxed To: Thomas Ryan  Fax Number: [phone]   Sent to HIM (Date) 12/30/24

## 2025-01-03 NOTE — PROGRESS NOTES
[facility], Orthopaedic Oncology Surgery Consultation  by Juan Jose Bravo M.D.    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  Repeat scans are pending.    Jeremiah's wife had numerous questions related to the planned surgery in terms of the perioperative care, recovery, rehabilitation, and prognosis.  All questions were answered today.    Impression:  Right periacetabular intermediate grade chondrosarcoma, localized.      PLAN:  Repeat CT/MRI for surgical planning  CT for thyroid eval can be done at time of pelvic CT. thyroid needle biopsy may be done before or after his planned tumor excision.  Need to coordinate with implant  for cutting guides for stage 1 (resection) and OR and Dr Sanchez, urologist.  I had a conference with Dr. Lyman regarding surgical assistance and the technical issues surrounding why I have asked for his expertise.    MD Chuck Hummel Family Professor  Oncology and Adult Reconstructive Surgery  Dept Orthopaedic Surgery, [facility]  [phone] office, [phone] pager  [email or web address]    Virtual-Visit Details    Type of service:  Video Visit  Visit total duration (including visit time, pre and post visit work time as documented above on the same day of  service): 40  min  Video start time: 1230  Video end time:  1310  Originating Location (pt. Location): Home  Distant Location (provider location):  [facility] ORTHOPEDIC [facility]   Platform used for Virtual Visit: Nightpro

## 2025-01-07 ENCOUNTER — PRE VISIT (OUTPATIENT)
Dept: ENDOCRINOLOGY | Facility: CLINIC | Age: 45
End: 2025-01-07

## 2025-01-07 ENCOUNTER — VIRTUAL VISIT (OUTPATIENT)
Dept: ENDOCRINOLOGY | Facility: CLINIC | Age: 45
End: 2025-01-07
Attending: ORTHOPAEDIC SURGERY
Payer: COMMERCIAL

## 2025-01-07 VITALS — BODY MASS INDEX: 30.52 KG/M2 | WEIGHT: 225 LBS

## 2025-01-07 DIAGNOSIS — R93.89 ABNORMAL IMAGING OF THYROID: ICD-10-CM

## 2025-01-07 DIAGNOSIS — C41.9 CHONDROSARCOMA (H): ICD-10-CM

## 2025-01-07 DIAGNOSIS — C41.4 PRIMARY CHONDROSARCOMA OF BONE OF PELVIS (H): ICD-10-CM

## 2025-01-07 DIAGNOSIS — E04.1 THYROID NODULE: Primary | ICD-10-CM

## 2025-01-07 PROCEDURE — 98002 SYNCH AUDIO-VIDEO NEW MOD 45: CPT

## 2025-01-07 NOTE — LETTER
1/7/2025       RE: Gilberto Lund  [address] [facility] [place] [address]  [facility] [address]     Dear Colleague,    Thank you for referring your patient, Gilberto Lund, to the [facility] [place] at [facility]. Please see a copy of my visit note below.    Endocrine Consult Video visit     Attending Assessment/Plan :     High FDG right thyroid nodule incidentally noted on PET  Thyroid US followed by FNAB of right thyroid nodule    History of kidney stones    Chondrosarcoma involving right innominate bone .      Due to the COVID 19 pandemic this visit was a video visit in order to help prevent spread of infection in this patient and the general population. The patient gave verbal consent for the visit today. I have independently reviewed and interpreted labs, imaging as indicated.     Distant Location (provider location):  Off-site  Mode of Communication:  Video Conference via [employer]  Chart review/prep time 1  [id number]   Visit Start time 1652   Visit Stop time  1702   _54_ minutes spent on the date of the encounter doing chart review, history and exam, documentation and further activities as noted above.      Chief complaint:  Gilberto is a 44 year old male seen in consultation at the request of Dr Nick Bravo for thyroid nodule   I have reviewed Care Everywhere including  Health lab reports, imaging reports and provider notes as indicated.      HISTORY OF PRESENT ILLNESS    Thyroid nodule was incidentally noted on recent CT and PET scan which were being obtained in the staging follow up related to diagnosis chondrosarcoma.  It had high FDG on the PET scan. He has not had further thyroid imaging.    He has passed kidney stones x 2 in the past 5 years.       Surgery for the chondrosarcoma will be 2/4/2025      Endocrine relevant labs are as follows:  4/8/11 beta HCG < 3, AFT 2.6  4/7/21 prolactin 5.8  10/2/24 cCRP inflammation 5.21, ESR 6   10/25/24  Ca 9.6, creatinine 0.94 beta HCG < 3, AFP 2.7  10/28/24 SPEP no monoclonal; Kappa free light chain 2 (0.33-1.94), lambda 1.71,    Relevant imaging is as follows: (as read by me as it pertains to endocrine relevant organs)  11/18/24 CT CAP with contrast  right thyroid nodule is subtl, 2.6 x 2.2 x   12/10/24 PET/CT: right thyroid nodule with high FDG   He has not had thyroid US    No known childhood radiation exposure     REVIEW OF SYSTEMS  Voice normal  Swallow normal   No throat/ neck symptoms   No head position symptoms  Cardiac: negative  GI: negative   Pain in the right hip where the sarcoma is located - x 1.5-2 years. Its intermittent but mostly constant.    10 system ROS otherwise as per the HPI or negative    Past Medical History  Past Medical History:   Diagnosis Date     Arthritis      Chondrosarcoma (H)      CTS (carpal tunnel syndrome)      Gynecomastia, male      Hard to intubate 06/29/2022     Infection due to 2019 novel coronavirus 12/2020     Kidney stone 2015    90% calcium oxalate monohydrate, and  10% calcium oxalate dihydrate.     Psoriasis      Skin tag      Testicle cancer, left 2008    surgery and chemotherapy     Thyroid nodule 12/10/2024    high FDG, noted on PET     Past Surgical History:   Procedure Laterality Date     ABDOMEN SURGERY       BIOPSY       BIOPSY BONE PELVIS Right 12/6/2024    Procedure: BIOPSY, BONE, PELVIS;  Surgeon: Juan Jose Bravo MD;  Location[facility]     EXCISE EXOSTOSIS FOOT  10/25/2013    Procedure: EXCISE EXOSTOSIS FOOT;  Excision of calcaneal bone cyst left foot with allograft;  Surgeon: Marcello Jensen DPM;  Location: [facility] OR     LAPAROSCOPIC CHOLECYSTECTOMY N/A 06/29/2022    Procedure: CHOLECYSTECTOMY, LAPAROSCOPIC;  Surgeon: Cholo Agustin DO;  Location: [facility] OR     ORCHIECTOMY SCROTAL Left        Medications  Current Outpatient Medications   Medication Sig Dispense Refill     acetaminophen (TYLENOL) 325 MG tablet Take 2 tablets (650 mg) by mouth every 4  hours as needed for mild pain. 50 tablet 0     ondansetron (ZOFRAN ODT) 4 MG ODT tab Take 1 tablet (4 mg) by mouth every 8 hours as needed for nausea. 4 tablet 0     oxyCODONE (ROXICODONE) 5 MG tablet Take 1-2 tablets (5-10 mg) by mouth every 4 hours as needed for moderate to severe pain. 10 tablet 0     senna-docusate (SENOKOT-S/PERICOLACE) 8.6-50 MG tablet Take 1-2 tablets by mouth 2 times daily. 30 tablet 0       Allergies  No Known Allergies    Family History  Family History   Problem Relation Age of Onset     No Known Problems Mother      Hypertension Father      Atrial fibrillation Father      C.A.D. Maternal Grandfather        Social History  Social History     Tobacco Use     Smoking status: Former     Types: Cigarettes     Smokeless tobacco: Former     Types: Chew     Tobacco comments:     Occasional Cigar   Vaping Use     Vaping status: Never Used   Substance Use Topics     Alcohol use: Yes     Alcohol/week: 2.0 standard drinks of alcohol     Types: 2 Standard drinks or equivalent per week     Drug use: No     ; lives in [place] near [facility]    Physical Exam  There is no height or weight on file to calculate BMI.  BP Readings from Last 1 Encounters:   12/06/24 117/73      Pulse Readings from Last 1 Encounters:   12/06/24 73      Resp Readings from Last 1 Encounters:   12/06/24 16      Temp Readings from Last 1 Encounters:   12/06/24 98.1  F (36.7  C) (Oral)      SpO2 Readings from Last 1 Encounters:   12/06/24 98%      Wt Readings from Last 1 Encounters:   12/06/24 103.6 kg (228 lb 6.3 oz)      Ht Readings from Last 1 Encounters:   12/06/24 1.829 m (6')     GENERAL: no distress; I can see from mid chest up; normal voice  SKIN: Visible skin clear.   EYES: Eyes grossly normal to inspection.    NECK: visible goiter is not present; no visible neck masses  RESP: No audible wheeze, cough, or increased work of breathing.    NEURO: Awake, alert, responds appropriately to questions.  Mentation  and speech fluent.  PSYCH:affect normal and appearance well-groomed.    DATA REVIEW    EXAM: PET ONCOLOGY WHOLE BODY  LOCATION: [facility]  DATE: 12/10/2024  INDICATION: Initial treatment planning and staging for malignant neoplasm of pelvic bones, sacrum, coccyx. Chondrosarcoma  COMPARISON: CT the abdomen pelvis dated 12/2/2024  CONTRAST: None  TECHNIQUE: Serum glucose level 86 mg/dL. One hour post intravenous administration of 13.7 mCi F-18 FDG, PET imaging was performed from the skull vertex to feet, utilizing attenuation correction with concurrent axial CT and PET/CT image fusion. Dose   reduction techniques were used.  PET/CT FINDINGS: Ill-defined infiltrative FDG avid lesion diffusely involving the right aspect of the pelvis with areas of osseous thinning/dehiscence and extension into the adjacent soft tissues (Max SUV 6.9) suspicious for the biopsy-proven   chondrosarcoma without metastasis.  FDG avid right thyroid nodule measuring up to 1.5 cm (max SUV 25.5), a third of which represent primary thyroid neoplasms. Degenerative shoulder and hip synovitis. Additional mildly FDG avid lesion with in situ mineralization in the marrow space of the   distal left femoral diaphysis (Max SUV 2.5)  CT FINDINGS: Mild senescent intracranial changes. Calcified granuloma in the superior segment of the right lower lobe. Mild coronary artery calcium. Mild diffuse hepatic steatosis. Cholecystectomy. Renal cysts. Nonobstructing punctate renal calculi.   Sigmoid diverticulosis. Multilevel degenerative changes of the spine.                                                           IMPRESSION:  1. Findings suspicious for the biopsy-proven chondrosarcoma without metastasis  2. FDG avid right thyroid nodule measuring up to 1.5 cm, a third of which represent primary thyroid neoplasms. This is amenable to ultrasound-guided histologic sampling if desired.  3. Additional mildly FDG avid lesion with in-situ  "mineralizati      Virtual Visit Details    Type of service:  Video Visit     Originating Location (pt. Location): {video visit patient location:019640::\"Home\"}  {PROVIDER LOCATION On-site should be selected for visits conducted from your clinic location or adjoining [facility] hospital, academic office, or other nearby [facility] building. Off-site should be selected for all other provider locations, including home:[address]}  Distant Location (provider location):  {virtual location provider:120267}  Platform used for Video Visit: {Virtual Visit Platforms:535548::\"[employer]\"}          Again, thank you for allowing me to participate in the care of your patient.      Sincerely,    Saritah Lindquist MD    "

## 2025-01-07 NOTE — NURSING NOTE
Current patient location: [address] [place] [address] S  [facility] [address]    Is the patient currently in the state of [place]? YES    Visit mode:VIDEO    If the visit is dropped, the patient can be reconnected by: VIDEO VISIT: Text to cell phone:   Telephone Information:   Mobile [phone]       Will anyone else be joining the visit? NO  (If patient encounters technical issues they should call [phone] :[phone])    Are changes needed to the allergy or medication list? Pt stated no changes to allergies and Pt stated no med changes    Are refills needed on medications prescribed by this physician? NO    Reason for visit: Consult    Mary Alice HOLT

## 2025-01-07 NOTE — PATIENT INSTRUCTIONS
Thyroid ultrasound and needle biopsy right thyroid nodule  You can schedule by calling [phone]. Tell them you want to have it done at [facility]      INFORMATION FOR PATIENTS  THYROID NODULES AND   FINE NEEDLE ASPIRATION BIOPSY OF THE THYROID    The finding of a thyroid nodule is almost never an emergency.  Thyroid nodules are common, occurring in up to 50% of patients over the age of 50 years.      Innocent (not important enough to have been detected during life) thyroid cancer may be found in around 5% of people.   Likewise, about 5-15% of patients with demonstrated thyroid nodules will prove to have thyroid cancer if subjected to aggressive diagnostic measures.  As a rule, thyroid cancer has an excellent prognosis.  Therefore, in each patient with thyroid nodules, the decision has to be made about how important it is to identify and treat a cancer, if present.      When we find nodules on the thyroid we use ultrasound and either palpation or ultrasound guided biopsy to help determine which patients, from the large number with nodules on the thyroid, are in the group containing an important thyroid cancer.      Ultrasound Guided Fine Needle Aspiration Biopsy of the Thyroid uses the ultrasound eye to see the nodule and the needle during the biopsy procedure.  This procedure is performed in the radiology department.  The radiologist uses a small needle to remove cells from the specific area of the thyroid. The cells are then analyzed under the microscope to determine whether or not they might be cancer.      The results of thyroid biopsy come in 4 categories    Benign or negative for cancer.  This is most common result, found in approximately 60-70% of biopsy specimens.  There remains a < 6 % chance that this result is wrong.    Positive for cancer.  This is found about 5 % of the time. There remains a  < 1% chance that cancer is not present when we make this diagnosis by biopsy. This result leads to a  recommendation for surgery to make the final diagnosis of cancer.     Indeterminate, or the grey zone.  This is found in approximately 20-30% of patients.  This diagnosis identifies a higher risk group, often resulting in diagnostic surgery to establish the final diagnosis.  If all patients in this group go to surgery, only 10-30%, on average, prove to have cancer.  This group is subdivided into 3 subcategories: atypia of undetermined significance, follicular neoplasm and suspicious, with increasing risk.      Insufficient for diagnosis. This is found in 5-10% of biopsies. When this occurs we need to repeat the biopsy.      The greatest risk of thyroid biopsy is that the result is not clear (that it is in the indeterminate grey zone group), resulting in future thyroid surgery for what is likely to be benign thyroid disease.  There is a small risk of bleeding or infection.      If your doctor has recommended you have an ultrasound guided fine needle aspiration biopsy of the thyroid, your appointment may be scheduled by calling [phone].  Be sure to specify that the procedure is to be ultrasound-guided and that it is to be scheduled in radiology

## 2025-01-07 NOTE — PROGRESS NOTES
Endocrine Consult Video visit     Attending Assessment/Plan :     High FDG right thyroid nodule incidentally noted on PET  Thyroid US followed by FNAB of right thyroid nodule    History of kidney stones    Chondrosarcoma involving right innominate bone .      Due to the COVID 19 pandemic this visit was a video visit in order to help prevent spread of infection in this patient and the general population. The patient gave verbal consent for the visit today. I have independently reviewed and interpreted labs, imaging as indicated.     Distant Location (provider location):  Off-site  Mode of Communication:  Video Conference via [employer]  Chart review/prep time 1  0716-0745   Visit Start time 1652   Visit Stop time  1702   _54_ minutes spent on the date of the encounter doing chart review, history and exam, documentation and further activities as noted above.      Chief complaint:  Gilberto is a 44 year old male seen in consultation at the request of Dr Nick Bravo for thyroid nodule   I have reviewed Care Everywhere including  Health lab reports, imaging reports and provider notes as indicated.      HISTORY OF PRESENT ILLNESS    Thyroid nodule was incidentally noted on recent CT and PET scan which were being obtained in the staging follow up related to diagnosis chondrosarcoma.  It had high FDG on the PET scan. He has not had further thyroid imaging.    He has passed kidney stones x 2 in the past 5 years.       Surgery for the chondrosarcoma will be 2/4/2025      Endocrine relevant labs are as follows:  4/8/11 beta HCG < 3, AFT 2.6  4/7/21 prolactin 5.8  10/2/24 cCRP inflammation 5.21, ESR 6   10/25/24 Ca 9.6, creatinine 0.94 beta HCG < 3, AFP 2.7  10/28/24 SPEP no monoclonal; Kappa free light chain 2 (0.33-1.94), lambda 1.71,    Relevant imaging is as follows: (as read by me as it pertains to endocrine relevant organs)  11/18/24 CT CAP with contrast  right thyroid nodule is subtl, 2.6 x 2.2 x   12/10/24 PET/CT: right  thyroid nodule with high FDG   He has not had thyroid US    No known childhood radiation exposure     REVIEW OF SYSTEMS  Voice normal  Swallow normal   No throat/ neck symptoms   No head position symptoms  Cardiac: negative  GI: negative   Pain in the right hip where the sarcoma is located - x 1.5-2 years. Its intermittent but mostly constant.    10 system ROS otherwise as per the HPI or negative    Past Medical History  Past Medical History:   Diagnosis Date    Arthritis     Chondrosarcoma (H)     CTS (carpal tunnel syndrome)     Gynecomastia, male     Hard to intubate 06/29/2022    Infection due to 2019 novel coronavirus 12/2020    Kidney stone 2015    90% calcium oxalate monohydrate, and  10% calcium oxalate dihydrate.    Psoriasis     Skin tag     Testicle cancer, left 2008    surgery and chemotherapy    Thyroid nodule 12/10/2024    high FDG, noted on PET     Past Surgical History:   Procedure Laterality Date    ABDOMEN SURGERY      BIOPSY      BIOPSY BONE PELVIS Right 12/6/2024    Procedure: BIOPSY, BONE, PELVIS;  Surgeon: Juan Jose Bravo MD;  Location[facility] OR    EXCISE EXOSTOSIS FOOT  10/25/2013    Procedure: EXCISE EXOSTOSIS FOOT;  Excision of calcaneal bone cyst left foot with allograft;  Surgeon: Marcello Jensen DPM;  Location: [facility] OR    LAPAROSCOPIC CHOLECYSTECTOMY N/A 06/29/2022    Procedure: CHOLECYSTECTOMY, LAPAROSCOPIC;  Surgeon: Cholo Agustin DO;  [facility]: [facility] OR    ORCHIECTOMY SCROTAL Left        Medications  Current Outpatient Medications   Medication Sig Dispense Refill    acetaminophen (TYLENOL) 325 MG tablet Take 2 tablets (650 mg) by mouth every 4 hours as needed for mild pain. 50 tablet 0    ondansetron (ZOFRAN ODT) 4 MG ODT tab Take 1 tablet (4 mg) by mouth every 8 hours as needed for nausea. 4 tablet 0    oxyCODONE (ROXICODONE) 5 MG tablet Take 1-2 tablets (5-10 mg) by mouth every 4 hours as needed for moderate to severe pain. 10 tablet 0    senna-docusate  (SENOKOT-S/PERICOLACE) 8.6-50 MG tablet Take 1-2 tablets by mouth 2 times daily. 30 tablet 0       Allergies  No Known Allergies    Family History  Family History   Problem Relation Age of Onset    No Known Problems Mother     Hypertension Father     Atrial fibrillation Father     C.A.D. Maternal Grandfather        Social History  Social History     Tobacco Use    Smoking status: Former     Types: Cigarettes    Smokeless tobacco: Former     Types: Chew    Tobacco comments:     Occasional Cigar   Vaping Use    Vaping status: Never Used   Substance Use Topics    Alcohol use: Yes     Alcohol/week: 2.0 standard drinks of alcohol     Types: 2 Standard drinks or equivalent per week    Drug use: No     ; lives in [place] near [facility]    Physical Exam  There is no height or weight on file to calculate BMI.  BP Readings from Last 1 Encounters:   12/06/24 117/73      Pulse Readings from Last 1 Encounters:   12/06/24 73      Resp Readings from Last 1 Encounters:   12/06/24 16      Temp Readings from Last 1 Encounters:   12/06/24 98.1  F (36.7  C) (Oral)      SpO2 Readings from Last 1 Encounters:   12/06/24 98%      Wt Readings from Last 1 Encounters:   12/06/24 103.6 kg (228 lb 6.3 oz)      Ht Readings from Last 1 Encounters:   12/06/24 1.829 m (6')     GENERAL: no distress; I can see from mid chest up; normal voice  SKIN: Visible skin clear.   EYES: Eyes grossly normal to inspection.    NECK: visible goiter is not present; no visible neck masses  RESP: No audible wheeze, cough, or increased work of breathing.    NEURO: Awake, alert, responds appropriately to questions.  Mentation and speech fluent.  PSYCH:affect normal and appearance well-groomed.    DATA REVIEW    EXAM: PET ONCOLOGY WHOLE BODY  LOCATION: [facility]  DATE: 12/10/2024  INDICATION: Initial treatment planning and staging for malignant neoplasm of pelvic bones, sacrum, coccyx. Chondrosarcoma  COMPARISON: CT the abdomen  pelvis dated 12/2/2024  CONTRAST: None  TECHNIQUE: Serum glucose level 86 mg/dL. One hour post intravenous administration of 13.7 mCi F-18 FDG, PET imaging was performed from the skull vertex to feet, utilizing attenuation correction with concurrent axial CT and PET/CT image fusion. Dose   reduction techniques were used.  PET/CT FINDINGS: Ill-defined infiltrative FDG avid lesion diffusely involving the right aspect of the pelvis with areas of osseous thinning/dehiscence and extension into the adjacent soft tissues (Max SUV 6.9) suspicious for the biopsy-proven   chondrosarcoma without metastasis.  FDG avid right thyroid nodule measuring up to 1.5 cm (max SUV 25.5), a third of which represent primary thyroid neoplasms. Degenerative shoulder and hip synovitis. Additional mildly FDG avid lesion with in situ mineralization in the marrow space of the   distal left femoral diaphysis (Max SUV 2.5)  CT FINDINGS: Mild senescent intracranial changes. Calcified granuloma in the superior segment of the right lower lobe. Mild coronary artery calcium. Mild diffuse hepatic steatosis. Cholecystectomy. Renal cysts. Nonobstructing punctate renal calculi.   Sigmoid diverticulosis. Multilevel degenerative changes of the spine.                                                           IMPRESSION:  1. Findings suspicious for the biopsy-proven chondrosarcoma without metastasis  2. FDG avid right thyroid nodule measuring up to 1.5 cm, a third of which represent primary thyroid neoplasms. This is amenable to ultrasound-guided histologic sampling if desired.  3. Additional mildly FDG avid lesion with in-situ mineralizati

## 2025-01-08 ENCOUNTER — VIRTUAL VISIT (OUTPATIENT)
Dept: SURGERY | Facility: CLINIC | Age: 45
End: 2025-01-08
Payer: COMMERCIAL

## 2025-01-08 ENCOUNTER — PRE VISIT (OUTPATIENT)
Dept: SURGERY | Facility: CLINIC | Age: 45
End: 2025-01-08

## 2025-01-08 VITALS — WEIGHT: 230 LBS | BODY MASS INDEX: 31.15 KG/M2 | HEIGHT: 72 IN

## 2025-01-08 DIAGNOSIS — Z01.818 PRE-OP EVALUATION: Primary | ICD-10-CM

## 2025-01-08 LAB
ABO + RH BLD: NORMAL
BLD GP AB SCN SERPL QL: NEGATIVE
SPECIMEN EXP DATE BLD: NORMAL

## 2025-01-08 PROCEDURE — 98000 SYNCH AUDIO-VIDEO NEW SF 15: CPT | Performed by: PHYSICIAN ASSISTANT

## 2025-01-08 ASSESSMENT — PAIN SCALES - GENERAL: PAINLEVEL_OUTOF10: NO PAIN (0)

## 2025-01-08 ASSESSMENT — LIFESTYLE VARIABLES: TOBACCO_USE: 0

## 2025-01-08 ASSESSMENT — ENCOUNTER SYMPTOMS: SEIZURES: 0

## 2025-01-08 NOTE — H&P
Pre-Operative H & P     CC:  Preoperative exam to assess for increased cardiopulmonary risk while undergoing surgery and anesthesia.    Date of Encounter: 1/8/2025  Primary Care Physician:  Marcello Abreu     Reason for visit:   Encounter Diagnosis   Name Primary?    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 Lund was recently diagnosed with an  intermediate grade chondrosarcoma. He has been following with Dr. Bravo. He is now scheduled for surgery as above.     History is obtained from the patient and chart review    Hx of abnormal bleeding or anti-platelet use: denies      Past Medical History  Past Medical History:   Diagnosis Date    Arthritis     Chondrosarcoma (H)     CTS (carpal tunnel syndrome)     Gynecomastia, male     Hard to intubate 06/29/2022    Infection due to 2019 novel coronavirus 12/2020    Kidney stone 2015    90% calcium oxalate monohydrate, and  10% calcium oxalate dihydrate.    Psoriasis     Skin tag     Testicle cancer, left 2008    surgery and chemotherapy    Thyroid nodule 12/10/2024    high FDG, noted on PET       Past Surgical History  Past Surgical History:   Procedure Laterality Date    ABDOMEN SURGERY      BIOPSY      BIOPSY BONE PELVIS Right 12/6/2024    Procedure: BIOPSY, BONE, PELVIS;  Surgeon: Lawrence  Juan Jose ROGERS MD;  [facility]    EXCISE EXOSTOSIS FOOT  10/25/2013    Procedure: EXCISE EXOSTOSIS FOOT;  Excision of calcaneal bone cyst left foot with allograft;  Surgeon: Marcello Jensen DPM;  Location: [facility] OR    LAPAROSCOPIC CHOLECYSTECTOMY N/A 06/29/2022    Procedure: CHOLECYSTECTOMY, LAPAROSCOPIC;  Surgeon: Cholo Agustin DO;  Location: [facility] OR    ORCHIECTOMY SCROTAL Left        Prior to Admission Medications  Current Outpatient Medications   Medication Sig Dispense Refill    acetaminophen (TYLENOL) 325 MG tablet Take 2 tablets (650 mg) by mouth every 4 hours as needed for mild pain. 50 tablet 0    ondansetron (ZOFRAN ODT) 4 MG ODT tab Take 1 tablet (4 mg) by mouth every 8 hours as needed for nausea. 4 tablet 0    oxyCODONE (ROXICODONE) 5 MG tablet Take 1-2 tablets (5-10 mg) by mouth every 4 hours as needed for moderate to severe pain. 10 tablet 0    senna-docusate (SENOKOT-S/PERICOLACE) 8.6-50 MG tablet Take 1-2 tablets by mouth 2 times daily. 30 tablet 0       Allergies  No Known Allergies    Social History  Social History     Socioeconomic History    Marital status:      Spouse name: Not on file    Number of children: Not on file    Years of education: Not on file    Highest education level: Not on file   Occupational History    Not on file   Tobacco Use    Smoking status: Former     Types: Cigarettes    Smokeless tobacco: Former     Types: Chew    Tobacco comments:     Occasional Cigar   Vaping Use    Vaping status: Never Used   Substance and Sexual Activity    Alcohol use: Not Currently    Drug use: No    Sexual activity: Yes     Partners: Female   Other Topics Concern    Parent/sibling w/ CABG, MI or angioplasty before 65F 55M? No   Social History Narrative    Not on file     Social Drivers of Health     Financial Resource Strain: Low Risk  (9/30/2024)    Financial Resource Strain     Within the past 12 months, have you or your family members you live with been unable to get utilities  (heat, electricity) when it was really needed?: No   Food Insecurity: Low Risk  (9/30/2024)    Food Insecurity     Within the past 12 months, did you worry that your food would run out before you got money to buy more?: No     Within the past 12 months, did the food you bought just not last and you didn t have money to get more?: No   Transportation Needs: Low Risk  (9/30/2024)    Transportation Needs     Within the past 12 months, has lack of transportation kept you from medical appointments, getting your medicines, non-medical meetings or appointments, work, or from getting things that you need?: No   Physical Activity: Sufficiently Active (9/30/2024)    Exercise Vital Sign     Days of Exercise per Week: 5 days     Minutes of Exercise per Session: 150+ min   Stress: No Stress Concern Present (9/30/2024)    [employer] [place] [employer] - Occupational Stress Questionnaire     Feeling of Stress : Only a little   Social Connections: Unknown (9/30/2024)    Social Connection and Isolation Panel [NHANES]     Frequency of Communication with Friends and Family: Not on file     Frequency of Social Gatherings with Friends and Family: Once a week     Attends [employer] Services: Not on file     Active Member of Clubs or Organizations: Not on file     Attends Club or Organization Meetings: Not on file     Marital Status: Not on file   Interpersonal Safety: Low Risk  (12/6/2024)    Interpersonal Safety     Do you feel physically and emotionally safe where you currently live?: Yes     Within the past 12 months, have you been hit, slapped, kicked or otherwise physically hurt by someone?: No     Within the past 12 months, have you been humiliated or emotionally abused in other ways by your partner or ex-partner?: No   Housing Stability: Low Risk  (9/30/2024)    Housing Stability     Do you have housing? : Yes     Are you worried about losing your housing?: No       Family History  Family History   Problem Relation Age  of Onset    No Known Problems Mother     Hypertension Father     Atrial fibrillation Father     Thyroid Disease Father     Nephrolithiasis Father     Pulmonary Embolism Father     C.A.D. Maternal Grandfather     Thyroid Cancer No family hx of     Diabetes No family hx of     Anesthesia Reaction No family hx of        Review of Systems  The complete review of systems is negative other than noted in the HPI or here.   Anesthesia Evaluation   Pt has had prior anesthetic.     History of anesthetic complications  - difficult airway.      ROS/MED HX  ENT/Pulmonary:  - neg pulmonary ROS  (-) tobacco use   Neurologic:  - neg neurologic ROS  (-) no seizures and no CVA   Cardiovascular:    (-) taking anticoagulants/antiplatelets   METS/Exercise Tolerance: >4 METS Comment:  on feet all day long, denies exertional symptoms    Hematologic:  - neg hematologic  ROS  (-) history of blood clots and history of blood transfusion   Musculoskeletal: Comment: Chondrosarcoma of pelvis       GI/Hepatic:  - neg GI/hepatic ROS  (-) GERD   Renal/Genitourinary:       Endo:     (+)          thyroid problem, thyroid nodule,           Psychiatric/Substance Use:       Infectious Disease:  - neg infectious disease ROS     Malignancy:   (+) Malignancy, History of Other.Other CA testicular cancer Remission status post Surgery and Chemo.    Other:            Virtual visit -  No vitals were obtained    Physical Exam  Constitutional: Awake, alert, cooperative, no apparent distress, and appears stated age.  HENT: Normocephalic  Respiratory: non labored breathing   Neurologic: Awake, alert, oriented to name, place and time.   Neuropsychiatric: Calm, cooperative. Normal affect.      Prior Labs/Diagnostic Studies   All labs and imaging personally reviewed     EKG/ stress test - if available please see in ROS above   No results found.       No data to display                  The patient's records and results personally reviewed by this  provider.     Outside records reviewed from: Care Everywhere      Assessment    Gilberto Lund is a 44 year old male seen as a PAC referral for risk assessment and optimization for anesthesia.    Plan/Recommendations  Pt will be optimized for the proposed procedure.  See below for details on the assessment, risk, and preoperative recommendations    NEUROLOGY  - No history of TIA, CVA or seizure  -Post Op delirium risk factors:  No risk identified    ENT  -h/o difficult airway in 2022:    Placement Date: 06/29/22; Placement Time: 0734 (created via procedure documentation); Mask Ventilation: 2 (easy mask); Induction Type: Intravenous; Ease of Intubation: Difficult (abnormal) (small mouth opening, possibly the result of inadequate paralytic); Technique: Direct laryngoscopy; ETT Type: Single; Tube Size: 8 mm; DL Blade Size: Saeed 2; Grade View: 1; Adjucts: Stylet; Placement Person: CRNA; Attempts: 1; Depth: 23 cm     Since that time, had general with supraglottic airway 12/6/24.   Mallampati: Unable to assess  TM: Unable to assess    CARDIAC  - No history of CAD, Hypertension, and Afib  -denies cardiac symptoms   - METS (Metabolic Equivalents)  Patient performs 4 or more METS exercise without symptoms             Total Score: 0      RCRI-Low risk: Class 2 0.9% complication rate             Total Score: 1    RCRI: High Risk Surgery        PULMONARY  ELLIE Low Risk             Total Score: 1    ELLIE: Male      - Denies asthma or inhaler use  - Tobacco History    History   Smoking Status    Former    Types: Cigarettes   Smokeless Tobacco    Former    Types: Chew       GI  PONV Medium Risk  Total Score: 2           1 AN PONV: Patient is not a current smoker    1 AN PONV: Intended Post Op Opioids        /RENAL  - Baseline Creatinine WNL    ENDOCRINE    - BMI: Estimated body mass index is 31.19 kg/m  as calculated from the following:    Height as of this encounter: 1.829 m (6').    Weight as of this encounter: 104.3 kg  (230 lb).  Obesity (BMI >30)  - No history of Diabetes Mellitus    HEME  VTE High Risk 3%             Total Score: 11    VTE: Male    VTE: Family Hx of VTE    VTE: Current cancer      - No history of abnormal bleeding or antiplatelet use.    MSK  -osteochondroma of right pelvis with the above procedure planned   Patient is NOT Frail             Total Score: 0      -PM&R referral not indicated     Different anesthesia methods/types have been discussed with the patient, but they are aware that the final plan will be decided by the assigned anesthesia provider on the date of service.    The patient is optimized for their procedure. AVS with information on surgery time/arrival time, meds and NPO status given by nursing staff. No further diagnostic testing indicated.    Please refer to the physical examination documented by the anesthesiologist in the anesthesia record on the day of surgery.    Video-Visit Details    Type of service:  Video Visit    Provider received verbal consent for a Video Visit from the patient? Yes     Originating Location (pt. Location): Home    Distant Location (provider location):  Off-site  Mode of Communication:  Video Conference via [employer]  On the day of service:     Prep time: 8 minutes  Visit time: 7 minutes  Documentation time: 4 minutes  ------------------------------------------  Total time: 19 minutes      Sue Martin PA-C  Preoperative Assessment Center  [facility]  Clinic and Surgery Center  Phone: [phone]  Fax: [phone]

## 2025-01-08 NOTE — PROGRESS NOTES
Jeremiah is a 44 year old who is being evaluated via a billable video visit.    How would you like to obtain your AVS? MyChart  If the video visit is dropped, the invitation should be resent by: Text to cell phone: [phone]

## 2025-01-08 NOTE — PATIENT INSTRUCTIONS
Preparing for Your Surgery      Name:  Gilberto Lund   MRN:  [id number]   :  1980   Today's Date:  2025       Arriving for surgery:  Surgery date:  25  Arrival time:  06:00 am        Please come to:      [facility] [address]   [address] [place]  [address]   The Green Ramp for patients and visitors is beneath the [facility]. The parking facility entrance is at the intersection of [address] [place] [address] and [address]. Patients and visitors who self-park will receive the reduced hospital parking rate (no ticket validation needed).     [employer] parking, located at the [facility] main entrance on [address] [place] [address], is available Monday - Friday from 7 am to 3:30 pm.     Discounted parking pass options can be purchased from  attendants during business hours.     -Check in at the security desk in the [facility] ([facility])   Lobby. They will direct you to the correct elevators.   -Proceed to the 3rd floor, Adult Surgery Waiting Lounge. [phone]     If you need directions, a wheelchair or escort please stop at the Information Desk in the lobby.  Inform the information person you are here for surgery; a wheelchair and escort to [address] will be provided.   An escort to the Adult Surgery Waiting Lounge will be provided. .    What can I eat or drink?  -  You may eat and drink normally up to 8 hours prior to arrival time.  -  You may have clear liquids until 2 hours prior to arrival time.    Examples of clear liquids:  Water  Clear broth  Juices (apple, white grape, white cranberry  and cider) without pulp  Noncarbonated, powder based beverages  (lemonade and Joseph-Aid)  Sodas (Sprite, 7-Up, ginger ale and seltzer)  Coffee or tea (without milk or cream)  Gatorade    -  No Alcohol or cannabis products for at least 24 hours before surgery.     Which medicines  can I take?    Hold Aspirin for 7 days before surgery.   Hold Multivitamins for 7 days before surgery.  Hold Supplements for 7 days before surgery.  Hold Ibuprofen (Advil, Motrin) for 1 day(s) before surgery--unless otherwise directed by surgeon.  Hold Naproxen (Aleve) for 4 days before surgery.    -  PLEASE TAKE these medications the day of surgery:  Tylenol if needed.    How do I prepare myself?  - Please take 2 showers (one the night prior to surgery and one the morning of surgery) using Scrubcare or Hibiclens soap.    Use this soap only from the neck to your toes. Avoid genital area      Leave the soap on your skin for one minute--then rinse thoroughly.      You may use your own shampoo and conditioner. No other hair products.   - Please remove all jewelry and body piercings.  - No lotions, deodorants or fragrance.  - No makeup or fingernail polish.   - Bring your ID and insurance card.    -If you use a CPAP machine, please bring the CPAP machine, tubing, and mask to hospital.    -If you have a Deep Brain Stimulator, Spinal Cord Stimulator, or any Neuro Stimulator device---you must bring the remote control to the hospital.      ALL PATIENTS GOING HOME THE SAME DAY OF SURGERY ARE REQUIRED TO HAVE A RESPONSIBLE ADULT TO DRIVE AND BE IN ATTENDANCE WITH THEM FOR 24 HOURS FOLLOWING SURGERY.    Covid testing policy as of 12/06/2022  Your surgeon will notify and schedule you for a COVID test if one is needed before surgery--please direct any questions or COVID symptoms to your surgeon      Questions or Concerns:    - For any questions regarding the day of surgery or your hospital stay, please contact the Pre Admission Nursing Office at [phone].       - If you have health changes between today and your surgery, please call your surgeon.       - For questions after surgery, please call your surgeons office.           Current Visitor Guidelines    You may have 2 visitors in the pre op area.    Visiting hours: 8 a.m.  to 8:30 p.m.    Patients confirmed or suspected to have symptoms of COVID 19 or flu:     No visitors allowed for adult patients.   Children (under age 18) can have 1 named visitor.     People who are sick or showing symptoms of COVID 19 or flu:    Are not allowed to visit patients--we can only make exceptions in special situations.       Please follow these guidelines for your visit:          Please maintain social distance          Masking is optional--however at times you may be asked to wear a mask for the safety of yourself and others     Clean your hands with alcohol hand . Do this when you arrive at and leave the building and patient room,    And again after you touch your mask or anything in the room.     Go directly to and from the room you are visiting.     Stay in the patient s room during your visit. Limit going to other places in the hospital as much as possible     Leave bags and jackets at home or in the car.     For everyone s health, please don t come and go during your visit. That includes for smoking   during your visit.

## 2025-01-09 ENCOUNTER — LAB (OUTPATIENT)
Dept: LAB | Facility: CLINIC | Age: 45
End: 2025-01-09

## 2025-01-09 DIAGNOSIS — Z01.818 PRE-OP EVALUATION: ICD-10-CM

## 2025-01-09 LAB
ANION GAP SERPL CALCULATED.3IONS-SCNC: 11 MMOL/L (ref 7–15)
BUN SERPL-MCNC: 15.8 MG/DL (ref 6–20)
CALCIUM SERPL-MCNC: 9.5 MG/DL (ref 8.8–10.4)
CHLORIDE SERPL-SCNC: 104 MMOL/L (ref 98–107)
CREAT SERPL-MCNC: 1.1 MG/DL (ref 0.67–1.17)
EGFRCR SERPLBLD CKD-EPI 2021: 84 ML/MIN/1.73M2
GLUCOSE SERPL-MCNC: 123 MG/DL (ref 70–99)
HCO3 SERPL-SCNC: 27 MMOL/L (ref 22–29)
POTASSIUM SERPL-SCNC: 4.2 MMOL/L (ref 3.4–5.3)
SODIUM SERPL-SCNC: 142 MMOL/L (ref 135–145)

## 2025-01-13 ENCOUNTER — VIRTUAL VISIT (OUTPATIENT)
Dept: ORTHOPEDICS | Facility: CLINIC | Age: 45
End: 2025-01-13
Payer: COMMERCIAL

## 2025-01-13 VITALS — BODY MASS INDEX: 31.15 KG/M2 | WEIGHT: 230 LBS | HEIGHT: 72 IN

## 2025-01-13 DIAGNOSIS — C41.9 CHONDROSARCOMA (H): Primary | ICD-10-CM

## 2025-01-13 DIAGNOSIS — M25.551 HIP PAIN, RIGHT: ICD-10-CM

## 2025-01-13 DIAGNOSIS — C41.4 PRIMARY CHONDROSARCOMA OF BONE OF PELVIS (H): ICD-10-CM

## 2025-01-13 PROCEDURE — 98007 SYNCH AUDIO-VIDEO EST HI 40: CPT | Performed by: ORTHOPAEDIC SURGERY

## 2025-01-13 ASSESSMENT — PAIN SCALES - GENERAL: PAINLEVEL_OUTOF10: NO PAIN (0)

## 2025-01-13 NOTE — NURSING NOTE
Current patient location:  work    Is the patient currently in the state of [place]? YES    Visit mode: VIDEO    If the visit is dropped, the patient can be reconnected by:VIDEO VISIT: Text to cell phone:   Telephone Information:   Mobile [phone]    and VIDEO VISIT: Send to e-mail at: [email or web address]    Will anyone else be joining the visit? Pts spouse  (If patient encounters technical issues they should call [phone])    Are changes needed to the allergy or medication list?  Medications flagged for removal, please review/remove    Patient denies any changes since echeck-in completion and states all information entered during echeck-in remains accurate.    Are refills needed on medications prescribed by this physician? NO    Rooming Documentation:  Questionnaire(s) completed    No other vitals to report today    Reason for visit: DWAINE Stringer MA VVF

## 2025-01-13 NOTE — LETTER
1/13/2025      Gilberto Lund  [address]  [place] [address]      Dear Colleague,    Thank you for referring your patient, Gilberto Lund, to the [facility] [place]. Please see a copy of my visit note below.          [facility], Orthopaedic Oncology Surgery Consultation  by Juan Jose Bravo M.D.    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  Remote history of testicular CA with chemotherapy, no radiation      TREATMENTS:  2008, Testicular CA excision and chemotherapy  10/25/2013, Excision of calcaneal bone cyst left foot with allograft,  [facility]  12/2/24, CT guided core needle biopsy R innominate bone (Jean-Pierre) [facility]  12/6/24, Right pelvis open biopsy, (Lawrence), [facility]      I had a virtual visit with Jeremiah and his wife Mary Ellen and we had an extensive discussion again regarding the upcoming planned surgery for his chondrosarcoma resection of the pelvis.  Repeat scans are pending.    Jeremiah's wife had numerous questions related to the planned surgery in terms of the perioperative care, recovery, rehabilitation, and prognosis.  All questions were answered today.    Impression:  Right periacetabular intermediate grade chondrosarcoma, localized.      PLAN:  Repeat CT/MRI for surgical planning  CT for thyroid eval can be done at time of pelvic CT. thyroid needle biopsy may be done before or after his planned tumor excision.  Need to coordinate with implant  for cutting guides for stage 1 (resection) and OR and Dr Sanchez, urologist.  I had a conference with Dr. Lyman regarding surgical assistance and the technical issues surrounding why I have asked for his expertise.    Juan Jose Bravo MD  [facility] Family Professor  Oncology and Adult Reconstructive Surgery  Dept Orthopaedic Surgery,  [facility]  [phone] office, [phone] pager  [email or web address]    Virtual-Visit Details    Type of service:  Video Visit  Visit total duration (including visit time, pre and post visit work time as documented above on the same day of service): 40  min  Video start time: 1230  Video end time:  1310  Originating Location (pt. Location): Home  Distant Location (provider location):  [facility]   Platform used for Virtual Visit: SRCH2         Again, thank you for allowing me to participate in the care of your patient.        Sincerely,        Juan Jose Bravo MD    Electronically signed

## 2025-01-14 ENCOUNTER — ANCILLARY PROCEDURE (OUTPATIENT)
Dept: CT IMAGING | Facility: CLINIC | Age: 45
End: 2025-01-14
Attending: ORTHOPAEDIC SURGERY
Payer: COMMERCIAL

## 2025-01-14 ENCOUNTER — ANCILLARY PROCEDURE (OUTPATIENT)
Dept: MRI IMAGING | Facility: CLINIC | Age: 45
End: 2025-01-14
Attending: ORTHOPAEDIC SURGERY
Payer: COMMERCIAL

## 2025-01-14 DIAGNOSIS — C41.9 CHONDROSARCOMA (H): ICD-10-CM

## 2025-01-14 DIAGNOSIS — M25.551 HIP PAIN, RIGHT: ICD-10-CM

## 2025-01-14 DIAGNOSIS — C41.4 PRIMARY CHONDROSARCOMA OF BONE OF PELVIS (H): ICD-10-CM

## 2025-01-14 RX ORDER — GADOBUTROL 604.72 MG/ML
10 INJECTION INTRAVENOUS ONCE
Status: COMPLETED | OUTPATIENT
Start: 2025-01-14 | End: 2025-01-14

## 2025-01-14 RX ADMIN — GADOBUTROL 10 ML: 604.72 INJECTION INTRAVENOUS at 11:00

## 2025-01-21 ENCOUNTER — MYC MEDICAL ADVICE (OUTPATIENT)
Dept: ORTHOPEDICS | Facility: CLINIC | Age: 45
End: 2025-01-21
Payer: COMMERCIAL

## 2025-01-21 DIAGNOSIS — C41.4 PRIMARY CHONDROSARCOMA OF BONE OF PELVIS (H): ICD-10-CM

## 2025-01-21 DIAGNOSIS — C41.9 CHONDROSARCOMA (H): Primary | ICD-10-CM

## 2025-02-03 NOTE — TELEPHONE ENCOUNTER
Patient is scheduled for surgery with Dr. Bravo    Spoke with: patient    Date of Surgery: 2/14/25    [facility]    Post op: 3/3/25    H&P: PAC 2/6/25    Additional imaging/appointments: N/A    Surgery packet: received     Additional comments: N/A    Isamar  Perioperative   [phone]

## 2025-02-04 ENCOUNTER — TELEPHONE (OUTPATIENT)
Dept: ORTHOPEDICS | Facility: CLINIC | Age: 45
End: 2025-02-04
Payer: COMMERCIAL

## 2025-02-04 NOTE — TELEPHONE ENCOUNTER
FUTURE VISIT INFORMATION      SURGERY INFORMATION:  Date: 2/14/25  [facility]  Surgeon:  Juan Jose Bravo MD Elliott, Sean Patrick, MD   Anesthesia Type:  general  Procedure: HEMIPELVECTOMY, MODIFIED INTERNAL, USING OPTICAL TRACKING SYSTEM INSERTION, ANTIBIOTIC BEADS OR ANTIBIOTIC SPACER, PELVIS     RECORDS REQUESTED FROM:       [facility]    Pertinent Medical History: hard to intubate

## 2025-02-04 NOTE — TELEPHONE ENCOUNTER
- A call was placed to the patient.     - She said [facility] medical would not cover bed. I let her know she should reach out to insurance to see where she could get this that would be covered by insurance. But they may run into issue with insurance approval as patient has not had surgery yet and there may not be sufficient documentation to support need at this time. I let her know it may be more likely to get this approved after surgery.     - She said she would reach out and let us know where this can be sent.     - PAC cannot create an addendum and a new visit is needed.        - Patient verbalized understanding of plan and all questions were answered. Call back number to clinic was given and patient was told to call if they had an further questions.

## 2025-02-04 NOTE — TELEPHONE ENCOUNTER
Patient Returning Call    Reason for call:  patients wife calling regarding handy medical bed delivery. Was told insurance is out of network and needing order to be sent else where so they can obtain one. Also to go over prep for surgery. Requesting callback     Information relayed to patient:  te sent to clinic     Patient has additional questions:  No      Could we [facility] this information to you [facility] or would you prefer to receive a phone call?:   Patient would prefer a phone call   Okay to leave a detailed message?: Yes at Cell number on file:    Telephone Information:   Mobile [phone]

## 2025-02-05 ENCOUNTER — PREP FOR PROCEDURE (OUTPATIENT)
Dept: OTHER | Facility: CLINIC | Age: 45
End: 2025-02-05
Payer: COMMERCIAL

## 2025-02-05 NOTE — PROGRESS NOTES
SURGERY PLAN (PRE-OP PLAN)    Patient Position (indicated by x):    Supine     Supine with torso rolled up on a bump     Floppy lateral on torso length bean bag   X  Lateral decubitus, bean bag, torso lenth     Lateral decubitus, Wixson hip positioner     Safety paddle side supports x 2 clamped to side rail     Lithotomy, both legs in yellow padded leg red     Lithotomy, single leg in yellow padded leg red     Prone on blanket rolls/round gel pad     Prone on Americo (arched) frame on Amarjit table     Single thigh in orange arthroscopy clamp     Beach chair semi recumbent     Spider limb positioner     Arm out on radiolucent arm table   X  Split drape with top bar     Revision DEVONTE drape with plastic side bags for leg     Extremity drape     Shoulder pack drape     Laparotomy drape   X  Michelle catheter   X  Type and Cross, 2 units ready for OR     General Equipment Requests (indicated by x):    X  C-Arm with C-Armor drape     C-Arm (video capable, Portafare00 model)     O-Arm with Stealth imaging   X  Fracture Table     Amarjit XR Table     SurgiGraphic 6000 (diving board) fluoro table     Cell saver     Lawrence Biopsy trephine set w/ K-wire & pituitary rongeurs   X  Small pituitary rongeur     Lawrence's angled curettes, narrow shaft     Bone graft, kapner gouges     Midas Pedro Medtronic taya, electric motor     Phenol 5%     Pioneer BMAC stem cell     Vancomycin 1 gram powder     Zometa 4 mg vials     Depo Medrol steroid   X  Blunt Pelvic Retractor (.55, Blunt Hohmann with  slight bend)     (1) Portable hand held radiation detector machine for sentinel node biopsy and (2) Lymphazurin   X  Lambotte Osteotomes     Trauma/Fixation Requests (indicated by x):    Large Frag AO plates     Small Frag AO plates     Locking periarticular plates - AO     Locking periarticular prox humerus plate - Elizabeth/Nephew   X  Nia Pelvic recon plates (curved & straight), 3.5 & 4.5 mm (all lengths)   X  Nia quadrilateral surface  plate (all lengths)   X  Nia 4.5, 6.5, 7.0 cannulated screws (pevlic set)     Rupert Gamma nail     AO, DCS 95 degree plate/screw     AO, 95 degree condylar blade plate   X  AO, 3.0, 3.5, 4.0, 4.5 mm Cannulated screws   X  AO, 6.5, 7.0, 7.3 mm Cannulated screws- Stainless Steel     AO, 6.5 mm Cannulated screws- Titanium     Lobito cerclage cable plates     AO IM nails     AO Large Ex Fix     AO Small Ex Fix   X  Large pelvic bone clamps     Large fx reduction forcepts - AO   X  Bone clamps - Large (Julia Ocasio, Chantell)   X  Onkos custom cutting jigs       Specimens and cultures (indicated by x):     Tissue cultures, aerobic and anaerobic without gram stain     Frozen section     pathology specimens - fresh   X  pathology specimens - formalin     Plan:  Internal hemipelvectomy with cement spacer      George Boone MD  Adult Joint Reconstruction Fellow  Dept Orthopaedic Surgery, [facility]

## 2025-02-06 ENCOUNTER — PATIENT OUTREACH (OUTPATIENT)
Dept: CARE COORDINATION | Facility: CLINIC | Age: 45
End: 2025-02-06

## 2025-02-06 ENCOUNTER — PRE VISIT (OUTPATIENT)
Dept: SURGERY | Facility: CLINIC | Age: 45
End: 2025-02-06

## 2025-02-06 ENCOUNTER — VIRTUAL VISIT (OUTPATIENT)
Dept: SURGERY | Facility: CLINIC | Age: 45
End: 2025-02-06
Payer: COMMERCIAL

## 2025-02-06 VITALS — HEIGHT: 73 IN | WEIGHT: 230 LBS | BODY MASS INDEX: 30.48 KG/M2

## 2025-02-06 DIAGNOSIS — Z01.818 PREOP EXAMINATION: Primary | ICD-10-CM

## 2025-02-06 DIAGNOSIS — C41.9 CHONDROSARCOMA (H): ICD-10-CM

## 2025-02-06 RX ORDER — COVID-19 ANTIGEN TEST
220 KIT MISCELLANEOUS 2 TIMES DAILY WITH MEALS
COMMUNITY

## 2025-02-06 ASSESSMENT — LIFESTYLE VARIABLES: TOBACCO_USE: 0

## 2025-02-06 ASSESSMENT — PAIN SCALES - GENERAL: PAINLEVEL_OUTOF10: NO PAIN (0)

## 2025-02-06 ASSESSMENT — ENCOUNTER SYMPTOMS
SEIZURES: 0
DYSRHYTHMIAS: 0

## 2025-02-06 NOTE — H&P
Pre-Operative H & P     CC:  Preoperative exam to assess for increased cardiopulmonary risk while undergoing surgery and anesthesia.    Date of Encounter: 2/6/2025  Primary Care Physician:  Marcello Abreu     Reason for visit:   Encounter Diagnoses   Name Primary?    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 Bravo, and diagnosed with an intermediate grade chondrosarcoma.  He has now been counseled for above surgical procedure.  His history is otherwise significant for difficult intubation, testicular cancer status post scrotal orchiectomy, and chemotherapy, kidney stone, arthritis, and psoriasis.    The patient was initially seen in the Preop Assessment Center for an earlier surgery date which had to be changed.    He returns today virtually for an updated preop evaluation.    He has had no changes in this interim    Hx of abnormal bleeding or anti-platelet use: Denies      Past Medical History  Past Medical History:   Diagnosis Date    Arthritis     Chondrosarcoma (H)     CTS (carpal tunnel syndrome)     Gynecomastia, male     Hard to intubate 06/29/2022    Infection due to 2019 novel coronavirus 12/2020    Kidney stone 2015    90% calcium oxalate monohydrate, and  10% calcium oxalate dihydrate.    Primary chondrosarcoma  of bone of pelvis (H)     Psoriasis     Skin tag     Testicle cancer, left 2008    surgery and chemotherapy    Thyroid nodule 12/10/2024    high FDG, noted on PET       Past Surgical History  Past Surgical History:   Procedure Laterality Date    ABDOMEN SURGERY      BIOPSY      BIOPSY BONE PELVIS Right 12/6/2024    Procedure: BIOPSY, BONE, PELVIS;  Surgeon: Juan Jose Bravo MD;  [facility]    EXCISE EXOSTOSIS FOOT  10/25/2013    Procedure: EXCISE EXOSTOSIS FOOT;  Excision of calcaneal bone cyst left foot with allograft;  Surgeon: Marcello Jensen DPM;  Location: [facility]    LAPAROSCOPIC CHOLECYSTECTOMY N/A 06/29/2022    Procedure: CHOLECYSTECTOMY, LAPAROSCOPIC;  Surgeon: Cholo Agustin DO;  Location: [facility]    ORCHIECTOMY SCROTAL Left        Prior to Admission Medications  Current Outpatient Medications   Medication Sig Dispense Refill    naproxen sodium 220 MG capsule Take 220 mg by mouth 2 times daily (with meals).         Allergies  No Known Allergies    Social History  Social History     Socioeconomic History    Marital status:      Spouse name: Not on file    Number of children: Not on file    Years of education: Not on file    Highest education level: Not on file   Occupational History    Not on file   Tobacco Use    Smoking status: Former     Types: Cigarettes    Smokeless tobacco: Former     Types: Chew   Vaping Use    Vaping status: Never Used   Substance and Sexual Activity    Alcohol use: Not Currently    Drug use: No    Sexual activity: Yes     Partners: Female   Other Topics Concern    Parent/sibling w/ CABG, MI or angioplasty before 65F 55M? No   Social History Narrative    Not on file     Social Drivers of Health     Financial Resource Strain: Low Risk  (9/30/2024)    Financial Resource Strain     Within the past 12 months, have you or your family members you live with been unable to get utilities (heat, electricity) when it was really needed?: No   Food Insecurity: Low Risk   (9/30/2024)    Food Insecurity     Within the past 12 months, did you worry that your food would run out before you got money to buy more?: No     Within the past 12 months, did the food you bought just not last and you didn t have money to get more?: No   Transportation Needs: Low Risk  (9/30/2024)    Transportation Needs     Within the past 12 months, has lack of transportation kept you from medical appointments, getting your medicines, non-medical meetings or appointments, work, or from getting things that you need?: No   Physical Activity: Sufficiently Active (9/30/2024)    Exercise Vital Sign     Days of Exercise per Week: 5 days     Minutes of Exercise per Session: 150+ min   Stress: No Stress Concern Present (9/30/2024)    [employer] [place] [employer] - Occupational Stress Questionnaire     Feeling of Stress : Only a little   Social Connections: Unknown (9/30/2024)    Social Connection and Isolation Panel [NHANES]     Frequency of Communication with Friends and Family: Not on file     Frequency of Social Gatherings with Friends and Family: Once a week     Attends Scientology Services: Not on file     Active Member of Clubs or Organizations: Not on file     Attends Club or Organization Meetings: Not on file     Marital Status: Not on file   Interpersonal Safety: Low Risk  (12/6/2024)    Interpersonal Safety     Do you feel physically and emotionally safe where you currently live?: Yes     Within the past 12 months, have you been hit, slapped, kicked or otherwise physically hurt by someone?: No     Within the past 12 months, have you been humiliated or emotionally abused in other ways by your partner or ex-partner?: No   Housing Stability: Low Risk  (9/30/2024)    Housing Stability     Do you have housing? : Yes     Are you worried about losing your housing?: No       Family History  Family History   Problem Relation Age of Onset    No Known Problems Mother     Hypertension Father     Atrial  fibrillation Father     Thyroid Disease Father     Nephrolithiasis Father     Pulmonary Embolism Father     C.A.D. Maternal Grandfather     Thyroid Cancer No family hx of     Diabetes No family hx of     Anesthesia Reaction No family hx of        Review of Systems  The complete review of systems is negative other than noted in the HPI or here.   Anesthesia Evaluation   Pt has had prior anesthetic. Type: General.    History of anesthetic complications  - difficult airway.      ROS/MED HX  ENT/Pulmonary: Comment: History of bleomycin as part of chemotherapy for testicular cancer.   (-) tobacco use and recent URI   Neurologic:  - neg neurologic ROS  (-) no seizures and no CVA   Cardiovascular:     (+)  - -   -  - -                                 No previous cardiac testing  (-) taking anticoagulants/antiplatelets, PALMA and arrhythmias   METS/Exercise Tolerance: >4 METS Comment:  on feet all day long, denies exertional symptoms    Hematologic:  - neg hematologic  ROS  (-) history of blood clots and history of blood transfusion   Musculoskeletal: Comment: Chondrosarcoma of pelvis       GI/Hepatic:  - neg GI/hepatic ROS  (-) GERD   Renal/Genitourinary:     (+) renal disease,      Nephrolithiasis ,       Endo:     (+)          thyroid problem, thyroid nodule,           Psychiatric/Substance Use:  - neg psychiatric ROS     Infectious Disease:  - neg infectious disease ROS     Malignancy:   (+) Malignancy, History of Other.Other CA testicular cancer Remission status post Surgery and Chemo.    Other:  - neg other ROS          Virtual visit -  No vitals were obtained    Physical Exam  Constitutional: Awake, alert, no apparent distress, and appears stated age.  HENT: Normocephalic  Respiratory: non labored breathing: no cough   Neurologic: Oriented to name, place and time.   Neuropsychiatric: Calm, cooperative. Normal affect.      Prior Labs/Diagnostic Studies   All labs and imaging personally reviewed   Lab  Results   Component Value Date    WBC 7.5 12/02/2024     Lab Results   Component Value Date    RBC 5.09 12/02/2024     Lab Results   Component Value Date    HGB 15.8 12/02/2024     Lab Results   Component Value Date    HCT 46.1 12/02/2024     Lab Results   Component Value Date    MCV 91 12/02/2024     Lab Results   Component Value Date    MCH 31.0 12/02/2024     Lab Results   Component Value Date    MCHC 34.3 12/02/2024     Lab Results   Component Value Date    RDW 12.9 12/02/2024     Lab Results   Component Value Date     12/02/2024     Last Comprehensive Metabolic Panel:  Lab Results   Component Value Date     01/09/2025    POTASSIUM 4.2 01/09/2025    CHLORIDE 104 01/09/2025    CO2 27 01/09/2025    ANIONGAP 11 01/09/2025     (H) 01/09/2025    BUN 15.8 01/09/2025    CR 1.10 01/09/2025    GFRESTIMATED 84 01/09/2025    CANDICE 9.5 01/09/2025     MR Right hip 1/14/25                                                     Impression:  1. No substantial change in size of pathology proven chondrosarcoma  involving acetabulum and superior pubic ramus; extension through the  right iliac bone and hip joint. Multifocal areas of cortical violation  better seen on same day CT.  1a. Interval enlargement of the predominantly cystic irregular  ring-enhancing soft tissue mass at the puboacetabular junction; now  measuring up to 3.6 cm. Central area of nonenhancement and T2  hyperintensity is consistent with tissue necrosis versus enlarging  possibly complex fluid collection.  1b. Patchy enhancement particularly in the adductor compartment  musculature; tumor extension should be considered given areas of  cortical breakthrough.      The patient's records and results personally reviewed by this provider.     Outside records reviewed from: Care Everywhere      Assessment    Gilberto Lund is a 44 year old male seen as a PAC referral for risk assessment and optimization for anesthesia.    Plan/Recommendations  Pt will  "be optimized for the proposed procedure.  See below for details on the assessment, risk, and preoperative recommendations    NEUROLOGY  - No history of TIA, CVA or seizure    -Post Op delirium risk factors:  No risk identified    ENT  - Patient has previous history of complicated airway.   History of difficult intubation  From anesthesia airway notes 6/29/22  \"Placement Date: 06/29/22; Placement Time: 0734 (created via procedure documentation); Mask Ventilation: 2 (easy mask); Induction Type: Intravenous; Ease of Intubation: Difficult (abnormal) (small mouth opening, possibly the result of inadequate paralytic); Technique: Direct laryngoscopy; ETT Type: Single; Tube Size: 8 mm; DL Blade Size: Saeed 2; Grade View: 1; Adjucts: Stylet; Placement Person: CRNA; Attempts: 1; Depth: 23 cm\"    Supragottic airway used with last procedure  Mallampati: Unable to assess  TM: Unable to assess    CARDIAC  Denies cardiac history, symptoms, or meds.  Good exercise tolerance  - METS (Metabolic Equivalents)>4    RCRI: 0.4% risk of serious cardiac events    PULMONARY  Denies asthma, cough or use of inhaler  He did have Bleomycin as part of chemotherapy for previous testicular cancer    ELLIE Low Risk             Total Score: 1    ELLIE: Male      - Tobacco History    History   Smoking Status    Former    Types: Cigarettes   Smokeless Tobacco    Former    Types: Chew       GI:   PONV Medium Risk  Total Score: 2           1 AN PONV: Patient is not a current smoker    1 AN PONV: Intended Post Op Opioids        /RENAL  - Baseline Creatinine  1.10  History of nephrolithiasis    ENDOCRINE    - BMI: Estimated body mass index is 30.34 kg/m  as calculated from the following:    Height as of this encounter: 1.854 m (6' 1\").    Weight as of this encounter: 104.3 kg (230 lb).  Obesity (BMI >30)  - No history of Diabetes Mellitus    Thyroid nodule evaluated by Endocrinology. FNA planned for future    HEME  VTE High Risk 3%             Total Score: " 11    VTE: Male    VTE: Family Hx of VTE    VTE: Current cancer      Denies personal history of blood clots  Denies history of blood transfusion  Type and screen drawn on 1/9/25, antibody screen negative    MSK  Patient is NOT Frail             Total Score: 0      -Osteochondroma of right pelvis with the above procedure planned     Different anesthesia methods/types have been discussed with the patient, but they are aware that the final plan will be decided by the assigned anesthesia provider on the date of service.    The patient is optimized for their procedure. AVS with information on surgery time/arrival time, meds and NPO status given by nursing staff. No further diagnostic testing indicated.    Please refer to the physical examination documented by the anesthesiologist in the anesthesia record on the day of surgery.    Video-Visit Details    Type of service:  Video Visit    Provider received verbal consent for a Video Visit from the patient? Yes     Originating Location (pt. Location): Home    Distant Location (provider location):  Off-site  Mode of Communication:  Video Conference via Ifeelgoods  On the day of service:     Prep time: 14 minutes  Visit time: 5 minutes  Documentation time: 12 minutes  ------------------------------------------  Total time: 31 minutes      LJ Zheng CNS  Preoperative Assessment Center  [facility]  Clinic and Surgery Center  Phone: [phone]  Fax: [phone]

## 2025-02-06 NOTE — PATIENT INSTRUCTIONS
Preparing for Your Surgery      Name:  Gilberto Lund   MRN:  [id number]   :  1980   Today's Date:  2025       Arriving for surgery:  Surgery date:  25  Arrival time:  5:30 am  Surgery time: 7:30 am    Please come to:     Please come to:       [facility] [address]   [address] [place]  [address]     The Green Ramp for patients and visitors is beneath the [facility]. The parking facility entrance is at the intersection of [address] [place] [address] and [address]. Patients and visitors who self-park will receive the reduced hospital parking rate (no ticket validation needed).     [employer] parking, located at the [facility] main entrance on [address] [place] [address], is available Monday - Friday from 7 am to 3:30 pm.     Discounted parking pass options can be purchased from  attendants during business hours.     -Check in at the security desk in the [facility] ([facility])   Lobby. They will direct you to the correct elevators.   -Proceed to the 3rd floor, Adult Surgery Waiting Lounge. [phone]     If you need directions, a wheelchair or escort please stop at the Information Desk in the lobby.  Inform the information person you are here for surgery; a wheelchair and escort to [address] will be provided.   An escort to the Adult Surgery Waiting Lounge will be provided. .    What can I eat or drink?  -  You may eat and drink normally up to 8 hours prior to arrival time. (Until 9:30 pm on 25)  -  You may have clear liquids until 2 hours prior to arrival time. (Until 3:30 am on 25)    Examples of clear liquids:  Water  Clear broth  Juices (apple, white grape, white cranberry  and cider) without pulp  Noncarbonated, powder based beverages  (lemonade and Joseph-Aid)  Sodas (Sprite, 7-Up, ginger ale and seltzer)  Coffee or tea (without milk or  cream)  Gatorade    -  No Alcohol or cannabis products for at least 24 hours before surgery.     Which medicines can I take?    Hold Aspirin for 7 days before surgery.   Hold Multivitamins for 7 days before surgery.  Hold Supplements for 7 days before surgery.  Hold Ibuprofen (Advil, Motrin) for 1 day(s) before surgery--unless otherwise directed by surgeon.  Hold Naproxen (Aleve) for 4 days before surgery.      How do I prepare myself?  - Please take 2 showers (one the night prior to surgery and one the morning of surgery) using Scrubcare or Hibiclens soap.    Use this soap only from the neck to your toes. Avoid genital area      Leave the soap on your skin for one minute--then rinse thoroughly.      You may use your own shampoo and conditioner. No other hair products.   - Please remove all jewelry and body piercings.  - No lotions, deodorants or fragrance.  - No makeup or fingernail polish.   - Bring your ID and insurance card.    -For patients being admitted to the [facility]  Family members are to take the patient belongings with them and place them in the lockers provided in the Family Lounge.  Please limit the items you bring to 1 bag as the lockers are small.      -If you use a CPAP machine, please bring the CPAP machine, tubing, and mask to hospital.    -If you have a Deep Brain Stimulator, Spinal Cord Stimulator, or any Neuro Stimulator device---you must bring the remote control to the hospital.      ALL PATIENTS GOING HOME THE SAME DAY OF SURGERY ARE REQUIRED TO HAVE A RESPONSIBLE ADULT TO DRIVE AND BE IN ATTENDANCE WITH THEM FOR 24 HOURS FOLLOWING SURGERY.    Covid testing policy as of 12/06/2022  Your surgeon will notify and schedule you for a COVID test if one is needed before surgery--please direct any questions or COVID symptoms to your surgeon      Questions or Concerns:    - For any questions regarding the day of surgery or your hospital stay, please contact the Pre Admission Nursing Office  at [phone].       - If you have health changes between today and your surgery, please call your surgeon.       - For questions after surgery, please call your surgeons office.           Current Visitor Guidelines    2 adult visitors for adult patients in the pre op area    If additional visitors come (beyond a patient care attendant or a group home caregiver), the additional visitors will be asked to wait in the main lobby of the hospital    Visiting hours: 8 a.m. to 8:30 p.m.    Patients confirmed or suspected to have symptoms of COVID 19 or flu:     No visitors allowed for adult patients.   Children (under age 18) can have 1 named visitor.     People who are sick or showing symptoms of COVID 19 or flu:    Are not allowed to visit patients--we can only make exceptions in special situations.       Please follow these guidelines for your visit:          Please maintain social distance          Masking is optional--however at times you may be asked to wear a mask for the safety of yourself and others     Clean your hands with alcohol hand . Do this when you arrive at and leave the building and patient room,    And again after you touch your mask or anything in the room.     Go directly to and from the room you are visiting.     Stay in the patient s room during your visit. Limit going to other places in the hospital as much as possible     Leave bags and jackets at home or in the car.     For everyone s health, please don t come and go during your visit. That includes for smoking   during your visit.

## 2025-02-06 NOTE — PROGRESS NOTES
Jeremiah is a 44 year old who is being evaluated via a billable video visit.    How would you like to obtain your AVS? MyChart  If the video visit is dropped, the invitation should be resent by: Text to cell phone: [phone]    Subjective   Jeremiah is a 44 year old, presenting for the following health issues:  Pre-Op Exam    HPI         Physical Exam

## 2025-02-06 NOTE — PROGRESS NOTES
Social Work - Intervention  [facility]  Data/Intervention:    Patient Name: Gilberto Lund Goes By: Jeremiah    /Age: 1980 (44 year old)     Visit Type: telephone  Referral Source: Orthopedics   Reason for Referral: Financial and transportation    Collaborated With:    -Patient spouse, Mary Ellen     Psychosocial Information/Concerns:  Jeremiah is having two upcoming surgeries, the first of which will leave him bed bound for 8 weeks and the second likely requiring months of recovery. Jeremiah has no short term disability and is no longer working as of 2025 leaving their household of 5 completely dependent on savings and Mary Ellen's income alone. A large concern is that it is unknown if Jeremiah will be able to transport by wheelchair after his first surgery and may need a stretcher which means they will need to pay for transportation which their insurance will not cover.      Intervention/Education/Resources Provided:  Determined that no one in the family is eligible for MA, SNAP, or MFIP due to Mary Ellen's income. Provided Mary Ellen with information on SSDI in the off chance that Jeremiah is out of work for more than a year. Provided resources for transportation companies that can do wheelchair such as [employer] and [employer]. Stretcher companies would include [employer] which could Bill Jeremiah, or Graham from [employer] which charges a flat rate of $500 and extra per mile up front. Inpatient team will assist in setting this up but good to have a plan in place. Also provided Mary Ellen with [facility] for a hospital bed that her insurance should cover. Also shared information on eligibility for partial discount with [facility] that Mary Ellen can apply for.      Assessment/Plan:   will remain available for support as needed.      Provided patient/family with contact information and availability.    Silvana Perez [facility]  Medical Social Worker    [facility]     [address] [place] [address]  [id number]  [place] [address]  [email or web address]  Gender pronouns: she/her   Employed by [employer]

## 2025-02-10 ENCOUNTER — DOCUMENTATION ONLY (OUTPATIENT)
Dept: ORTHOPEDICS | Facility: CLINIC | Age: 45
End: 2025-02-10
Payer: COMMERCIAL

## 2025-02-10 NOTE — PROGRESS NOTES
Received Completed forms Yes   Faxed Forms Faxed To: ang  Fax Number: [phone]   Sent to HIM (Date) 2/10/25

## 2025-02-14 ENCOUNTER — HOSPITAL ENCOUNTER (INPATIENT)
Facility: CLINIC | Age: 45
DRG: 498 | End: 2025-02-14
Attending: ORTHOPAEDIC SURGERY | Admitting: ORTHOPAEDIC SURGERY
Payer: COMMERCIAL

## 2025-02-14 ENCOUNTER — APPOINTMENT (OUTPATIENT)
Dept: GENERAL RADIOLOGY | Facility: CLINIC | Age: 45
DRG: 498 | End: 2025-02-14
Attending: STUDENT IN AN ORGANIZED HEALTH CARE EDUCATION/TRAINING PROGRAM
Payer: COMMERCIAL

## 2025-02-14 ENCOUNTER — APPOINTMENT (OUTPATIENT)
Dept: GENERAL RADIOLOGY | Facility: CLINIC | Age: 45
DRG: 498 | End: 2025-02-14
Attending: ORTHOPAEDIC SURGERY
Payer: COMMERCIAL

## 2025-02-14 DIAGNOSIS — Z98.890 STATUS POST SURGERY: Primary | ICD-10-CM

## 2025-02-14 DIAGNOSIS — C41.4 PRIMARY CHONDROSARCOMA OF BONE OF PELVIS (H): ICD-10-CM

## 2025-02-14 LAB
ABO + RH BLD: NORMAL
ALBUMIN SERPL BCG-MCNC: 3.8 G/DL (ref 3.5–5.2)
ALP SERPL-CCNC: 70 U/L (ref 40–150)
ALT SERPL W P-5'-P-CCNC: 21 U/L (ref 0–70)
ANION GAP SERPL CALCULATED.3IONS-SCNC: 11 MMOL/L (ref 7–15)
ANION GAP SERPL CALCULATED.3IONS-SCNC: 12 MMOL/L (ref 7–15)
AST SERPL W P-5'-P-CCNC: 45 U/L (ref 0–45)
BASE EXCESS BLDA CALC-SCNC: -0.6 MMOL/L (ref -3–3)
BASE EXCESS BLDA CALC-SCNC: -0.9 MMOL/L (ref -3–3)
BASE EXCESS BLDA CALC-SCNC: -1.3 MMOL/L (ref -3–3)
BASE EXCESS BLDA CALC-SCNC: -1.5 MMOL/L (ref -3–3)
BASOPHILS # BLD AUTO: 0 10E3/UL (ref 0–0.2)
BASOPHILS NFR BLD AUTO: 0 %
BILIRUB SERPL-MCNC: 0.9 MG/DL
BLD GP AB SCN SERPL QL: NEGATIVE
BLD PROD TYP BPU: NORMAL
BLD PROD TYP BPU: NORMAL
BLOOD COMPONENT TYPE: NORMAL
BLOOD COMPONENT TYPE: NORMAL
BUN SERPL-MCNC: 17.3 MG/DL (ref 6–20)
BUN SERPL-MCNC: 17.6 MG/DL (ref 6–20)
CA-I BLD-MCNC: 4.5 MG/DL (ref 4.4–5.2)
CA-I BLD-MCNC: 4.7 MG/DL (ref 4.4–5.2)
CA-I BLD-MCNC: 4.9 MG/DL (ref 4.4–5.2)
CA-I BLD-MCNC: 5.1 MG/DL (ref 4.4–5.2)
CALCIUM SERPL-MCNC: 8.6 MG/DL (ref 8.8–10.4)
CALCIUM SERPL-MCNC: 8.8 MG/DL (ref 8.8–10.4)
CHLORIDE SERPL-SCNC: 107 MMOL/L (ref 98–107)
CHLORIDE SERPL-SCNC: 108 MMOL/L (ref 98–107)
CODING SYSTEM: NORMAL
CODING SYSTEM: NORMAL
CREAT SERPL-MCNC: 1.01 MG/DL (ref 0.67–1.17)
CREAT SERPL-MCNC: 1.03 MG/DL (ref 0.67–1.17)
CROSSMATCH: NORMAL
CROSSMATCH: NORMAL
EGFRCR SERPLBLD CKD-EPI 2021: >90 ML/MIN/1.73M2
EGFRCR SERPLBLD CKD-EPI 2021: >90 ML/MIN/1.73M2
EOSINOPHIL # BLD AUTO: 0 10E3/UL (ref 0–0.7)
EOSINOPHIL NFR BLD AUTO: 0 %
ERYTHROCYTE [DISTWIDTH] IN BLOOD BY AUTOMATED COUNT: 13 % (ref 10–15)
ERYTHROCYTE [DISTWIDTH] IN BLOOD BY AUTOMATED COUNT: 13.1 % (ref 10–15)
GLUCOSE BLD-MCNC: 125 MG/DL (ref 70–99)
GLUCOSE BLD-MCNC: 130 MG/DL (ref 70–99)
GLUCOSE BLD-MCNC: 138 MG/DL (ref 70–99)
GLUCOSE BLD-MCNC: 140 MG/DL (ref 70–99)
GLUCOSE BLDC GLUCOMTR-MCNC: 114 MG/DL (ref 70–99)
GLUCOSE BLDC GLUCOMTR-MCNC: 136 MG/DL (ref 70–99)
GLUCOSE SERPL-MCNC: 153 MG/DL (ref 70–99)
GLUCOSE SERPL-MCNC: 171 MG/DL (ref 70–99)
HCO3 BLDA-SCNC: 23 MMOL/L (ref 21–28)
HCO3 BLDA-SCNC: 24 MMOL/L (ref 21–28)
HCO3 BLDA-SCNC: 24 MMOL/L (ref 21–28)
HCO3 BLDA-SCNC: 25 MMOL/L (ref 21–28)
HCO3 SERPL-SCNC: 23 MMOL/L (ref 22–29)
HCO3 SERPL-SCNC: 23 MMOL/L (ref 22–29)
HCT VFR BLD AUTO: 28 % (ref 40–53)
HCT VFR BLD AUTO: 44 % (ref 40–53)
HGB BLD-MCNC: 11.2 G/DL (ref 13.3–17.7)
HGB BLD-MCNC: 11.5 G/DL (ref 13.3–17.7)
HGB BLD-MCNC: 12.6 G/DL (ref 13.3–17.7)
HGB BLD-MCNC: 14.3 G/DL (ref 13.3–17.7)
HGB BLD-MCNC: 15.3 G/DL (ref 13.3–17.7)
HGB BLD-MCNC: 9.6 G/DL (ref 13.3–17.7)
IMM GRANULOCYTES # BLD: 0.1 10E3/UL
IMM GRANULOCYTES NFR BLD: 1 %
ISSUE DATE AND TIME: NORMAL
ISSUE DATE AND TIME: NORMAL
LACTATE BLD-SCNC: 1.3 MMOL/L (ref 0.7–2)
LACTATE BLD-SCNC: 2.7 MMOL/L (ref 0.7–2)
LACTATE BLD-SCNC: 2.7 MMOL/L (ref 0.7–2)
LACTATE BLD-SCNC: 2.8 MMOL/L (ref 0.7–2)
LYMPHOCYTES # BLD AUTO: 0.7 10E3/UL (ref 0.8–5.3)
LYMPHOCYTES NFR BLD AUTO: 4 %
MAGNESIUM SERPL-MCNC: 1.5 MG/DL (ref 1.7–2.3)
MCH RBC QN AUTO: 31.2 PG (ref 26.5–33)
MCH RBC QN AUTO: 31.3 PG (ref 26.5–33)
MCHC RBC AUTO-ENTMCNC: 34.3 G/DL (ref 31.5–36.5)
MCHC RBC AUTO-ENTMCNC: 34.8 G/DL (ref 31.5–36.5)
MCV RBC AUTO: 90 FL (ref 78–100)
MCV RBC AUTO: 91 FL (ref 78–100)
MONOCYTES # BLD AUTO: 1.2 10E3/UL (ref 0–1.3)
MONOCYTES NFR BLD AUTO: 7 %
NEUTROPHILS # BLD AUTO: 16.1 10E3/UL (ref 1.6–8.3)
NEUTROPHILS NFR BLD AUTO: 89 %
NRBC # BLD AUTO: 0 10E3/UL
NRBC BLD AUTO-RTO: 0 /100
O2/TOTAL GAS SETTING VFR VENT: 32 %
O2/TOTAL GAS SETTING VFR VENT: 33 %
O2/TOTAL GAS SETTING VFR VENT: 41 %
O2/TOTAL GAS SETTING VFR VENT: 41 %
OXYHGB MFR BLDA: 96 % (ref 92–100)
OXYHGB MFR BLDA: 97 % (ref 92–100)
PCO2 BLDA: 39 MM HG (ref 35–45)
PCO2 BLDA: 40 MM HG (ref 35–45)
PCO2 BLDA: 41 MM HG (ref 35–45)
PCO2 BLDA: 45 MM HG (ref 35–45)
PH BLDA: 7.36 [PH] (ref 7.35–7.45)
PH BLDA: 7.37 [PH] (ref 7.35–7.45)
PH BLDA: 7.38 [PH] (ref 7.35–7.45)
PH BLDA: 7.39 [PH] (ref 7.35–7.45)
PHOSPHATE SERPL-MCNC: 4.3 MG/DL (ref 2.5–4.5)
PLATELET # BLD AUTO: 252 10E3/UL (ref 150–450)
PLATELET # BLD AUTO: 313 10E3/UL (ref 150–450)
PO2 BLDA: 114 MM HG (ref 80–105)
PO2 BLDA: 118 MM HG (ref 80–105)
PO2 BLDA: 129 MM HG (ref 80–105)
PO2 BLDA: 129 MM HG (ref 80–105)
POTASSIUM BLD-SCNC: 4.1 MMOL/L (ref 3.4–5.3)
POTASSIUM BLD-SCNC: 4.3 MMOL/L (ref 3.4–5.3)
POTASSIUM BLD-SCNC: 4.3 MMOL/L (ref 3.4–5.3)
POTASSIUM BLD-SCNC: 4.6 MMOL/L (ref 3.4–5.3)
POTASSIUM SERPL-SCNC: 4.7 MMOL/L (ref 3.4–5.3)
POTASSIUM SERPL-SCNC: 4.8 MMOL/L (ref 3.4–5.3)
PROT SERPL-MCNC: 5.3 G/DL (ref 6.4–8.3)
RBC # BLD AUTO: 3.08 10E6/UL (ref 4.4–5.9)
RBC # BLD AUTO: 4.89 10E6/UL (ref 4.4–5.9)
SAO2 % BLDA: 99 % (ref 96–97)
SODIUM BLD-SCNC: 140 MMOL/L (ref 135–145)
SODIUM BLD-SCNC: 140 MMOL/L (ref 135–145)
SODIUM BLD-SCNC: 141 MMOL/L (ref 135–145)
SODIUM BLD-SCNC: 141 MMOL/L (ref 135–145)
SODIUM SERPL-SCNC: 142 MMOL/L (ref 135–145)
SODIUM SERPL-SCNC: 142 MMOL/L (ref 135–145)
SPECIMEN EXP DATE BLD: NORMAL
UNIT ABO/RH: NORMAL
UNIT ABO/RH: NORMAL
UNIT NUMBER: NORMAL
UNIT NUMBER: NORMAL
UNIT STATUS: NORMAL
UNIT STATUS: NORMAL
UNIT TYPE ISBT: 5100
UNIT TYPE ISBT: 5100
WBC # BLD AUTO: 18.1 10E3/UL (ref 4–11)
WBC # BLD AUTO: 7.5 10E3/UL (ref 4–11)

## 2025-02-14 PROCEDURE — 0QS204Z REPOSITION RIGHT PELVIC BONE WITH INTERNAL FIXATION DEVICE, OPEN APPROACH: ICD-10-PCS | Performed by: ORTHOPAEDIC SURGERY

## 2025-02-14 PROCEDURE — 250N000011 HC RX IP 250 OP 636: Performed by: ORTHOPAEDIC SURGERY

## 2025-02-14 PROCEDURE — 0YH70YZ INSERTION OF OTHER DEVICE INTO RIGHT FEMORAL REGION, OPEN APPROACH: ICD-10-PCS | Performed by: ORTHOPAEDIC SURGERY

## 2025-02-14 PROCEDURE — 999N000141 HC STATISTIC PRE-PROCEDURE NURSING ASSESSMENT: Performed by: ORTHOPAEDIC SURGERY

## 2025-02-14 PROCEDURE — C1713 ANCHOR/SCREW BN/BN,TIS/BN: HCPCS | Performed by: ORTHOPAEDIC SURGERY

## 2025-02-14 PROCEDURE — 370N000017 HC ANESTHESIA TECHNICAL FEE, PER MIN: Performed by: ORTHOPAEDIC SURGERY

## 2025-02-14 PROCEDURE — 27218 TREAT PELVIC RING FRACTURE: CPT | Mod: 22 | Performed by: ORTHOPAEDIC SURGERY

## 2025-02-14 PROCEDURE — 86923 COMPATIBILITY TEST ELECTRIC: CPT | Performed by: NURSE ANESTHETIST, CERTIFIED REGISTERED

## 2025-02-14 PROCEDURE — 250N000009 HC RX 250: Performed by: ORTHOPAEDIC SURGERY

## 2025-02-14 PROCEDURE — 0WBH0ZZ EXCISION OF RETROPERITONEUM, OPEN APPROACH: ICD-10-PCS | Performed by: ORTHOPAEDIC SURGERY

## 2025-02-14 PROCEDURE — 86900 BLOOD TYPING SEROLOGIC ABO: CPT | Performed by: ANESTHESIOLOGY

## 2025-02-14 PROCEDURE — 0QB60ZZ EXCISION OF RIGHT UPPER FEMUR, OPEN APPROACH: ICD-10-PCS | Performed by: ORTHOPAEDIC SURGERY

## 2025-02-14 PROCEDURE — 27290 AMPUTATION OF LEG AT HIP: CPT | Mod: 22 | Performed by: ORTHOPAEDIC SURGERY

## 2025-02-14 PROCEDURE — 250N000025 HC SEVOFLURANE, PER MIN: Performed by: ORTHOPAEDIC SURGERY

## 2025-02-14 PROCEDURE — 84295 ASSAY OF SERUM SODIUM: CPT | Performed by: STUDENT IN AN ORGANIZED HEALTH CARE EDUCATION/TRAINING PROGRAM

## 2025-02-14 PROCEDURE — 3E033XZ INTRODUCTION OF VASOPRESSOR INTO PERIPHERAL VEIN, PERCUTANEOUS APPROACH: ICD-10-PCS | Performed by: ORTHOPAEDIC SURGERY

## 2025-02-14 PROCEDURE — 710N000011 HC RECOVERY PHASE 1, LEVEL 3, PER MIN: Performed by: ORTHOPAEDIC SURGERY

## 2025-02-14 PROCEDURE — 88309 TISSUE EXAM BY PATHOLOGIST: CPT | Mod: 26 | Performed by: PATHOLOGY

## 2025-02-14 PROCEDURE — 85048 AUTOMATED LEUKOCYTE COUNT: CPT | Performed by: ANESTHESIOLOGY

## 2025-02-14 PROCEDURE — 88311 DECALCIFY TISSUE: CPT | Mod: 26 | Performed by: PATHOLOGY

## 2025-02-14 PROCEDURE — 999N000065 XR PELVIS PORT 1/2 VIEWS

## 2025-02-14 PROCEDURE — P9016 RBC LEUKOCYTES REDUCED: HCPCS | Performed by: NURSE ANESTHETIST, CERTIFIED REGISTERED

## 2025-02-14 PROCEDURE — 250N000013 HC RX MED GY IP 250 OP 250 PS 637: Performed by: PHYSICIAN ASSISTANT

## 2025-02-14 PROCEDURE — 250N000013 HC RX MED GY IP 250 OP 250 PS 637: Performed by: STUDENT IN AN ORGANIZED HEALTH CARE EDUCATION/TRAINING PROGRAM

## 2025-02-14 PROCEDURE — 88309 TISSUE EXAM BY PATHOLOGIST: CPT | Mod: TC | Performed by: ORTHOPAEDIC SURGERY

## 2025-02-14 PROCEDURE — 84295 ASSAY OF SERUM SODIUM: CPT

## 2025-02-14 PROCEDURE — 84132 ASSAY OF SERUM POTASSIUM: CPT | Performed by: STUDENT IN AN ORGANIZED HEALTH CARE EDUCATION/TRAINING PROGRAM

## 2025-02-14 PROCEDURE — 84132 ASSAY OF SERUM POTASSIUM: CPT

## 2025-02-14 PROCEDURE — 250N000011 HC RX IP 250 OP 636: Performed by: ANESTHESIOLOGY

## 2025-02-14 PROCEDURE — 85018 HEMOGLOBIN: CPT

## 2025-02-14 PROCEDURE — 84100 ASSAY OF PHOSPHORUS: CPT

## 2025-02-14 PROCEDURE — 85025 COMPLETE CBC W/AUTO DIFF WBC: CPT | Performed by: ANESTHESIOLOGY

## 2025-02-14 PROCEDURE — 86850 RBC ANTIBODY SCREEN: CPT | Performed by: ANESTHESIOLOGY

## 2025-02-14 PROCEDURE — 360N000086 HC SURGERY LEVEL 6 W/ FLUORO, PER MIN: Performed by: ORTHOPAEDIC SURGERY

## 2025-02-14 PROCEDURE — 0QB20ZZ EXCISION OF RIGHT PELVIC BONE, OPEN APPROACH: ICD-10-PCS | Performed by: ORTHOPAEDIC SURGERY

## 2025-02-14 PROCEDURE — 999N000063 XR ABDOMEN PORT 1 VIEW

## 2025-02-14 PROCEDURE — 83735 ASSAY OF MAGNESIUM: CPT

## 2025-02-14 PROCEDURE — 258N000003 HC RX IP 258 OP 636: Performed by: STUDENT IN AN ORGANIZED HEALTH CARE EDUCATION/TRAINING PROGRAM

## 2025-02-14 PROCEDURE — 200N000002 HC R&B ICU UMMC

## 2025-02-14 PROCEDURE — 272N000001 HC OR GENERAL SUPPLY STERILE: Performed by: ORTHOPAEDIC SURGERY

## 2025-02-14 PROCEDURE — 74018 RADEX ABDOMEN 1 VIEW: CPT | Mod: 26 | Performed by: RADIOLOGY

## 2025-02-14 PROCEDURE — 250N000011 HC RX IP 250 OP 636

## 2025-02-14 PROCEDURE — 250N000011 HC RX IP 250 OP 636: Performed by: STUDENT IN AN ORGANIZED HEALTH CARE EDUCATION/TRAINING PROGRAM

## 2025-02-14 DEVICE — IMPLANTABLE DEVICE: Type: IMPLANTABLE DEVICE | Site: PELVIS | Status: FUNCTIONAL

## 2025-02-14 DEVICE — TOBRA FULL DOSE ANTIBIOTIC BONE CEMENT, 10 PACK CATALOG NUMBER IS 6197-9-010
Type: IMPLANTABLE DEVICE | Site: PELVIS | Status: FUNCTIONAL
Brand: SIMPLEX

## 2025-02-14 RX ORDER — NALOXONE HYDROCHLORIDE 0.4 MG/ML
0.4 INJECTION, SOLUTION INTRAMUSCULAR; INTRAVENOUS; SUBCUTANEOUS
Status: DISCONTINUED | OUTPATIENT
Start: 2025-02-14 | End: 2025-02-21 | Stop reason: HOSPADM

## 2025-02-14 RX ORDER — AMOXICILLIN 250 MG
2 CAPSULE ORAL 2 TIMES DAILY PRN
Status: DISCONTINUED | OUTPATIENT
Start: 2025-02-14 | End: 2025-02-21 | Stop reason: HOSPADM

## 2025-02-14 RX ORDER — SODIUM CHLORIDE, SODIUM LACTATE, POTASSIUM CHLORIDE, CALCIUM CHLORIDE 600; 310; 30; 20 MG/100ML; MG/100ML; MG/100ML; MG/100ML
INJECTION, SOLUTION INTRAVENOUS CONTINUOUS
Status: DISCONTINUED | OUTPATIENT
Start: 2025-02-14 | End: 2025-02-14 | Stop reason: HOSPADM

## 2025-02-14 RX ORDER — NALOXONE HYDROCHLORIDE 0.4 MG/ML
0.2 INJECTION, SOLUTION INTRAMUSCULAR; INTRAVENOUS; SUBCUTANEOUS
Status: DISCONTINUED | OUTPATIENT
Start: 2025-02-14 | End: 2025-02-21 | Stop reason: HOSPADM

## 2025-02-14 RX ORDER — AMOXICILLIN 250 MG
1 CAPSULE ORAL 2 TIMES DAILY
Status: DISCONTINUED | OUTPATIENT
Start: 2025-02-14 | End: 2025-02-19

## 2025-02-14 RX ORDER — CEFAZOLIN SODIUM/WATER 2 G/20 ML
2 SYRINGE (ML) INTRAVENOUS SEE ADMIN INSTRUCTIONS
Status: DISCONTINUED | OUTPATIENT
Start: 2025-02-14 | End: 2025-02-14 | Stop reason: HOSPADM

## 2025-02-14 RX ORDER — HYDRALAZINE HYDROCHLORIDE 20 MG/ML
2.5-5 INJECTION INTRAMUSCULAR; INTRAVENOUS EVERY 10 MIN PRN
Status: DISCONTINUED | OUTPATIENT
Start: 2025-02-14 | End: 2025-02-14 | Stop reason: HOSPADM

## 2025-02-14 RX ORDER — ONDANSETRON 4 MG/1
4 TABLET, ORALLY DISINTEGRATING ORAL EVERY 30 MIN PRN
Status: DISCONTINUED | OUTPATIENT
Start: 2025-02-14 | End: 2025-02-14 | Stop reason: HOSPADM

## 2025-02-14 RX ORDER — HYDROXYZINE HYDROCHLORIDE 25 MG/1
25 TABLET, FILM COATED ORAL EVERY 6 HOURS PRN
Status: DISCONTINUED | OUTPATIENT
Start: 2025-02-14 | End: 2025-02-21 | Stop reason: HOSPADM

## 2025-02-14 RX ORDER — SODIUM CHLORIDE, SODIUM LACTATE, POTASSIUM CHLORIDE, CALCIUM CHLORIDE 600; 310; 30; 20 MG/100ML; MG/100ML; MG/100ML; MG/100ML
INJECTION, SOLUTION INTRAVENOUS CONTINUOUS
Status: DISCONTINUED | OUTPATIENT
Start: 2025-02-14 | End: 2025-02-21 | Stop reason: HOSPADM

## 2025-02-14 RX ORDER — AMOXICILLIN 250 MG
1 CAPSULE ORAL 2 TIMES DAILY PRN
Status: DISCONTINUED | OUTPATIENT
Start: 2025-02-14 | End: 2025-02-21 | Stop reason: HOSPADM

## 2025-02-14 RX ORDER — FENTANYL CITRATE 50 UG/ML
25 INJECTION, SOLUTION INTRAMUSCULAR; INTRAVENOUS EVERY 5 MIN PRN
Status: DISCONTINUED | OUTPATIENT
Start: 2025-02-14 | End: 2025-02-14 | Stop reason: HOSPADM

## 2025-02-14 RX ORDER — OXYCODONE HYDROCHLORIDE 5 MG/1
5 TABLET ORAL EVERY 4 HOURS PRN
Status: DISCONTINUED | OUTPATIENT
Start: 2025-02-14 | End: 2025-02-18 | Stop reason: ALTCHOICE

## 2025-02-14 RX ORDER — ONDANSETRON 2 MG/ML
4 INJECTION INTRAMUSCULAR; INTRAVENOUS EVERY 30 MIN PRN
Status: DISCONTINUED | OUTPATIENT
Start: 2025-02-14 | End: 2025-02-14 | Stop reason: HOSPADM

## 2025-02-14 RX ORDER — NICOTINE POLACRILEX 4 MG
15-30 LOZENGE BUCCAL
Status: DISCONTINUED | OUTPATIENT
Start: 2025-02-14 | End: 2025-02-21 | Stop reason: HOSPADM

## 2025-02-14 RX ORDER — VANCOMYCIN HYDROCHLORIDE 1 G/20ML
INJECTION, POWDER, LYOPHILIZED, FOR SOLUTION INTRAVENOUS PRN
Status: DISCONTINUED | OUTPATIENT
Start: 2025-02-14 | End: 2025-02-16

## 2025-02-14 RX ORDER — ACETAMINOPHEN 325 MG/1
975 TABLET ORAL EVERY 8 HOURS
Status: COMPLETED | OUTPATIENT
Start: 2025-02-14 | End: 2025-02-17

## 2025-02-14 RX ORDER — LIDOCAINE 40 MG/G
CREAM TOPICAL
Status: DISCONTINUED | OUTPATIENT
Start: 2025-02-14 | End: 2025-02-21 | Stop reason: HOSPADM

## 2025-02-14 RX ORDER — CELECOXIB 200 MG/1
400 CAPSULE ORAL ONCE
Status: COMPLETED | OUTPATIENT
Start: 2025-02-14 | End: 2025-02-14

## 2025-02-14 RX ORDER — METOPROLOL TARTRATE 1 MG/ML
1-2 INJECTION, SOLUTION INTRAVENOUS EVERY 5 MIN PRN
Status: DISCONTINUED | OUTPATIENT
Start: 2025-02-14 | End: 2025-02-14 | Stop reason: HOSPADM

## 2025-02-14 RX ORDER — ONDANSETRON 2 MG/ML
4 INJECTION INTRAMUSCULAR; INTRAVENOUS EVERY 6 HOURS PRN
Status: DISCONTINUED | OUTPATIENT
Start: 2025-02-14 | End: 2025-02-21 | Stop reason: HOSPADM

## 2025-02-14 RX ORDER — DEXTROSE MONOHYDRATE 25 G/50ML
25-50 INJECTION, SOLUTION INTRAVENOUS
Status: DISCONTINUED | OUTPATIENT
Start: 2025-02-14 | End: 2025-02-21 | Stop reason: HOSPADM

## 2025-02-14 RX ORDER — GABAPENTIN 100 MG/1
100 CAPSULE ORAL 3 TIMES DAILY
Status: DISCONTINUED | OUTPATIENT
Start: 2025-02-14 | End: 2025-02-21 | Stop reason: HOSPADM

## 2025-02-14 RX ORDER — PHENYLEPHRINE HCL IN 0.9% NACL 50MG/250ML
.5-1.25 PLASTIC BAG, INJECTION (ML) INTRAVENOUS CONTINUOUS
Status: DISCONTINUED | OUTPATIENT
Start: 2025-02-14 | End: 2025-02-14 | Stop reason: HOSPADM

## 2025-02-14 RX ORDER — FENTANYL CITRATE 50 UG/ML
50 INJECTION, SOLUTION INTRAMUSCULAR; INTRAVENOUS EVERY 5 MIN PRN
Status: DISCONTINUED | OUTPATIENT
Start: 2025-02-14 | End: 2025-02-14 | Stop reason: HOSPADM

## 2025-02-14 RX ORDER — HYDROMORPHONE HYDROCHLORIDE 1 MG/ML
0.2 INJECTION, SOLUTION INTRAMUSCULAR; INTRAVENOUS; SUBCUTANEOUS
Status: DISCONTINUED | OUTPATIENT
Start: 2025-02-14 | End: 2025-02-21 | Stop reason: HOSPADM

## 2025-02-14 RX ORDER — PROCHLORPERAZINE MALEATE 5 MG/1
10 TABLET ORAL EVERY 6 HOURS PRN
Status: DISCONTINUED | OUTPATIENT
Start: 2025-02-14 | End: 2025-02-21 | Stop reason: HOSPADM

## 2025-02-14 RX ORDER — TRANEXAMIC ACID 650 MG/1
1950 TABLET ORAL ONCE
Status: COMPLETED | OUTPATIENT
Start: 2025-02-14 | End: 2025-02-14

## 2025-02-14 RX ORDER — ONDANSETRON 4 MG/1
4 TABLET, ORALLY DISINTEGRATING ORAL EVERY 6 HOURS PRN
Status: DISCONTINUED | OUTPATIENT
Start: 2025-02-14 | End: 2025-02-21 | Stop reason: HOSPADM

## 2025-02-14 RX ORDER — KETOROLAC TROMETHAMINE 15 MG/ML
15 INJECTION, SOLUTION INTRAMUSCULAR; INTRAVENOUS EVERY 6 HOURS
Status: COMPLETED | OUTPATIENT
Start: 2025-02-14 | End: 2025-02-15

## 2025-02-14 RX ORDER — MAGNESIUM HYDROXIDE 1200 MG/15ML
LIQUID ORAL PRN
Status: DISCONTINUED | OUTPATIENT
Start: 2025-02-14 | End: 2025-02-14 | Stop reason: HOSPADM

## 2025-02-14 RX ORDER — CEFAZOLIN SODIUM 2 G/100ML
2 INJECTION, SOLUTION INTRAVENOUS EVERY 8 HOURS
Status: DISCONTINUED | OUTPATIENT
Start: 2025-02-15 | End: 2025-02-21 | Stop reason: HOSPADM

## 2025-02-14 RX ORDER — ONDANSETRON 2 MG/ML
4 INJECTION INTRAMUSCULAR; INTRAVENOUS EVERY 6 HOURS PRN
Status: DISCONTINUED | OUTPATIENT
Start: 2025-02-14 | End: 2025-02-14

## 2025-02-14 RX ORDER — MAGNESIUM SULFATE HEPTAHYDRATE 40 MG/ML
4 INJECTION, SOLUTION INTRAVENOUS ONCE
Status: COMPLETED | OUTPATIENT
Start: 2025-02-14 | End: 2025-02-14

## 2025-02-14 RX ORDER — HYDROMORPHONE HYDROCHLORIDE 1 MG/ML
0.2 INJECTION, SOLUTION INTRAMUSCULAR; INTRAVENOUS; SUBCUTANEOUS EVERY 5 MIN PRN
Status: DISCONTINUED | OUTPATIENT
Start: 2025-02-14 | End: 2025-02-14 | Stop reason: HOSPADM

## 2025-02-14 RX ORDER — BISACODYL 10 MG
10 SUPPOSITORY, RECTAL RECTAL DAILY PRN
Status: DISCONTINUED | OUTPATIENT
Start: 2025-02-14 | End: 2025-02-14

## 2025-02-14 RX ORDER — HYDROMORPHONE HYDROCHLORIDE 1 MG/ML
0.4 INJECTION, SOLUTION INTRAMUSCULAR; INTRAVENOUS; SUBCUTANEOUS EVERY 5 MIN PRN
Status: DISCONTINUED | OUTPATIENT
Start: 2025-02-14 | End: 2025-02-14 | Stop reason: HOSPADM

## 2025-02-14 RX ORDER — ASPIRIN 81 MG/1
162 TABLET ORAL DAILY
Status: DISCONTINUED | OUTPATIENT
Start: 2025-02-15 | End: 2025-02-21 | Stop reason: HOSPADM

## 2025-02-14 RX ORDER — METHOCARBAMOL 750 MG/1
750 TABLET, FILM COATED ORAL EVERY 6 HOURS PRN
Status: DISCONTINUED | OUTPATIENT
Start: 2025-02-14 | End: 2025-02-21 | Stop reason: HOSPADM

## 2025-02-14 RX ORDER — BISACODYL 10 MG
10 SUPPOSITORY, RECTAL RECTAL DAILY PRN
Status: DISCONTINUED | OUTPATIENT
Start: 2025-02-14 | End: 2025-02-21 | Stop reason: HOSPADM

## 2025-02-14 RX ORDER — HYDROMORPHONE HYDROCHLORIDE 1 MG/ML
0.2 INJECTION, SOLUTION INTRAMUSCULAR; INTRAVENOUS; SUBCUTANEOUS
Status: DISCONTINUED | OUTPATIENT
Start: 2025-02-14 | End: 2025-02-14

## 2025-02-14 RX ORDER — ACETAMINOPHEN 325 MG/1
975 TABLET ORAL ONCE
Status: COMPLETED | OUTPATIENT
Start: 2025-02-14 | End: 2025-02-14

## 2025-02-14 RX ORDER — HYDROMORPHONE HYDROCHLORIDE 1 MG/ML
0.4 INJECTION, SOLUTION INTRAMUSCULAR; INTRAVENOUS; SUBCUTANEOUS
Status: DISCONTINUED | OUTPATIENT
Start: 2025-02-14 | End: 2025-02-21 | Stop reason: HOSPADM

## 2025-02-14 RX ORDER — POLYETHYLENE GLYCOL 3350 17 G/17G
17 POWDER, FOR SOLUTION ORAL DAILY
Status: DISCONTINUED | OUTPATIENT
Start: 2025-02-15 | End: 2025-02-19

## 2025-02-14 RX ORDER — PHENYLEPHRINE HCL IN 0.9% NACL 50MG/250ML
.5-1.25 PLASTIC BAG, INJECTION (ML) INTRAVENOUS CONTINUOUS
Status: DISCONTINUED | OUTPATIENT
Start: 2025-02-14 | End: 2025-02-15

## 2025-02-14 RX ORDER — CEFAZOLIN SODIUM/WATER 2 G/20 ML
2 SYRINGE (ML) INTRAVENOUS
Status: COMPLETED | OUTPATIENT
Start: 2025-02-14 | End: 2025-02-14

## 2025-02-14 RX ORDER — NALOXONE HYDROCHLORIDE 0.4 MG/ML
0.1 INJECTION, SOLUTION INTRAMUSCULAR; INTRAVENOUS; SUBCUTANEOUS
Status: DISCONTINUED | OUTPATIENT
Start: 2025-02-14 | End: 2025-02-14 | Stop reason: HOSPADM

## 2025-02-14 RX ORDER — OXYCODONE HYDROCHLORIDE 10 MG/1
10 TABLET ORAL EVERY 4 HOURS PRN
Status: DISCONTINUED | OUTPATIENT
Start: 2025-02-14 | End: 2025-02-18 | Stop reason: ALTCHOICE

## 2025-02-14 RX ORDER — ONDANSETRON 4 MG/1
4 TABLET, ORALLY DISINTEGRATING ORAL EVERY 6 HOURS PRN
Status: DISCONTINUED | OUTPATIENT
Start: 2025-02-14 | End: 2025-02-14

## 2025-02-14 RX ORDER — DEXAMETHASONE SODIUM PHOSPHATE 4 MG/ML
4 INJECTION, SOLUTION INTRA-ARTICULAR; INTRALESIONAL; INTRAMUSCULAR; INTRAVENOUS; SOFT TISSUE
Status: DISCONTINUED | OUTPATIENT
Start: 2025-02-14 | End: 2025-02-14 | Stop reason: HOSPADM

## 2025-02-14 RX ADMIN — SENNOSIDES AND DOCUSATE SODIUM 1 TABLET: 50; 8.6 TABLET ORAL at 20:22

## 2025-02-14 RX ADMIN — KETOROLAC TROMETHAMINE 15 MG: 15 INJECTION, SOLUTION INTRAMUSCULAR; INTRAVENOUS at 20:18

## 2025-02-14 RX ADMIN — ACETAMINOPHEN 975 MG: 325 TABLET, FILM COATED ORAL at 06:15

## 2025-02-14 RX ADMIN — MAGNESIUM SULFATE HEPTAHYDRATE 4 G: 40 INJECTION, SOLUTION INTRAVENOUS at 21:59

## 2025-02-14 RX ADMIN — TRANEXAMIC ACID 1950 MG: 650 TABLET ORAL at 06:15

## 2025-02-14 RX ADMIN — FENTANYL CITRATE 25 MCG: 50 INJECTION INTRAMUSCULAR; INTRAVENOUS at 18:31

## 2025-02-14 RX ADMIN — HYDROMORPHONE HYDROCHLORIDE 0.4 MG: 1 INJECTION, SOLUTION INTRAMUSCULAR; INTRAVENOUS; SUBCUTANEOUS at 18:40

## 2025-02-14 RX ADMIN — ACETAMINOPHEN 975 MG: 325 TABLET, FILM COATED ORAL at 20:16

## 2025-02-14 RX ADMIN — SODIUM CHLORIDE, POTASSIUM CHLORIDE, SODIUM LACTATE AND CALCIUM CHLORIDE: 600; 310; 30; 20 INJECTION, SOLUTION INTRAVENOUS at 20:02

## 2025-02-14 RX ADMIN — FENTANYL CITRATE 25 MCG: 50 INJECTION INTRAMUSCULAR; INTRAVENOUS at 18:19

## 2025-02-14 RX ADMIN — GABAPENTIN 100 MG: 100 CAPSULE ORAL at 20:14

## 2025-02-14 RX ADMIN — METHOCARBAMOL 750 MG: 750 TABLET ORAL at 20:26

## 2025-02-14 RX ADMIN — HYDROMORPHONE HYDROCHLORIDE 0.4 MG: 1 INJECTION, SOLUTION INTRAMUSCULAR; INTRAVENOUS; SUBCUTANEOUS at 19:13

## 2025-02-14 RX ADMIN — CELECOXIB 400 MG: 200 CAPSULE ORAL at 06:15

## 2025-02-14 ASSESSMENT — ACTIVITIES OF DAILY LIVING (ADL)
ADLS_ACUITY_SCORE: 19
ADLS_ACUITY_SCORE: 24
ADLS_ACUITY_SCORE: 47
ADLS_ACUITY_SCORE: 24
ADLS_ACUITY_SCORE: 19
ADLS_ACUITY_SCORE: 18
ADLS_ACUITY_SCORE: 47
ADLS_ACUITY_SCORE: 47
ADLS_ACUITY_SCORE: 19

## 2025-02-14 NOTE — BRIEF OP NOTE
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 HEMIPELVECTOMY, MODIFIED INTERNAL, 2. Right hip girdlestone resection, 3. Right  RETROPERITONEAL DISSECTION    Staff:    * Juan Jose Bravo MD - Primary     * Cheng Lyman MD - Assisting     * Silas Shrestha PA-C - Assisting     * Rolando Negron MD - Fellow - Assisting     * eGorge Boone MD - Fellow - Assisting    Anesthesia: General  EBL: 1400 cc  UOP: see anesthesia record  Tourniquet Time: n/a    Implants:   Implant Name Type Inv. Item Serial No.  Lot No. LRB No. Used Action   QLS RIGHT    MALU  Right 1 Implanted   IMP BONE CEMENT STRK SIMPLEX TOBRAMYCIN [facility] 6197-9-001 - [id number] Cement, Bone IMP BONE CEMENT STRK SIMPLEX TOBRAMYCIN [facility] 6197-9-001  [employer] [id number] Right 5 Implanted     Drains: 15f HUGO right thigh  Intra-op Labs/Cxs/Specimens:   ID Type Source Tests Collected by Time Destination   1 : RIGHT INNOMINATE BONE WITH ACETABULUM Bone Resection Pelvis, Right SURGICAL PATHOLOGY EXAM Juan Jose Bravo MD 2025  4:45 PM      Complications: No apparent complications during procedure  Findings: Please see dictated operative note for details    Disposition: Stable to PACU, then ICU for post op monitoring, transfer to floor when stable     Post-Op Plan:  Assessment/Plan: Gilberto Lund is a 44 year old male s/p Procedure(s):  1. HEMIPELVECTOMY, MODIFIED INTERNAL, 2. Right hip girdlestone resection, 3. Right  RETROPERITONEAL DISSECTION on 2025 with Dr. Bravo.    Activity: Up with assist and assistive devices as needed until independent.   Weight bearing status: NWB RLE   Antibiotics: Ancef while in house  Diet: Begin with clear fluids and progress diet as tolerated. Bowel regimen.  Anti-emetics PRN.    DVT prophylaxis: ASA 81 mg BID x 4 weeks, starting POD1  Elevation: elevate RLE  as needed, may use foam ramp   Wound Care: Prevena to remain in place until POD #14, managed by ortho  Drains: monitor drain output. Strip and empty drain per shift protocol. Discontinue drain when output < 20 ml per shift   Michelle: keep in place until POD #2-3  Pain management: Orals PRN, IV for breakthrough only  X-rays: to be completed in PACU  Physical Therapy:  Mobilization, ROM, ADL's  Occupational Therapy:  ADL's  Labs:  Trend Hgb on PODs #1 & 2    Consults: ICU team post op, PT, OT. Hospitalist, appreciate assistance in caring for this patient throughout the perioperative period    Disposition:  Pending progress with therapies, pain control on orals, and medical stability, anticipate discharge   Follow up: Plan for follow up with Dr. Bravo 4 weeks

## 2025-02-14 NOTE — OP NOTE
OPERATIVE REPORT  02/14/25    PREOPERATIVE DIAGNOSIS:   Metastatic NSGCT s/p left orchiectomy  Chondrosarcoma of right innominate bone    POSTOPERATIVE DIAGNOSIS:   Metastatic NSGCT s/p left orchiectomy  Chondrosarcoma of right innominate bone    PROCEDURES PERFORMED:   1. HEMIPELVECTOMY, MODIFIED INTERNAL,   2. Right hip girdlestone resection,   3. Right  RETROPERITONEAL DISSECTION (co-surgeon with Dr. Bravo)    Co-surgeon statement: this was a large tumor that invaded most of the bony right pelvis. Those bony strictures were very close to the urethra, bladder , right spermatic cord and pelvic vasculature (all structures that Dr. Lyman is an expert surgeon in). Dr. Bravo required the expertise of Dr Lyman in the internal pelvic / extraperitoneal anatomy to dissect out those internal structures. Dr. Lyman and Dr. Bravo worked independently at times (Dr Bravo on the external pelvis and Dr. Lyman on the internal pelvis. They worked together at other times, each bringing their own expertise. Dr. Lyman's portion of the case was critical only for the resection, not for the reconstruction. All reconstruction aspects were performed entirely by Dr. Bravo. Dr. Lyman was present for 6-7 hours.     STAFF SURGEON: Cheng Lyman MD  FELLOW: Rolando Negron MD    ANESTHESIA: General  ESTIMATED BLOOD LOSS: 1000 ml  COMPLICATIONS: None.   SPECIMEN: Right innominate bone    BRIEF OPERATIVE INDICATIONS: Gilberto Lund is a(n) 44 year old male with a remote history of testicular cancer s/p left orchiectomy (pathology NSGCT) and chemotherapy, without subsequent retroperitoneal radiation. He was recently found to have a chondrosarcoma of the right innominate bone and presents today for flaca-pelvectomy with Dr. Bravo. We were asked to be present to aid with exposure and protection of the pelvic contents. Prior to surgery we discussed all risks, benefits, and alternatives and the patient agreed to proceed. Informed consent was  obtained.    DESCRIPTION OF PROCEDURE:  After informed consent was obtained, the patient was transported to the operating room & placed supine on the table. After adequate anesthesia was induced, the patient was placed in a modified left lateral decubitus position with access to the right lower extremity. He was prepped and draped in the usual sterile fashion. A timeout was taken to confirm correct patient, procedure and laterality. Pre-operative IV antibiotics were administered. A 16F Michelle catheter was placed on the field.    The orthopedic surgery team proceeded with an incision over the iliac crest and lateral mobilization. Simultaneously, we made a low Pfannenstiel incision along Elizabet's lines, favoring the patient's right side. We dissected down to the anterior rectus fascia, which was incised transversely. The midline was identified - this was difficult due to the patient's positioning. The rectus muscle bellies were split in the midline and the transversalis fascia was incised to enter the pelvis.    A combination of blunt dissection and focused electrocautery was used to mobilize the bladder from the pubic symphysis. In this manner the space of Retzius was developed. Dissection was carried caudad to the level of the endopelvic fascia. The prostate and urethra were mobilized away from the pubis in this fashion. During this dissection we did identify the right spermatic cord. This was isolated and preserved.    We then carried our dissection laterally to disconnect the right rectus belly from its attachments to the pubic symphysis. We mobilized the external iliac artery and vein anteriorly such that the pubis could be transposed to the lateral dissection via a tunnel.    In brief, the orthopedic surgery team exposed the femoral head, iliac crest, pubis, and ischium. The ilium and inferior pubic ramus were disconnected to allow for mobility of the innominate bone. Dr. Lyman then isolated the plane between  the membranous urethra and the inferior margin of the pubic symphysis with a right angle and vessel loop. Dr. Lyman then divided the pubic symphysis with bovie cautery and osteotome, while protecting the prostate, bladder, and urethra posteriorly.  We remained scrubbed throughout the procedure to aid with exposure and protection of the bladder, prostate, urethra, and pelvic vasculature. Please see Dr. Bravo's note for additional details regarding the flaca-pelvectomy and subsequent reconstruction.    The patient tolerated the procedure well and there were no apparent complications. The patient was transported to the postanesthesia care unit in stable condition.      Rolando Negron MD  Genitourinary Reconstructive Surgery Fellow    As the attending surgeon I, Cheng Lyman, was present and scrubbed throughout the procedure.

## 2025-02-15 ENCOUNTER — APPOINTMENT (OUTPATIENT)
Dept: PHYSICAL THERAPY | Facility: CLINIC | Age: 45
DRG: 498 | End: 2025-02-15
Attending: STUDENT IN AN ORGANIZED HEALTH CARE EDUCATION/TRAINING PROGRAM
Payer: COMMERCIAL

## 2025-02-15 LAB
ANION GAP SERPL CALCULATED.3IONS-SCNC: 11 MMOL/L (ref 7–15)
BUN SERPL-MCNC: 16.6 MG/DL (ref 6–20)
CA-I BLD-MCNC: 4.4 MG/DL (ref 4.4–5.2)
CALCIUM SERPL-MCNC: 8.3 MG/DL (ref 8.8–10.4)
CHLORIDE SERPL-SCNC: 104 MMOL/L (ref 98–107)
CREAT SERPL-MCNC: 1.01 MG/DL (ref 0.67–1.17)
EGFRCR SERPLBLD CKD-EPI 2021: >90 ML/MIN/1.73M2
ERYTHROCYTE [DISTWIDTH] IN BLOOD BY AUTOMATED COUNT: 13 % (ref 10–15)
GLUCOSE BLDC GLUCOMTR-MCNC: 134 MG/DL (ref 70–99)
GLUCOSE SERPL-MCNC: 138 MG/DL (ref 70–99)
HCO3 SERPL-SCNC: 24 MMOL/L (ref 22–29)
HCT VFR BLD AUTO: 25.6 % (ref 40–53)
HGB BLD-MCNC: 8.4 G/DL (ref 13.3–17.7)
HGB BLD-MCNC: 8.7 G/DL (ref 13.3–17.7)
HOLD SPECIMEN: NORMAL
LACTATE SERPL-SCNC: 1.4 MMOL/L (ref 0.7–2)
MAGNESIUM SERPL-MCNC: 2.3 MG/DL (ref 1.7–2.3)
MCH RBC QN AUTO: 31.1 PG (ref 26.5–33)
MCHC RBC AUTO-ENTMCNC: 34 G/DL (ref 31.5–36.5)
MCV RBC AUTO: 91 FL (ref 78–100)
PHOSPHATE SERPL-MCNC: 3.6 MG/DL (ref 2.5–4.5)
PLATELET # BLD AUTO: 218 10E3/UL (ref 150–450)
POTASSIUM SERPL-SCNC: 4.4 MMOL/L (ref 3.4–5.3)
RBC # BLD AUTO: 2.8 10E6/UL (ref 4.4–5.9)
SODIUM SERPL-SCNC: 139 MMOL/L (ref 135–145)
WBC # BLD AUTO: 13 10E3/UL (ref 4–11)

## 2025-02-15 PROCEDURE — 36415 COLL VENOUS BLD VENIPUNCTURE: CPT

## 2025-02-15 PROCEDURE — 250N000013 HC RX MED GY IP 250 OP 250 PS 637: Performed by: STUDENT IN AN ORGANIZED HEALTH CARE EDUCATION/TRAINING PROGRAM

## 2025-02-15 PROCEDURE — 258N000003 HC RX IP 258 OP 636: Performed by: STUDENT IN AN ORGANIZED HEALTH CARE EDUCATION/TRAINING PROGRAM

## 2025-02-15 PROCEDURE — 250N000013 HC RX MED GY IP 250 OP 250 PS 637

## 2025-02-15 PROCEDURE — 83735 ASSAY OF MAGNESIUM: CPT

## 2025-02-15 PROCEDURE — 250N000011 HC RX IP 250 OP 636

## 2025-02-15 PROCEDURE — 84100 ASSAY OF PHOSPHORUS: CPT

## 2025-02-15 PROCEDURE — 97530 THERAPEUTIC ACTIVITIES: CPT | Mod: GP | Performed by: PHYSICAL THERAPIST

## 2025-02-15 PROCEDURE — 250N000011 HC RX IP 250 OP 636: Performed by: STUDENT IN AN ORGANIZED HEALTH CARE EDUCATION/TRAINING PROGRAM

## 2025-02-15 PROCEDURE — 83605 ASSAY OF LACTIC ACID: CPT

## 2025-02-15 PROCEDURE — 85027 COMPLETE CBC AUTOMATED: CPT

## 2025-02-15 PROCEDURE — 97162 PT EVAL MOD COMPLEX 30 MIN: CPT | Mod: GP | Performed by: PHYSICAL THERAPIST

## 2025-02-15 PROCEDURE — 97110 THERAPEUTIC EXERCISES: CPT | Mod: GP | Performed by: PHYSICAL THERAPIST

## 2025-02-15 PROCEDURE — 120N000002 HC R&B MED SURG/OB UMMC

## 2025-02-15 PROCEDURE — 99254 IP/OBS CNSLTJ NEW/EST MOD 60: CPT | Performed by: STUDENT IN AN ORGANIZED HEALTH CARE EDUCATION/TRAINING PROGRAM

## 2025-02-15 PROCEDURE — 82330 ASSAY OF CALCIUM: CPT

## 2025-02-15 PROCEDURE — 80048 BASIC METABOLIC PNL TOTAL CA: CPT

## 2025-02-15 PROCEDURE — 85018 HEMOGLOBIN: CPT

## 2025-02-15 PROCEDURE — 99232 SBSQ HOSP IP/OBS MODERATE 35: CPT

## 2025-02-15 PROCEDURE — 82435 ASSAY OF BLOOD CHLORIDE: CPT

## 2025-02-15 RX ORDER — HYDROMORPHONE HYDROCHLORIDE 2 MG/1
2 TABLET ORAL
Status: DISCONTINUED | OUTPATIENT
Start: 2025-02-15 | End: 2025-02-18 | Stop reason: ALTCHOICE

## 2025-02-15 RX ORDER — ENOXAPARIN SODIUM 100 MG/ML
40 INJECTION SUBCUTANEOUS EVERY 24 HOURS
Status: DISCONTINUED | OUTPATIENT
Start: 2025-02-15 | End: 2025-02-15

## 2025-02-15 RX ADMIN — ACETAMINOPHEN 975 MG: 325 TABLET, FILM COATED ORAL at 12:04

## 2025-02-15 RX ADMIN — ASPIRIN 162 MG: 81 TABLET, COATED ORAL at 08:19

## 2025-02-15 RX ADMIN — KETOROLAC TROMETHAMINE 15 MG: 15 INJECTION, SOLUTION INTRAMUSCULAR; INTRAVENOUS at 08:19

## 2025-02-15 RX ADMIN — SENNOSIDES AND DOCUSATE SODIUM 1 TABLET: 50; 8.6 TABLET ORAL at 08:19

## 2025-02-15 RX ADMIN — HYDROMORPHONE HYDROCHLORIDE 0.4 MG: 1 INJECTION, SOLUTION INTRAMUSCULAR; INTRAVENOUS; SUBCUTANEOUS at 15:55

## 2025-02-15 RX ADMIN — HYDROMORPHONE HYDROCHLORIDE 0.2 MG: 1 INJECTION, SOLUTION INTRAMUSCULAR; INTRAVENOUS; SUBCUTANEOUS at 03:24

## 2025-02-15 RX ADMIN — METHOCARBAMOL 750 MG: 750 TABLET ORAL at 19:57

## 2025-02-15 RX ADMIN — METHOCARBAMOL 750 MG: 750 TABLET ORAL at 06:29

## 2025-02-15 RX ADMIN — HYDROMORPHONE HYDROCHLORIDE 0.2 MG: 1 INJECTION, SOLUTION INTRAMUSCULAR; INTRAVENOUS; SUBCUTANEOUS at 00:31

## 2025-02-15 RX ADMIN — CEFAZOLIN SODIUM 2 G: 2 INJECTION, SOLUTION INTRAVENOUS at 00:23

## 2025-02-15 RX ADMIN — HYDROMORPHONE HYDROCHLORIDE 0.4 MG: 1 INJECTION, SOLUTION INTRAMUSCULAR; INTRAVENOUS; SUBCUTANEOUS at 18:00

## 2025-02-15 RX ADMIN — KETOROLAC TROMETHAMINE 15 MG: 15 INJECTION, SOLUTION INTRAMUSCULAR; INTRAVENOUS at 01:48

## 2025-02-15 RX ADMIN — GABAPENTIN 100 MG: 100 CAPSULE ORAL at 19:49

## 2025-02-15 RX ADMIN — CEFAZOLIN SODIUM 2 G: 2 INJECTION, SOLUTION INTRAVENOUS at 18:03

## 2025-02-15 RX ADMIN — HYDROMORPHONE HYDROCHLORIDE 2 MG: 2 TABLET ORAL at 21:00

## 2025-02-15 RX ADMIN — SODIUM CHLORIDE, POTASSIUM CHLORIDE, SODIUM LACTATE AND CALCIUM CHLORIDE: 600; 310; 30; 20 INJECTION, SOLUTION INTRAVENOUS at 08:19

## 2025-02-15 RX ADMIN — KETOROLAC TROMETHAMINE 15 MG: 15 INJECTION, SOLUTION INTRAMUSCULAR; INTRAVENOUS at 13:59

## 2025-02-15 RX ADMIN — HYDROMORPHONE HYDROCHLORIDE 0.2 MG: 1 INJECTION, SOLUTION INTRAMUSCULAR; INTRAVENOUS; SUBCUTANEOUS at 13:08

## 2025-02-15 RX ADMIN — GABAPENTIN 100 MG: 100 CAPSULE ORAL at 08:19

## 2025-02-15 RX ADMIN — POLYETHYLENE GLYCOL 3350 17 G: 17 POWDER, FOR SOLUTION ORAL at 08:19

## 2025-02-15 RX ADMIN — SENNOSIDES AND DOCUSATE SODIUM 1 TABLET: 50; 8.6 TABLET ORAL at 19:48

## 2025-02-15 RX ADMIN — ACETAMINOPHEN 975 MG: 325 TABLET, FILM COATED ORAL at 03:25

## 2025-02-15 RX ADMIN — HYDROMORPHONE HYDROCHLORIDE 0.2 MG: 1 INJECTION, SOLUTION INTRAMUSCULAR; INTRAVENOUS; SUBCUTANEOUS at 09:07

## 2025-02-15 RX ADMIN — GABAPENTIN 100 MG: 100 CAPSULE ORAL at 13:59

## 2025-02-15 RX ADMIN — SODIUM CHLORIDE, POTASSIUM CHLORIDE, SODIUM LACTATE AND CALCIUM CHLORIDE: 600; 310; 30; 20 INJECTION, SOLUTION INTRAVENOUS at 23:28

## 2025-02-15 RX ADMIN — ENOXAPARIN SODIUM 40 MG: 40 INJECTION SUBCUTANEOUS at 08:19

## 2025-02-15 RX ADMIN — CEFAZOLIN SODIUM 2 G: 2 INJECTION, SOLUTION INTRAVENOUS at 23:30

## 2025-02-15 RX ADMIN — METHOCARBAMOL 750 MG: 750 TABLET ORAL at 13:10

## 2025-02-15 RX ADMIN — ACETAMINOPHEN 975 MG: 325 TABLET, FILM COATED ORAL at 18:11

## 2025-02-15 RX ADMIN — CEFAZOLIN SODIUM 2 G: 2 INJECTION, SOLUTION INTRAVENOUS at 08:23

## 2025-02-15 RX ADMIN — HYDROMORPHONE HYDROCHLORIDE 0.2 MG: 1 INJECTION, SOLUTION INTRAMUSCULAR; INTRAVENOUS; SUBCUTANEOUS at 06:29

## 2025-02-15 RX ADMIN — HYDROMORPHONE HYDROCHLORIDE 0.2 MG: 1 INJECTION, SOLUTION INTRAMUSCULAR; INTRAVENOUS; SUBCUTANEOUS at 13:40

## 2025-02-15 ASSESSMENT — ACTIVITIES OF DAILY LIVING (ADL)
ADLS_ACUITY_SCORE: 47

## 2025-02-15 NOTE — PHARMACY-ADMISSION MEDICATION HISTORY
Pharmacist Admission Medication History    Admission medication history is complete. The information provided in this note is only as accurate as the sources available at the time of the update.    Information Source(s): Patient via in-person    Pertinent Information:   Jeremiah reports only taking PRN Aleve PTA.     Changes made to PTA medication list:  Added: None  Deleted: None  Changed: sig from BID to q12h PRN pain    Allergies reviewed with patient and updates made in EHR: yes    Medication History Completed By: Vidhya Hebert [facility] 2/14/2025 7:50 PM    PTA Med List   Medication Sig Last Dose/Taking    naproxen sodium 220 MG capsule Take 220 mg by mouth every 12 hours as needed (pain). Past Week

## 2025-02-15 NOTE — PROGRESS NOTES
Notified Resident at 120 PM regarding  Just to let you know patient was transferred to room 535 ortho unit. In addition MD Bravo stated wanted to start patient with aspirin and discontinue Enoxaparin. Our ICU provider discontinued enoxaparin today .      Spoke with: Kimani Kline  Above Resident acknowledged message .    Comments: messaged received by resident

## 2025-02-15 NOTE — PLAN OF CARE
Problem: Pain Acute  Goal: Optimal Pain Control and Function  Outcome: Progressing  Intervention: Develop Pain Management Plan  Recent Flowsheet Documentation  Taken 2/14/2025 1935 by Walt Umaña RN  Pain Management Interventions: medication (see MAR)  Intervention: Prevent or Manage Pain  Recent Flowsheet Documentation  Taken 2/14/2025 2000 by Walt Umaña RN  Medication Review/Management: medications reviewed       Goal Outcome Evaluation: Manage pain using pharmacological and non-pharmacological interventions to promote comfort and rest

## 2025-02-15 NOTE — PLAN OF CARE
ICU SHIFT NOTE  Major Shift Events:    - afebrile, AOX4, RA, in bed, able to move all extremities, denies numbness and tingling in all extremities, normotensive and does not require phenylephrine infusion overnight   - right hip incision covered with dressing CDI  - HUGO drain and wound vac drain intact   - patient offered to get up to the recliner but declined several times, patient as per order is NWB on right leg, Incentive spirometer was given at bedside and educated patient on how to use IS, he is currently using IS at 2300 ml   Plan:   - as per DR Bravo when wound vac will be changed to prevena wound VAC it should be for 14 days    - pain control   - transfer to Ortho floor pending bed availability   For vital signs and complete assessments, please see documentation flowsheets.   Goal Outcome Evaluation:      Plan of Care Reviewed With: patient    Overall Patient Progress: improvingOverall Patient Progress: improving    Outcome Evaluation: did not require pressors overnight ,

## 2025-02-15 NOTE — PROGRESS NOTES
Brief orthopedic update:  Received a page from physical therapy that instructions and activity orders do not align.  Writer was informed by physical therapy that the patient's wife had a discussion with Dr. Bravo prior to surgery in which he informed them that postoperatively the patient would not be able to sit upright more than 45 degrees (in addition to other restrictions).  While there is no documentation of this conversation within the medical record, I am inclined to believe that this conversation did happen. Will change activity order to no hip flexion greater than 45 degrees with non-weightbearing.  Will plan to contact Dr. Bravo for clarification in the morning.      Voice-to-text dictation software was utilized in the creation of this note therefore there may be unintended word substitutions, although errors are generally corrected real-time, there is the potential for a rare error to be present in the completed chart. Please do not hesitate to reach out for clarification.    Silas Toribio MD  Orthopaedic Surgery Resident  02/15/25

## 2025-02-15 NOTE — PROGRESS NOTES
Orthopaedic Surgery Progress Note 02/15/2025    Subjective  No acute events overnight.  Pain well controlled. Denies new numbness, tingling, or weakness.  Tolerating diet without nausea or vomiting. Denies fevers, chills.    Objective  Temp: 98.4  F (36.9  C) Temp src: Oral BP: 104/60 Pulse: 84   Resp: 15 SpO2: 97 % O2 Device: None (Room air) Oxygen Delivery: 2 LPM    Exam:  Gen: No acute distress, resting comfortably in bed.  Resp: Non-labored breathing  Cardio: Regular rate via peripheral pulse  MSK:    RLE:  - Dressings c/d/I, Prevena vac in place holding suction  - Fires Quad, TA, GSC, EHL, FHL  - SILT femoral/tibial/sural/saphenous/DP/SP nerves  - PT/DP pulses 2+, foot wwp    Drain output: 275 ml from HUGO, 0 ml from Prevena.    Recent Labs   Lab 02/15/25  0337 02/14/25  2031 02/14/25  1453 02/14/25  1100 02/14/25  0656   WBC 13.0* 18.1*  --   --  7.5   HGB 8.7* 9.6* 11.2*   < > 15.3    252  --   --  313    < > = values in this interval not displayed.       Assessment: Gilberto Lund is a 44 year old male s/p R internal hemipelvectomy on 2/14/25 with Dr. Bravo. Doing well. Plan to transfer to floor today when stable per ICU. Will need medicine co-management while on the floor. Michelle catheter will remain in place until POD2-3. He will require PT today and should remain nonweighbearing on the RLE. Post-op CT pending.    Plan:  Ortho Primary  Activity: Up with assist and assistive devices as needed until independent.   Weight bearing status: NWB RLE   Antibiotics: Ancef while in house  Diet: Begin with clear fluids and progress diet as tolerated. Bowel regimen. Anti-emetics PRN.    DVT prophylaxis:  mg every day   Elevation: elevate RLE  as needed, may use foam ramp   Wound Care: Prevena to remain in place until POD #14, managed by ortho  Drains: monitor drain output. Strip and empty drain per shift protocol. Discontinue drain when output < 20 ml per shift   Michelle: keep in place until POD  #2-3  Pain management: Orals PRN, IV for breakthrough only  X-rays: to be completed in PACU  Physical Therapy:  Mobilization, ROM, ADL's  Occupational Therapy:  ADL's  Labs:  Trend Hgb on PODs #1 & 2     Consults: PT, OT. Hospitalist, appreciate assistance in caring for this patient throughout the perioperative period     Disposition:  Pending progress with therapies, pain control on orals, and medical stability, anticipate discharge   Follow up: Plan for follow up with Dr. Bravo 4 weeks.    Kimani Mike MD  Orthopaedic Surgery Resident  [facility]  [phone]    Please page me at [phone] with any questions/concerns. If there is no response, if it is a weekend, or if it is during normal workday hours, then please page the orthopaedic surgery resident on call.     Future Appointments   Date Time Provider Department Center   2/15/2025  2:30 PM Haley Ford, [facility] URPT [place]   2/15/2025  8:30 PM Laurence Estrada, [facility] UROT [place]   3/3/2025  2:00 PM Angel Grajeda, [facility]

## 2025-02-15 NOTE — PLAN OF CARE
10A ICU End of Shift Summary.     Changes this shift: Patient arrived from PACU on a Phenyll drip which was turned off upon arrival and not needed throughout the shift. Discomfort to right hip and leg described as constant aching/cramping. Medications given and repositioning utilized with relief. Magnesium replaced with 4g. Patient transitioned to room air. No problems with swallowing pills or drinking water. ART line removed    Neuro: AO x 4. RASS of 0. Pupils PERRLA 3mm. Pain rating 2-4/10 on DVPRS scale. Baseline numbness and tingling in lower extremities. Slight difficulty moving right leg (expected post surgery)    Cardiac: NSR in 70s. Normotensive on cuff readings and ART line. Pulses 2+ in all extremities    Respiratory: Room air. Lung sounds clear and equal bilaterally. EtCO2 monitoring in place and WDL    GI/: Michelle in place with adequate drainage. No BM this shift. Bowel regimen started    Diet/appetite: Clear liquid diet; tolerating well    Pain: 2-4/10 to right hip and leg. Dull/aching/cramping. Medications and repositioning utilized with relief.    Skin: Right hip incision site CDI. No other skin concerns    LDA's: Left PIV. Right PIV. Right HUGO drain. Right Wound Vac. Michelle    Activities: Turns in bed q2H    Drips:   None       Plan: Patient normotensive overnight with no need for pressor medications. Potentially able to transfer to the floors today 2/15

## 2025-02-15 NOTE — PROGRESS NOTES
Brief note:    Did well overnight, pain controlled, urine adequately draining, likely transfer to floor today.    Plan to keep mccord x2-3 days  Remainder of cares per ortho    Will follow along until mccord is removed and urologic needs have been met (voiding, etc).    Discussed with Misty Lyman and Jamil Maynard MD  Urology PGY-5

## 2025-02-15 NOTE — PROGRESS NOTES
Patient arrived at 1935      Admitted/transferred from: PACU  Patient status upon admission/transfer: Stable on monitors  2 RN skin assessment: completed by FILIPE Godoy and FILIPE Green  Result of skin assessment and interventions/actions: Right Hip surgical incision  Height, weight, drug calc weight: Done  Patient belongings (see Flowsheet - Adult Profile for details): Remain with patient and wife    Orders were obtained and completed as described in flowsheets.        Walt Umaña RN

## 2025-02-15 NOTE — CONSULTS
[facility]  Consult Note - Hospitalist Service, GOLD TEAM 19  Date of Admission:  2/14/2025  Consult Requested by: Dr. Bravo  Reason for Consult: co-medical management    Assessment & Plan   Gilberto Lund is a 44 year old male admitted on 2/14/2025. He he has a past medical history including cirrhosis, history of kidney stones, testicular cancer status post orchiectomy and chemotherapy in 2008.  He is hospitalized for combined surgery requiring orthopedics and urology.  Postoperatively on 2/14/2025 he was transferred to the ICU for hemodynamic instability and undifferentiated shock.  He is now hemodynamically stable and being downgraded to the medical floor by the ICU.  Medicine asked to consult for Co. medical management.    # Postoperative hypotension  # Undifferentiated shock  # Acute blood loss anemia  -Multifactorial 1.5 L of estimated blood loss intraoperatively.  -Status post IV fluids and 1 L of albumin  -He now remains hemodynamically stable  -Continue IV fluids  -Continue telemetry    #s/p right modified radical hemipelvectomy  # RLE numbness  - HUGO/woundvac in place  Ortho Primary  Activity: Up with assist and assistive devices as needed until independent.   Weight bearing status: NWB RLE   Antibiotics: Ancef while in house  Diet: Begin with clear fluids and progress diet as tolerated. Bowel regimen. Anti-emetics PRN.    DVT prophylaxis: Enoxaparin daily x 2 weeks, beginning on morning of POD 1  Elevation: elevate RLE  as needed, may use foam ramp   Wound Care: Prevena to remain in place until POD #14, managed by ortho  Drains: monitor drain output. Strip and empty drain per shift protocol. Discontinue drain when output < 20 ml per shift   Michelle: keep in place until POD #2-3  Pain management: Orals PRN, IV for breakthrough only  X-rays: to be completed in PACU  Physical Therapy:  Mobilization, ROM, ADL's  Occupational Therapy:  ADL's  Labs:  Trend Hgb on  "PODs #1 & 2  Consults: PT, OT. Hospitalist, appreciate assistance in caring for this patient throughout the perioperative period  Disposition:  Pending progress with therapies, pain control on orals, and medical stability, anticipate discharge   Follow up: Plan for follow up with Dr. Bravo 4 weeks.    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 Patient now s/p right modified radical hemipelvectomy of chondrosarcoma with Dr. Bravo.  - Plan per Musculoskeletal section  -Urology consulted       Clinically Significant Risk Factors          # Hyperchloremia: Highest Cl = 108 mmol/L in last 2 days, will monitor as appropriate      # Hypocalcemia: Lowest Ca = 8.3 mg/dL in last 2 days, will monitor and replace as appropriate   # Hypomagnesemia: Lowest Mg = 1.5 mg/dL in last 2 days, will replace as needed                  # Obesity: Estimated body mass index is 30.63 kg/m  as calculated from the following:    Height as of this encounter: 1.854 m (6' 1\").    Weight as of this encounter: 105.3 kg (232 lb 2.3 oz)., PRESENT ON ADMISSION            Keisha Robles MD  Hospitalist Service, GOLD TEAM 19  Securely message with [employer] (more info)  Text page via [facility] Paging/Directory   See signed in provider for up to date coverage information  ______________________________________________________________________    Chief Complaint   Post op pain    History is obtained from the patient    History of Present Illness   Gilberto Lund is a 44 year old male admitted on 2/14/2025. He he has a past medical history including cirrhosis, history of kidney stones, testicular cancer status post orchiectomy and chemotherapy in 2008.  He is hospitalized for " combined surgery requiring orthopedics and urology.  Postoperatively on 2/14/2025 he was transferred to the ICU for hemodynamic instability and undifferentiated shock.  He is now hemodynamically stable and being downgraded to the medical floor by the ICU.  Medicine asked to consult for Co. medical management.     Patient is seen at bedside medical floor.  He is joined by his wife at the bedside.  Patient states he has some pain in the hips 9 out of 10, states likely due to the transfer however otherwise he was comfortable when he remains still.  He denies new symptoms including lightheadedness difficulty breathing cough or new numbness.      Past Medical History    Past Medical History:   Diagnosis Date    Arthritis     Chondrosarcoma (H)     CTS (carpal tunnel syndrome)     Gynecomastia, male     Hard to intubate 06/29/2022    Infection due to 2019 novel coronavirus 12/2020    Kidney stone 2015    90% calcium oxalate monohydrate, and  10% calcium oxalate dihydrate.    Primary chondrosarcoma of bone of pelvis (H)     Psoriasis     Skin tag     Testicle cancer, left 2008    surgery and chemotherapy    Thyroid nodule 12/10/2024    high FDG, noted on PET       Past Surgical History   Past Surgical History:   Procedure Laterality Date    ABDOMEN SURGERY      BIOPSY      BIOPSY BONE PELVIS Right 12/6/2024    Procedure: BIOPSY, BONE, PELVIS;  Surgeon: Juan Jose Bravo MD;  Location: [facility] OR    EXCISE EXOSTOSIS FOOT  10/25/2013    Procedure: EXCISE EXOSTOSIS FOOT;  Excision of calcaneal bone cyst left foot with allograft;  Surgeon: Marcello Jensen DPM;  Location: [facility] OR    LAPAROSCOPIC CHOLECYSTECTOMY N/A 06/29/2022    Procedure: CHOLECYSTECTOMY, LAPAROSCOPIC;  Surgeon: Cholo Agustin DO;  Location: [facility] OR    ORCHIECTOMY SCROTAL Left        Medications   I have reviewed this patient's current medications       Review of Systems    The 10 point Review of Systems is negative other than noted in the HPI or here.       Physical Exam   Vital Signs: Temp: 98  F (36.7  C) Temp src: Oral BP: 107/66 Pulse: 91   Resp: 16 SpO2: 96 % O2 Device: None (Room air) Oxygen Delivery: 2 LPM  Weight: 232 lbs 2.31 oz    General Appearance: Appears comfortable.  Respiratory: Without wheezes rhonchi or rales.  CTA  Cardiovascular: RRR, without murmurs  GI: Soft, nontender, plus BS  Skin: Without obvious bleeding, bruising or excoriations      Medical Decision Making       77 MINUTES SPENT BY ME on the date of service doing chart review, history, exam, documentation & further activities per the note.      Data   ------------------------- PAST 24 HR DATA REVIEWED -----------------------------------------------

## 2025-02-15 NOTE — H&P
[facility] ICU H&P  02/14/2025    Date of Hospital Admission: 02/14/2025  Date of ICU Admission: 02/14/2025   Reason for Critical Care Admission: Undifferentiated shock  Date of Service (when I saw the patient): 02/14/2025    ASSESSMENT: Gilberto Lund is a 44 year old male with PMH psoriasis, kidney stones, and testicular cancer.  Kidney Stones, Testicular cancer, and chondrosarcoma of pelvis. Patient diagnosis with testicular CA in 2008 received left orchiectomy and chemotherapy at that time. 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 Presented to [facility] for combined surgery with orthopaedics and urology. Patient now s/p right modified radical hemipelvectomy with Dr. Brvao. Now admitted  ICU for hemodynamic monitoring and undifferentiated shock requiring phenylephrine infusion.      Intra-op: 1.5L EBL; received ~ 5.5L crystalloid, 1L albumin, and no blood products. Patient requiring phenylephrine in PACU/post-op.     CHANGES and MAJOR THINGS TODAY:   - Admit to ICU    PLAN:    Neuro:  # Acute postop Pain  - See MSK below surgical discussion  - APAP 975mg Q8H, gabapentin 100mg TID, & toradol 15mg Q6H scheduled  - PRN Dilaudid, hydroxyzine, and oxycodone available    Pulmonary:  # No acute concerns  - Pulmonary toilet; including IS Q1H when awake  - Supplemental oxygen to maintain SpO2 > 92%    Cardiovascular:  # Undifferentiated shock; likely hemorrhagic vs vsoplegia  Patient had 1.5L EBL with intra-op course ~ 9 hours long. Patient received ~ 5.5L crystalloid, and 1L Albumin. Patient requiring post-op phenyelphrine infusion; of which is now off.   - MAP > 65; phenylephrine available  - MIV LR 75 ml/hr per orthopaedics       Renal/Fluids/Electrolytes:  # Hypomagnesia  - See CV above for resuscitation efforts  - Electrolyte replacements ordered  - Serial chemistries ordered    GI/Nutrition:  # No acute concerns  - Clear liquid diet  ADAT  - Bowel regimen ordered    Endocrine:  # Stress induced hyperglycemia  - No indication for insulin at this time  - Hypoglycemia order set in place  - Blood glucose goal < 180; notify provider if higher    ID:  # Farrah-op abx  - Ancef ordered per orthopaedics  - CTM infectious markers    Hematology/oncology:    # Acute blood loss anemia  - Serial CBC w/ plt ordered  - Hgb goal > 7.0  - Chemical DVT ppx per orthopaedics     # Metastatic Right chondrosarcoma s/p resection  # Hx testicular CA   Patient diagnosis with testicular CA in 2008 received left orchiectomy and chemotherapy at that time. 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 Patient now s/p right modified radical hemipelvectomy of chondrosarcoma with Dr. Bravo.  - Nothing acute to do see MSK below    Musculoskeletal:  # s/p right modified radical hemipelvectomy  # RLE numbness  - HUGO/woundvac in place    Per Orthopaedic post-op recommendations:  Activity: Up with assist and assistive devices as needed until independent.   Weight bearing status: NWB RLE   Antibiotics: Ancef while in house  Diet: Begin with clear fluids and progress diet as tolerated. Bowel regimen. Anti-emetics PRN.    DVT prophylaxis: Enoxaparin daily x 2 weeks, beginning on morning of POD 1  Elevation: elevate RLE  as needed, may use foam ramp   Wound Care: Prevena to remain in place until POD #14, managed by ortho  Drains: monitor drain output. Strip and empty drain per shift protocol. Discontinue drain when output < 20 ml per shift   Michelle: keep in place until POD #2-3  Pain management: Orals PRN, IV for breakthrough only  X-rays: to be completed in PACU  Physical Therapy:  Mobilization, ROM, ADL's  Occupational Therapy:  ADL's  Labs:  Trend Hgb on PODs #1 & 2    Skin:  # Surgical incision  - HUGO/woundvac in place    General Cares/Prophylaxis:    DVT Prophylaxis: Pneumatic Compression Devices and Chemical DVT ppx  per orthopaedics   GI Prophylaxis: NA  Restraints: Not Indicated  Family Communication: Surgery updated  Code Status: Full Code    Lines/tubes/drains:  - PIV   - arterial line  - flex prabhakar  - HUGO/woundvac     Disposition:  - [facility] ICU       Billing: This patient is critically ill: Yes. Total critical care time today 40 min. This does not include time spent on procedures or teaching.     LJ English CNP         -----------------------------------------------------------------------    HISTORY PRESENTING ILLNESS: Gilberto Lund is a 44 year old male with PMH psoriasis, kidney stones, and testicular cancer.  Kidney Stones, Testicular cancer, and chondrosarcoma of pelvis. Patient diagnosis with testicular CA in 2008 received left orchiectomy and chemotherapy at that time. 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 Presented to [facility] for combined surgery with orthopaedics and urology. Patient now s/p right modified radical hemipelvectomy with Dr. Bravo. Now admitted  ICU for hemodynamic monitoring and Undifferentiated shock requiring phenylephrine infusion.      REVIEW OF SYSTEMS: Denies headache, [place], double vision, blurry vision, SOB, chest pain, and back pain     PAST MEDICAL HISTORY:   Past Medical History:   Diagnosis Date    Arthritis     Chondrosarcoma (H)     CTS (carpal tunnel syndrome)     Gynecomastia, male     Hard to intubate 06/29/2022    Infection due to 2019 novel coronavirus 12/2020    Kidney stone 2015    90% calcium oxalate monohydrate, and  10% calcium oxalate dihydrate.    Primary chondrosarcoma of bone of pelvis (H)     Psoriasis     Skin tag     Testicle cancer, left 2008    surgery and chemotherapy    Thyroid nodule 12/10/2024    high FDG, noted on PET     SURGICAL HISTORY:  Past Surgical History:   Procedure Laterality Date    ABDOMEN SURGERY      BIOPSY      BIOPSY BONE PELVIS Right 12/6/2024     Procedure: BIOPSY, BONE, PELVIS;  Surgeon: Juan Jose Bravo MD;  [facility]: [facility] OR    EXCISE EXOSTOSIS FOOT  10/25/2013    Procedure: EXCISE EXOSTOSIS FOOT;  Excision of calcaneal bone cyst left foot with allograft;  Surgeon: Marcello Jensen DPM;  Location: [facility] OR    LAPAROSCOPIC CHOLECYSTECTOMY N/A 06/29/2022    Procedure: CHOLECYSTECTOMY, LAPAROSCOPIC;  Surgeon: Cholo Agustin DO;  Location: [facility] OR    ORCHIECTOMY SCROTAL Left      SOCIAL HISTORY:  Social History     Socioeconomic History    Marital status:      Spouse name: None    Number of children: None    Years of education: None    Highest education level: None   Tobacco Use    Smoking status: Former     Types: Cigarettes    Smokeless tobacco: Former     Types: Chew   Vaping Use    Vaping status: Never Used   Substance and Sexual Activity    Alcohol use: Not Currently    Drug use: No    Sexual activity: Yes     Partners: Female   Other Topics Concern    Parent/sibling w/ CABG, MI or angioplasty before 65F 55M? No     Social Drivers of Health     Financial Resource Strain: Low Risk  (9/30/2024)    Financial Resource Strain     Within the past 12 months, have you or your family members you live with been unable to get utilities (heat, electricity) when it was really needed?: No   Food Insecurity: Low Risk  (9/30/2024)    Food Insecurity     Within the past 12 months, did you worry that your food would run out before you got money to buy more?: No     Within the past 12 months, did the food you bought just not last and you didn t have money to get more?: No   Transportation Needs: Low Risk  (9/30/2024)    Transportation Needs     Within the past 12 months, has lack of transportation kept you from medical appointments, getting your medicines, non-medical meetings or appointments, work, or from getting things that you need?: No   Physical Activity: Sufficiently Active (9/30/2024)    Exercise Vital Sign     Days of Exercise per Week: 5 days      Minutes of Exercise per Session: 150+ min   Stress: No Stress Concern Present (9/30/2024)    [employer] - Occupational Stress Questionnaire     Feeling of Stress : Only a little   Social Connections: Unknown (9/30/2024)    Social Connection and Isolation Panel [NHANES]     Frequency of Social Gatherings with Friends and Family: Once a week   Interpersonal Safety: Low Risk  (2/14/2025)    Interpersonal Safety     Do you feel physically and emotionally safe where you currently live?: Yes     Within the past 12 months, have you been hit, slapped, kicked or otherwise physically hurt by someone?: No     Within the past 12 months, have you been humiliated or emotionally abused in other ways by your partner or ex-partner?: No   Housing Stability: Low Risk  (9/30/2024)    Housing Stability     Do you have housing? : Yes     Are you worried about losing your housing?: No     FAMILY HISTORY:   Family History   Problem Relation Age of Onset    No Known Problems Mother     Hypertension Father     Atrial fibrillation Father     Thyroid Disease Father     Nephrolithiasis Father     Pulmonary Embolism Father     C.A.D. Maternal Grandfather     Thyroid Cancer No family hx of     Diabetes No family hx of     Anesthesia Reaction No family hx of      ALLERGIES:   No Known Allergies  MEDICATIONS:  Current Facility-Administered Medications   Medication Dose Route Frequency Provider Last Rate Last Admin    dexAMETHasone (DECADRON) injection 4 mg  4 mg Intravenous Once PRN Woody Kaur MD        fentaNYL (PF) (SUBLIMAZE) injection 25 mcg  25 mcg Intravenous Q5 Min PRN Woody Kaur MD   25 mcg at 02/14/25 1831    fentaNYL (PF) (SUBLIMAZE) injection 50 mcg  50 mcg Intravenous Q5 Min PRN Woody Kaur MD        hydrALAZINE (APRESOLINE) injection 2.5-5 mg  2.5-5 mg Intravenous Q10 Min PRN Woody Kaur MD        HYDROmorphone (PF) (DILAUDID) injection 0.2 mg  0.2 mg Intravenous Q5 Min PRN  Woody Kaur MD        HYDROmorphone (PF) (DILAUDID) injection 0.4 mg  0.4 mg Intravenous Q5 Min PRN Woody Kaur MD        lactated ringers infusion   Intravenous Continuous Woody Kaur MD        metoprolol (LOPRESSOR) injection 1-2 mg  1-2 mg Intravenous Q5 Min PRN Woody Kaur MD        naloxone (NARCAN) injection 0.1 mg  0.1 mg Intravenous Q2 Min PRN Woody Kaur MD        ondansetron (ZOFRAN ODT) ODT tab 4 mg  4 mg Oral Q30 Min PRN Woody Kaur MD        Or    ondansetron (ZOFRAN) injection 4 mg  4 mg Intravenous Q30 Min PRN Woody Kaur MD        prochlorperazine (COMPAZINE) injection 5 mg  5 mg Intravenous Q6H PRN Woody Kaur MD        ROPivacaine (NAROPIN) 5 MG/ mg, ketorolac (TORADOL) 30 mg, EPINEPHrine (ADRENALIN) 0.6 mg in sodium chloride 0.9 % 100 mL (ORTHO APPLE STANDARD DOSE)   INTRA-ARTICULAR On Call to OR Angel Grajeda, HERNESTO           Physical Exam     GEN: , not in distress  EYES: PERRL, Anicteric sclera.   HEENT:  Normocephalic, atraumatic, trachea midline, Pupils PERRLA  CV: RRR, no gallops, rubs, or murmurs  PULM/CHEST: Clear breath sounds bilaterally without rhonchi, crackles or wheeze, symmetric chest rise  GI: normal bowel sounds, soft, obese, non-tender, no rebound tenderness or guarding, no masses  : mccord catheter in place, urine yellow and clear  EXTREMITIES: No peripheral edema, moving all extremities, peripheral pulses intact  NEURO: Cranial nerves II-XII grossly intact, RLE numbness present, and R hip pain 6/10  SKIN: Surgical incision dressing CDI  PSYCH:  Affect: appropriate      LABS: Reviewed.   Arterial Blood Gases   Recent Labs   Lab 02/14/25  1453 02/14/25  1356 02/14/25  1254 02/14/25  1100   PH 7.38 7.37 7.39 7.36   PCO2 40 41 39 45   PO2 118* 114* 129* 129*   HCO3 24 24 23 25     Complete Blood Count   Recent Labs   Lab 02/14/25  1453 02/14/25  1356 02/14/25  1254 02/14/25  1100 02/14/25  0656   WBC  --   --   --   --  7.5    HGB 11.2* 11.5* 12.6* 14.3 15.3   PLT  --   --   --   --  313     Basic Metabolic Panel  Recent Labs   Lab 02/14/25  1453 02/14/25  1356 02/14/25  1254 02/14/25  1100    141 140 141   POTASSIUM 4.6 4.3 4.3 4.1   * 138* 140* 130*     Liver Function Tests  No lab results found in last 7 days.  Coagulation Profile  No lab results found in last 7 days.    IMAGING:  No results found for this or any previous visit (from the past 24 hours).

## 2025-02-15 NOTE — PLAN OF CARE
"Goal Outcome Evaluation:      Plan of Care Reviewed With: patient    Overall Patient Progress: no change      VS: /66   Pulse 91   Temp 98.3  F (36.8  C) (Oral)   Resp 16   Ht 1.854 m (6' 1\")   Wt 112 kg (246 lb 14.6 oz)   SpO2 96%   BMI 32.58 kg/m       O2: Room air    Output: Michelle catheter in place . Patent and draining    Last BM: No BM on shift    Activity: Bedfast - CL with transfers    Up for meals? Yes   Skin: Skin check performed upon admission - Skin intact on back and extremities.Surgical incision on R. Hip.    Pain: Reports R. Hip pain. PRN dilaudid given. Pain reported to be improving   CMS: A/Ox-4 - Baseline n/t in bilateral lower extremities    Dressing: R. Hip dressing - CDI    Diet: Regular diet    LDA: R and L hand PIV . R- infusing LR at 75ml/hr . Wound Vac. HUGO drain    Equipment: IV pole , Wound vac, PCD's pump, personal items. Call light within reach    Plan: Plan of care ongoing    Additional Info:               "

## 2025-02-15 NOTE — PROGRESS NOTES
02/15/25 4809   Appointment Info   Signing Clinician's Name / Credentials (PT) Haley Ford PT, DPT, ATC, LAT   Rehab Comments (PT) PT eval and treat post op management. Activity order: ambulate with assist, up with assist, up in chair, IS, no precautions (however family reports being informed of signficiant functional and self management precautions- no coughing or breaing down, no hip flexion past 90*, sitting at 45* hip/trunk flexion combined), NWB RLE, ICU early mobility       Present no   Living Environment   People in Home spouse   Current Living Arrangements house   Home Accessibility   (ramp to enter)   Transportation Anticipated family or friend will provide  (small SUV)   Living Environment Comments recently installed ramp to enter home. able to stay on one level of the home. walk-in shower upstairs, 16 stairs to upper level bedroom and bathroom   Self-Care   Usual Activity Tolerance good   Current Activity Tolerance poor   Regular Exercise No   Equipment Currently Used at Home none   Fall history within last six months no   General Information   Onset of Illness/Injury or Date of Surgery 02/14/25   Referring Physician Rolando Gray, NP   Patient/Family Therapy Goals Statement (PT) unsure of the options   Pertinent History of Current Problem (include personal factors and/or comorbidities that impact the POC) Patient is a 44 year old male, day 1 status post right internal hemipelvectomy by Dr. Bravo for manamagement of primary chondrosarcoma of bone of pelvis, 1400mL EBL, general anesthesia. Patient with a previous medical history of testicular cancer  in 2008 s/p left orchiectomy and chemotherapy, kideney stones and psoriasis.   Existing Precautions/Restrictions fall;weight bearing   Weight-Bearing Status - LUE full weight-bearing   Weight-Bearing Status - RUE full weight-bearing   Weight-Bearing Status - LLE full weight-bearing   Weight-Bearing Status - RLE nonweight-bearing    General Observations on room air, IV infusing, HUGO drain intact, wound vac to right hip   Cognition   Affect/Mental Status (Cognition) WFL;anxious   Orientation Status (Cognition) oriented x 4   Follows Commands (Cognition) WFL   Pain Assessment   Patient Currently in Pain Yes, see Vital Sign flowsheet   Integumentary/Edema   Integumentary/Edema Comments post op dressing intact   Posture    Posture Forward head position   Range of Motion (ROM)   ROM Comment AROM intact for right ankle, pain with right hip flexion and limited ROM.   Strength (Manual Muscle Testing)   Strength Comments unable to SLR flex LLE due to surgery.intact quad and hamstring isometric   Bed Mobility   Bed Mobility rolling left   Rolling Left [place] (Bed Mobility) maximum assist (25% patient effort)   Bed Mobility Limitations decreased ability to use legs for bridging/pushing   Impairments Contributing to Impaired Bed Mobility decreased strength;pain   Comment, (Bed Mobility) dependent mangement for RLE   Transfers   Comment, (Transfers) did not progress due to vary precautions   Sensory Examination   Sensory Perception Comments impaired right hip   Coordination   Coordination Comments UE intact   Muscle Tone   Muscle Tone no deficits were identified   Clinical Impression   Criteria for Skilled Therapeutic Intervention Yes, treatment indicated   PT Diagnosis (PT) impaired mobility, muscle weakness, post op healing   Influenced by the following impairments day 1 status post completed right hemipelvictomy   Functional limitations due to impairments dependent for transfers and heavy support for bed mobility.   Clinical Presentation (PT Evaluation Complexity) evolving   Clinical Presentation Rationale post op status, clinical judgement, severe pain   Clinical Decision Making (Complexity) moderate complexity   Planned Therapy Interventions (PT) balance training;bed mobility training;cryotherapy;gait training;home exercise  program;patient/family education;neuromuscular re-education;ROM (range of motion);strengthening;transfer training;progressive activity/exercise;home program guidelines   Risk & Benefits of therapy have been explained evaluation/treatment results reviewed;participants voiced agreement with care plan;participants included;patient;spouse/significant other   Clinical Impression Comments Patient presents with functional mobility below his baseline in the setting of right hemipelvictomy. Patient with NWB RLE precaution and patient verbally instructed in several other functional precautions. Patient would benefit from ARU placement given his age, motivation, post op precautions and functional impairments in order to safely progress him back home with his family while he awaits the second stage of his operation.   PT Total Evaluation Time   PT Eval, Moderate Complexity Minutes (90306) 15   Physical Therapy Goals   PT Frequency Daily   PT Predicted Duration/Target Date for Goal Attainment 02/22/25   PT Goals Bed Mobility;Transfers;Gait   PT: Bed Mobility Independent;Supine to/from sit;Within precautions   PT: Transfers Supervision/stand-by assist;Sit to/from stand;Assistive device   PT: Gait Supervision/stand-by assist;Standard walker;Crutches;50 feet   PT Discharge Planning   PT Plan bed mobility, sitting EOB once precautions are clarified.   PT Discharge Recommendation (DC Rec) [facility]-Motivated patient will benefit from intensive, interdisciplinary therapy.  Anticipate will be able to tolerate 3 hours of therapy per day   PT Rationale for DC Rec Patient from home with wife, ramp to enter, upper level bedroom and shower. Patient and family made best efforts to prepare home for return following surgery, however given post op precautions they were verbally provided they are feeling ill equipped to manage recovery at home. Patient presents with functional mobility below his baseline in the setting of right  hemipelvictomy. Patient with NWB RLE precaution and patient verbally instructed in several other functional precautions. Patient would benefit from ARU placement given his age, motivation, post op precautions and functional impairments in order to safely progress him back home with his family while he awaits the second stage of his operation.   PT Brief overview of current status MAX assist rolling. LE HEP with fair tolerance   Physical Therapy Time and Intention   Total Session Time (sum of timed and untimed services) 15     Thank you for your referral.  Haley Ford, PT, DPT, ATC, LAT    [facility]  O: [phone]  E: [email or web address]

## 2025-02-15 NOTE — PROGRESS NOTES
INTENSIVIST FACULTY NOTE:  Off pressors overnight  Says a combination of dilaudid and muscle relaxants are adequately controlling his pain    Nothing concerning on labs  Hgb 8.7 after fluid resuscitation    MAP in target range, although SBP a little low    This is a 44M hx testicular cancer and chondrosarcoma of the pelvis, no s/p modified radial hemipelvetomy.    He is off pressors and stable for the floor.      Physician Attestation   I, CORBIN Falcon, have reviewed and discussed with the advanced practice provider their history, physical and plan for this patient. I also examined the patient and reviewed the chart, but did not participate substantively enough for this to be billed as a shared visit; this encounter should therefore instead be billed as an advanced practice provider only visit.     CORBIN Falcon MD  Clinical   Anesthesia / Critical Care  *[phone]

## 2025-02-15 NOTE — PROGRESS NOTES
"5 Ortho Admission Note    Reason for admission: R.   Modified Radical hemipelevectomy   Primary team notified of pt arrival.  Admitted from: 10 ICU   Via: Cart  Accompanied by: Spouse  Belongings: Placed in closet; valuables sent home with family  Admission Required Doc Completed: Yes  Mobility Devices Utilized by Patient Provided (i.e. walker, wheelchair, etc.): NA  Teaching: Orientation to unit and call light- call light within reach, use of console, meal times, when to call for the RN, and enforced importance of safety.  IV Access: R and L hand PIV   Telemetry: Yes  Ht./Wt.: Completed- 112kg   Code Status verified on armband: Yes  2 RN Skin Assessment Completed with: Sommer HOLLINS   Suction/Ambu bag/Flowmeter at bedside: Yes    Pt status:     ./66   Pulse 91   Temp 98.3  F (36.8  C) (Oral)   Resp 16   Ht 1.854 m (6' 1\")   Wt 112 kg (246 lb 14.6 oz)   SpO2 96%   BMI 32.58 kg/m    5 Ortho Admission Note     "

## 2025-02-15 NOTE — PROGRESS NOTES
Family education completed:Yes    Report given to: Odalys DENT     Time of transfer: 1320 hrs     Transferred to: 535 ortho    Belongings sent:Yes    Family updated:Yes, patient significant other at bedside and went with patient to his new room     Reviewed pertinent information from EPIC (EMAR/Clinical Summary/Flowsheets):Yes    Head-to-toe assessment with receiving RN:Yes    Recommendations (e.g. Family needs/recent issues/things to watch for):   - pain control  - drains monitoring , I/O check s

## 2025-02-15 NOTE — PROGRESS NOTES
[facility] ICU PROGRESS NOTE  02/15/2025      Date of Service (when I saw the patient): 02/15/2025    ASSESSMENT: Gilberto Lund is a 44 year old male with PMH psoriasis, kidney stones, and testicular cancer, and chondrosarcoma of pelvis. Patient diagnosed with testicular CA in 2008 received left orchiectomy and chemotherapy at that time. 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 Presented to [facility] for combined surgery with orthopaedics and urology. Patient now s/p right modified radical hemipelvectomy with Dr. Bravo. Now admitted  ICU for hemodynamic monitoring and undifferentiated shock requiring phenylephrine infusion.       INTERVAL HISTORY:   No acute overnight events. Pain level at 4/10 during AM assessment and surgical dressing and wound vac CDI. Patient to work with PT/OT today. Medically stable and able to transition out of the ICU    CHANGES and MAJOR THINGS TODAY:   - Lactic, ionized calcium, and hemoglobin check at 0800 - levels stable  - Enoxaparin ordered, ok per Ortho recs to start POD 1  - Transfer out of ICU      PLAN:    Neuro:  # Acute post-operative pain  - See MSK below surgical discussion  - APAP 975mg Q8H  - Gabapentin 100mg TID  - Toradol 15mg Q6H scheduled  - Dilaudid 0.2-0.4 mg q2 hrs PRN  - Hydroxyzine 25 mg q6 hrs PRN  - Oxycodone 5-10 mg q4 hrs PRN      Pulmonary:  # No acute concerns  - Pulmonary toilet; including IS Q1H when awake  - Supplemental oxygen to maintain SpO2 > 92%      Cardiovascular:  # No acute concerns  # Shock; likely multifactorial hemorrhagic and intra-operative vasoplegia - resolved  Patient had 1.5L EBL with intra-op course ~ 9 hours long. Patient received ~ 5.5L crystalloid, and 1L Albumin. Patient requiring post-op phenyelphrine infusion; of which is now off.   - MAP goal greater than 65 mmHg  - MIV LR 75 ml/hr per orthopaedics, ok to stop when taking adequate PO  intake  -  mg qDay        Renal/Fluids/Electrolytes:  # Hypocalcemia  - See CV above for resuscitation efforts  - Electrolyte replacements ordered  - Serial chemistries ordered  - BMP, Mg, and Phos qDay      GI/Nutrition:  # No acute concerns  - Clear liquid to ADAT ordered  - Senna 1 tab BID  - Miralax qDay  - Bisacodyl qDay PRN  - Zofran 4 mg q6 hrs PRN  - Compazine 10 mg q6 hrs PRN       Endocrine:  # Stress induced hyperglycemia  - No indication for insulin at this time  - Hypoglycemia order set in place  - Blood glucose goal < 180; notify provider if higher      ID:  # Farrah-operative antibiotics  # No acute concerns  - Ancef ordered per Orthopaedics  - Obtain blood, sputum, and urine cultures if patient's temperature less than 95.0F, or greater than 101.5F       Hematology/oncology:    # Acute blood loss anemia  # Leukocytosis, likely stress-induced - improving  - Hgb goal > 7.0  - Enoxaparin 40 mg qDay, per Ortho recommendations  - CBC qDay  - Type and screen current, recheck x1 scheduled for 02/17     # 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 Patient now s/p right modified radical hemipelvectomy of chondrosarcoma with Dr. Bravo.  - Plan per Musculoskeletal section       Musculoskeletal:  # s/p Right modified radical hemipelvectomy  # RLE numbness  - HUGO/woundvac in place  - PT and OT to eval and treat  - Orthopedic team primary     Orthopedic recommendations 02/15:  Plan:  Activity: Up with assist and assistive devices as needed until independent.   Weight bearing status: NWB RLE   Antibiotics: Ancef while in house  Diet: Begin with clear fluids and progress diet as tolerated. Bowel regimen. Anti-emetics PRN.    DVT  "prophylaxis: Enoxaparin daily x 2 weeks, beginning on morning of POD 1  Elevation: elevate RLE  as needed, may use foam ramp   Wound Care: Prevena to remain in place until POD #14, managed by ortho  Drains: monitor drain output. Strip and empty drain per shift protocol. Discontinue drain when output < 20 ml per shift   Michelle: keep in place until POD #2-3  Pain management: Orals PRN, IV for breakthrough only  X-rays: to be completed in PACU  Physical Therapy:  Mobilization, ROM, ADL's  Occupational Therapy:  ADL's  Labs:  Trend Hgb on PODs #1 & 2     Skin:  # Surgical incision  - HUGO/woundvac in place  - Appreciate diligent nursing cares      General Cares/Prophylaxis:    DVT Prophylaxis: Enoxaparin (Lovenox) SQ and Pneumatic Compression Devices  GI Prophylaxis: Not indicated  Restraints: Not indicated  Family Communication: Patient and family updated at bedside  Code Status: Full Code     Lines/tubes/drains:  - PIVs  - Michelle catheter  - Surgical drain  - Wound vac    Disposition:  - Med/Surg    Patient seen and findings/plan discussed with medical ICU staff, Dr. Falcon.    I spent a total of 40 minutes (excluding procedure time) personally providing and directing non-critical care services at the bedside and on the critical care unit for LJ Perea CNP    Clinically Significant Risk Factors          # Hyperchloremia: Highest Cl = 108 mmol/L in last 2 days, will monitor as appropriate      # Hypocalcemia: Lowest Ca = 8.3 mg/dL in last 2 days, will monitor and replace as appropriate   # Hypomagnesemia: Lowest Mg = 1.5 mg/dL in last 2 days, will replace as needed                  # Obesity: Estimated body mass index is 30.63 kg/m  as calculated from the following:    Height as of this encounter: 1.854 m (6' 1\").    Weight as of this encounter: 105.3 kg (232 lb 2.3 oz)., PRESENT ON ADMISSION                   ====================================    OBJECTIVE:   1. VITAL SIGNS:   Temp:  [98.2 "  F (36.8  C)-99.5  F (37.5  C)] 98.4  F (36.9  C)  Pulse:  [] 84  Resp:  [15-20] 15  BP: (102-120)/(59-66) 104/60  MAP:  [59 mmHg-85 mmHg] 61 mmHg  Arterial Line BP: ()/(44-66) 104/46  SpO2:  [96 %-99 %] 97 %  Resp: 15  2. INTAKE/ OUTPUT:   I/O last 3 completed shifts:  In: 7987.62 [I.V.:6987.62]  Out: 3310 [Urine:1635; Drains:275; Blood:1400]    3. PHYSICAL EXAMINATION:  Physical Exam  Vitals and nursing note reviewed.   Constitutional:       General: He is awake. He is not in acute distress.     Appearance: Normal appearance. He is not ill-appearing or toxic-appearing.   HENT:      Head: Normocephalic and atraumatic.      Mouth/Throat:      Mouth: Mucous membranes are moist.      Pharynx: Oropharynx is clear.   Eyes:      General: No scleral icterus.     Extraocular Movements: Extraocular movements intact.      Conjunctiva/sclera: Conjunctivae normal.      Pupils: Pupils are equal, round, and reactive to light.   Cardiovascular:      Rate and Rhythm: Normal rate and regular rhythm.      Pulses: Normal pulses.           Dorsalis pedis pulses are 2+ on the right side and 2+ on the left side.        Posterior tibial pulses are 2+ on the right side and 2+ on the left side.      Heart sounds: Normal heart sounds. No murmur heard.     No friction rub. No gallop.   Pulmonary:      Effort: Pulmonary effort is normal. No respiratory distress.      Breath sounds: Normal breath sounds. No stridor. No wheezing, rhonchi or rales.   Abdominal:      General: Abdomen is flat. Bowel sounds are normal. There is no distension.      Palpations: Abdomen is soft.      Tenderness: There is no abdominal tenderness.   Musculoskeletal:         General: Tenderness present.      Right hip: Tenderness present.      Right upper leg: Tenderness present.      Right lower leg: No edema.      Left lower leg: No edema.        Legs:    Skin:     General: Skin is warm and dry.      Capillary Refill: Capillary refill takes less than 2  seconds.      Coloration: Skin is not pale.   Neurological:      General: No focal deficit present.      Mental Status: He is alert and oriented to person, place, and time.      GCS: GCS eye subscore is 4. GCS verbal subscore is 5. GCS motor subscore is 6.      Cranial Nerves: Cranial nerves 2-12 are intact.      Sensory: Sensation is intact.   Psychiatric:         Behavior: Behavior is cooperative.         4. LABS:   Arterial Blood Gases   Recent Labs   Lab 02/14/25  1453 02/14/25  1356 02/14/25  1254 02/14/25  1100   PH 7.38 7.37 7.39 7.36   PCO2 40 41 39 45   PO2 118* 114* 129* 129*   HCO3 24 24 23 25     Complete Blood Count   Recent Labs   Lab 02/15/25  0337 02/14/25 2031 02/14/25  1453 02/14/25  1356 02/14/25  1100 02/14/25  0656   WBC 13.0* 18.1*  --   --   --  7.5   HGB 8.7* 9.6* 11.2* 11.5*   < > 15.3    252  --   --   --  313    < > = values in this interval not displayed.     Basic Metabolic Panel  Recent Labs   Lab 02/15/25  0340 02/15/25  0337 02/14/25 2031 02/14/25  1943 02/14/25  1857 02/14/25  1453   NA  --  139 142  --  142 140   POTASSIUM  --  4.4 4.7  --  4.8 4.6   CHLORIDE  --  104 107  --  108*  --    CO2  --  24 23  --  23  --    BUN  --  16.6 17.6  --  17.3  --    CR  --  1.01 1.03  --  1.01  --    * 138* 171* 136* 153* 125*     Liver Function Tests  Recent Labs   Lab 02/14/25 2031   AST 45   ALT 21   ALKPHOS 70   BILITOTAL 0.9   ALBUMIN 3.8     Coagulation Profile  No lab results found in last 7 days.    5. RADIOLOGY:   Recent Results (from the past 24 hours)   XR Abdomen Port 1 View    Narrative    EXAMINATION:  XR ABDOMEN PORT 1 VIEW 2/14/2025     COMPARISON: CT pelvis 1/14/2025.    HISTORY: confirm count.    TECHNIQUE: Two frontal supine views of the abdomen.    FINDINGS: Postoperative changes of the pelvis. Midline lap sponge  projects over the pubic symphysis. No abnormally dilated air-filled  loops of bowel.      Impression    IMPRESSION:   Lap sponge projects over the  pubic symphysis. Otherwise no radiopaque  foreign bodies within the visualized portions of the abdomen and  pelvis.    I have personally reviewed the examination and initial interpretation  and I agree with the findings.    JARRED SANTIAGO MD         SYSTEM ID:  [id number]   XR Pelvis Port 1/2 Views    Narrative    EXAM: XR PELVIS PORT 1/2 VIEWS  LOCATION: [facility]  DATE: 2/14/2025    INDICATION: Status post Hip surgery  COMPARISON: CT 1/14/2025      Impression    IMPRESSION: Interval postoperative changes of resection of the right femoral head as well as significant portions of the right acetabulum and portions of the right-sided pubic rami. Plate and screw fixation and bone cement along the margins of the old   acetabulum. The left hip is negative for fracture with mild degenerative change. The remainder of the left side of the pelvis is negative.

## 2025-02-15 NOTE — PROGRESS NOTES
Acquired Patient from [id number]    Patient is A&O x4. Patient denied chest pain/SOB and dizziness/nausea. Patient c/o R hip pain; PRN dilaudid given. Patient has a mccord in place, draining adequately.     Patient's wife requested for clarification regarding pt's activity restrictions due to hearing different things from nursing, therapies, and the ortho surgeon. Pt's wife stated that the ortho surgeon recommended that the pt does not sit upright more than 45 degrees. On call ortho provider was notified of this discussion; order was placed.       Activity: Bedfast. NWB to RLE   Diet: Regular/thin/whole   LDAs: R PIV; infusing LR. L PIV saline locked. Wound vac and HUGO drain   Skin: R hip dressing   Goals for next shift: Pain management and promote rest

## 2025-02-16 ENCOUNTER — APPOINTMENT (OUTPATIENT)
Dept: OCCUPATIONAL THERAPY | Facility: CLINIC | Age: 45
DRG: 498 | End: 2025-02-16
Attending: STUDENT IN AN ORGANIZED HEALTH CARE EDUCATION/TRAINING PROGRAM
Payer: COMMERCIAL

## 2025-02-16 LAB
ANION GAP SERPL CALCULATED.3IONS-SCNC: 7 MMOL/L (ref 7–15)
BUN SERPL-MCNC: 14.9 MG/DL (ref 6–20)
CALCIUM SERPL-MCNC: 8.2 MG/DL (ref 8.8–10.4)
CHLORIDE SERPL-SCNC: 102 MMOL/L (ref 98–107)
CREAT SERPL-MCNC: 1.14 MG/DL (ref 0.67–1.17)
EGFRCR SERPLBLD CKD-EPI 2021: 81 ML/MIN/1.73M2
ERYTHROCYTE [DISTWIDTH] IN BLOOD BY AUTOMATED COUNT: 13 % (ref 10–15)
GLUCOSE SERPL-MCNC: 131 MG/DL (ref 70–99)
HCO3 SERPL-SCNC: 27 MMOL/L (ref 22–29)
HCT VFR BLD AUTO: 21.5 % (ref 40–53)
HGB BLD-MCNC: 7.1 G/DL (ref 13.3–17.7)
HGB BLD-MCNC: 7.1 G/DL (ref 13.3–17.7)
HOLD SPECIMEN: NORMAL
MAGNESIUM SERPL-MCNC: 2.3 MG/DL (ref 1.7–2.3)
MCH RBC QN AUTO: 30.7 PG (ref 26.5–33)
MCHC RBC AUTO-ENTMCNC: 33 G/DL (ref 31.5–36.5)
MCV RBC AUTO: 93 FL (ref 78–100)
PHOSPHATE SERPL-MCNC: 2.5 MG/DL (ref 2.5–4.5)
PLATELET # BLD AUTO: 182 10E3/UL (ref 150–450)
POTASSIUM SERPL-SCNC: 4.3 MMOL/L (ref 3.4–5.3)
RBC # BLD AUTO: 2.31 10E6/UL (ref 4.4–5.9)
SODIUM SERPL-SCNC: 136 MMOL/L (ref 135–145)
WBC # BLD AUTO: 11.6 10E3/UL (ref 4–11)

## 2025-02-16 PROCEDURE — 85014 HEMATOCRIT: CPT

## 2025-02-16 PROCEDURE — 36415 COLL VENOUS BLD VENIPUNCTURE: CPT

## 2025-02-16 PROCEDURE — 250N000013 HC RX MED GY IP 250 OP 250 PS 637: Performed by: STUDENT IN AN ORGANIZED HEALTH CARE EDUCATION/TRAINING PROGRAM

## 2025-02-16 PROCEDURE — 99233 SBSQ HOSP IP/OBS HIGH 50: CPT | Performed by: STUDENT IN AN ORGANIZED HEALTH CARE EDUCATION/TRAINING PROGRAM

## 2025-02-16 PROCEDURE — 83735 ASSAY OF MAGNESIUM: CPT

## 2025-02-16 PROCEDURE — 85041 AUTOMATED RBC COUNT: CPT

## 2025-02-16 PROCEDURE — 250N000013 HC RX MED GY IP 250 OP 250 PS 637

## 2025-02-16 PROCEDURE — 85018 HEMOGLOBIN: CPT

## 2025-02-16 PROCEDURE — 97166 OT EVAL MOD COMPLEX 45 MIN: CPT | Mod: GO

## 2025-02-16 PROCEDURE — 250N000011 HC RX IP 250 OP 636: Mod: JZ | Performed by: STUDENT IN AN ORGANIZED HEALTH CARE EDUCATION/TRAINING PROGRAM

## 2025-02-16 PROCEDURE — 120N000002 HC R&B MED SURG/OB UMMC

## 2025-02-16 PROCEDURE — 97535 SELF CARE MNGMENT TRAINING: CPT | Mod: GO

## 2025-02-16 PROCEDURE — 84100 ASSAY OF PHOSPHORUS: CPT

## 2025-02-16 PROCEDURE — 80048 BASIC METABOLIC PNL TOTAL CA: CPT

## 2025-02-16 RX ORDER — DIPHENHYDRAMINE HYDROCHLORIDE AND LIDOCAINE HYDROCHLORIDE AND ALUMINUM HYDROXIDE AND MAGNESIUM HYDRO
10 KIT EVERY 6 HOURS PRN
Status: DISCONTINUED | OUTPATIENT
Start: 2025-02-16 | End: 2025-02-21 | Stop reason: HOSPADM

## 2025-02-16 RX ADMIN — DIPHENHYDRAMINE HYDROCHLORIDE AND LIDOCAINE HYDROCHLORIDE AND ALUMINUM HYDROXIDE AND MAGNESIUM HYDRO 10 ML: KIT at 19:53

## 2025-02-16 RX ADMIN — ACETAMINOPHEN 975 MG: 325 TABLET, FILM COATED ORAL at 19:08

## 2025-02-16 RX ADMIN — CEFAZOLIN SODIUM 2 G: 2 INJECTION, SOLUTION INTRAVENOUS at 08:00

## 2025-02-16 RX ADMIN — HYDROMORPHONE HYDROCHLORIDE 2 MG: 2 TABLET ORAL at 19:53

## 2025-02-16 RX ADMIN — GABAPENTIN 100 MG: 100 CAPSULE ORAL at 13:25

## 2025-02-16 RX ADMIN — HYDROMORPHONE HYDROCHLORIDE 2 MG: 2 TABLET ORAL at 10:10

## 2025-02-16 RX ADMIN — HYDROMORPHONE HYDROCHLORIDE 2 MG: 2 TABLET ORAL at 13:25

## 2025-02-16 RX ADMIN — ACETAMINOPHEN 975 MG: 325 TABLET, FILM COATED ORAL at 11:12

## 2025-02-16 RX ADMIN — ASPIRIN 162 MG: 81 TABLET, COATED ORAL at 07:55

## 2025-02-16 RX ADMIN — ACETAMINOPHEN 975 MG: 325 TABLET, FILM COATED ORAL at 03:47

## 2025-02-16 RX ADMIN — HYDROMORPHONE HYDROCHLORIDE 2 MG: 2 TABLET ORAL at 03:47

## 2025-02-16 RX ADMIN — POLYETHYLENE GLYCOL 3350 17 G: 17 POWDER, FOR SOLUTION ORAL at 07:54

## 2025-02-16 RX ADMIN — SENNOSIDES AND DOCUSATE SODIUM 1 TABLET: 50; 8.6 TABLET ORAL at 07:55

## 2025-02-16 RX ADMIN — GABAPENTIN 100 MG: 100 CAPSULE ORAL at 19:09

## 2025-02-16 RX ADMIN — HYDROMORPHONE HYDROCHLORIDE 2 MG: 2 TABLET ORAL at 00:02

## 2025-02-16 RX ADMIN — CEFAZOLIN SODIUM 2 G: 2 INJECTION, SOLUTION INTRAVENOUS at 15:43

## 2025-02-16 RX ADMIN — HYDROMORPHONE HYDROCHLORIDE 2 MG: 2 TABLET ORAL at 16:42

## 2025-02-16 RX ADMIN — GABAPENTIN 100 MG: 100 CAPSULE ORAL at 07:55

## 2025-02-16 RX ADMIN — SENNOSIDES AND DOCUSATE SODIUM 1 TABLET: 50; 8.6 TABLET ORAL at 19:08

## 2025-02-16 RX ADMIN — SENNOSIDES AND DOCUSATE SODIUM 1 TABLET: 50; 8.6 TABLET ORAL at 08:01

## 2025-02-16 RX ADMIN — HYDROMORPHONE HYDROCHLORIDE 2 MG: 2 TABLET ORAL at 06:57

## 2025-02-16 ASSESSMENT — ACTIVITIES OF DAILY LIVING (ADL)
ADLS_ACUITY_SCORE: 47
ADLS_ACUITY_SCORE: 46
ADLS_ACUITY_SCORE: 47
ADLS_ACUITY_SCORE: 46
ADLS_ACUITY_SCORE: 46
ADLS_ACUITY_SCORE: 47
ADLS_ACUITY_SCORE: 46
ADLS_ACUITY_SCORE: 47
ADLS_ACUITY_SCORE: 47
ADLS_ACUITY_SCORE: 46
ADLS_ACUITY_SCORE: 46
ADLS_ACUITY_SCORE: 47
ADLS_ACUITY_SCORE: 46
ADLS_ACUITY_SCORE: 47

## 2025-02-16 NOTE — PLAN OF CARE
"Goal Outcome Evaluation:      Plan of Care Reviewed With: patient    Overall Patient Progress: improving    VS: /70 (BP Location: Left arm)   Pulse 102   Temp 98.8  F (37.1  C) (Oral)   Resp 16   Ht 1.854 m (6' 1\")   Wt 112 kg (246 lb 14.6 oz)   SpO2 98%   BMI 32.58 kg/m     O2: O2 sats maintained with 1-2L NC overnight, weaned to room air this AM   Output: Voiding adequately via mccord catheter   Activity: Not OOB this shift, NWB to RLE  Hip precaution orders conflicting, Dr Bravo to clarify tomorrow patient refusing to get OOB until clarified   Skin: R hip incision   Pain: Pain managed with scheduled tylenol, PRN robaxin x1, PRN PO dilaudid x3  Writer messaged MD to change PRN PO oxycodone to PRN PO dilaudid due to patient having a history of nausea with oxycodone. Orders changed to reflect this. Pt tolerated PO dilaudid with no nausea   CMS: Baseline numbness and tingling   Dressing: R hip dressing intact   Diet: Regular diet   LDA: R PIV, L PIV, urinary mccord patent,   EILEEN drain and wound vac to R hip   Equipment: IV pole, wound vac, eileen drain, urinary mccord,    Plan: Pending progress with PT/OT, pain control on orals, and medical stability    Additional Info: Messaged ortho MD regarding hgb of 7.1 this AM, recheck ordered and recommended to monitor for symptoms of bleeding (lightheadedness), will transfuse if less than 7  Patient able to make needs known using call light appropriately, call light in reach, continue POC, report given to oncoming RN     "

## 2025-02-16 NOTE — PROGRESS NOTES
[facility]    Medicine Progress Note - Hospitalist Service, GOLD TEAM 19    Date of Admission:  2/14/2025    Assessment & Plan    Gilberto Lund is a 44 year old male admitted on 2/14/2025. He he has a past medical history including cirrhosis, history of kidney stones, testicular cancer status post orchiectomy and chemotherapy in 2008.  He is hospitalized for combined surgery requiring orthopedics and urology.  Postoperatively on 2/14/2025 he was transferred to the ICU for hemodynamic instability and undifferentiated shock.  He is now hemodynamically stable and being downgraded to the medical floor by the ICU.  Medicine asked to consult for Co. medical management.     Interval changes 2/16  - Feels more comfortable this morning and pain is improving.  - No new symptoms   - Pain control per primary team    # Postoperative hypotension  # Undifferentiated shock  # Acute blood loss anemia  -Multifactorial 1.5 L of estimated blood loss intraoperatively.  -Status post IV fluids and 1 L of albumin  -He now remains hemodynamically stable  -Continue IV fluids  -Continue telemetry     #s/p right modified radical hemipelvectomy  # RLE numbness  - HUGO/woundvac in place  Ortho Primary  Activity: Up with assist and assistive devices as needed until independent.   Weight bearing status: NWB RLE   Antibiotics: Ancef while in house  Diet: Begin with clear fluids and progress diet as tolerated. Bowel regimen. Anti-emetics PRN.    DVT prophylaxis: Enoxaparin daily x 2 weeks, beginning on morning of POD 1  Elevation: elevate RLE  as needed, may use foam ramp   Wound Care: Prevena to remain in place until POD #14, managed by ortho  Drains: monitor drain output. Strip and empty drain per shift protocol. Discontinue drain when output < 20 ml per shift   Michelle: keep in place until POD #2-3  Pain management: Orals PRN, IV for breakthrough only  X-rays: to be completed in PACU  Physical  "Therapy:  Mobilization, ROM, ADL's  Occupational Therapy:  ADL's  Labs:  Trend Hgb on PODs #1 & 2  Consults: PT, OT. Hospitalist, appreciate assistance in caring for this patient throughout the perioperative period  Disposition:  Pending progress with therapies, pain control on orals, and medical stability, anticipate discharge   Follow up: Plan for follow up with Dr. Bravo 4 weeks.     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 Patient now s/p right modified radical hemipelvectomy of chondrosarcoma with Dr. Bravo.  - Plan per Musculoskeletal section  -Urology consulted          Diet: Advance Diet as Tolerated: Regular Diet Adult; Regular Diet Adult    DVT Prophylaxis: Defer to primary service  Michelle Catheter: Not present  Lines: None     Cardiac Monitoring: None  Code Status: Full Code      Clinically Significant Risk Factors          # Hyperchloremia: Highest Cl = 108 mmol/L in last 2 days, will monitor as appropriate      # Hypocalcemia: Lowest Ca = 8.2 mg/dL in last 2 days, will monitor and replace as appropriate   # Hypomagnesemia: Lowest Mg = 1.5 mg/dL in last 2 days, will replace as needed                  # Obesity: Estimated body mass index is 32.58 kg/m  as calculated from the following:    Height as of this encounter: 1.854 m (6' 1\").    Weight as of this encounter: 112 kg (246 lb 14.6 oz)., PRESENT ON ADMISSION            Social Drivers of Health    Tobacco Use: Medium Risk (2/14/2025)    Patient History     Smoking Tobacco Use: Former     Smokeless Tobacco Use: Former   Social Connections: Unknown (9/30/2024)    Social Connection and Isolation Panel [NHANES]     Frequency of Social Gatherings with Friends and Family: Once a " week          Disposition Plan     Medically Ready for Discharge: Anticipated in 2-4 Days             Keisha Robles MD  Hospitalist Service, GOLD TEAM 19  [facility]  Securely message with [employer] (more info)  Text page via [employer] Paging/Directory   See signed in provider for up to date coverage information  ______________________________________________________________________    Interval History   Patient seen and examined at bedside in no acute distress.   States pain controlled overnight and he's comfortable this morning.     Physical Exam   Vital Signs: Temp: 98.6  F (37  C) Temp src: Oral BP: 118/63 Pulse: 99   Resp: 16 SpO2: 92 % O2 Device: None (Room air) Oxygen Delivery: 1 LPM  Weight: 246 lbs 14.64 oz    General Appearance: Appears comfortable.  Respiratory: Without wheezes rhonchi or rales.  CTA  Cardiovascular: RRR, without murmurs  GI: Soft, nontender, plus BS  Skin: Without obvious bleeding, bruising or excoriations      Medical Decision Making       59 MINUTES SPENT BY ME on the date of service doing chart review, history, exam, documentation & further activities per the note.      Data   ------------------------- PAST 24 HR DATA REVIEWED -----------------------------------------------

## 2025-02-16 NOTE — PROGRESS NOTES
Orthopaedic Surgery Progress Note 02/16/2025    Subjective  Transferred to floor yesterday. Pain well controlled. Denies new numbness, tingling, or weakness.  Mild nausea with no vomiting. Denies fevers, chills.    Objective  Temp: 98.8  F (37.1  C) Temp src: Oral BP: 120/70 Pulse: 102   Resp: 16 SpO2: 98 % O2 Device: Nasal cannula Oxygen Delivery: 1 LPM    Exam:  Gen: No acute distress, resting comfortably in bed.  Resp: Non-labored breathing  Cardio: Regular rate via peripheral pulse  MSK:    RLE:  - Dressings c/d/I, Prevena vac in place holding suction  - Fires Quad, TA, GSC, EHL, FHL  - SILT femoral/tibial/sural/saphenous/DP/SP nerves  - PT/DP pulses 2+, foot wwp    Drain output: 75 ml from HUGO, 0 ml from Prevena.    Recent Labs   Lab 02/16/25  0615 02/15/25  0817 02/15/25  0337 02/14/25 2031   WBC 11.6*  --  13.0* 18.1*   HGB 7.1* 8.4* 8.7* 9.6*     --  218 252       Assessment: Gilberto Lund is a 44 year old male s/p R internal hemipelvectomy on 2/14/25 with Dr. Bravo. Doing well. Michelle catheter will remain in place today, plan for removal tomorrow. He will require PT today and should remain nonweighbearing on the RLE, no flexion past 45 degrees, no Valsalva. Post-op CT pending. Continue monitoring hemoglobin.     - Michelle in place today, remove tomorrow  - Restrictions: No valsalva, no contractions of abdominal wall  - Repeat Hgb at 12, transfuse if <7  - Switch large vac unit to Portable Prevena    Plan:  Ortho Primary  Activity: Up with assist and assistive devices as needed until independent. No hip flexion past 45 degrees. No Valsalva or contractions of abdominal wall to decrease stress on repair.  Weight bearing status: NWB RLE   Antibiotics: Ancef while in house  Diet: Begin with clear fluids and progress diet as tolerated. Bowel regimen. Anti-emetics PRN.    DVT prophylaxis:  mg every day   Elevation: elevate RLE  as needed, may use foam ramp   Wound Care: Prevena to remain in place  until POD #14, managed by ortho  Drains: monitor drain output. Strip and empty drain per shift protocol. Discontinue drain when output < 20 ml per shift   Michelle: keep in place until POD #2-3  Pain management: Orals PRN, IV for breakthrough only  X-rays: to be completed in PACU  Physical Therapy:  Mobilization, ROM, ADL's  Occupational Therapy:  ADL's  Labs:  Trend Hgb on PODs #1 & 2     Consults: PT, OT. Hospitalist, appreciate assistance in caring for this patient throughout the perioperative period     Disposition:  Pending progress with therapies, pain control on orals, and medical stability, anticipate discharge   Follow up: Plan for follow up with Dr. Bravo 4 weeks.    Kimani Mike MD  Orthopaedic Surgery Resident  [facility]  [phone]    Please page me at [phone] with any questions/concerns. If there is no response, if it is a weekend, or if it is during normal workday hours, then please page the orthopaedic surgery resident on call.     Future Appointments   Date Time Provider Department [place]   2/15/2025  2:30 PM Haley Ford, PT URPT [place]   2/15/2025  8:30 PM Laurence Estrada, OT UROT [place]   3/3/2025  2:00 PM Angel Grajeda, PA-C [facility]

## 2025-02-16 NOTE — PROGRESS NOTES
02/16/25 1345   Appointment Info   Signing Clinician's Name / Credentials (OT) Frances Marshall, OTR/L   Rehab Comments (OT) NWB, no hip flexion past 45 degrees, no Valsalva   Living Environment   People in Home spouse   Current Living Arrangements house   Living Environment Comments Ramp to enter home. Once dc home, plan to stay on main level until able to complete stairs. BR w/ WIS and tub shower on upper level.   Self-Care   Usual Activity Tolerance good   Current Activity Tolerance poor   Equipment Currently Used at Home commode chair   Fall history within last six months no   Activity/Exercise/Self-Care Comment ind with I/ADLs at baseline; wife is able to assist   General Information   Onset of Illness/Injury or Date of Surgery 02/14/25   Referring Physician Dr. Bravo   Patient/Family Therapy Goal Statement (OT) to get out of bed   Additional Occupational Profile Info/Pertinent History of Current Problem per chart, 44 year old male admitted on 2/14/2025. He he has a past medical history including cirrhosis, history of kidney stones, testicular cancer status post orchiectomy and chemotherapy in 2008.  He is hospitalized for combined surgery requiring orthopedics and urology.  Postoperatively on 2/14/2025 he was transferred to the ICU for hemodynamic instability and undifferentiated shock   Existing Precautions/Restrictions weight bearing;other (see comments)  (no hip flexion past 45 degrees, no Valsalva)   Left Upper Extremity (Weight-bearing Status) full weight-bearing (FWB)   Right Upper Extremity (Weight-bearing Status) full weight-bearing (FWB)   Left Lower Extremity (Weight-bearing Status) full weight-bearing (FWB)   Right Lower Extremity (Weight-bearing Status) non weight-bearing (NWB)   Cognitive Status Examination   Orientation Status orientation to person, place and time   Visual Perception   Visual Impairment/Limitations corrective lenses full-time   Sensory   Sensory Quick Adds right LE   Pain  Assessment   Patient Currently in Pain Yes, see Vital Sign flowsheet   Posture   Posture not impaired   Range of Motion Comprehensive   General Range of Motion no range of motion deficits identified   Strength Comprehensive (MMT)   General Manual Muscle Testing (MMT) Assessment no strength deficits identified   Muscle Tone Assessment   Muscle Tone Quick Adds No deficits were identified   Coordination   Upper Extremity Coordination No deficits were identified   Bed Mobility   Bed Mobility rolling left   Rolling Left [place] (Bed Mobility) dependent (less than 25% patient effort)   Activities of Daily Living   BADL Assessment/Intervention lower body dressing   Lower Body Dressing Assessment/Training   Largo Level (Lower Body Dressing) dependent (less than 25% patient effort)   Clinical Impression   Criteria for Skilled Therapeutic Interventions Met (OT) Yes, treatment indicated   OT Diagnosis decreased ADL ind   Influenced by the following impairments s/p R internal hemipelvectomy   OT Problem List-Impairments impacting ADL problems related to;activity tolerance impaired;post-surgical precautions   Assessment of Occupational Performance 3-5 Performance Deficits   Identified Performance Deficits dressing, toileting, showering, func mob   Planned Therapy Interventions (OT) ADL retraining;strengthening   Clinical Decision Making Complexity (OT) detailed assessment/moderate complexity   Risk & Benefits of therapy have been explained evaluation/treatment results reviewed;patient;spouse/significant other   OT Total Evaluation Time   OT Eval, Moderate Complexity Minutes (84896) 10   OT Goals   Therapy Frequency (OT) 5 times/week   OT Predicted Duration/Target Date for Goal Attainment 03/09/25   OT Goals Hygiene/Grooming;Lower Body Dressing;Bed Mobility;Transfers;Toilet Transfer/Toileting   OT: Hygiene/Grooming supervision/stand-by assist   OT: Lower Body Dressing Supervision/stand-by assist   OT: Bed Mobility  Supervision/stand-by assist   OT: Transfer Supervision/stand-by assist   OT: Toilet Transfer/Toileting Supervision/stand-by assist   Interventions   Interventions Quick Adds Self-Care/Home Management   Self-Care/Home Management   Self-Care/Home Mgmt/ADL, Compensatory, Meal Prep Minutes (13142) 30   Symptoms Noted During/After Treatment (Meal Preparation/Planning Training) increased pain;other (see comments)  (overwhelmed)   Treatment Detail/Skilled Intervention Pt supine in bed with elevated HOB to 42 degrees upon OT arrival and agreeable to therapy. Educ on post-surgical precautions. Pt and wife have many quesiotns about progression of therapy, AE/DME for home, what to expect at a rehab setting and how he will be able to use the toilet. Educ on trunk extension to maximize 45 degree hip flexion. Pt's wife reports that ortho does not want pt to actively use trunk in extension, OT sent a message to ortho to clarify. Educ on LB dressing with AE while supine in bed with elevated HOB. Max A LB  dressing to don/doff underwear to knees using reacher with increased time and effort to promote ind. Pt appears overwhelemed. Discussed possible DME/AE needs for once dc shi from a rehab facitlity. Increased time and effort needed to thoroughly explain what we are doing and how to move safely while in bed. Total A to roll to L side to position pillow under R side. Total Ax2 boosting to HOB with use of transfer sheet. Pt left supine in bed with needs in reach, wife present and HOB elevated to 22 degrees.   OT Discharge Planning   OT Plan ADLs in bed, clarify w/ ortho if able to use trunk extension, UB ex   OT Discharge Recommendation (DC Rec) Acute Rehab Center-Motivated patient will benefit from intensive, interdisciplinary therapy.  Anticipate will be able to tolerate 3 hours of therapy per day   OT Rationale for DC Rec Pt is significantly below baseline d/t post-surgical precautions. Pt is motivated and anticipate that he would  be able to tolerate 3 hours of [facility] at [facility].   OT Brief overview of current status Ax1 ADLs, Ax2 bed mob   OT Total Distance Amb During Session (feet) 0   Total Session Time   Timed Code Treatment Minutes 30   Total Session Time (sum of timed and untimed services) 40

## 2025-02-16 NOTE — PLAN OF CARE
"Goal Outcome Evaluation:       Patient is A & O x 4; coherent of speech, and able to make his needs known to staff; A x 1 with bed mobility; continuing on clear liquid diet; receives scheduled medications, and IV LR at 75 ml/hr; wound vac and mccord is patent; pleasant, and calm; denied SOB, pain,and discomfort; will continue with POC; call light ius within reach. /63 (BP Location: Left arm)   Pulse 99   Temp 98.6  F (37  C) (Oral)   Resp 16   Ht 1.854 m (6' 1\")   Wt 112 kg (246 lb 14.6 oz)   SpO2 96%   BMI 32.58 kg/m      Justo Buck RN                   "

## 2025-02-16 NOTE — OP NOTE
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 HEMIPELVECTOMY, MODIFIED INTERNAL,   2. Right hip girdlestone resection,   3. Right  RETROPERITONEAL DISSECTION  4. Manufacture and implantation of drug eluting implant  5. Open reduction, internal fixation of both column pelvis      Staff:    * Juan Jose Bravo MD - Primary     * Cheng Lyman MD - Assisting     * Silas Shrestha PA-C - Assisting     * Rolando Negron MD - Fellow - Assisting     * George Boone MD - Fellow - Assisting     Anesthesia: General  EBL: 1400 cc  UOP: see anesthesia record  Tourniquet Time: n/a     Implants:   Implant Name Type Inv. Item Serial No.  Lot No. LRB No. Used Action   QLS RIGHT       MALU   Right 1 Implanted   IMP BONE CEMENT STRK SIMPLEX TOBRAMYCIN [facility] 6197-9-001 - [id number] Cement, Bone IMP BONE CEMENT STRK SIMPLEX TOBRAMYCIN [facility] 6197-9-001   [employer] [id number] Right 5 Implanted      Drains: 15f HUGO right thigh  Intra-op Labs/Cxs/Specimens:   ID Type Source Tests Collected by Time Destination   1 : RIGHT INNOMINATE BONE WITH ACETABULUM Bone Resection Pelvis, Right SURGICAL PATHOLOGY EXAM Juan Jose Bravo MD 2/14/2025  4:45 PM        Complications: No apparent complications during procedure  Findings: Grossly negative margins    Procedure details:  Patient was placed on the operating table in the floppy lateral position after induction of general anesthesia.  This allowed access to the abdominal region as well as posterior lateral pelvis.    After standard prep and drape, an incision was made extending along the iliac crest midpoint over the anterior superior iliac spine and along the lateral thigh.  A second transverse Pfannenstiel type incision was then made over the suprapubic area.  With 2 operating teams  performing to the sections, the urologic team developed the retroperitoneal approach in the suprapubic region and the orthopedic team proceeded with the iliac and hip exposure.    Tumor Excision + Retroperitoneal dissection  Dissection planes were continued on the posterior aspect of the pelvis by detaching the gluteal musculature from the outer portion of the pelvis while maintaining an adequate soft tissue margin along the bone that was involved with his chondrosarcoma.  Fortunately, there is no soft tissue extension allowing for preservation of the majority of the muscular tissue.  The dissection continued to detach the gluteal musculature and the tensor fascia joy and reflect this down towards the hip joint.  The hip joint capsule was identified and detached from the anterior rim of the acetabulum.    Internally, the abdominal wall musculature was reflected from the iliac crest while maintaining again an adequate soft tissue margin until the retroperitoneal area was entered and the iliac us muscle was encountered.  The abdominal wall was opened and the iliacus muscle then reflected from the inner table of the ilium extending distally and retracting the iliopsoas muscle medially.  I detached the inguinal ligament from the anterior superior iliac spine allowing the abdominal wall and psoas and iliac us muscle retracted medially.  Dissection was then commenced along the superior pubic ramus by elevating the tissues off on maintaining an adequate soft tissue margin.    At this point the retropubic dissection and dissection of the symphysis had been completed by the urology teams under the direction of Dr. Cheng Lyman.  Electrocautery was used to divide the symphysis pubis while protecting the urethra.  We then worked together to elevate the iliopsoas muscle and external iliac artery and vein as a single pedicle bridging over the pubic bone.  I divided the investing pelvic fascia as it traversed from the psoas  muscle to the pelvic brim in order to gain access to the true pelvis.  This allowed elevation of the psoas muscle along with the femoral nerve and the external iliac vessels as they traversed to the femoral vessels over the pubic ramus.  Working from the medial side, the soft tissues were elevated from the superior pubic ramus and the inclusion of the femoral artery and vein allowed placement of a Penrose drain around all of the structures simultaneously.  Traction was applied while dissection continued to elevate the soft tissues from the femoral eminence laterally and extending towards the midline across the superior pubic ramus.    At this point dissection was continued along the inferior aspect of the pubic ramus extending distally by detaching the ischial cavernosus base of penis from the inferior portion of the bone and retracting it anterior and laterally.    In order to mobilize the portion of the innominate bone to be excised with the chondrosarcoma, a custom osteotomy jig had been fashioned and manufactured by [employer] for determination of the appropriate osteotomy multiplanar cut.  Using the bone model for assistance, the outer table of the iliac wing and the sciatic notch were identified.  I carefully dissected along the sciatic notch and retracted the superior gluteal artery and vein posteriorly away from the notch.  Care was taken avoid excessive pressure on the iliac bone within the acetabular notch in order to compromise tumor margins.  However adequate exposure was achieved for placement of the hooked jig around the sciatic notch and the iliac wing.  This fit quite nicely in 1 single unique position to identify the appropriate cut plane for the osteotomy.  A oscillating saw combined with reciprocating saw were then used to make the cut after first anchoring the jig to the outer table of the ilium with several Steinmann pins.  After completing the cut, the inferior and periacetabular bone was now   from the superior portion of the iliac wing.    I then continued the dissection along the posterior column of the pelvis with care being taken to avoid injury to the sciatic nerve which was retracted posteriorly.  The dissection continued along the posterior border of the ischium and a portion of the hamstring muscle was detached.  Approximately 1.5 cm distal to the acetabular notch, the ischium was engaged and a notched forked type angle pelvic osteotome was then used to come across the posterior column.    Attention was directed to the inferior pubic ramus whereby access was gained from the medial side as well as from the lateral side.  By detaching the inferomedial portion of the hip joint capsule, I was able to continue dissection process the obturator foramen to palpate the lateral side of the inferior pubic ramus.  Again, using the narrow forte notched angle pelvic osteotome, I came across the inferior pubic ramus at the appropriate level approximately 1 cm distal to the symphysis pubis.    Once the 3 osteotomies have been completed, the soft tissues were gradually detached from different directions while maintaining adequate margin along the periacetabular bone.  Some of the chondrosarcoma had expanded beyond the cortical outlines of the periacetabular bone along the medial and superior aspects and a soft tissue margin was preserved over these areas.    It was necessary to divide the obturator nerve and artery as it traversed the tumor.  I then detached portions of the obturator internist and externus muscle from the obturator foramen to allow detachment of the periacetabular bone.  By rotating the periacetabular bone pelvic fragment internally, thereby rotating the acetabulum outwards in a lateral direction around the psoas muscle, I was gradually able to bring the superior pubic ramus from beneath the psoas muscle.  The psoas tendon was then was kept intact throughout the procedure.  After the  acetabulum was retracted laterally and the pubic ramus was rotated from beneath the psoas muscle, the remaining soft tissue attachments were divided around the excised bone.    At this point the bone model was used to compared to the excised fragment which was now free.  All surgical margins were deemed to be grossly negative upon visual inspection with no evidence of the tumor present.    The resected bed was now inspected and any bleeding points were cauterized or oversewn with silk or Prolene sutures.  Thorough irrigation was undertaken to cleanse the wound.    Antibiotic cement impregnated spacer manufacture  A drug eluting antibiotic impregnated cement spacer was now fashioned and molded over a pelvic internal fixation plate for placement and implantation of the patient in the following manner.  A large curvilinear reconstructive 3.5 mm pelvic plate was placed within the patient extending from the left suprapubic ramus across the symphysis laterally and around the pelvic rim extending to the right posterior ilium and sacroiliac joint.  It was curved and molded to fit the bony contour.  A second quadrilateral plate was utilized to provide a basis for placement of the antibiotic cement spacer.  The 2 plates were attached to each other by using Ethibond sutures and sewing them together for provisional fixation.  The entire construct of both plates was now temporarily placed and fitted to reconstruct the large defect created by the acetabular bone resection.  After appropriate contouring, the antibiotic spacer was created.    3 g of vancomycin per package of tobramycin impregnated Simplex P cement were mixed and 1 package was placed around both plates in the area between the symphysis pubis around the pelvic brim to the posterior pelvis on the right side.  3 holes were kept protruding beyond the antibiotic cement portion to allow fixation to the left pubic ramus and the right posterior ilium.  Once the cemented  hardened internal fixation was accomplished and performed.    Pelvis internal fixation  The internal fixation fixation was obtained by placement of the plates first along the left superior pubic ramus and then pivoting this and affixing the curvilinear plate along the posterior iliac wing and right sacroiliac joint.  Three 4.0 cancellous screws were placed at each junction point to obtain adequate fixation.  Once achieved, additional cement was mixed with the same antibiotic ingredients and ratio as above, and used to place this against the quadrilateral plate to enlarge the spacer volumetric length to preserve the space for placement of a custom made implant to be implanted as a second stage surgery in the future.  A total of 6 packages of tobramycin impregnated Simplex P cement with 18 g of vancomycin was mixed in a large bulk manner and approximately 2/3-3/4 of the volume of cement was used to create the spacer by building up on the existing pelvic plate and extending this superiorly to the anterior superior iliac wing and inferiorly towards the ischium.  Once the cemented hardened, soft tissues were checked as a lay against the antibiotic cement implant to ensure there were no sharp protruding surfaces along the soft tissues.    Wound closure was accomplished by irrigated the wound with 3 L of pulsatile lavage saline followed by dilute Betadine solution.  The urologic team reconstructed the abdominal wall to the remaining superior pubic ramus and to the adductor muscles using interrupted figure-of-eight Ethibond sutures.  The orthopedic team reapposed the external abdominal wall to the iliac crest with transosseous sutures as well as reattaching the gluteal musculature to the iliac wing in similar manner.  Figure-of-eight #1 Ethibond interrupted sutures were used throughout the closure.  The fascia overlying the tensor fascia joy muscle and the vastus lateralis and then sartorius muscles was gradual reapposed  with 0 Vicryl sutures with care being taken to avoid and scaring the lateral femoral cutaneous nerve.    The tag portions of the sartorius and the direct and reflected heads of the rectus femoris muscle were attached to the remaining portion of the iliac wing as possible or anchored to the area of the cement spacer.    The remainder of the subcutaneous tissue was closed with multiple layers of interrupted 0 and 2-0 Vicryl sutures followed by 3-0 PDS running subcuticular skin closure throughout.      A vacuum-assisted Prevena type dressing was now applied.  A deep drain was placed in the region around the cement spacer lateral to the psoas muscle and brought through distal stab wound incision.    The 22 modifier is appended to this surgical procedure code for the following reasons.  The markedly increased complexity of the surgical procedure requiring months of preoperative planning and meeting with engineers to design the manufacture the custom surgical jigs utilized for this procedure along with the need for 2 surgical teams and coordination to perform this modified internal hemipelvectomy procedure while preserving the patient's lower extremity.  Furthermore, the extensive soft tissue dissection along the inner pelvis and outer pelvis and around the pubic ramus required prior to performing the 3 different osteotomies of the pelvis to free up the tumor involving the periacetabular bone.  Lastly, the prolonged operative time of 8 hours and 23 minutes further justifies usage of the 22 modifier.     Disposition: Stable to PACU, then ICU for post op monitoring, transfer to floor when stable      Post-Op Plan:    Activity: Up with assist and assistive devices as needed until independent.   Weight bearing status: NWB RLE   Antibiotics: Ancef while in house, then po antibx x 2 weeks  Diet: Begin with clear fluids and progress diet as tolerated. Bowel regimen. Anti-emetics PRN.    DVT prophylaxis: ASA 81 mg BID x 4 weeks,  starting POD1. Compression boots bilaterally  Elevation: elevate RLE  as needed, may use foam ramp   Wound Care: Prevena to remain in place until POD #14, managed by ortho  Drains: monitor drain output. Strip and empty drain per shift protocol. Discontinue drain when output < 20 ml per shift   X-rays: to be completed in PACU  Physical Therapy:  Mobilization, ROM, ADL's  Occupational Therapy:  ADL's  Labs:  Trend Hgb on PODs #1 & 2  Follow up: Plan for follow up with Dr. Bravo 4 weeks    MD Chuck Hummel Family Professor  Oncology and Adult Reconstructive Surgery  Dept Orthopaedic Surgery, [facility]  [phone] office, [phone] pager  [email or web address]

## 2025-02-17 ENCOUNTER — APPOINTMENT (OUTPATIENT)
Dept: PHYSICAL THERAPY | Facility: CLINIC | Age: 45
DRG: 498 | End: 2025-02-17
Attending: ORTHOPAEDIC SURGERY
Payer: COMMERCIAL

## 2025-02-17 ENCOUNTER — APPOINTMENT (OUTPATIENT)
Dept: OCCUPATIONAL THERAPY | Facility: CLINIC | Age: 45
DRG: 498 | End: 2025-02-17
Attending: ORTHOPAEDIC SURGERY
Payer: COMMERCIAL

## 2025-02-17 LAB
ABO + RH BLD: NORMAL
ANION GAP SERPL CALCULATED.3IONS-SCNC: 9 MMOL/L (ref 7–15)
BLD GP AB SCN SERPL QL: NEGATIVE
BLD PROD TYP BPU: NORMAL
BLOOD COMPONENT TYPE: NORMAL
BUN SERPL-MCNC: 12.9 MG/DL (ref 6–20)
CALCIUM SERPL-MCNC: 8.4 MG/DL (ref 8.8–10.4)
CHLORIDE SERPL-SCNC: 102 MMOL/L (ref 98–107)
CODING SYSTEM: NORMAL
CREAT SERPL-MCNC: 1 MG/DL (ref 0.67–1.17)
CROSSMATCH: NORMAL
EGFRCR SERPLBLD CKD-EPI 2021: >90 ML/MIN/1.73M2
ERYTHROCYTE [DISTWIDTH] IN BLOOD BY AUTOMATED COUNT: 13.1 % (ref 10–15)
GLUCOSE SERPL-MCNC: 120 MG/DL (ref 70–99)
HCO3 SERPL-SCNC: 28 MMOL/L (ref 22–29)
HCT VFR BLD AUTO: 21.1 % (ref 40–53)
HGB BLD-MCNC: 6.9 G/DL (ref 13.3–17.7)
HGB BLD-MCNC: 7.7 G/DL (ref 13.3–17.7)
ISSUE DATE AND TIME: NORMAL
MAGNESIUM SERPL-MCNC: 2.3 MG/DL (ref 1.7–2.3)
MCH RBC QN AUTO: 30.8 PG (ref 26.5–33)
MCHC RBC AUTO-ENTMCNC: 32.7 G/DL (ref 31.5–36.5)
MCV RBC AUTO: 94 FL (ref 78–100)
PHOSPHATE SERPL-MCNC: 2.5 MG/DL (ref 2.5–4.5)
PLATELET # BLD AUTO: 229 10E3/UL (ref 150–450)
POTASSIUM SERPL-SCNC: 3.8 MMOL/L (ref 3.4–5.3)
RBC # BLD AUTO: 2.24 10E6/UL (ref 4.4–5.9)
SODIUM SERPL-SCNC: 139 MMOL/L (ref 135–145)
SPECIMEN EXP DATE BLD: NORMAL
UNIT ABO/RH: NORMAL
UNIT NUMBER: NORMAL
UNIT STATUS: NORMAL
UNIT TYPE ISBT: 5100
WBC # BLD AUTO: 10.4 10E3/UL (ref 4–11)

## 2025-02-17 PROCEDURE — 97530 THERAPEUTIC ACTIVITIES: CPT | Mod: GO

## 2025-02-17 PROCEDURE — 97602 WOUND(S) CARE NON-SELECTIVE: CPT

## 2025-02-17 PROCEDURE — 36415 COLL VENOUS BLD VENIPUNCTURE: CPT | Performed by: STUDENT IN AN ORGANIZED HEALTH CARE EDUCATION/TRAINING PROGRAM

## 2025-02-17 PROCEDURE — 83735 ASSAY OF MAGNESIUM: CPT

## 2025-02-17 PROCEDURE — 85027 COMPLETE CBC AUTOMATED: CPT

## 2025-02-17 PROCEDURE — 36415 COLL VENOUS BLD VENIPUNCTURE: CPT

## 2025-02-17 PROCEDURE — 86900 BLOOD TYPING SEROLOGIC ABO: CPT

## 2025-02-17 PROCEDURE — 99233 SBSQ HOSP IP/OBS HIGH 50: CPT | Performed by: STUDENT IN AN ORGANIZED HEALTH CARE EDUCATION/TRAINING PROGRAM

## 2025-02-17 PROCEDURE — 250N000011 HC RX IP 250 OP 636: Mod: JW | Performed by: STUDENT IN AN ORGANIZED HEALTH CARE EDUCATION/TRAINING PROGRAM

## 2025-02-17 PROCEDURE — 86923 COMPATIBILITY TEST ELECTRIC: CPT | Performed by: STUDENT IN AN ORGANIZED HEALTH CARE EDUCATION/TRAINING PROGRAM

## 2025-02-17 PROCEDURE — 84100 ASSAY OF PHOSPHORUS: CPT

## 2025-02-17 PROCEDURE — 250N000013 HC RX MED GY IP 250 OP 250 PS 637

## 2025-02-17 PROCEDURE — P9016 RBC LEUKOCYTES REDUCED: HCPCS | Performed by: STUDENT IN AN ORGANIZED HEALTH CARE EDUCATION/TRAINING PROGRAM

## 2025-02-17 PROCEDURE — 97110 THERAPEUTIC EXERCISES: CPT | Mod: GO

## 2025-02-17 PROCEDURE — 97530 THERAPEUTIC ACTIVITIES: CPT | Mod: GP

## 2025-02-17 PROCEDURE — 250N000013 HC RX MED GY IP 250 OP 250 PS 637: Performed by: HOSPITALIST

## 2025-02-17 PROCEDURE — 80048 BASIC METABOLIC PNL TOTAL CA: CPT

## 2025-02-17 PROCEDURE — G0463 HOSPITAL OUTPT CLINIC VISIT: HCPCS | Mod: 25

## 2025-02-17 PROCEDURE — 120N000002 HC R&B MED SURG/OB UMMC

## 2025-02-17 PROCEDURE — 85018 HEMOGLOBIN: CPT | Performed by: STUDENT IN AN ORGANIZED HEALTH CARE EDUCATION/TRAINING PROGRAM

## 2025-02-17 PROCEDURE — 250N000013 HC RX MED GY IP 250 OP 250 PS 637: Performed by: STUDENT IN AN ORGANIZED HEALTH CARE EDUCATION/TRAINING PROGRAM

## 2025-02-17 RX ORDER — POTASSIUM CHLORIDE 1500 MG/1
20 TABLET, EXTENDED RELEASE ORAL ONCE
Status: COMPLETED | OUTPATIENT
Start: 2025-02-17 | End: 2025-02-17

## 2025-02-17 RX ADMIN — HYDROMORPHONE HYDROCHLORIDE 2 MG: 2 TABLET ORAL at 00:35

## 2025-02-17 RX ADMIN — GABAPENTIN 100 MG: 100 CAPSULE ORAL at 12:34

## 2025-02-17 RX ADMIN — GABAPENTIN 100 MG: 100 CAPSULE ORAL at 08:30

## 2025-02-17 RX ADMIN — HYDROMORPHONE HYDROCHLORIDE 0.2 MG: 1 INJECTION, SOLUTION INTRAMUSCULAR; INTRAVENOUS; SUBCUTANEOUS at 14:54

## 2025-02-17 RX ADMIN — POTASSIUM CHLORIDE 20 MEQ: 1500 TABLET, EXTENDED RELEASE ORAL at 08:30

## 2025-02-17 RX ADMIN — POTASSIUM & SODIUM PHOSPHATES POWDER PACK 280-160-250 MG 1 PACKET: 280-160-250 PACK at 08:30

## 2025-02-17 RX ADMIN — GABAPENTIN 100 MG: 100 CAPSULE ORAL at 20:32

## 2025-02-17 RX ADMIN — CEFAZOLIN SODIUM 2 G: 2 INJECTION, SOLUTION INTRAVENOUS at 16:17

## 2025-02-17 RX ADMIN — ASPIRIN 162 MG: 81 TABLET, COATED ORAL at 08:29

## 2025-02-17 RX ADMIN — SENNOSIDES AND DOCUSATE SODIUM 1 TABLET: 50; 8.6 TABLET ORAL at 08:30

## 2025-02-17 RX ADMIN — METHOCARBAMOL 750 MG: 750 TABLET ORAL at 06:10

## 2025-02-17 RX ADMIN — ACETAMINOPHEN 975 MG: 325 TABLET, FILM COATED ORAL at 03:35

## 2025-02-17 RX ADMIN — SENNOSIDES AND DOCUSATE SODIUM 1 TABLET: 50; 8.6 TABLET ORAL at 20:33

## 2025-02-17 RX ADMIN — HYDROMORPHONE HYDROCHLORIDE 2 MG: 2 TABLET ORAL at 17:08

## 2025-02-17 RX ADMIN — ACETAMINOPHEN 975 MG: 325 TABLET, FILM COATED ORAL at 10:31

## 2025-02-17 RX ADMIN — POTASSIUM & SODIUM PHOSPHATES POWDER PACK 280-160-250 MG 1 PACKET: 280-160-250 PACK at 12:34

## 2025-02-17 RX ADMIN — HYDROMORPHONE HYDROCHLORIDE 2 MG: 2 TABLET ORAL at 20:32

## 2025-02-17 RX ADMIN — HYDROXYZINE HYDROCHLORIDE 25 MG: 25 TABLET, FILM COATED ORAL at 06:10

## 2025-02-17 RX ADMIN — HYDROMORPHONE HYDROCHLORIDE 2 MG: 2 TABLET ORAL at 12:34

## 2025-02-17 RX ADMIN — HYDROMORPHONE HYDROCHLORIDE 2 MG: 2 TABLET ORAL at 03:35

## 2025-02-17 RX ADMIN — CEFAZOLIN SODIUM 2 G: 2 INJECTION, SOLUTION INTRAVENOUS at 08:29

## 2025-02-17 RX ADMIN — HYDROMORPHONE HYDROCHLORIDE 2 MG: 2 TABLET ORAL at 08:29

## 2025-02-17 RX ADMIN — HYDROMORPHONE HYDROCHLORIDE 0.4 MG: 1 INJECTION, SOLUTION INTRAMUSCULAR; INTRAVENOUS; SUBCUTANEOUS at 23:35

## 2025-02-17 RX ADMIN — CEFAZOLIN SODIUM 2 G: 2 INJECTION, SOLUTION INTRAVENOUS at 00:33

## 2025-02-17 RX ADMIN — POLYETHYLENE GLYCOL 3350 17 G: 17 POWDER, FOR SOLUTION ORAL at 08:29

## 2025-02-17 RX ADMIN — METHOCARBAMOL 750 MG: 750 TABLET ORAL at 14:53

## 2025-02-17 ASSESSMENT — ACTIVITIES OF DAILY LIVING (ADL)
ADLS_ACUITY_SCORE: 46
ADLS_ACUITY_SCORE: 48
ADLS_ACUITY_SCORE: 46
ADLS_ACUITY_SCORE: 48

## 2025-02-17 NOTE — PLAN OF CARE
"Goal Outcome Evaluation:      Plan of Care Reviewed With: patient    Overall Patient Progress: improvingOverall Patient Progress: improving    Outcome Evaluation: AxO x4, VSS on RA. patient put on 1L via NC for comfort overnight. NWB on RLE, patient was not OOB this shift. Pain managed with PRN oral dilaudid. Michelle draining adequately. Patient's wife concerned for sore on tongue that has been getting worse since surgery. WOC consult placed and PRN magic mouthwash ordered    VS: /60 (BP Location: Left arm, Patient Position: Semi-Dinero's, Cuff Size: Adult Regular)   Pulse 97   Temp 98.6  F (37  C) (Oral)   Resp 18   Ht 1.854 m (6' 1\")   Wt 112 kg (246 lb 14.6 oz)   SpO2 95%   BMI 32.58 kg/m     O2: >90% on RA  Denies SOB and chest pain    Output: Michelle in place, draining adequately. Catheter cares completed by wife   Last BM: Patient stated it was before surgery and could not remember when. Patient is passing flatus, bowel sounds normoactive   Activity: Not OOB this shift, patient wanting to work with PT prior to getting OOB. Patient and patient's significant other concerned due to order for no hip flexion past 45 degrees  NWB on RLE    Skin: R hip/abdomen surgical incision   Sore/lesion on tongue, provider notified. WOC consult placed   Pain: Managed with PRN dilaudid    CMS: AxO x4  Numbness in RLE, baseline per patient    Dressing: RLE surgical dressing CDI   Diet: Regular    LDA: R and L PIV SL  HUGO drain and prevena wound vac to R hip    Equipment: IV pole, call light, HUGO drain, wound vac, personal belongings    Plan: Pending PT and OT   Additional Info: RN managed K+, phos, and mag  Transfuse if patient hgb <7 per ortho note  PRN magic mouthwash ordered q6hrs for tongue sore       "

## 2025-02-17 NOTE — PROGRESS NOTES
[facility]    Medicine Progress Note - Hospitalist Service, GOLD TEAM 19    Date of Admission:  2/14/2025    Assessment & Plan   Gilberto Lund is a 44 year old male admitted on 2/14/2025. He he has a past medical history including cirrhosis, history of kidney stones, testicular cancer status post orchiectomy and chemotherapy in 2008.  He is hospitalized for combined surgery requiring orthopedics and urology.  Postoperatively on 2/14/2025 he was transferred to the ICU for hemodynamic instability and undifferentiated shock.  He is now hemodynamically stable and being downgraded to the medical floor by the ICU.  Medicine asked to consult for Co. medical management.     Interval changes 2/17  - Charts reviewed, Hb 6.9 this morning  - recommend transfusing 1 unit pRBC (ordered for you)  - Feels more comfortable this morning and pain is improving.  - No new symptoms   - Pain control per primary team    # Postoperative hypotension  # Undifferentiated shock  # Acute blood loss anemia  -Multifactorial 1.5 L of estimated blood loss intraoperatively.  -Status post IV fluids and 1 L of albumin  -He now remains hemodynamically stable  -Continue IV fluids  -Continue telemetry     #s/p right modified radical hemipelvectomy  # RLE numbness  - HUGO/woundvac in place  Plan:  Ortho Primary  Activity: Up with assist and assistive devices as needed until independent. No hip flexion past 45 degrees. No Valsalva or contractions of abdominal wall to decrease stress on repair.  Weight bearing status: NWB RLE   Antibiotics: Ancef while in house  Diet: Begin with clear fluids and progress diet as tolerated. Bowel regimen. Anti-emetics PRN.    DVT prophylaxis:  mg every day   Elevation: elevate RLE  as needed, may use foam ramp   Wound Care: Prevena to remain in place until POD #14, managed by ortho  Drains: monitor drain output. Strip and empty drain per shift protocol. 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 Patient now s/p right modified radical hemipelvectomy of chondrosarcoma with Dr. Bravo.  - Plan per Musculoskeletal section  -Urology consulted          Diet: Advance Diet as Tolerated: Regular Diet Adult; Regular Diet Adult    DVT Prophylaxis: Defer to primary service  Michelle Catheter: Not present  Lines: None     Cardiac Monitoring: None  Code Status: Full Code      Clinically Significant Risk Factors                              # Obesity: Estimated body mass index is 32.58 kg/m  as calculated from the following:    Height as of this encounter: 1.854 m (6' 1\").    Weight as of this encounter: 112 kg (246 lb 14.6 oz)., PRESENT ON ADMISSION            Social Drivers of Health    Tobacco Use: Medium Risk (2/14/2025)    Patient History     Smoking Tobacco Use: Former     Smokeless Tobacco Use: Former   Social Connections: Unknown (9/30/2024)    Social Connection and Isolation Panel [NHANES]     Frequency of Social Gatherings with Friends and Family: Once a week          Disposition Plan     Medically Ready for Discharge: Anticipated in 2-4 Days             Keisha Robles MD  Hospitalist Service, GOLD TEAM [address] [place] [address]  Securely message with [employer] (more info)  Text page via [facility] Paging/Directory   See signed in provider for up to date coverage " information  ______________________________________________________________________    Interval History   Patient seen and examined this morning. No acute complaints, states his pain is at a 5/10.     Physical Exam   Vital Signs: Temp: 98.2  F (36.8  C) Temp src: Oral BP: 122/68 Pulse: 92   Resp: 18 SpO2: 100 % O2 Device: Nasal cannula Oxygen Delivery: 1 LPM  Weight: 246 lbs 14.64 oz    General Appearance: Appears comfortable.  Respiratory: Without wheezes rhonchi or rales.  CTA  Cardiovascular: RRR, without murmurs  GI: Soft, nontender, plus BS  Skin: Without obvious bleeding, bruising or excoriations      Medical Decision Making       59 MINUTES SPENT BY ME on the date of service doing chart review, history, exam, documentation & further activities per the note.      Data   ------------------------- PAST 24 HR DATA REVIEWED -----------------------------------------------

## 2025-02-17 NOTE — CONSULTS
[facility] Nurse Inpatient Assessment     Consulted for: Tongue wound    Summary: Patient had 10 hour surgery on 2/12/2024, pressure injury noted on end of tongue once extubated.     [facility] nurse follow-up plan: weekly    Patient History (according to provider note(s):      Per Dr Keisha Robles with Medicine on 2/17/2025: Gilberto Lund is a 44 year old male admitted on 2/14/2025. He he has a past medical history including cirrhosis, history of kidney stones, testicular cancer status post orchiectomy and chemotherapy in 2008. He is hospitalized for combined surgery requiring orthopedics and urology (right internal hemipelvectomy). Postoperatively on 2/14/2025 he was transferred to the ICU for hemodynamic instability and undifferentiated shock. He is now hemodynamically stable and being downgraded to the medical floor by the ICU. Medicine asked to consult for Co. medical management.     Assessment:      Areas visualized during today's visit: Mouth    Pressure Injury Location: End of tongue    2/17 Tongue    Last photo: 2/17/2025  Wound type: Pressure Injury     Pressure Injury Stage: Mucosal, hospital acquired      This is a Medical Device Related Pressure Injury (MDRPI) due to ETT during surgery  Wound history/plan of care:   Patient had 10 hour surgery on 2/12/2024, pressure injury noted on end of tongue once extubated.     Wound base: 100 % slough over mucosa     Palpation of the wound bed: normal      Drainage: none     Description of drainage: none     Measurements (length x width x depth, in cm) 2  x 1  x  0 cm      Tunneling N/A     Undermining N/A  Periwound skin: Intact      Color: pink      Temperature: normal   Odor: none  Pain: mild  Pain intervention prior to dressing change: slow and gentle cares   Treatment goal: Heal   STATUS: initial assessment  Supplies ordered: gathered    Treatment Plan:     Tongue wound: PRN: wash mouth and tongue with Peridex  swabs left at bedside. If pain continues and it interrupts mealtime, discuss use of Orajel (lidocaine) with Medicine.      Orders: Written    RECOMMEND PRIMARY TEAM ORDER: None, at this time  Education provided: plan of care  Discussed plan of care with: Patient and Nurse  Notify [facility] if wound(s) deteriorate.  Nursing to notify the Provider(s) and re-consult the [facility] Nurse if new skin concern.    DATA:     Current support surface: Standard  Standard gel mattress (Isoflex)  Containment of urine/stool: Continent of bowel and Indwelling catheter  BMI: Body mass index is 32.58 kg/m .   Active diet order: Orders Placed This Encounter      Advance Diet as Tolerated: Regular Diet Adult; Regular Diet Adult     Output: I/O last 3 completed shifts:  In: 120 [P.O.:120]  Out: 1760 [Urine:1575; Drains:185]     Labs:   Recent Labs   Lab 02/17/25  0652 02/15/25  0337 02/14/25  2031   ALBUMIN  --   --  3.8   HGB 6.9*   < > 9.6*   WBC 10.4   < > 18.1*    < > = values in this interval not displayed.     Pressure injury risk assessment:   Sensory Perception: 4-->no impairment  Moisture: 3-->occasionally moist  Activity: 1-->bedfast  Mobility: 2-->very limited  Nutrition: 2-->probably inadequate  Friction and Shear: 3-->no apparent problem  Dom Score: 15    Imelda Merino RN CWOCN  Pager no longer is use, please contact through [employer] group: [facility]  Dept. Office Number: [phone]

## 2025-02-17 NOTE — PROGRESS NOTES
Orthopedic Surgery Progress Note 02/17/2025    S: No acute events overnight. Pain controlled. Michelle in place. Appetite improving. Denies any new concerns.     O:  Temp: 98.9  F (37.2  C) Temp src: Oral BP: 122/65 Pulse: 100   Resp: 18 SpO2: 98 % O2 Device: None (Room air) Oxygen Delivery: 1 LPM    Exam:  Gen: No acute distress, resting comfortably in bed.  Resp: Non-labored breathing  Cardio: Regular rate via peripheral pulse  MSK:     RLE:  - Dressings c/d/I, Prevena vac in place holding suction  - Fires Quad, TA, GSC, EHL, FHL  - SILT femoral/tibial/sural/saphenous/DP/SP nerves  - PT/DP pulses 1+, foot wwp     Drain output: 185 ml from HUGO, 0 ml from Prevena.    Recent Labs   Lab 02/16/25  1129 02/16/25  0615 02/15/25  0817 02/15/25  0337 02/14/25 2031   WBC  --  11.6*  --  13.0* 18.1*   HGB 7.1* 7.1* 8.4* 8.7* 9.6*   PLT  --  182  --  218 252         Assessment: Gilberto Lund is a 44 year old male s/p R internal hemipelvectomy on 2/14/25 with Dr. Bravo. Doing well. Michelle catheter will remain in place today, plan for removal tomorrow. He will require PT today and should remain nonweighbearing on the RLE, no flexion past 45 degrees, no Valsalva. Post-op CT pending. Continue monitoring hemoglobin.      - Michelle in place today, remove today  - Restrictions: No valsalva, no contractions of abdominal wall  - Repeat Hgb at 12, transfuse if <7. Labs pending this AM.   - Switch large vac unit to Portable Prevena     Plan:  Ortho Primary  Activity: Up with assist and assistive devices as needed until independent. No hip flexion past 45 degrees. No Valsalva or contractions of abdominal wall to decrease stress on repair.  Weight bearing status: NWB RLE   Antibiotics: Ancef while in house  Diet: Begin with clear fluids and progress diet as tolerated. Bowel regimen. Anti-emetics PRN.    DVT prophylaxis:  mg every day   Elevation: elevate RLE  as needed, may use foam ramp   Wound Care: Prevena to remain in place  until POD #14, managed by ortho  Drains: monitor drain output. Strip and empty drain per shift protocol. Discontinue drain when output < 20 ml per shift   Michelle: keep in place until POD #2-3  Pain management: Orals PRN, IV for breakthrough only  X-rays: to be completed in PACU  Physical Therapy:  Mobilization, ROM, ADL's  Occupational Therapy:  ADL's  Labs:  Trend Hgb on PODs #1 & 2     Consults: PT, OT. Hospitalist, appreciate assistance in caring for this patient throughout the perioperative period     Disposition:  Pending progress with therapies, pain control on orals, and medical stability, anticipate discharge   Follow up: Plan for follow up with Dr. Bravo 4 weeks.    --  Buck Amador MD  Orthopedic Surgery PGY-4

## 2025-02-17 NOTE — PLAN OF CARE
"Goal Outcome Evaluation:      Plan of Care Reviewed With: patient    Overall Patient Progress: no changeOverall Patient Progress: no change    Shift: 2616-9186  VS:/65 (BP Location: Left arm)   Pulse 98   Temp 98.6  F (37  C) (Oral)   Resp 16   Ht 1.854 m (6' 1\")   Wt 112 kg (246 lb 14.6 oz)   SpO2 96%   BMI 32.58 kg/m        Pain: Prn Dilaudid and Robaxin given  Neuro: A&Ox4.Numbness in RLE, baseline per patient   Cardiac: WDL. Denied chest pain  Respiratory: When sleeping sats 87%. Put on 1L/NC/95%  Diet/Appetite: Tolerating regular diet.   /GI:. No BM this shift  LDA's: Prevena wound vac and HUGO drain with no drainage. Dressing CDI  Skin: R hip/abdomen surgical incision   lesion on tongue, .  Activity: Not OOB  Plan: Precautions maintained / Continue with plan of care. Call light within reach. Able to make needs known.     Michelle removed at 1630 due to void at 2130.  Hgb 6.9. 1 unit pRBC given recheked Hgb 7.7  RN managed K+., phos and Mag replaced,recheck in AM  "

## 2025-02-17 NOTE — PLAN OF CARE
"Goal Outcome Evaluation:      Plan of Care Reviewed With: patient    Overall Patient Progress: no change      VS: /65 (BP Location: Left arm)   Pulse 100   Temp 98.9  F (37.2  C) (Oral)   Resp 18   Ht 1.854 m (6' 1\")   Wt 112 kg (246 lb 14.6 oz)   SpO2 98%   BMI 32.58 kg/m     O2: O2 sats maintained with 1L NC overnight per patient request   Output: Voiding adequately via mccord catheter   Activity: Not OOB this shift, NWB to RLE  Hip precaution orders conflicting, Dr Bravo to clarify tomorrow patient refusing to get OOB until clarified   Skin: R hip incision   Pain: Pain managed with scheduled tylenol, PRN PO dilaudid x2, PRN atarax x1 and PRN robaxin x1   CMS: Baseline numbness and tingling   Dressing: R hip dressing intact   Diet: Regular diet   LDA: R PIV, L PIV, urinary mccord patent,   EILEEN drain and wound vac to R hip   Equipment: IV pole, wound vac, eileen drain, urinary mccord,    Plan: Pending progress with PT/OT, pain control on orals, and medical stability    Additional Info: Patient able to make needs known using call light appropriately, call light in reach, continue POC, report given to oncoming RN     "

## 2025-02-18 ENCOUNTER — APPOINTMENT (OUTPATIENT)
Dept: OCCUPATIONAL THERAPY | Facility: CLINIC | Age: 45
DRG: 498 | End: 2025-02-18
Attending: ORTHOPAEDIC SURGERY
Payer: COMMERCIAL

## 2025-02-18 ENCOUNTER — APPOINTMENT (OUTPATIENT)
Dept: CT IMAGING | Facility: CLINIC | Age: 45
DRG: 498 | End: 2025-02-18
Attending: STUDENT IN AN ORGANIZED HEALTH CARE EDUCATION/TRAINING PROGRAM
Payer: COMMERCIAL

## 2025-02-18 ENCOUNTER — APPOINTMENT (OUTPATIENT)
Dept: PHYSICAL THERAPY | Facility: CLINIC | Age: 45
DRG: 498 | End: 2025-02-18
Attending: ORTHOPAEDIC SURGERY
Payer: COMMERCIAL

## 2025-02-18 LAB
ANION GAP SERPL CALCULATED.3IONS-SCNC: 8 MMOL/L (ref 7–15)
BUN SERPL-MCNC: 13.3 MG/DL (ref 6–20)
CALCIUM SERPL-MCNC: 8.7 MG/DL (ref 8.8–10.4)
CHLORIDE SERPL-SCNC: 101 MMOL/L (ref 98–107)
CREAT SERPL-MCNC: 0.9 MG/DL (ref 0.67–1.17)
EGFRCR SERPLBLD CKD-EPI 2021: >90 ML/MIN/1.73M2
ERYTHROCYTE [DISTWIDTH] IN BLOOD BY AUTOMATED COUNT: 13.3 % (ref 10–15)
GLUCOSE SERPL-MCNC: 105 MG/DL (ref 70–99)
HCO3 SERPL-SCNC: 28 MMOL/L (ref 22–29)
HCT VFR BLD AUTO: 23.3 % (ref 40–53)
HGB BLD-MCNC: 8 G/DL (ref 13.3–17.7)
MAGNESIUM SERPL-MCNC: 2.1 MG/DL (ref 1.7–2.3)
MCH RBC QN AUTO: 31.9 PG (ref 26.5–33)
MCHC RBC AUTO-ENTMCNC: 34.3 G/DL (ref 31.5–36.5)
MCV RBC AUTO: 93 FL (ref 78–100)
PHOSPHATE SERPL-MCNC: 3 MG/DL (ref 2.5–4.5)
PLATELET # BLD AUTO: 271 10E3/UL (ref 150–450)
POTASSIUM SERPL-SCNC: 3.8 MMOL/L (ref 3.4–5.3)
RBC # BLD AUTO: 2.51 10E6/UL (ref 4.4–5.9)
SODIUM SERPL-SCNC: 137 MMOL/L (ref 135–145)
WBC # BLD AUTO: 10.1 10E3/UL (ref 4–11)

## 2025-02-18 PROCEDURE — 84100 ASSAY OF PHOSPHORUS: CPT

## 2025-02-18 PROCEDURE — 97530 THERAPEUTIC ACTIVITIES: CPT | Mod: GO

## 2025-02-18 PROCEDURE — 250N000013 HC RX MED GY IP 250 OP 250 PS 637: Performed by: STUDENT IN AN ORGANIZED HEALTH CARE EDUCATION/TRAINING PROGRAM

## 2025-02-18 PROCEDURE — 72192 CT PELVIS W/O DYE: CPT | Mod: 26 | Performed by: RADIOLOGY

## 2025-02-18 PROCEDURE — 83735 ASSAY OF MAGNESIUM: CPT

## 2025-02-18 PROCEDURE — 36415 COLL VENOUS BLD VENIPUNCTURE: CPT

## 2025-02-18 PROCEDURE — 85014 HEMATOCRIT: CPT

## 2025-02-18 PROCEDURE — 250N000013 HC RX MED GY IP 250 OP 250 PS 637

## 2025-02-18 PROCEDURE — 97530 THERAPEUTIC ACTIVITIES: CPT | Mod: GP

## 2025-02-18 PROCEDURE — 120N000002 HC R&B MED SURG/OB UMMC

## 2025-02-18 PROCEDURE — 99232 SBSQ HOSP IP/OBS MODERATE 35: CPT | Performed by: INTERNAL MEDICINE

## 2025-02-18 PROCEDURE — 80048 BASIC METABOLIC PNL TOTAL CA: CPT

## 2025-02-18 PROCEDURE — 72192 CT PELVIS W/O DYE: CPT

## 2025-02-18 PROCEDURE — 250N000011 HC RX IP 250 OP 636: Mod: JZ | Performed by: STUDENT IN AN ORGANIZED HEALTH CARE EDUCATION/TRAINING PROGRAM

## 2025-02-18 RX ORDER — ACETAMINOPHEN 325 MG/1
650 TABLET ORAL EVERY 4 HOURS PRN
Status: DISCONTINUED | OUTPATIENT
Start: 2025-02-18 | End: 2025-02-21 | Stop reason: HOSPADM

## 2025-02-18 RX ORDER — HYDROCODONE BITARTRATE AND ACETAMINOPHEN 5; 325 MG/1; MG/1
1 TABLET ORAL EVERY 6 HOURS PRN
Status: DISCONTINUED | OUTPATIENT
Start: 2025-02-18 | End: 2025-02-18

## 2025-02-18 RX ORDER — HYDROCODONE BITARTRATE AND ACETAMINOPHEN 5; 325 MG/1; MG/1
1 TABLET ORAL EVERY 4 HOURS PRN
Status: DISCONTINUED | OUTPATIENT
Start: 2025-02-18 | End: 2025-02-21 | Stop reason: HOSPADM

## 2025-02-18 RX ORDER — ACETAMINOPHEN 325 MG/1
325 TABLET ORAL EVERY 4 HOURS PRN
Status: DISCONTINUED | OUTPATIENT
Start: 2025-02-18 | End: 2025-02-18

## 2025-02-18 RX ORDER — HYDROCODONE BITARTRATE AND ACETAMINOPHEN 5; 325 MG/1; MG/1
2 TABLET ORAL EVERY 4 HOURS PRN
Status: DISCONTINUED | OUTPATIENT
Start: 2025-02-18 | End: 2025-02-21 | Stop reason: HOSPADM

## 2025-02-18 RX ADMIN — HYDROCODONE BITARTRATE AND ACETAMINOPHEN 2 TABLET: 5; 325 TABLET ORAL at 14:46

## 2025-02-18 RX ADMIN — CEFAZOLIN SODIUM 2 G: 2 INJECTION, SOLUTION INTRAVENOUS at 00:19

## 2025-02-18 RX ADMIN — SENNOSIDES AND DOCUSATE SODIUM 1 TABLET: 50; 8.6 TABLET ORAL at 20:22

## 2025-02-18 RX ADMIN — HYDROXYZINE HYDROCHLORIDE 25 MG: 25 TABLET, FILM COATED ORAL at 17:09

## 2025-02-18 RX ADMIN — HYDROMORPHONE HYDROCHLORIDE 0.4 MG: 1 INJECTION, SOLUTION INTRAMUSCULAR; INTRAVENOUS; SUBCUTANEOUS at 10:42

## 2025-02-18 RX ADMIN — HYDROMORPHONE HYDROCHLORIDE 2 MG: 2 TABLET ORAL at 06:33

## 2025-02-18 RX ADMIN — HYDROMORPHONE HYDROCHLORIDE 0.4 MG: 1 INJECTION, SOLUTION INTRAMUSCULAR; INTRAVENOUS; SUBCUTANEOUS at 13:00

## 2025-02-18 RX ADMIN — ASPIRIN 162 MG: 81 TABLET, COATED ORAL at 08:08

## 2025-02-18 RX ADMIN — GABAPENTIN 100 MG: 100 CAPSULE ORAL at 08:08

## 2025-02-18 RX ADMIN — METHOCARBAMOL 750 MG: 750 TABLET ORAL at 13:00

## 2025-02-18 RX ADMIN — POLYETHYLENE GLYCOL 3350 17 G: 17 POWDER, FOR SOLUTION ORAL at 08:09

## 2025-02-18 RX ADMIN — GABAPENTIN 100 MG: 100 CAPSULE ORAL at 14:46

## 2025-02-18 RX ADMIN — HYDROCODONE BITARTRATE AND ACETAMINOPHEN 2 TABLET: 5; 325 TABLET ORAL at 18:59

## 2025-02-18 RX ADMIN — HYDROMORPHONE HYDROCHLORIDE 2 MG: 2 TABLET ORAL at 03:29

## 2025-02-18 RX ADMIN — METHOCARBAMOL 750 MG: 750 TABLET ORAL at 06:01

## 2025-02-18 RX ADMIN — HYDROXYZINE HYDROCHLORIDE 25 MG: 25 TABLET, FILM COATED ORAL at 09:40

## 2025-02-18 RX ADMIN — GABAPENTIN 100 MG: 100 CAPSULE ORAL at 20:23

## 2025-02-18 RX ADMIN — HYDROMORPHONE HYDROCHLORIDE 0.4 MG: 1 INJECTION, SOLUTION INTRAMUSCULAR; INTRAVENOUS; SUBCUTANEOUS at 08:08

## 2025-02-18 RX ADMIN — CEFAZOLIN SODIUM 2 G: 2 INJECTION, SOLUTION INTRAVENOUS at 17:09

## 2025-02-18 RX ADMIN — METHOCARBAMOL 750 MG: 750 TABLET ORAL at 18:59

## 2025-02-18 RX ADMIN — CEFAZOLIN SODIUM 2 G: 2 INJECTION, SOLUTION INTRAVENOUS at 10:49

## 2025-02-18 RX ADMIN — HYDROMORPHONE HYDROCHLORIDE 2 MG: 2 TABLET ORAL at 09:40

## 2025-02-18 RX ADMIN — SENNOSIDES AND DOCUSATE SODIUM 1 TABLET: 50; 8.6 TABLET ORAL at 08:08

## 2025-02-18 ASSESSMENT — ACTIVITIES OF DAILY LIVING (ADL)
ADLS_ACUITY_SCORE: 50
ADLS_ACUITY_SCORE: 50
ADLS_ACUITY_SCORE: 48
ADLS_ACUITY_SCORE: 50
ADLS_ACUITY_SCORE: 48
ADLS_ACUITY_SCORE: 50
ADLS_ACUITY_SCORE: 48

## 2025-02-18 NOTE — PROGRESS NOTES
[facility]    Medicine Progress Note - Hospitalist Service, GOLD TEAM 19    Date of Admission:  2/14/2025    Assessment & Plan   Gilberto Lund is a 44 year old male admitted on 2/14/2025. He he has a past medical history including cirrhosis, history of kidney stones, testicular cancer status post orchiectomy and chemotherapy in 2008.  He is hospitalized for combined surgery requiring orthopedics and urology.  Postoperatively on 2/14/2025 he was transferred to the ICU for hemodynamic instability and undifferentiated shock.  He is now hemodynamically stable and being downgraded to the medical floor by the ICU.  Medicine asked to consult for Co. medical management.     Interval changes 2/18  - Charts reviewed, Hb 6.9 yesterday, 8.0 today after transfusing 1 unit pRBC   - Feels more comfortable this morning and pain is improving.  - No new symptoms   - Pain control per primary team  - vital signs okay     # Postoperative hypotension  # Undifferentiated shock  # Acute blood loss anemia  -Multifactorial 1.5 L of estimated blood loss intraoperatively.  -Status post IV fluids and 1 L of albumin  -He now remains hemodynamically stable  -Continue IV fluids  -Continue telemetry     #s/p right modified radical hemipelvectomy  # RLE numbness  - HUGO/woundvac in place  Plan:  Ortho Primary  Activity: Up with assist and assistive devices as needed until independent. No hip flexion past 45 degrees. No Valsalva or contractions of abdominal wall to decrease stress on repair.  Weight bearing status: NWB RLE   Antibiotics: Ancef while in house  Diet: Begin with clear fluids and progress diet as tolerated. Bowel regimen. Anti-emetics PRN.    DVT prophylaxis:  mg every day   Elevation: elevate RLE  as needed, may use foam ramp   Wound Care: Prevena to remain in place until POD #14, managed by ortho  Drains: monitor drain output. Strip and empty drain per shift protocol. 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 Patient now s/p right modified radical hemipelvectomy of chondrosarcoma with Dr. Bravo.  - Plan per Musculoskeletal section  -Urology consulted          Diet: Advance Diet as Tolerated: Regular Diet Adult; Regular Diet Adult    DVT Prophylaxis: Defer to primary service  Michelle Catheter: Not present  Lines: None     Cardiac Monitoring: None  Code Status: Full Code      Clinically Significant Risk Factors                              # Obesity: Estimated body mass index is 32.58 kg/m  as calculated from the following:    Height as of this encounter: 1.854 m (6' 1\").    Weight as of this encounter: 112 kg (246 lb 14.6 oz).             Social Drivers of Health    Tobacco Use: Medium Risk (2/14/2025)    Patient History     Smoking Tobacco Use: Former     Smokeless Tobacco Use: Former   Social Connections: Unknown (9/30/2024)    Social Connection and Isolation Panel [NHANES]     Frequency of Social Gatherings with Friends and Family: Once a week          Disposition Plan     Medically Ready for Discharge: Anticipated in 2-4 Days             Dominick Ace MD  Hospitalist Service, GOLD TEAM [address] [place] [address]  Securely message with [employer] (more info)  Text page via [facility] Paging/Directory   See signed in provider for up to date coverage " information  ______________________________________________________________________    Interval History   Patient seen and evaluated.  No any acute events  Folate catheter was removed yesterday patient is able to void spontaneously.  A unit of PRBC was transfused yesterday for hemoglobin 6.9.  Denies any dizziness or shortness of breath.  Physical Exam   Vital Signs: Temp: 98.2  F (36.8  C) Temp src: Oral BP: 134/70 Pulse: 92   Resp: 16 SpO2: 98 % O2 Device: Nasal cannula Oxygen Delivery: 1 LPM  Weight: 246 lbs 14.64 oz    General Appearance: Appears comfortable.  Respiratory: Without wheezes rhonchi or rales.  CTA  Cardiovascular: RRR, without murmurs  GI: Soft, nontender, plus BS  Skin: Without obvious bleeding, bruising or excoriations      Medical Decision Making       59 MINUTES SPENT BY ME on the date of service doing chart review, history, exam, documentation & further activities per the note.      Data   CBC RESULTS:   Recent Labs   Lab Test 02/18/25  0555   WBC 10.1   RBC 2.51*   HGB 8.0*   HCT 23.3*   MCV 93   MCH 31.9   MCHC 34.3   RDW 13.3        Last Comprehensive Metabolic Panel:  Lab Results   Component Value Date     02/18/2025    POTASSIUM 3.8 02/18/2025    CHLORIDE 101 02/18/2025    CO2 28 02/18/2025    ANIONGAP 8 02/18/2025     (H) 02/18/2025    BUN 13.3 02/18/2025    CR 0.90 02/18/2025    GFRESTIMATED >90 02/18/2025    CANDICE 8.7 (L) 02/18/2025

## 2025-02-18 NOTE — PROVIDER NOTIFICATION
Pg provider for clarification of when CT should be performed, provider clarified that CT can be performed in the morning.

## 2025-02-18 NOTE — PLAN OF CARE
Goal Outcome Evaluation:      Plan of Care Reviewed With: patient    Overall Patient Progress: improvingOverall Patient Progress: improving    Outcome Evaluation: Pt is A&Ox4. Pt is NWB to RLE, transfers to recliner with 3A and lift. RLE was turned out, when it was straightened and propped up with towels, pt reported decreased pain. Pain was managed with PRN IV Dilaudid, Robaxin, PO Dilaudid, and Atarax. PO dilaudid was changed to Norco and pt reported increased relief from pain. ABD pillow and PCDs in place. CT scan was completed. LBM was 2/14. Pt had difficulty voiding all day. Pt was able to void 550 at 1448 and PVR right below abd dressing was 273. Pt has voided smaller amounts the rest of the day. PCDs on, pt is using IS and on 1L of O2 via NC. Pt is able to make needs known, call light within reach. Continue to POC

## 2025-02-18 NOTE — PROGRESS NOTES
Orthopedic Surgery Progress Note 02/18/2025    S: No acute events overnight. Pain controlled. Michelle removed yesterday. Voiding spontaneously. Required 1U PRBC's yesterday with improvement this AM. Denies any new concerns.    O:  Temp: 98.2  F (36.8  C) Temp src: Oral BP: 134/70 Pulse: 92   Resp: 16 SpO2: 98 % O2 Device: Nasal cannula Oxygen Delivery: 1 LPM    Exam:  Gen: No acute distress, resting comfortably in bed.  Resp: Non-labored breathing  Cardio: Regular rate via peripheral pulse  MSK:     RLE:  - Dressings c/d/I, Prevena vac in place holding suction  - Fires Quad, TA, GSC, EHL, FHL  - SILT femoral/tibial/sural/saphenous/DP/SP nerves  - PT/DP pulses 1+, foot wwp     Drain output: 150 ml from HUGO, 0 ml from Prevena.    Recent Labs   Lab 02/18/25  0555 02/17/25  1600 02/17/25  0652 02/16/25  1129 02/16/25  0615   WBC 10.1  --  10.4  --  11.6*   HGB 8.0* 7.7* 6.9*   < > 7.1*     --  229  --  182    < > = values in this interval not displayed.         Assessment: Gilberto Lund is a 44 year old male s/p R internal hemipelvectomy on 2/14/25 with Dr. Bravo. Doing well. Michelle catheter will remain in place today, plan for removal tomorrow. He will require PT today and should remain nonweighbearing on the RLE, no flexion past 45 degrees, no Valsalva. Post-op CT pending. Continue monitoring hemoglobin.      - Restrictions: No valsalva, no contractions of abdominal wall  - Repeat Hgb at 12, transfuse if <7. Labs pending this AM.   - Switch large vac unit to Portable Prevena  - Dispo plan TBD     Plan:  Ortho Primary  Activity: Up with assist and assistive devices as needed until independent. No hip flexion past 45 degrees. No Valsalva or contractions of abdominal wall to decrease stress on repair.  Weight bearing status: NWB RLE   Antibiotics: Ancef while in house then discharge on Cefadroxil x 2 weeks  Diet: Begin with clear fluids and progress diet as tolerated. Bowel regimen. Anti-emetics PRN.    DVT  prophylaxis:  mg every day   Elevation: elevate RLE  as needed, may use foam ramp   Wound Care: Prevena to remain in place until POD #14, managed by ortho  Drains: monitor drain output. Strip and empty drain per shift protocol. Discontinue drain when output < 20 ml per shift   Michelle: keep in place until POD #2-3  Pain management: Orals PRN, IV for breakthrough only  X-rays: to be completed in PACU  Physical Therapy:  Mobilization, ROM, ADL's  Occupational Therapy:  ADL's  Labs:  Trend Hgb on PODs #1 & 2     Consults: PT, OT. Hospitalist, appreciate assistance in caring for this patient throughout the perioperative period     Disposition:  Pending progress with therapies, pain control on orals, and medical stability, anticipate discharge   Follow up: Plan for follow up with Dr. Bravo 4 weeks.    --  Buck Amador MD  Orthopedic Surgery PGY-4

## 2025-02-18 NOTE — PLAN OF CARE
Goal Outcome Evaluation: 1900 to 0730      Plan of Care Reviewed With: patient    Overall Patient Progress: no changeOverall Patient Progress: no change       Alert and orientated x4, able to make needs known. VSS on 1L/min via NC to have >95%. Denies SOB, CP, and N/V. N/T baseline in extremities. C/o pain in R leg ranging from 4-9/10. Pain managed with PRN dilaudid, PRN robaxin, and Scheduled gabapentin. Pt describes best relief from PRN dilaudid. Continent of bladder and bowel, utilizes urinal at bedside, has been voiding adequately, DOMINICK retention due to dressing covering pelvic area. Continent of bowel, Last BM DOMINICK. Has surgical incision to pelvis and R side-CDI, attached to HUGO drain and prevena wound vac, refer to flowsheets for output and has lesion on tongue. Up Ax3-4 for repositioning. Has PIV's in L & R hand-SL, abx given x1, tolerated well. NWB to R leg. On regular diet and takes medications whole with thin liquids. Call light in reach, bed alarm on for safety. Plan is pain management and progressing with PT/OT, As well get CT completed this morning/afternoon.     Vital signs:  Temp: 98.2  F (36.8  C) Temp src: Oral BP: 134/70 Pulse: 92   Resp: 16 SpO2: 98 % O2 Device: Nasal cannula Oxygen Delivery: 1 LPM

## 2025-02-18 NOTE — DISCHARGE INSTRUCTIONS
[employer] [employer] [place]  disabilityhub[place].[email or web address]    [employer] [employer] [place] can provide information regarding several topics and connect you with further resources. Some of these topics include (but are not limited to):    Health:  Prescriptions  Medical Assistance  Medicare  Housing  Affordable housing  Housing stabilization  Maintaining Power  Assistive Technology  Community First Services and Supports  PCA services  Transportation  Waivers (Home and community-based waivers)  Money  SNAP  Social Security  Other  Guardianship and Alternatives  State Medical Review Team (SMRT)    Reach out to [employer] team members via chat on their website, or by calling them at [phone].       Applying for County Benefits  Food assistance (SNAP), cash programs, emergency assistance, housing support, childcare assistance  1) You will need a device that can access the internet (smartphone, tablet, etc)  2) You will need electronic copies of documents such as bank statements, rent receipts, or medical bills  3) Visit this website: https://[place]its.[place]/  4) Follow the prompts to complete the application.  5) Your application submission is the earliest date your benefits can begin  6) Look for a letter in the mail or a phone call from your [facility] or [facility] nation.  They will reach out for the interview portion.  If you are in need of further support, you can reach out to your county's Department of Human Services for assistance.       [facility] Human Resources  [email or web address].  [address]   [address]  [place] [address]  PH: [phone]

## 2025-02-19 ENCOUNTER — APPOINTMENT (OUTPATIENT)
Dept: PHYSICAL THERAPY | Facility: CLINIC | Age: 45
DRG: 498 | End: 2025-02-19
Attending: ORTHOPAEDIC SURGERY
Payer: COMMERCIAL

## 2025-02-19 ENCOUNTER — APPOINTMENT (OUTPATIENT)
Dept: OCCUPATIONAL THERAPY | Facility: CLINIC | Age: 45
DRG: 498 | End: 2025-02-19
Attending: ORTHOPAEDIC SURGERY
Payer: COMMERCIAL

## 2025-02-19 LAB
ANION GAP SERPL CALCULATED.3IONS-SCNC: 8 MMOL/L (ref 7–15)
BUN SERPL-MCNC: 13.1 MG/DL (ref 6–20)
CALCIUM SERPL-MCNC: 8.7 MG/DL (ref 8.8–10.4)
CHLORIDE SERPL-SCNC: 98 MMOL/L (ref 98–107)
CREAT SERPL-MCNC: 0.9 MG/DL (ref 0.67–1.17)
EGFRCR SERPLBLD CKD-EPI 2021: >90 ML/MIN/1.73M2
ERYTHROCYTE [DISTWIDTH] IN BLOOD BY AUTOMATED COUNT: 13.2 % (ref 10–15)
GLUCOSE SERPL-MCNC: 112 MG/DL (ref 70–99)
HCO3 SERPL-SCNC: 30 MMOL/L (ref 22–29)
HCT VFR BLD AUTO: 24.2 % (ref 40–53)
HGB BLD-MCNC: 8.1 G/DL (ref 13.3–17.7)
MAGNESIUM SERPL-MCNC: 2 MG/DL (ref 1.7–2.3)
MCH RBC QN AUTO: 31.3 PG (ref 26.5–33)
MCHC RBC AUTO-ENTMCNC: 33.5 G/DL (ref 31.5–36.5)
MCV RBC AUTO: 93 FL (ref 78–100)
PHOSPHATE SERPL-MCNC: 3.8 MG/DL (ref 2.5–4.5)
PLATELET # BLD AUTO: 315 10E3/UL (ref 150–450)
POTASSIUM SERPL-SCNC: 4 MMOL/L (ref 3.4–5.3)
RBC # BLD AUTO: 2.59 10E6/UL (ref 4.4–5.9)
SODIUM SERPL-SCNC: 136 MMOL/L (ref 135–145)
WBC # BLD AUTO: 7.6 10E3/UL (ref 4–11)

## 2025-02-19 PROCEDURE — 84100 ASSAY OF PHOSPHORUS: CPT

## 2025-02-19 PROCEDURE — 97530 THERAPEUTIC ACTIVITIES: CPT | Mod: GP

## 2025-02-19 PROCEDURE — 97530 THERAPEUTIC ACTIVITIES: CPT | Mod: GO

## 2025-02-19 PROCEDURE — 36415 COLL VENOUS BLD VENIPUNCTURE: CPT

## 2025-02-19 PROCEDURE — 85027 COMPLETE CBC AUTOMATED: CPT

## 2025-02-19 PROCEDURE — 250N000013 HC RX MED GY IP 250 OP 250 PS 637

## 2025-02-19 PROCEDURE — 80048 BASIC METABOLIC PNL TOTAL CA: CPT

## 2025-02-19 PROCEDURE — 99232 SBSQ HOSP IP/OBS MODERATE 35: CPT | Performed by: INTERNAL MEDICINE

## 2025-02-19 PROCEDURE — 120N000002 HC R&B MED SURG/OB UMMC

## 2025-02-19 PROCEDURE — 250N000013 HC RX MED GY IP 250 OP 250 PS 637: Performed by: INTERNAL MEDICINE

## 2025-02-19 PROCEDURE — 83735 ASSAY OF MAGNESIUM: CPT

## 2025-02-19 PROCEDURE — 250N000011 HC RX IP 250 OP 636: Mod: JZ | Performed by: STUDENT IN AN ORGANIZED HEALTH CARE EDUCATION/TRAINING PROGRAM

## 2025-02-19 PROCEDURE — 250N000013 HC RX MED GY IP 250 OP 250 PS 637: Performed by: STUDENT IN AN ORGANIZED HEALTH CARE EDUCATION/TRAINING PROGRAM

## 2025-02-19 RX ORDER — POLYETHYLENE GLYCOL 3350 17 G/17G
17 POWDER, FOR SOLUTION ORAL 2 TIMES DAILY
Status: DISCONTINUED | OUTPATIENT
Start: 2025-02-19 | End: 2025-02-21 | Stop reason: HOSPADM

## 2025-02-19 RX ORDER — AMOXICILLIN 250 MG
2 CAPSULE ORAL 2 TIMES DAILY
Status: DISCONTINUED | OUTPATIENT
Start: 2025-02-19 | End: 2025-02-21 | Stop reason: HOSPADM

## 2025-02-19 RX ADMIN — SENNOSIDES AND DOCUSATE SODIUM 2 TABLET: 50; 8.6 TABLET ORAL at 19:46

## 2025-02-19 RX ADMIN — HYDROXYZINE HYDROCHLORIDE 25 MG: 25 TABLET, FILM COATED ORAL at 00:32

## 2025-02-19 RX ADMIN — METHOCARBAMOL 750 MG: 750 TABLET ORAL at 14:03

## 2025-02-19 RX ADMIN — GABAPENTIN 100 MG: 100 CAPSULE ORAL at 19:46

## 2025-02-19 RX ADMIN — HYDROXYZINE HYDROCHLORIDE 25 MG: 25 TABLET, FILM COATED ORAL at 13:02

## 2025-02-19 RX ADMIN — SENNOSIDES AND DOCUSATE SODIUM 1 TABLET: 50; 8.6 TABLET ORAL at 08:46

## 2025-02-19 RX ADMIN — METHOCARBAMOL 750 MG: 750 TABLET ORAL at 01:00

## 2025-02-19 RX ADMIN — POLYETHYLENE GLYCOL 3350 17 G: 17 POWDER, FOR SOLUTION ORAL at 19:47

## 2025-02-19 RX ADMIN — HYDROCODONE BITARTRATE AND ACETAMINOPHEN 2 TABLET: 5; 325 TABLET ORAL at 17:26

## 2025-02-19 RX ADMIN — CEFAZOLIN SODIUM 2 G: 2 INJECTION, SOLUTION INTRAVENOUS at 09:23

## 2025-02-19 RX ADMIN — HYDROCODONE BITARTRATE AND ACETAMINOPHEN 1 TABLET: 5; 325 TABLET ORAL at 00:32

## 2025-02-19 RX ADMIN — HYDROXYZINE HYDROCHLORIDE 25 MG: 25 TABLET, FILM COATED ORAL at 07:02

## 2025-02-19 RX ADMIN — HYDROCODONE BITARTRATE AND ACETAMINOPHEN 2 TABLET: 5; 325 TABLET ORAL at 08:46

## 2025-02-19 RX ADMIN — GABAPENTIN 100 MG: 100 CAPSULE ORAL at 08:47

## 2025-02-19 RX ADMIN — ASPIRIN 162 MG: 81 TABLET, COATED ORAL at 08:47

## 2025-02-19 RX ADMIN — METHOCARBAMOL 750 MG: 750 TABLET ORAL at 07:02

## 2025-02-19 RX ADMIN — CEFAZOLIN SODIUM 2 G: 2 INJECTION, SOLUTION INTRAVENOUS at 00:20

## 2025-02-19 RX ADMIN — CEFAZOLIN SODIUM 2 G: 2 INJECTION, SOLUTION INTRAVENOUS at 18:16

## 2025-02-19 RX ADMIN — POLYETHYLENE GLYCOL 3350 17 G: 17 POWDER, FOR SOLUTION ORAL at 08:46

## 2025-02-19 RX ADMIN — HYDROCODONE BITARTRATE AND ACETAMINOPHEN 2 TABLET: 5; 325 TABLET ORAL at 13:02

## 2025-02-19 RX ADMIN — HYDROCODONE BITARTRATE AND ACETAMINOPHEN 1 TABLET: 5; 325 TABLET ORAL at 04:32

## 2025-02-19 RX ADMIN — GABAPENTIN 100 MG: 100 CAPSULE ORAL at 14:03

## 2025-02-19 RX ADMIN — SENNOSIDES AND DOCUSATE SODIUM 2 TABLET: 50; 8.6 TABLET ORAL at 13:02

## 2025-02-19 ASSESSMENT — ACTIVITIES OF DAILY LIVING (ADL)
ADLS_ACUITY_SCORE: 48
ADLS_ACUITY_SCORE: 50
DEPENDENT_IADLS:: INDEPENDENT
ADLS_ACUITY_SCORE: 50
ADLS_ACUITY_SCORE: 50
ADLS_ACUITY_SCORE: 48
ADLS_ACUITY_SCORE: 48
ADLS_ACUITY_SCORE: 50
ADLS_ACUITY_SCORE: 50
ADLS_ACUITY_SCORE: 48
ADLS_ACUITY_SCORE: 50
ADLS_ACUITY_SCORE: 48
ADLS_ACUITY_SCORE: 48
ADLS_ACUITY_SCORE: 50
ADLS_ACUITY_SCORE: 48
ADLS_ACUITY_SCORE: 50

## 2025-02-19 NOTE — PLAN OF CARE
Goal Outcome Evaluation:      Plan of Care Reviewed With: patient, spouse    Overall Patient Progress: improvingOverall Patient Progress: improving    Outcome Evaluation: Pt is A&Ox4. Pt is NWB to RLE, transfers to recliner with 2A and lift. RLE needs to be straightened and propped up with towels or a blanket. Pt stood with therapy today and sat on BSC in an attempt to have a bowel movement. LBM was 2/14, miralax and senna doses increased today. Pain was managed with PRNs Norco, Robaxin, and Atarax and scheduled gabapentin. PCDs on, pt is using IS and on RA. Pt is able to make needs known, call light within reach. Continue to POC.

## 2025-02-19 NOTE — PLAN OF CARE
Goal Outcome Evaluation: s/p R internal hemipelvectomy on 2/14/25     Plan of Care Reviewed With: patient  Overall Patient Progress: improving    Vital signs:  Temp: 98.9  F (37.2  C) Temp src: Oral BP: 115/67 Pulse: 81   Resp: 16 SpO2: 99 % O2 Device: None (Room air)     Orientation: A/Ox4. Baseline neuropathy in BLE.  Bowel: Continent, last BM on 2/14/25  Bladder: Continent, urinal at bedside.  Pain: Constant R hip and abdominal pain controlled with PRN Norco, Robaxin and Atrax and asks for IV Dilaudid for sever pain danielle during transfers.  Ambulation/Transfers: Requires A3 with ceiling lift. NWB RLE, no flexion past 45 degrees, no Valsalva.  Diet/ Liquids: Regular and was tolerated.  Tubes/ Lines/ Drains: L PIV SL, HUGO Drain-40 ml serosanguinous outgput, Prevena [place] at 125mmHg with no output.  Tube Feeding: n/a  Oxygen: O2@1L NC with O2sat above 90%. IS encouraged and was tolerated.  Skin: Surgical incisions.   Scrotal swelling noted, supported with folded washcloth. No skin breakdown. Provider notified.    RN-Managed Phos, K, Mag- all WNL, to recheck in the morning.    Awaiting ARU placement.

## 2025-02-19 NOTE — CONSULTS
Care Management Initial Consult    General Information  Assessment completed with: Patient, Spouse or significant other,  (Jeremiah Lund and his wife, Floresita.)  Type of CM/SW Visit: Initial Assessment    Primary Care Provider verified and updated as needed: Yes   Readmission within the last 30 days: no previous admission in last 30 days      Reason for Consult: discharge planning  Advance Care Planning: Advance Care Planning Reviewed:  (Spouse will bring in HCD)          Communication Assessment  Patient's communication style: spoken language (English or Bilingual)    Hearing Difficulty or Deaf: no   Wear Glasses or Blind: yes    Cognitive  Cognitive/Neuro/Behavioral: WDL  Level of Consciousness: alert  Arousal Level: opens eyes spontaneously     Mood/Behavior: calm, cooperative  Best Language: 0 - No aphasia  Speech: spontaneous, clear, logical    Living Environment:   People in home: child(angelo), dependent, spouse  Mary Ellen - spouse  Current living Arrangements: house      Able to return to prior arrangements: no (ARU rec)       Family/Social Support:  Care provided by: self  Provides care for: child(angelo)  Marital Status:   Support system:  (Delores Lund)          Description of Support System: Supportive, Involved    Support Assessment: Adequate family and caregiver support, Adequate social supports    Current Resources:   Patient receiving home care services: No        Community Resources: None  Equipment currently used at home: none (Pt has a ramp to enter their home, owns a commode, and was gifted a gently used electric WC; walk-in shower on the second level - 16 STI.)  Supplies currently used at home: None    Employment/Financial:  Employment Status:  (Still employed, but unable to work. Employer has no STD benefits. Pt has private pay LTD insurance.)     Employment/ Comments:  (Pt works in [employer]. Pt is not a .)  Financial Concerns: none   Referral to Financial Worker: Yes       Does the  patient's insurance plan have a 3 day qualifying hospital stay waiver?  Yes     Which insurance plan 3 day waiver is available? Alternative insurance waiver    Will the waiver be used for post-acute placement? No - IRF is anticipated discharge disposition.    Lifestyle & Psychosocial Needs:  Social Drivers of Health     Food Insecurity: Low Risk  (9/30/2024)    Food Insecurity     Within the past 12 months, did you worry that your food would run out before you got money to buy more?: No     Within the past 12 months, did the food you bought just not last and you didn t have money to get more?: No   Depression: Not at risk (2/6/2025)    PHQ-2     PHQ-2 Score: 0   Housing Stability: Low Risk  (9/30/2024)    Housing Stability     Do you have housing? : Yes     Are you worried about losing your housing?: No   Tobacco Use: Medium Risk (2/14/2025)    Patient History     Smoking Tobacco Use: Former     Smokeless Tobacco Use: Former     Passive Exposure: Not on file   Financial Resource Strain: Low Risk  (9/30/2024)    Financial Resource Strain     Within the past 12 months, have you or your family members you live with been unable to get utilities (heat, electricity) when it was really needed?: No   Alcohol Use: Not on file   Transportation Needs: Low Risk  (9/30/2024)    Transportation Needs     Within the past 12 months, has lack of transportation kept you from medical appointments, getting your medicines, non-medical meetings or appointments, work, or from getting things that you need?: No   Physical Activity: Sufficiently Active (9/30/2024)    Exercise Vital Sign     Days of Exercise per Week: 5 days     Minutes of Exercise per Session: 150+ min   Interpersonal Safety: Low Risk  (2/14/2025)    Interpersonal Safety     Do you feel physically and emotionally safe where you currently live?: Yes     Within the past 12 months, have you been hit, slapped, kicked or otherwise physically hurt by someone?: No     Within the  "past 12 months, have you been humiliated or emotionally abused in other ways by your partner or ex-partner?: No   Stress: No Stress Concern Present (9/30/2024)    [employer] [place] [employer] - Occupational Stress Questionnaire     Feeling of Stress : Only a little   Social Connections: Unknown (9/30/2024)    Social Connection and Isolation Panel [NHANES]     Frequency of Communication with Friends and Family: Not on file     Frequency of Social Gatherings with Friends and Family: Once a week     Attends Restorationism Services: Not on file     Active Member of Clubs or Organizations: Not on file     Attends Club or Organization Meetings: Not on file     Marital Status: Not on file   Health Literacy: Not on file       Functional Status:  Prior to admission patient needed assistance:   Dependent ADLs:: Independent  Dependent IADLs:: Independent  Assesssment of Functional Status: Not at  functional baseline    Mental Health Status:  Mental Health Status: No Current Concerns       Chemical Dependency Status:  Chemical Dependency Status: No Current Concerns             Values/Beliefs:  Spiritual, Cultural Beliefs, Restorationism Practices, Values that affect care: no               Discussed  Partnership in Safe Discharge Planning  document with patient/family: No    Additional Information:  Per H&P, \"Gilberto Lund is a 44 year old male with PMH psoriasis, kidney stones, and testicular cancer.  Kidney Stones, Testicular cancer, and chondrosarcoma of pelvis. Patient diagnosis with testicular CA in 2008 received left orchiectomy and chemotherapy at that time. 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 Presented to [facility] for combined surgery with orthopaedics and urology. Patient now s/p right modified radical hemipelvectomy with Dr. Bravo.\"    ANA met with pt and spouse at bedside for Initial CMA. Pt/spouse opted for pt to go to  ARU " for ongoing care upon discharge. PTA, pt was IND with all I/ADLs. Pt lives with his wife, Mary Ellen, and their 3 children (ages 9-12) in a multi-level house; ramp to enter, 16 STI to bedroom and walk-in shower. Pt anticipates needing to live on the main level upon return to home; also anticipates needing a walker, manual WC, hospital bed, and shower chair. SW discussed that ARU will assist with billing insurance for medically necessary DME, but insurance does not pay for accessory items such as a shower chair; also explained that insurance will pay for either a WC or walker, but not both simultaneously. Spouse requested Family Child Support and for pt to be screened for any financial assistance. SW suggested that pt/spouse call [employer] [place] for guidance for SSDI, state and federal disability benefits, resources for home modification, and other resources/services that pt would be eligible for. SW added [employer] [place] Resource to pt's Discharge Instructions.    ANA made a referral to [facility] and [facility] Financial Assistance Navigator (FAN), and placed a Child Family Life IP Consult.    1425: SW called pt's spouse, provided update of the above referrals that were made.        Referrals Placed by CM/SW: [facility] Lia[facility] & [facility]    [facility]  [address]  [address] [place] [address]  Admissions: [phone]  RN Station: [phone]  ARU SW: [phone]   2/19: Referral sent via Inbasket.         Next Steps: SW to follow-up with [facility] Liaison tomorrow with JUVENTINO after IDT rounds.          LEMUEL BhardwajW, LSW  5 Ortho   PHONE: [phone]    ANA Coverage SEARCHABLE in Maven search 5 Ortho SW  (or by name)  Or click on link below:  5 Ortho Nils

## 2025-02-19 NOTE — PROGRESS NOTES
[facility]    Medicine Progress Note - Hospitalist Service, GOLD TEAM 19    Date of Admission:  2/14/2025    Assessment & Plan   Gilberto Lund is a 44 year old male admitted on 2/14/2025. He he has a past medical history including cirrhosis, history of kidney stones, testicular cancer status post orchiectomy and chemotherapy in 2008.  He is hospitalized for combined surgery requiring orthopedics and urology.  Postoperatively on 2/14/2025 he was transferred to the ICU for hemodynamic instability and undifferentiated shock.  He is now hemodynamically stable and being downgraded to the medical floor by the ICU.  Medicine asked to consult for Co. medical management.     Interval changes 2/19    - no bowel movement since Thursday, dose of senna increased to 2 tablets BID and miralax increased to twice daily   Charts reviewed, Hb was 6.9 , 8.0 yesterday after transfusing 1 unit pRBC   - Feels more comfortable this morning and pain is improving.  - No new symptoms   - Pain control per primary team  - vital signs okay   - appropriate for ARU according to PT    # Postoperative hypotension  # Undifferentiated shock  # Acute blood loss anemia  -Multifactorial 1.5 L of estimated blood loss intraoperatively.  -Status post IV fluids and 1 L of albumin  -He now remains hemodynamically stable  -Continue IV fluids  -Continue telemetry     #s/p right modified radical hemipelvectomy,, on ancef while inpatient, will be discharged on cefadroxil x2 weeks   # RLE numbness  - HUGO/woundvac in place  Plan:  Ortho Primary  Activity: Up with assist and assistive devices as needed until independent. No hip flexion past 45 degrees. No Valsalva or contractions of abdominal wall to decrease stress on repair.  Weight bearing status: NWB RLE   Antibiotics: Ancef while in house  Diet: Begin with clear fluids and progress diet as tolerated. Bowel regimen. Anti-emetics PRN.    DVT prophylaxis:   "mg every day   Elevation: elevate RLE  as needed, may use foam ramp   Wound Care: Prevena to remain in place until POD #14, managed by ortho  Drains: monitor drain output. Strip and empty drain per shift protocol. 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 Patient now s/p right modified radical hemipelvectomy of chondrosarcoma with Dr. Bravo.  - Plan per Musculoskeletal section  -Urology consulted          Diet: Advance Diet as Tolerated: Regular Diet Adult; Regular Diet Adult    DVT Prophylaxis: Defer to primary service  Michelle Catheter: Not present  Lines: None     Cardiac Monitoring: None  Code Status: Full Code      Clinically Significant Risk Factors                              # Obesity: Estimated body mass index is 32.58 kg/m  as calculated from the following:    Height as of this encounter: 1.854 m (6' 1\").    Weight as of this encounter: 112 kg (246 lb 14.6 oz).             Social Drivers of Health    Tobacco Use: Medium Risk (2/14/2025)    Patient History     Smoking Tobacco Use: Former     Smokeless Tobacco Use: Former   Social Connections: Unknown (9/30/2024)    Social Connection and Isolation Panel [NHANES]     Frequency of Social Gatherings with Friends and Family: Once a week          Disposition Plan     Medically Ready for Discharge: Anticipated in 2-4 Days             Dominick Ace MD  Hospitalist Service, GOLD TEAM 19  [facility] " [facility]  Securely message with Inkv[employer] (more info)  Text page via Simple[employer] Paging/Directory   See signed in provider for up to date coverage information  ______________________________________________________________________    Interval History   Patient seen and evaluated.  No any acute events  Folate catheter was removed yesterday patient is able to void spontaneously.  A unit of PRBC was transfused yesterday for hemoglobin 6.9.  Denies any dizziness or shortness of breath.  Physical Exam   Vital Signs: Temp: 98.4  F (36.9  C) Temp src: Oral BP: 125/75 Pulse: 86   Resp: 16 SpO2: 97 % O2 Device: Nasal cannula Oxygen Delivery: 1 LPM  Weight: 246 lbs 14.64 oz    General Appearance: Appears comfortable.  Respiratory: Without wheezes rhonchi or rales.  CTA  Cardiovascular: RRR, without murmurs  GI: Soft, nontender, plus BS  Skin: Without obvious bleeding, bruising or excoriations      Medical Decision Making       59 MINUTES SPENT BY ME on the date of service doing chart review, history, exam, documentation & further activities per the note.      Data   CBC RESULTS:   Recent Labs   Lab Test 02/18/25  0555   WBC 10.1   RBC 2.51*   HGB 8.0*   HCT 23.3*   MCV 93   MCH 31.9   MCHC 34.3   RDW 13.3        Last Comprehensive Metabolic Panel:  Lab Results   Component Value Date     02/19/2025    POTASSIUM 4.0 02/19/2025    CHLORIDE 98 02/19/2025    CO2 30 (H) 02/19/2025    ANIONGAP 8 02/19/2025     (H) 02/19/2025    BUN 13.1 02/19/2025    CR 0.90 02/19/2025    GFRESTIMATED >90 02/19/2025    CANDICE 8.7 (L) 02/19/2025

## 2025-02-19 NOTE — PROGRESS NOTES
Orthopedic Surgery Progress Note 02/19/2025    S: No acute events overnight. Pain controlled. Voiding spontaneously.  Pain well controlled. Denies any new concerns.     O:  Temp: 98.9  F (37.2  C) Temp src: Oral BP: 115/67 Pulse: 81   Resp: 16 SpO2: 99 % O2 Device: None (Room air)      Exam:  Gen: No acute distress, resting comfortably in bed.  Resp: Non-labored breathing  Cardio: Regular rate via peripheral pulse  MSK:     RLE:  - Dressings c/d/I, Prevena vac in place holding suction  - Fires Quad, TA, GSC, EHL, FHL  - SILT femoral/tibial/sural/saphenous/DP/SP nerves  - PT/DP pulses 1+, foot wwp     Drain output: 140 ml/ 24 hrs from HUGO, 0 ml from Prevena.    Recent Labs   Lab 02/19/25  0555 02/18/25  0555 02/17/25  1600 02/17/25  0652   WBC 7.6 10.1  --  10.4   HGB 8.1* 8.0* 7.7* 6.9*    271  --  229         Assessment: Gilberto Lund is a 44 year old male s/p R internal hemipelvectomy on 2/14/25 with Dr. Bravo. Doing well.       - Restrictions: No valsalva, no contractions of abdominal wall  - Repeat Hgb transfuse if <7, currently asymptomatic  - Dispo plan TBD     Plan:  Ortho Primary  Activity: Up with assist and assistive devices as needed until independent. No hip flexion past 45 degrees. No Valsalva or contractions of abdominal wall to decrease stress on repair.  Weight bearing status: NWB RLE   Antibiotics: Ancef while in house then discharge on Cefadroxil x 2 weeks  Diet: Begin with clear fluids and progress diet as tolerated. Bowel regimen. Anti-emetics PRN.    DVT prophylaxis:  mg every day   Elevation: elevate RLE  as needed, may use foam ramp   Wound Care: Prevena to remain in place until POD #14, managed by ortho  Drains: monitor drain output. Strip and empty drain per shift protocol. Discontinue drain when output < 20 ml per shift   Michelle: keep in place until POD #2-3  Pain management: Orals PRN, IV for breakthrough only  X-rays: to be completed in PACU  Physical Therapy:   Mobilization, ROM, ADL's  Occupational Therapy:  ADL's  Labs:  Trend Hgb on PODs #1 & 2     Consults: PT, OT. Hospitalist, appreciate assistance in caring for this patient throughout the perioperative period     Disposition:  Pending progress with therapies, pain control on orals, and medical stability, anticipate discharge   Follow up: Plan for follow up with Dr. Bravo 4 weeks.       George Boone MD  Adult Reconstruction Fellow

## 2025-02-20 ENCOUNTER — APPOINTMENT (OUTPATIENT)
Dept: OCCUPATIONAL THERAPY | Facility: CLINIC | Age: 45
DRG: 498 | End: 2025-02-20
Attending: ORTHOPAEDIC SURGERY
Payer: COMMERCIAL

## 2025-02-20 VITALS
TEMPERATURE: 98.8 F | HEART RATE: 85 BPM | RESPIRATION RATE: 16 BRPM | SYSTOLIC BLOOD PRESSURE: 117 MMHG | HEIGHT: 73 IN | BODY MASS INDEX: 32.72 KG/M2 | DIASTOLIC BLOOD PRESSURE: 68 MMHG | WEIGHT: 246.91 LBS | OXYGEN SATURATION: 97 %

## 2025-02-20 LAB
ANION GAP SERPL CALCULATED.3IONS-SCNC: 10 MMOL/L (ref 7–15)
BUN SERPL-MCNC: 13.5 MG/DL (ref 6–20)
CALCIUM SERPL-MCNC: 8.7 MG/DL (ref 8.8–10.4)
CHLORIDE SERPL-SCNC: 97 MMOL/L (ref 98–107)
CREAT SERPL-MCNC: 0.8 MG/DL (ref 0.67–1.17)
EGFRCR SERPLBLD CKD-EPI 2021: >90 ML/MIN/1.73M2
ERYTHROCYTE [DISTWIDTH] IN BLOOD BY AUTOMATED COUNT: 13.2 % (ref 10–15)
GLUCOSE SERPL-MCNC: 122 MG/DL (ref 70–99)
HCO3 SERPL-SCNC: 27 MMOL/L (ref 22–29)
HCT VFR BLD AUTO: 24.6 % (ref 40–53)
HGB BLD-MCNC: 8.3 G/DL (ref 13.3–17.7)
MAGNESIUM SERPL-MCNC: 1.9 MG/DL (ref 1.7–2.3)
MCH RBC QN AUTO: 31 PG (ref 26.5–33)
MCHC RBC AUTO-ENTMCNC: 33.7 G/DL (ref 31.5–36.5)
MCV RBC AUTO: 92 FL (ref 78–100)
PHOSPHATE SERPL-MCNC: 3.4 MG/DL (ref 2.5–4.5)
PLATELET # BLD AUTO: 338 10E3/UL (ref 150–450)
POTASSIUM SERPL-SCNC: 3.9 MMOL/L (ref 3.4–5.3)
RBC # BLD AUTO: 2.68 10E6/UL (ref 4.4–5.9)
SODIUM SERPL-SCNC: 134 MMOL/L (ref 135–145)
WBC # BLD AUTO: 8.2 10E3/UL (ref 4–11)

## 2025-02-20 PROCEDURE — 84100 ASSAY OF PHOSPHORUS: CPT

## 2025-02-20 PROCEDURE — 80048 BASIC METABOLIC PNL TOTAL CA: CPT

## 2025-02-20 PROCEDURE — 250N000011 HC RX IP 250 OP 636: Mod: JZ | Performed by: STUDENT IN AN ORGANIZED HEALTH CARE EDUCATION/TRAINING PROGRAM

## 2025-02-20 PROCEDURE — 120N000002 HC R&B MED SURG/OB UMMC

## 2025-02-20 PROCEDURE — 85018 HEMOGLOBIN: CPT

## 2025-02-20 PROCEDURE — 36415 COLL VENOUS BLD VENIPUNCTURE: CPT

## 2025-02-20 PROCEDURE — 83735 ASSAY OF MAGNESIUM: CPT

## 2025-02-20 PROCEDURE — 97530 THERAPEUTIC ACTIVITIES: CPT | Mod: GO

## 2025-02-20 PROCEDURE — 99232 SBSQ HOSP IP/OBS MODERATE 35: CPT | Performed by: INTERNAL MEDICINE

## 2025-02-20 PROCEDURE — 250N000013 HC RX MED GY IP 250 OP 250 PS 637: Performed by: STUDENT IN AN ORGANIZED HEALTH CARE EDUCATION/TRAINING PROGRAM

## 2025-02-20 PROCEDURE — 250N000013 HC RX MED GY IP 250 OP 250 PS 637

## 2025-02-20 PROCEDURE — 250N000013 HC RX MED GY IP 250 OP 250 PS 637: Performed by: INTERNAL MEDICINE

## 2025-02-20 PROCEDURE — 85041 AUTOMATED RBC COUNT: CPT

## 2025-02-20 PROCEDURE — 97535 SELF CARE MNGMENT TRAINING: CPT | Mod: GO

## 2025-02-20 RX ORDER — MAGNESIUM OXIDE 400 MG/1
400 TABLET ORAL EVERY 4 HOURS
Status: COMPLETED | OUTPATIENT
Start: 2025-02-20 | End: 2025-02-20

## 2025-02-20 RX ADMIN — HYDROCODONE BITARTRATE AND ACETAMINOPHEN 2 TABLET: 5; 325 TABLET ORAL at 18:55

## 2025-02-20 RX ADMIN — CEFAZOLIN SODIUM 2 G: 2 INJECTION, SOLUTION INTRAVENOUS at 16:41

## 2025-02-20 RX ADMIN — METHOCARBAMOL 750 MG: 750 TABLET ORAL at 13:04

## 2025-02-20 RX ADMIN — GABAPENTIN 100 MG: 100 CAPSULE ORAL at 13:04

## 2025-02-20 RX ADMIN — ASPIRIN 162 MG: 81 TABLET, COATED ORAL at 08:33

## 2025-02-20 RX ADMIN — MAGNESIUM OXIDE TAB 400 MG (241.3 MG ELEMENTAL MG) 400 MG: 400 (241.3 MG) TAB at 13:04

## 2025-02-20 RX ADMIN — SENNOSIDES AND DOCUSATE SODIUM 2 TABLET: 50; 8.6 TABLET ORAL at 20:13

## 2025-02-20 RX ADMIN — METHOCARBAMOL 750 MG: 750 TABLET ORAL at 20:18

## 2025-02-20 RX ADMIN — POLYETHYLENE GLYCOL 3350 17 G: 17 POWDER, FOR SOLUTION ORAL at 08:33

## 2025-02-20 RX ADMIN — GABAPENTIN 100 MG: 100 CAPSULE ORAL at 20:13

## 2025-02-20 RX ADMIN — MAGNESIUM OXIDE TAB 400 MG (241.3 MG ELEMENTAL MG) 400 MG: 400 (241.3 MG) TAB at 08:34

## 2025-02-20 RX ADMIN — HYDROCODONE BITARTRATE AND ACETAMINOPHEN 2 TABLET: 5; 325 TABLET ORAL at 00:45

## 2025-02-20 RX ADMIN — HYDROCODONE BITARTRATE AND ACETAMINOPHEN 2 TABLET: 5; 325 TABLET ORAL at 14:59

## 2025-02-20 RX ADMIN — CEFAZOLIN SODIUM 2 G: 2 INJECTION, SOLUTION INTRAVENOUS at 00:41

## 2025-02-20 RX ADMIN — HYDROCODONE BITARTRATE AND ACETAMINOPHEN 2 TABLET: 5; 325 TABLET ORAL at 10:59

## 2025-02-20 RX ADMIN — GABAPENTIN 100 MG: 100 CAPSULE ORAL at 08:33

## 2025-02-20 RX ADMIN — HYDROCODONE BITARTRATE AND ACETAMINOPHEN 2 TABLET: 5; 325 TABLET ORAL at 06:59

## 2025-02-20 RX ADMIN — CEFAZOLIN SODIUM 2 G: 2 INJECTION, SOLUTION INTRAVENOUS at 08:33

## 2025-02-20 RX ADMIN — SENNOSIDES AND DOCUSATE SODIUM 2 TABLET: 50; 8.6 TABLET ORAL at 08:33

## 2025-02-20 RX ADMIN — HYDROXYZINE HYDROCHLORIDE 25 MG: 25 TABLET, FILM COATED ORAL at 20:18

## 2025-02-20 ASSESSMENT — ACTIVITIES OF DAILY LIVING (ADL)
ADLS_ACUITY_SCORE: 48
ADLS_ACUITY_SCORE: 52
ADLS_ACUITY_SCORE: 48
ADLS_ACUITY_SCORE: 52
ADLS_ACUITY_SCORE: 48
ADLS_ACUITY_SCORE: 48
ADLS_ACUITY_SCORE: 52
ADLS_ACUITY_SCORE: 48
ADLS_ACUITY_SCORE: 48
ADLS_ACUITY_SCORE: 52
ADLS_ACUITY_SCORE: 48
ADLS_ACUITY_SCORE: 52
ADLS_ACUITY_SCORE: 48
ADLS_ACUITY_SCORE: 52
ADLS_ACUITY_SCORE: 48
ADLS_ACUITY_SCORE: 48
ADLS_ACUITY_SCORE: 52

## 2025-02-20 NOTE — INTERIM SUMMARY
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 His post-op course has been complicated by  hemodynamic instability and undifferentiated shock requiring transfer to ICU, acute blood loss anemia, leukocytosis, and RLE numbness. The patient is now stable and ready for discharge to an ARC level of care for intensive therapies not available in a lesser level of care including OT/PT, ongoing medical management by PMR, and rehab nursing care. At baseline, he lives with his spouse and is independent in all I/ADL and functional mobility. Currently, he requires assist of 1-2 for all ADL and functional mobility.   Patient requires an intensive inpatient rehab program to address the following acute impairments: impaired activity tolerance, impaired balance due to weakness and NWB RLE, impaired ROM due to surgical restrictions (no hip flexion greater than 45 degrees), impaired RLE sensation, impaired strength, impaired weight shifting due to NWB RLE, and pain.    Current Active Medical Management Needs/Risks for Clinical Complications  The patient requires the high level of rehabilitation physician supervision that accompanies the provision of intensive  rehabilitation therapy.  The patient needs the services of the rehabilitation physician to assess the patient medically and functionally and to modify the course of treatment as needed to maximize the patient's capacity to benefit from the rehabilitation process.  Hematology: In setting of acute blood loss anemia and stress-induced leukocytosis, requires ongoing assessment of lab values. Received PRBC on 2/17 for Hgb 6.9. Hgb now stable at 8.3. WBC count up to 18.1 on 2/14, now stable at 8.2.  Cardiovascular: In setting of multifactorial hemorrhagic shock, pt with post-op hypotension requiring phenylephrine infusion, now discontinued. Requires ongoing assessment of cardiovascular response to increased activity demands. MAP goal > 65 mmHg.   Integument: Patient requires ongoing wound care management of HUGO drain and Prevena wound vac. Prevena wound vac to remain in place until POD #14 (2/28). HUGO drain care: Strip and empty drain per shift protocol. Discontinue drain when output < 20 ml per shift. Patient is at risk for incision breakdown and infection. Requires ongoing assessment of signs/symptoms of infection. Infection prophylaxis: ancef, then ceradroxil x 2 weeks at discharge from [facility] care hospital.  Bowel/GI: Patient requires ongoing management of bowel, due to constipation (no BM since surgery); medications recently adjusted to increase senna and miralax BID.   Pain: Patient will need ongoing assessment and adjustment of pain medications for optimal participation in therapies. Currently managed with gabapentin, norco, robaxin.   Patient is at risk for falls due to pain, gait instability, impaired balance, decreased strength, decreased activity tolerance, impaired RLE sensation.    Past Medical/Surgical History  Surgery in the past 100 days: Yes  Additional relevant past medical history: cirrhosis, history of kidney stones, testicular cancer status post orchiectomy and chemotherapy in 2008    Level of  "Functioning Prior to Admission:    LIVING ENVIRONMENT  People in Home: spouse  Current Living Arrangements: house  Home Accessibility:  (ramp to enter)  Transportation Anticipated: (IP Transportation Staff)  Living Environment Comments: Ramp to enter home. Once dc home, plan to stay on main level until able to complete stairs. BR w/ WIS and tub shower on upper level.    SELF-CARE  Usual Activity Tolerance: good  Regular Exercise: No  Equipment Currently Used at Home: none (Pt has a ramp to enter their home, owns a commode, and was gifted a gently used electric WC; walk-in shower on the second level - 16 STI.)  Activity/Exercise/Self-Care Comment: ind with I/ADLs at baseline; wife is able to assist    Additional Comments: ***    Level of Function: GG Scale (Section GG Functional Ability and Goals; CMS's MONET Version 3.0 Manual effective 10.1.2019):  PT Current Function Goals for Rehab   Bed Rolling {GG Scale:406745::\"6 Independent\"} {Goals for Rehab:521327}   Supine to Sit {GG Scale:505196::\"6 Independent\"} {Goals for Rehab:326886}   Sit to Stand {GG Scale:115534::\"6 Independent\"} {Goals for Rehab:083871}   Transfer {GG Scale:875379::\"6 Independent\"} {Goals for Rehab:977111}   Ambulation {GG Scale:246742::\"6 Independent\"} {Goals for Rehab:753832}   Stairs {GG Scale:560546::\"6 Independent\"} {Goals for Rehab:458269}     OT Current Function Goals for Rehab   Feeding {GG Scale:217785::\"6 Independent\"} {Goals for Rehab:471018}   Grooming {GG Scale:915536::\"6 Independent\"} {Goals for Rehab:298454}   Bathing {GG Scale:643102::\"6 Independent\"} {Goals for Rehab:223532}   Upper Body Dressing {GG Scale:730380::\"6 Independent\"} {Goals for Rehab:056994}   Lower Body Dressing {GG Scale:770452::\"6 Independent\"} {Goals for Rehab:322582}   Toileting {GG Scale:785505::\"6 Independent\"} {Goals for Rehab:572283}   Toilet Transfer {GG Scale:376766::\"6 Independent\"} {Goals for Rehab:314786}   Tub/Shower Transfer {GG Scale:151840::\"6 " "Independent\"} {Goals for Rehab:570723}   Cognition {ARC Impaired Not NA:792888} {ARC COGNITION GOAL:950536}     SLP Current Function Goals for Rehab   Swallow {ARC Impaired Not NA:631600} {ARC SWALLOW GOAL:352698::\"Tolerate least restrictive diet without signs & symptoms of aspiration and adhere to safe swallow strategies\"}   Communication {ARC Impaired Not NA:746114} {ARC COMM GOAL:248428::\"Communicate basic needs effectively\"}     Current Diet:  0-Thin and 7-Regular    Summary Statement:  ***    Expected Therapies and Services Required During Inpatient Rehab Admission  Intensity of Therapy: Patient requires intensive therapies not available in a lesser level of care. Patient is motivated, making gains, and can tolerate 3 hours of therapy a day.  Physical Therapy: 90 minutes per day, 6 days a week for *** days  Occupational Therapy: 90 minutes per day, 6 days a week for *** days  Speech and Language Therapy: Not indicated at this time.  Rehabilitation Nursing Needs: Patient requires 24 hour Rehab Nursing to manage wound care, vitals, medication education, positioning, carryover of new rehab techniques, care coordination, skin integrity, pain management, post-surgical incision care to promote healing and prevent infection, and provide safe environment for patient at falls risk.    Precautions/restrictions/special needs:   Precautions: fall precautions, Weight bearing precautions (NWB RLE)   Restrictions: No hip flexion past 45 degrees. No Valsalva or contractions of abdominal wall to decrease stress on repair.   Special Needs: lift    Expected Level of Improvement: ***  Expected Length of time to achieve: ***    Anticipated Discharge Needs:  Anticipated Discharge Destination: Home  Anticipated Discharge Support: Family member  24/7 support available : Yes  Identified caregiver(s):  spouse  Anticipated Discharge Needs: Home with outpatient therapy    Identified challenges/barriers: n/a    Liaison signature/date/time: " ***    Physician statement of review and agreement:  I have reviewed and am in agreement of the need for IRF stay to address above functional and medical needs. In addition to above statements address, Patient requires intensive active and ongoing therapeutic intervention and multiple therapies; Patient requires medical supervision; Expected to actively participate in the intensive rehab program; Sufficiently stable to actively participate; Expectation for measurable improvement in functional capacity or adaption to impairments.    MD signature/date/time:    reviewed and am in agreement of the need for IRF stay to address above functional and medical needs. In addition to above statements address, Patient requires intensive active and ongoing therapeutic intervention and multiple therapies; Patient requires medical supervision; Expected to actively participate in the intensive rehab program; Sufficiently stable to actively participate; Expectation for measurable improvement in functional capacity or adaption to impairments.    MD signature/date/time:

## 2025-02-20 NOTE — PLAN OF CARE
"Goal Outcome Evaluation:      Plan of Care Reviewed With: patient    Overall Patient Progress: improving    VS: /63 (BP Location: Right arm)   Pulse 90   Temp 98.7  F (37.1  C) (Oral)   Resp 16   Ht 1.854 m (6' 1\")   Wt 112 kg (246 lb 14.6 oz)   SpO2 95%   BMI 32.58 kg/m     O2: O2 sats >90% on RA   Output: Voiding adequately via mccord catheter   Activity: Not OOB this shift, NWB to RLE  Requires A3 with ceiling lift. NWB RLE,no hip flexion past 45 degrees, no Valsalva, no abdominal wall contraction   Skin: R hip incision   Pain: Pain managed with scheduled tylenol, PRN Norco x2   CMS: Baseline numbness and tingling   Dressing: R hip dressing intact   Diet: Regular diet   LDA: R PIV, L PIV  EILEEN drain and wound vac to R hip   Equipment: IV pole, wound vac, eileen drain, urinary mccord,    Plan: Dc to TCU pending progress with PT/OT, pain control on orals, and medical stability    Additional Info: Patient able to make needs known using call light appropriately, call light in reach, continue POC, report given to oncoming RN     "

## 2025-02-20 NOTE — PROGRESS NOTES
Care Management Follow Up    Length of Stay (days): 6    Expected Discharge Date: 02/21/25     Concerns to be Addressed: discharge planning     Patient plan of care discussed at interdisciplinary rounds: Yes    Anticipated Discharge Disposition: Acute Rehab    [facility]  [address]  [address] [place] [address]  Admissions: [phone]  RN Station: [phone]  ARU SW: [phone]       Anticipated Discharge Services:  (acute therapies)  Anticipated Discharge DME: None (all DME will be provided by IRF)    Patient/family educated on Medicare website which has current facility and service quality ratings: yes  Education Provided on the Discharge Plan: Yes  Patient/Family in Agreement with the Plan: yes    Referrals Placed by CM/SW:  ([facility] Liaison; [facility])    ACCEPTED  [facility]  [address]  [address] [place] [address]  Admissions: [phone]  RN Station: [phone]  ARU SW: [phone]   2/19: Referral sent via Inbasket.       DISCONTINUED  2/19 - [facility] FAN is unable to screen pt d/t pt's insurance not being a discharge barrier; requested that SW provide the patient with the resources on the CM/FAN tipsheet. ([place], [facility] and the CBO for [facility])     Private pay costs discussed: Not applicable    Discussed  Partnership in Safe Discharge Planning  document with patient/family: No     Handoff Completed: No, handoff not indicated or clinically appropriate    Additional Information:  SW received Staff Message from [facility], stating that FAN is unable to screen pt d/t pt's insurance not being a discharge barrier; requested that SW provide the patient with the resources on the CM/FAN tipsheet. ([place], [facility] and the CBO for [facility]).    [employer] [employer] [place], How to Apply for [facility] Benefits, and [facility] contact info resources added to pt's Discharge Instructions.    During IDT rounds, it was reported that pt  "\"will be here for a while\" and that he is currently requiring an assist of 3.    SW confirmed with [facility] Liaison that [facility]U is not able to accommodate an A3.    SW met with pt at bedside to discuss the above information. Pt stated that he is only requiring an assist of 2 people and does not recall 3 nursing staff assisting him at the same time. PT entered room during discussion and confirmed that pt is an assist of 2 and only needs A2 with nursing cares. PT agreeable to communicate pt's LOC wit nursing staff. SW assured pt that SW will reach out to  ARU once Hospitalist confirms when pt will be medically ready to discharge.     SW communicated pt's mobility status, per PT, to Dr. Ace, LJ Subramanian, and [facility] Liaison.    Dr. Ace and LJ Subramanian, both confirmed that pt is medically stable to discharge. [facility] Liaison, Marcia Maynard, confirmed that pt is appropriate for [facility], will start insurance auth today.    ANA met with pt and spouse at bedside, provided update of the above information. SW provided spouse with resources for [employer] [employer] [place], How to Apply for [facility] Benefits, and [facility] contact info, explained reason why FAN will not be reaching out during pt's hospitalization to screen for MA. SW to update pt and spouse once SW receives update from [facility].    1556: [facility] Liaison updated SW that [facility] anticipates having a bed for pt tomorrow late afternoon, will confirm with SW tomorrow morning.        Next Steps: SW to coordinate pt's [facility] ARU admission once [facility] Liasion requests the roll-over.          Ewa Rashid, LEMUELW, LSW  [facility]   PHONE: [phone]    SW Coverage SEARCHABLE in [employer] - search [facility] SW  (or by name)  Or click on link below:  [facility] Vocera                "

## 2025-02-20 NOTE — PROGRESS NOTES
Orthopedic Surgery Progress Note 02/20/2025    S: No acute events overnight. Pain controlled. Voiding spontaneously.  Pain well controlled. Denies any new concerns. Denies a bowel movement since surgery. Passing flatulence.     O:  Temp: 98.7  F (37.1  C) Temp src: Oral BP: 119/63 Pulse: 90   Resp: 16 SpO2: 95 % O2 Device: None (Room air)      Exam:  Gen: alert and oriented, responds to questions appropriately  Resp: non-labored on RA  MSK:  Gen: No acute distress, resting comfortably in bed.  Resp: Non-labored breathing  Cardio: Regular rate via peripheral pulse  MSK:     RLE:  - Dressings c/d/I, Prevena vac in place holding suction  - Fires Quad, TA, GSC, EHL, FHL  - SILT femoral/tibial/sural/saphenous/DP/SP nerves  - PT/DP pulses 1+, foot wwp     Drain output: 100 ml output over 24 hrs from HUGO, 0 ml from Prevena.    Recent Labs   Lab 02/19/25  0555 02/18/25  0555 02/17/25  1600 02/17/25  0652   WBC 7.6 10.1  --  10.4   HGB 8.1* 8.0* 7.7* 6.9*    271  --  229       Assessment: Gilberto Lund is a 44 year old male s/p R internal hemipelvectomy on 2/14/25 with Dr. Bravo. Doing well.       - Restrictions: No valsalva, no contractions of abdominal wall  - Repeat Hgb transfuse if <7, currently asymptomatic  - Pending ARU, appreciate SW/CC assistance     Plan:  Ortho Primary  Activity: Up with assist and assistive devices as needed until independent. No hip flexion past 45 degrees. No Valsalva or contractions of abdominal wall to decrease stress on repair.  Weight bearing status: NWB RLE   Antibiotics: Ancef while in house then discharge on Cefadroxil x 2 weeks  Diet: Begin with clear fluids and progress diet as tolerated. Bowel regimen. Anti-emetics PRN.    DVT prophylaxis:  mg every day   Elevation: elevate RLE  as needed, may use foam ramp   Wound Care: Prevena to remain in place until POD #14, managed by ortho  Drains: monitor drain output. Strip and empty drain per shift protocol. Discontinue  drain when output < 20 ml per shift   Michelle: keep in place until POD #2-3  Pain management: Orals PRN, IV for breakthrough only  X-rays: to be completed in PACU  Physical Therapy:  Mobilization, ROM, ADL's  Occupational Therapy:  ADL's  Labs:  Trend Hgb on PODs #1 & 2     Consults: PT, OT. Hospitalist, appreciate assistance in caring for this patient throughout the perioperative period     Disposition:  Pending progress with therapies, pain control on orals, and medical stability, anticipate discharge   Follow up: Plan for follow up with Dr. Bravo 4 weeks.        George Boone MD  Adult Reconstruction Fellow

## 2025-02-20 NOTE — PROGRESS NOTES
[facility]    Medicine Progress Note - Hospitalist Service, GOLD TEAM 19    Date of Admission:  2/14/2025    Assessment & Plan   Gilberto Lund is a 44 year old male admitted on 2/14/2025. He he has a past medical history including cirrhosis, history of kidney stones, testicular cancer status post orchiectomy and chemotherapy in 2008.  He is hospitalized for combined surgery requiring orthopedics and urology.  Postoperatively on 2/14/2025 he was transferred to the ICU for hemodynamic instability and undifferentiated shock.  He is now hemodynamically stable and being downgraded to the medical floor by the ICU.  Medicine asked to consult for Co. medical management.     Interval changes 2/20    -Patient is an assist of 2 persons with transfer and mobility.  - no bowel movement since Thursday, dose of senna increased to 2 tablets BID and miralax increased to twice daily  Hemoglobin posttransfusion stable.  Went up from 6.9-8.1 yesterday  He has not needed Dilaudid IV since yesterday for pain control.  Denies any shortness of breath, there is no any nausea abdominal pain vomiting or diarrhea.  Medically stable for discharge    # Postoperative hypotension, resolved  # Undifferentiated shock  # Acute blood loss anemia  -Multifactorial 1.5 L of estimated blood loss intraoperatively.  -Status post IV fluids and 1 L of albumin  -He now remains hemodynamically stable  -Hemoglobin 2/19/2025 was 8.1.     #s/p right modified radical hemipelvectomy,, on ancef while inpatient, will be discharged on cefadroxil x2 weeks   # RLE numbness  - HUGO/woundvac in place  Plan:  Ortho Primary  Activity: Up with assist and assistive devices as needed until independent. No hip flexion past 45 degrees. No Valsalva or contractions of abdominal wall to decrease stress on repair.  Weight bearing status: NWB RLE   Antibiotics: Ancef while in house  Diet: Begin with clear fluids and progress diet as  "tolerated. Bowel regimen. Anti-emetics PRN.    DVT prophylaxis:  mg every day   Elevation: elevate RLE  as needed, may use foam ramp   Wound Care: Prevena to remain in place until POD #14, managed by ortho  Drains: monitor drain output. Strip and empty drain per shift protocol. 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 Patient now s/p right modified radical hemipelvectomy of chondrosarcoma with Dr. Bravo.  - Plan per Musculoskeletal section  -Urology consulted          Diet: Advance Diet as Tolerated: Regular Diet Adult; Regular Diet Adult    DVT Prophylaxis: Defer to primary service  Michelle Catheter: Not present  Lines: None     Cardiac Monitoring: None  Code Status: Full Code      Clinically Significant Risk Factors                              # Obesity: Estimated body mass index is 32.58 kg/m  as calculated from the following:    Height as of this encounter: 1.854 m (6' 1\").    Weight as of this encounter: 112 kg (246 lb 14.6 oz).      # Financial/Environmental Concerns: none         Social Drivers of Health    Tobacco Use: Medium Risk (2/14/2025)    Patient History     Smoking Tobacco Use: Former     Smokeless Tobacco Use: Former   Social Connections: Unknown (9/30/2024)    Social Connection and Isolation Panel [NHANES]     Frequency of Social Gatherings with Friends and Family: Once a week          Disposition Plan     Medically Ready for " Discharge: Anticipated in 2-4 Days             Dominick Ace MD  Hospitalist Service, GOLD TEAM 19  [facility]  Securely message with [employer] (more info)  Text page via [facility] Paging/Directory   See signed in provider for up to date coverage information  ______________________________________________________________________    Interval History   Patient seen and evaluated.  No any acute events  Folate catheter was removed yesterday patient is able to void spontaneously.  A unit of PRBC was transfused for hemoglobin 6.9.  Denies any dizziness or shortness of breath.  Physical Exam   Vital Signs: Temp: 98.7  F (37.1  C) Temp src: Oral BP: 119/63 Pulse: 90   Resp: 16 SpO2: 95 % O2 Device: None (Room air)    Weight: 246 lbs 14.64 oz    General Appearance: Appears comfortable.  Respiratory: Without wheezes rhonchi or rales.  CTA  Cardiovascular: RRR, without murmurs  GI: Soft, nontender, plus BS  Skin: Without obvious bleeding, bruising or excoriations      Medical Decision Making       59 MINUTES SPENT BY ME on the date of service doing chart review, history, exam, documentation & further activities per the note.      Data   CBC RESULTS:   Recent Labs   Lab Test 02/18/25  0555   WBC 10.1   RBC 2.51*   HGB 8.0*   HCT 23.3*   MCV 93   MCH 31.9   MCHC 34.3   RDW 13.3        Last Comprehensive Metabolic Panel:  Lab Results   Component Value Date     02/19/2025    POTASSIUM 4.0 02/19/2025    CHLORIDE 98 02/19/2025    CO2 30 (H) 02/19/2025    ANIONGAP 8 02/19/2025     (H) 02/19/2025    BUN 13.1 02/19/2025    CR 0.90 02/19/2025    GFRESTIMATED >90 02/19/2025    CANDICE 8.7 (L) 02/19/2025

## 2025-02-20 NOTE — PLAN OF CARE
Goal Outcome Evaluation:      Plan of Care Reviewed With: patient, spouse          Outcome Evaluation: Pt will d/c to [facility] once auth is received and bed is available.

## 2025-02-21 ENCOUNTER — HOSPITAL ENCOUNTER (INPATIENT)
Facility: CLINIC | Age: 45
LOS: 11 days | Discharge: HOME HEALTH | DRG: 949 | End: 2025-03-04
Attending: PHYSICAL MEDICINE & REHABILITATION | Admitting: PHYSICAL MEDICINE & REHABILITATION
Payer: COMMERCIAL

## 2025-02-21 ENCOUNTER — APPOINTMENT (OUTPATIENT)
Dept: OCCUPATIONAL THERAPY | Facility: CLINIC | Age: 45
DRG: 498 | End: 2025-02-21
Attending: ORTHOPAEDIC SURGERY
Payer: COMMERCIAL

## 2025-02-21 VITALS
DIASTOLIC BLOOD PRESSURE: 68 MMHG | WEIGHT: 251.99 LBS | RESPIRATION RATE: 18 BRPM | BODY MASS INDEX: 33.4 KG/M2 | OXYGEN SATURATION: 96 % | HEIGHT: 73 IN | HEART RATE: 88 BPM | SYSTOLIC BLOOD PRESSURE: 117 MMHG | TEMPERATURE: 97.6 F

## 2025-02-21 DIAGNOSIS — C41.4 PRIMARY CHONDROSARCOMA OF BONE OF PELVIS (H): Primary | ICD-10-CM

## 2025-02-21 DIAGNOSIS — Z98.890 STATUS POST SURGERY: ICD-10-CM

## 2025-02-21 LAB
ANION GAP SERPL CALCULATED.3IONS-SCNC: 8 MMOL/L (ref 7–15)
BUN SERPL-MCNC: 15.9 MG/DL (ref 6–20)
CALCIUM SERPL-MCNC: 8.6 MG/DL (ref 8.8–10.4)
CHLORIDE SERPL-SCNC: 100 MMOL/L (ref 98–107)
CREAT SERPL-MCNC: 0.88 MG/DL (ref 0.67–1.17)
EGFRCR SERPLBLD CKD-EPI 2021: >90 ML/MIN/1.73M2
ERYTHROCYTE [DISTWIDTH] IN BLOOD BY AUTOMATED COUNT: 13.1 % (ref 10–15)
GLUCOSE SERPL-MCNC: 116 MG/DL (ref 70–99)
HCO3 SERPL-SCNC: 29 MMOL/L (ref 22–29)
HCT VFR BLD AUTO: 24.6 % (ref 40–53)
HGB BLD-MCNC: 7.9 G/DL (ref 13.3–17.7)
MAGNESIUM SERPL-MCNC: 2 MG/DL (ref 1.7–2.3)
MCH RBC QN AUTO: 30.2 PG (ref 26.5–33)
MCHC RBC AUTO-ENTMCNC: 32.1 G/DL (ref 31.5–36.5)
MCV RBC AUTO: 94 FL (ref 78–100)
PATH REPORT.COMMENTS IMP SPEC: ABNORMAL
PATH REPORT.COMMENTS IMP SPEC: ABNORMAL
PATH REPORT.COMMENTS IMP SPEC: YES
PATH REPORT.FINAL DX SPEC: ABNORMAL
PATH REPORT.GROSS SPEC: ABNORMAL
PATH REPORT.MICROSCOPIC SPEC OTHER STN: ABNORMAL
PATH REPORT.RELEVANT HX SPEC: ABNORMAL
PATHOLOGY SYNOPTIC REPORT: ABNORMAL
PHOSPHATE SERPL-MCNC: 3.5 MG/DL (ref 2.5–4.5)
PHOTO IMAGE: ABNORMAL
PLATELET # BLD AUTO: 360 10E3/UL (ref 150–450)
POTASSIUM SERPL-SCNC: 3.9 MMOL/L (ref 3.4–5.3)
RBC # BLD AUTO: 2.62 10E6/UL (ref 4.4–5.9)
SODIUM SERPL-SCNC: 137 MMOL/L (ref 135–145)
WBC # BLD AUTO: 9 10E3/UL (ref 4–11)

## 2025-02-21 PROCEDURE — 99232 SBSQ HOSP IP/OBS MODERATE 35: CPT | Performed by: INTERNAL MEDICINE

## 2025-02-21 PROCEDURE — 99222 1ST HOSP IP/OBS MODERATE 55: CPT | Mod: AI | Performed by: PHYSICIAN ASSISTANT

## 2025-02-21 PROCEDURE — 36415 COLL VENOUS BLD VENIPUNCTURE: CPT

## 2025-02-21 PROCEDURE — 85018 HEMOGLOBIN: CPT

## 2025-02-21 PROCEDURE — 97535 SELF CARE MNGMENT TRAINING: CPT | Mod: GO

## 2025-02-21 PROCEDURE — 97530 THERAPEUTIC ACTIVITIES: CPT | Mod: GO

## 2025-02-21 PROCEDURE — 250N000013 HC RX MED GY IP 250 OP 250 PS 637: Performed by: PHYSICIAN ASSISTANT

## 2025-02-21 PROCEDURE — 84100 ASSAY OF PHOSPHORUS: CPT

## 2025-02-21 PROCEDURE — 250N000013 HC RX MED GY IP 250 OP 250 PS 637: Performed by: INTERNAL MEDICINE

## 2025-02-21 PROCEDURE — 82565 ASSAY OF CREATININE: CPT

## 2025-02-21 PROCEDURE — 83735 ASSAY OF MAGNESIUM: CPT

## 2025-02-21 PROCEDURE — 80048 BASIC METABOLIC PNL TOTAL CA: CPT

## 2025-02-21 PROCEDURE — 250N000013 HC RX MED GY IP 250 OP 250 PS 637

## 2025-02-21 PROCEDURE — 250N000013 HC RX MED GY IP 250 OP 250 PS 637: Performed by: STUDENT IN AN ORGANIZED HEALTH CARE EDUCATION/TRAINING PROGRAM

## 2025-02-21 PROCEDURE — 250N000011 HC RX IP 250 OP 636: Mod: JZ | Performed by: STUDENT IN AN ORGANIZED HEALTH CARE EDUCATION/TRAINING PROGRAM

## 2025-02-21 PROCEDURE — 250N000013 HC RX MED GY IP 250 OP 250 PS 637: Performed by: PHYSICAL MEDICINE & REHABILITATION

## 2025-02-21 PROCEDURE — 128N000003 HC R&B REHAB

## 2025-02-21 RX ORDER — FERROUS SULFATE 325(65) MG
325 TABLET ORAL DAILY
Status: DISCONTINUED | OUTPATIENT
Start: 2025-02-21 | End: 2025-02-21 | Stop reason: HOSPADM

## 2025-02-21 RX ORDER — AMOXICILLIN 250 MG
1 CAPSULE ORAL 2 TIMES DAILY PRN
Qty: 60 TABLET | Refills: 0 | Status: ON HOLD | DISCHARGE
Start: 2025-02-21

## 2025-02-21 RX ORDER — MAGNESIUM OXIDE 400 MG/1
400 TABLET ORAL EVERY 4 HOURS
Status: DISCONTINUED | OUTPATIENT
Start: 2025-02-21 | End: 2025-02-21 | Stop reason: HOSPADM

## 2025-02-21 RX ORDER — METHOCARBAMOL 750 MG/1
750 TABLET, FILM COATED ORAL 4 TIMES DAILY
Status: DISCONTINUED | OUTPATIENT
Start: 2025-02-21 | End: 2025-03-04 | Stop reason: HOSPADM

## 2025-02-21 RX ORDER — NALOXONE HYDROCHLORIDE 0.4 MG/ML
0.4 INJECTION, SOLUTION INTRAMUSCULAR; INTRAVENOUS; SUBCUTANEOUS
Status: DISCONTINUED | OUTPATIENT
Start: 2025-02-21 | End: 2025-03-04 | Stop reason: HOSPADM

## 2025-02-21 RX ORDER — HYDROCODONE BITARTRATE AND ACETAMINOPHEN 5; 325 MG/1; MG/1
1 TABLET ORAL EVERY 4 HOURS PRN
Status: DISCONTINUED | OUTPATIENT
Start: 2025-02-21 | End: 2025-02-21

## 2025-02-21 RX ORDER — HYDROXYZINE HYDROCHLORIDE 25 MG/1
25 TABLET, FILM COATED ORAL EVERY 6 HOURS PRN
Status: DISCONTINUED | OUTPATIENT
Start: 2025-02-21 | End: 2025-03-04 | Stop reason: HOSPADM

## 2025-02-21 RX ORDER — ACETAMINOPHEN 325 MG/1
650 TABLET ORAL EVERY 4 HOURS PRN
Status: DISCONTINUED | OUTPATIENT
Start: 2025-02-21 | End: 2025-02-21

## 2025-02-21 RX ORDER — GABAPENTIN 100 MG/1
100 CAPSULE ORAL 3 TIMES DAILY
Status: ON HOLD | DISCHARGE
Start: 2025-02-21

## 2025-02-21 RX ORDER — ASPIRIN 81 MG/1
162 TABLET ORAL DAILY
Status: DISCONTINUED | OUTPATIENT
Start: 2025-02-22 | End: 2025-03-04 | Stop reason: HOSPADM

## 2025-02-21 RX ORDER — OXYCODONE HYDROCHLORIDE 5 MG/1
10 TABLET ORAL EVERY 4 HOURS PRN
Status: DISCONTINUED | OUTPATIENT
Start: 2025-02-21 | End: 2025-03-04 | Stop reason: HOSPADM

## 2025-02-21 RX ORDER — ASPIRIN 81 MG/1
162 TABLET ORAL DAILY
Qty: 60 TABLET | Refills: 0 | Status: ON HOLD | DISCHARGE
Start: 2025-02-22 | End: 2025-03-24

## 2025-02-21 RX ORDER — POLYETHYLENE GLYCOL 3350 17 G/17G
17 POWDER, FOR SOLUTION ORAL DAILY
Qty: 510 G | Refills: 0 | Status: ON HOLD | DISCHARGE
Start: 2025-02-21

## 2025-02-21 RX ORDER — ONDANSETRON 4 MG/1
4 TABLET, FILM COATED ORAL EVERY 6 HOURS PRN
Status: DISCONTINUED | OUTPATIENT
Start: 2025-02-21 | End: 2025-03-04 | Stop reason: HOSPADM

## 2025-02-21 RX ORDER — CEFADROXIL 500 MG/1
500 CAPSULE ORAL 2 TIMES DAILY
Qty: 28 CAPSULE | Refills: 0 | Status: ON HOLD | DISCHARGE
Start: 2025-02-21 | End: 2025-03-07

## 2025-02-21 RX ORDER — CEFADROXIL 500 MG/1
500 CAPSULE ORAL 2 TIMES DAILY
Status: DISCONTINUED | OUTPATIENT
Start: 2025-02-21 | End: 2025-03-04 | Stop reason: HOSPADM

## 2025-02-21 RX ORDER — POLYETHYLENE GLYCOL 3350 17 G/17G
17 POWDER, FOR SOLUTION ORAL 2 TIMES DAILY
Status: DISCONTINUED | OUTPATIENT
Start: 2025-02-21 | End: 2025-02-27

## 2025-02-21 RX ORDER — METHOCARBAMOL 750 MG/1
750 TABLET, FILM COATED ORAL EVERY 6 HOURS PRN
Qty: 30 TABLET | Refills: 0 | Status: ON HOLD | DISCHARGE
Start: 2025-02-21

## 2025-02-21 RX ORDER — NALOXONE HYDROCHLORIDE 0.4 MG/ML
0.2 INJECTION, SOLUTION INTRAMUSCULAR; INTRAVENOUS; SUBCUTANEOUS
Status: DISCONTINUED | OUTPATIENT
Start: 2025-02-21 | End: 2025-03-04 | Stop reason: HOSPADM

## 2025-02-21 RX ORDER — ACETAMINOPHEN 325 MG/1
650 TABLET ORAL EVERY 4 HOURS PRN
Qty: 100 TABLET | Refills: 0 | Status: ON HOLD | DISCHARGE
Start: 2025-02-21

## 2025-02-21 RX ORDER — HYDROCODONE BITARTRATE AND ACETAMINOPHEN 5; 325 MG/1; MG/1
2 TABLET ORAL EVERY 4 HOURS PRN
Status: DISCONTINUED | OUTPATIENT
Start: 2025-02-21 | End: 2025-02-21

## 2025-02-21 RX ORDER — OXYCODONE HYDROCHLORIDE 5 MG/1
5 TABLET ORAL EVERY 4 HOURS PRN
Status: DISCONTINUED | OUTPATIENT
Start: 2025-02-21 | End: 2025-03-04 | Stop reason: HOSPADM

## 2025-02-21 RX ORDER — FERROUS SULFATE 325(65) MG
325 TABLET ORAL DAILY
Status: ON HOLD | DISCHARGE
Start: 2025-02-21

## 2025-02-21 RX ORDER — HYDROXYZINE HYDROCHLORIDE 25 MG/1
25 TABLET, FILM COATED ORAL EVERY 6 HOURS PRN
Qty: 30 TABLET | Status: ON HOLD | DISCHARGE
Start: 2025-02-21

## 2025-02-21 RX ORDER — ACETAMINOPHEN 325 MG/1
650 TABLET ORAL
Status: DISCONTINUED | OUTPATIENT
Start: 2025-02-21 | End: 2025-03-04 | Stop reason: HOSPADM

## 2025-02-21 RX ORDER — AMOXICILLIN 250 MG
1 CAPSULE ORAL 2 TIMES DAILY
Status: DISCONTINUED | OUTPATIENT
Start: 2025-02-21 | End: 2025-03-04 | Stop reason: HOSPADM

## 2025-02-21 RX ORDER — METHOCARBAMOL 750 MG/1
750 TABLET, FILM COATED ORAL EVERY 6 HOURS PRN
Status: DISCONTINUED | OUTPATIENT
Start: 2025-02-21 | End: 2025-02-21

## 2025-02-21 RX ORDER — GABAPENTIN 100 MG/1
100 CAPSULE ORAL 3 TIMES DAILY
Status: DISCONTINUED | OUTPATIENT
Start: 2025-02-21 | End: 2025-02-28

## 2025-02-21 RX ORDER — HYDROCODONE BITARTRATE AND ACETAMINOPHEN 5; 325 MG/1; MG/1
2 TABLET ORAL EVERY 4 HOURS PRN
Qty: 26 TABLET | Refills: 0 | Status: ON HOLD | OUTPATIENT
Start: 2025-02-21

## 2025-02-21 RX ADMIN — CEFADROXIL 500 MG: 500 CAPSULE ORAL at 20:36

## 2025-02-21 RX ADMIN — HYDROXYZINE HYDROCHLORIDE 25 MG: 25 TABLET, FILM COATED ORAL at 07:43

## 2025-02-21 RX ADMIN — METHOCARBAMOL 750 MG: 750 TABLET ORAL at 17:15

## 2025-02-21 RX ADMIN — OXYCODONE HYDROCHLORIDE 10 MG: 5 TABLET ORAL at 19:23

## 2025-02-21 RX ADMIN — HYDROCODONE BITARTRATE AND ACETAMINOPHEN 2 TABLET: 5; 325 TABLET ORAL at 05:16

## 2025-02-21 RX ADMIN — GABAPENTIN 100 MG: 100 CAPSULE ORAL at 13:36

## 2025-02-21 RX ADMIN — Medication 10 MG: at 20:38

## 2025-02-21 RX ADMIN — POLYETHYLENE GLYCOL 3350 17 G: 17 POWDER, FOR SOLUTION ORAL at 20:36

## 2025-02-21 RX ADMIN — CEFAZOLIN SODIUM 2 G: 2 INJECTION, SOLUTION INTRAVENOUS at 00:48

## 2025-02-21 RX ADMIN — GABAPENTIN 100 MG: 100 CAPSULE ORAL at 07:34

## 2025-02-21 RX ADMIN — GABAPENTIN 100 MG: 100 CAPSULE ORAL at 20:35

## 2025-02-21 RX ADMIN — ASPIRIN 162 MG: 81 TABLET, COATED ORAL at 07:34

## 2025-02-21 RX ADMIN — SENNOSIDES AND DOCUSATE SODIUM 1 TABLET: 50; 8.6 TABLET ORAL at 20:35

## 2025-02-21 RX ADMIN — MAGNESIUM OXIDE TAB 400 MG (241.3 MG ELEMENTAL MG) 400 MG: 400 (241.3 MG) TAB at 11:18

## 2025-02-21 RX ADMIN — METHOCARBAMOL 750 MG: 750 TABLET ORAL at 11:18

## 2025-02-21 RX ADMIN — ACETAMINOPHEN 650 MG: 325 TABLET, FILM COATED ORAL at 17:15

## 2025-02-21 RX ADMIN — METHOCARBAMOL 750 MG: 750 TABLET ORAL at 20:35

## 2025-02-21 RX ADMIN — HYDROXYZINE HYDROCHLORIDE 25 MG: 25 TABLET, FILM COATED ORAL at 16:38

## 2025-02-21 RX ADMIN — POLYETHYLENE GLYCOL 3350 17 G: 17 POWDER, FOR SOLUTION ORAL at 07:35

## 2025-02-21 RX ADMIN — METHOCARBAMOL 750 MG: 750 TABLET ORAL at 05:23

## 2025-02-21 RX ADMIN — CEFAZOLIN SODIUM 2 G: 2 INJECTION, SOLUTION INTRAVENOUS at 07:44

## 2025-02-21 RX ADMIN — HYDROCODONE BITARTRATE AND ACETAMINOPHEN 2 TABLET: 5; 325 TABLET ORAL at 00:44

## 2025-02-21 RX ADMIN — HYDROCODONE BITARTRATE AND ACETAMINOPHEN 2 TABLET: 5; 325 TABLET ORAL at 15:10

## 2025-02-21 RX ADMIN — HYDROCODONE BITARTRATE AND ACETAMINOPHEN 2 TABLET: 5; 325 TABLET ORAL at 09:47

## 2025-02-21 ASSESSMENT — ACTIVITIES OF DAILY LIVING (ADL)
ADLS_ACUITY_SCORE: 52
ADLS_ACUITY_SCORE: 44
ADLS_ACUITY_SCORE: 52
ADLS_ACUITY_SCORE: 52
ADLS_ACUITY_SCORE: 44
ADLS_ACUITY_SCORE: 52
ADLS_ACUITY_SCORE: 44
ADLS_ACUITY_SCORE: 44
ADLS_ACUITY_SCORE: 40
ADLS_ACUITY_SCORE: 44
ADLS_ACUITY_SCORE: 44
ADLS_ACUITY_SCORE: 52
ADLS_ACUITY_SCORE: 44
ADLS_ACUITY_SCORE: 61
ADLS_ACUITY_SCORE: 52
ADLS_ACUITY_SCORE: 44
ADLS_ACUITY_SCORE: 44

## 2025-02-21 NOTE — PROGRESS NOTES
2/21/25: CHW delegated has forward a notarized copy of the HCD to [employer]. A copy has been filed in the Pt chart, and the original and a copy was given to the patient.         Suri Salcedo  Inpatient CHW  [facility] [address]/8A/ICU/ED  [phone]

## 2025-02-21 NOTE — DISCHARGE SUMMARY
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 HEMIPELVECTOMY, MODIFIED INTERNAL, 2. Right hip girdlestone resection, 3. Right  RETROPERITONEAL DISSECTION  on 2/14/2025    BRIEF HISTORY:  Gilberto Lund is a 44 year old male admitted on 2/14/2025 with PMH including cirrhosis, history of kidney stones, testicular cancer status post orchiectomy and chemotherapy in 2008. He is hospitalized for combined surgery requiring orthopedics and urology. Postoperatively on 2/14/2025 he was transferred to the ICU for hemodynamic instability and undifferentiated shock. He is now hemodynamically stable and on a medical floor.     HOSPITAL COURSE:    The patient was admitted following the above listed procedures for pain control and rehabilitation. Gilberto Lund did well post-operatively. Medicine was consulted post operatively to aid in management of medical co-morbidities.  EBL at surgery 1400 ml. Hgb 6.9 on 2/17/25 and received 1 unit pRBC. Hgb is still on the lower side but stable. Started on Ferrous sulfate today. The patient received routine nursing cares and at the time of discharge was medically stable. Vital signs were stable for discharge. The patient is tolerating a regular diet and is voiding spontaneously. All PT/OT goals have been met for safe mobility. Pain is now controlled on oral medications which will be available on discharge. Stool softeners have been used while taking pain medications to help prevent constipation. Gilberto Lund is deemed medically safe to discharge to ARU for further rehabilitation.     Antibiotics: Ancef 2 gm given periop and x 7 days postop.   DVT prophylaxis:  mg daily initiated after surgery and will be  continued for 4 weeks.   PT Progress: Has met PT/OT goals for safe mobility to transition to ARU  Pain Meds: Weaned off all IV pain meds by discharge.  Inpatient Events: No significant events or complications.     PHYSICAL EXAM:    Exam:  Gen: No acute distress, resting comfortably in bed.  Resp: Non-labored breathing  Cardio: Regular rate via peripheral pulse  MSK:  RLE:  Dressings c/d/i,   Fires Quad, TA, GSC, EHL, FHL  SILT femoral/tibial/sural/saphenous/DP/SP nerves, PT/DP pulses 2+, foot wwp   Prevena vac in place holding suction-output 0  HUGO Drain output:      FOLLOWUP:    Follow up with Dr. Bravo at the clinic in 4 weeks postoperatively.    Future Appointments   Date Time Provider Department Center   2/21/2025  2:30 PM [facility], [facility]   3/17/2025  2:20 PM Juan Jose Bravo MD [facility]     Orthopaedic Surgery appointments are at the [facility] ([address] [place] [address]). Call [phone]  to schedule a follow-up appointment at this location with your provider.     PLANNED DISCHARGE ORDERS:      Current Discharge Medication List        START taking these medications    Details   acetaminophen (TYLENOL) 325 MG tablet Take 2 tablets (650 mg) by mouth every 4 hours as needed for mild pain or fever.  Qty: 100 tablet, Refills: 0    Associated Diagnoses: Status post surgery      aspirin 81 MG EC tablet Take 2 tablets (162 mg) by mouth daily.  Qty: 60 tablet, Refills: 0    Associated Diagnoses: Status post surgery      cefadroxil (DURICEF) 500 MG capsule Take 1 capsule (500 mg) by mouth 2 times daily for 14 days.  Qty: 28 capsule, Refills: 0    Associated Diagnoses: Status post surgery      HYDROcodone-acetaminophen (NORCO) 5-325 MG tablet Take 2 tablets by mouth every 4 hours as needed for severe pain.  Qty: 26 tablet, Refills: 0    Associated Diagnoses: Status post surgery      hydrOXYzine HCl (ATARAX) 25 MG tablet Take 1 tablet (25 mg) by mouth  "every 6 hours as needed for other (adjuvant pain).  Qty: 30 tablet    Associated Diagnoses: Status post surgery      methocarbamol (ROBAXIN) 750 MG tablet Take 1 tablet (750 mg) by mouth every 6 hours as needed for muscle spasms.  Qty: 30 tablet, Refills: 0    Associated Diagnoses: Status post surgery      polyethylene glycol (MIRALAX) 17 GM/Dose powder Take 17 g by mouth daily.  Qty: 510 g, Refills: 0    Associated Diagnoses: Status post surgery      senna-docusate (SENOKOT-S/PERICOLACE) 8.6-50 MG tablet Take 1 tablet by mouth 2 times daily as needed for constipation.  Qty: 60 tablet, Refills: 0    Associated Diagnoses: Status post surgery           STOP taking these medications       naproxen sodium 220 MG capsule Comments:   Reason for Stopping:                 Discharge Procedure Orders   Primary Care - Care Coordination Referral   Standing Status: Future   Referral Priority: Routine: Next available opening Referral Type: Care Coordination   Number of Visits Requested: 1     Primary Care - Care Coordination Referral   Standing Status: Future   Referral Priority: Routine: Next available opening Referral Type: Care Coordination   Number of Visits Requested: 1     General info for SNF   Order Comments: Length of Stay Estimate: Short Term Care: Estimated # of Days <30 Condition at Discharge: Stable Level of care:skilled  Rehabilitation Potential: Good Admission H&P remains valid and up-to-date: Yes Recent Chemotherapy: N/A Use Nursing Home Standing Orders: Yes     Mantoux Instructions   Order Comments: Give two-step Mantoux (PPD) Per Facility Policy {.:[id number]     Incentive Spirometry   Order Comments: Incentive Spirometry 10 times per hour, 4 times per day.     Reason for your hospital stay   Order Comments: R internal hemipelvectomy     When to call - Contact Surgeon Team   Order Comments: You may experience symptoms that require follow-up before your scheduled appointment. Refer to the \"Stoplight Tool\" for " instructions on when to contact your Surgeon Team if you are concerned about pain control, blood clots, constipation, or if you are unable to urinate.     When to call - Reach out to Urgent Care   Order Comments: If you are not able to reach your Surgeon Team and you need immediate care, go to the Orthopedic Walk-in Clinic or Urgent Care at your Surgeon's office.  Do NOT go to the Emergency Room unless you have shortness of breath, chest pain, or other signs of a medical emergency.     When to call - Reasons to Call 911   Order Comments: Call 911 immediately if you experience sudden-onset chest pain, arm weakness/numbness, slurred speech, or shortness of breath     Symptoms - Fever Management   Order Comments: A low grade fever can be expected after surgery.  Use acetaminophen (TYLENOL) as needed for fever management.  Contact your Surgeon Team if you have a fever greater than 101.5 F, chills, and/or night sweats.     Symptoms - Constipation management   Order Comments: Constipation (hard, dry bowel movements) is expected after surgery due to the combination of being less active, the anesthetic, and the opioid pain medication.  You can do the following to help reduce constipation:  ~  FLUIDS:  Drink clear liquids (water or Gatorade), or juice (apple/prune).  ~  DIET:  Eat a fiber rich diet.    ~  ACTIVITY:  Get up and move around several times a day.  Increase your activity as you are able.  MEDICATIONS:  Reduce the risk of constipation by starting medications before you are constipated.  You can take Miralax   (1 packet as directed) and/or a stool softener (Senokot 1-2 tablets 1-2 times a day).  If you already have constipation and these medications are not working, you can get magnesium citrate and use as directed.  If you continue to have constipation you can try an over the counter suppository or enema.  Call your Surgeon Team if it has been greater than 3 days since your last bowel movement.     Symptoms -  Reduced Urine Output   Order Comments: Changes in the amount of fluids you drank before and after surgery may result in problems urinating.  It is important to stay well-hydrated after surgery and drink plenty of water. If it has been greater than 8 hours since you have urinated despite drinking plenty of water, call your Surgeon Team.     Activity - Exercises to prevent blood clots   Order Comments: Unless otherwise directed by your Surgeon team, perform the following exercises at least three times per day for the first four weeks after surgery to prevent blood clots in your legs: 1) Point and flex your feet (Ankle Pumps), 2) Move your ankle around in big circles, 3) Wiggle your toes, 4) Walk, even for short distances, several times a day, will help decrease the risk of blood clots.     Order Specific Question Answer Comments   Is discharge order? Yes      Comfort and Pain Management - Pain after Surgery   Order Comments: Pain after surgery is normal and expected.  You will have some amount of pain for several weeks after surgery.  Your pain will improve with time.  There are several things you can do to help reduce your pain including: rest, ice, elevation, and using pain medications as needed. Contact your Surgeon Team if you have pain that persists or worsens after surgery despite rest, ice, elevation, and taking your medication(s) as prescribed. Contact your Surgeon Team if you have new numbness, tingling, or weakness in your operative extremity.     Comfort and Pain Management - Swelling after Surgery   Order Comments: Swelling and/or bruising of the surgical extremity is common and may persist for several months after surgery. In addition to frequent icing and elevation, gentle compressive support with an ACE wrap or tubigrip may help with swelling. Apply compression regularly, removing at least twice daily to perform skin checks. Contact your Surgeon Team if your swelling increases and is NOT associated with  an increase in your activity level, or if your swelling increases and is associated with redness and pain.     Comfort and Pain Management - Cold therapy   Order Comments: Ice can be used to control swelling and discomfort after surgery. Place a thin towel over your operative site and apply the ice pack overtop. Leave ice pack in place for 20 minutes, then remove for 20 minutes. Repeat this 20 minutes on/20 minutes off routine as often as tolerated.     Medication Instructions - Acetaminophen (TYLENOL) Instructions   Order Comments: You were discharged with acetaminophen (TYLENOL) for pain management after surgery. Acetaminophen most effectively manages pain symptoms when it is taken on a schedule without missing doses (every four, six, or eight hours). Your Provider will prescribe a safe daily dose between 3000 - 4000 mg.  Do NOT exceed this daily dose. Most patients use acetaminophen for pain control for the first four weeks after surgery.  You can wean from this medication as your pain decreases.     Medication Instructions - NSAID Instructions   Order Comments: You were discharged with an anti-inflammatory medication for pain management to use in combination with acetaminophen (TYLENOL) and the narcotic pain medication.  Take this medication exactly as directed.  You should only take one anti-inflammatory at a time.  Some common anti-inflammatories include: ibuprofen (ADVIL, MOTRIN), naproxen (ALEVE, NAPROSYN), celecoxib (CELEBREX), meloxicam (MOBIC), ketorolac (TORADOL).  Take this medication with food and water.     Follow Up Care   Order Comments: Follow-up with your Surgeon Team in 3/17/25 with Dr. Bravo for wound check.     Assess heels for pressure points     Shower with wound/dressing covered   Order Comments: You must COVER your dressing or incision with saran wrap (or any other non-permeable covering) to allow the incision to remain dry while showering. You may shower 2 days after surgery as long as the  "surgical wound stays dry. Continue to cover your dressing or incision for showering until your first office visit.  You are strictly prohibited from soaking or submerging the surgical wound underwater.     Medication instructions -  Anticoagulation - aspirin   Order Comments: Take the aspirin as prescribed for a total of four weeks after surgery.  This is given to help minimize your risk of blood clot.     Comfort and Pain Management - LOWER Extremity Elevation   Order Comments: Swelling is expected for several months after surgery. This type of swelling is usually associated with gravity and activity, and can be improved with elevation.   The best way to do this is to get your \"toes above your nose\" by laying down and placing several pillows lengthwise under your calf and heel. When elevating your leg keep your knee completely straight. Perform this elevation as often as possible especially for the first two weeks after surgery.     Activity   Order Comments: No hip restrictions. No Valsalva or contractions of abdominal wall to decrease stress on repair.  Elevate RLE  as needed, may use foam ramp     Order Specific Question Answer Comments   Is discharge order? Yes      Physical Therapy Adult Consult   Order Comments: Evaluate and treat as clinically indicated.    Reason: Status Post Hip Surgery     Occupational Therapy Adult Consult   Order Comments: Evaluate and treat as clinically indicated.    Reason: Status Post Hip Surgery     Fall precautions     Diet   Order Comments: Follow this diet upon discharge: Regular     Order Specific Question Answer Comments   Is discharge order? Yes        Juwan Milton DNP-[facility]  Orthopaedic Surgery   "

## 2025-02-21 NOTE — PHARMACY-ADMISSION MEDICATION HISTORY
Admission medication history completed at [facility]. Please see Pharmacist Admission Medication History note from 2/14/2025.    Jaquan Valladares, ArcenioD, BCPS

## 2025-02-21 NOTE — PLAN OF CARE
Physical Therapy Discharge Summary    Reason for therapy discharge:    Discharged to acute rehabilitation facility.    Progress towards therapy goal(s). See goals on Care Plan in [facility] electronic health record for goal details.  Goals partially met.  Barriers to achieving goals:   discharge from facility.    Therapy recommendation(s):    Continued therapy is recommended.  Rationale/Recommendations:  continue progression in rehab at [facility].

## 2025-02-21 NOTE — PLAN OF CARE
Occupational Therapy Discharge Summary    Reason for therapy discharge:    Discharged to acute rehabilitation facility.    Progress towards therapy goal(s). See goals on Care Plan in [facility] electronic health record for goal details.  Goals not met.  Barriers to achieving goals:   discharge from facility.    Therapy recommendation(s):    Continued therapy is recommended.  Rationale/Recommendations:  Recommend further OT therapies in ARU setting to progress pt ind and safety with ADL/IADL completion as well as mobility/transfers. Pt benefits from slower approach with explanation of session plans prior to beginning, highly motivated and very pleasant.     Precautions: Abdominal precautions, NWB RLE (no ROM restrictions)    Last OT session: Pt tolerating STS from raised bed with 1 person total A at RLE to lift off ground, and Mod A of second individual supporting at gait belt in chest region. OH lift from bed<>recliner<>BSC. Utilize extension loop on RLE strap to avoid excessive flexion to assist with pain management.

## 2025-02-21 NOTE — PROGRESS NOTES
"  VS: /68 (BP Location: Right arm)   Pulse 88   Temp 97.6  F (36.4  C) (Oral)   Resp 18   Ht 1.854 m (6' 1\")   Wt 114.3 kg (251 lb 15.8 oz)   SpO2 96%   BMI 33.25 kg/m      O2: Room air saturations 96%. Pt is able to perform use of IS with confidence and ease.    Output: Using urinal to void ap urine.    Last BM: 2/20/2025 reports patient   Activity: Pt needed to use lift and support of staff to support the right leg. Pt was given a Rooke boot to help with stabilizing position of patients leg. Pt reports \" that really helped with supporting my leg\"    Skin: Pt has a very edematous scrotum. Elevated scrotum on rolled up towel. Right hip has wound vac and J.P. to help with drainage.    Pain: Pt has been using Robaxin and Narco for pain management. Pt also very good at directing positioning to help with how he rest the leg.    CMS: Intact   Dressing: Right hip dressing are CDI   Diet: Regular. Tolerating well.    LDA: I V SL   Equipment: Lift room, lift sling, IS, wound vac and J.P. for suction. Extension on bed in place and Rooke boot for comfort and postioning.    Plan: Pt to tranfers to ARU today on bed at 12 noon.    Additional Info: Pt's healthcare directive was faxed and copied by  today. Copy was kept in the chart before transfer. Pt has wife at the bedside. His wife is an RN and very helpful with his cares. All belongings will be sent with patient to go over to ARU.       "

## 2025-02-21 NOTE — H&P
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 Bravo (ortho) and Dr. Lyman (urology).    Hospital course was further complicated by undifferentiated shock, acute blood loss anemia (required 1 unit pRBC transfusion), constipation, and pain.     During acute hospitalization, patient was seen and evaluated by PT and OT, who collectively recommended that patient would benefit from ongoing therapies in the acute inpatient rehabilitation setting.      In review of the therapy notes, he is currently requiring max A to roll and mod A x2 to achieve partial stand. He requires use of ceiling lift for bed >chair tranfers.     Upon arrival to the rehab unit, he reports that pain overall has been well-controlled but increased today, felt to be in setting of increased activity level and transfer.  Pain is primarily from right knee to hip.  He is having some spasms in right low back and leg muscles, feels robaxin has been helpful for this.  He is not sure how much relief he is getting from [place], but notes some drowsiness with it.  He feels outside of today, each day is generally getting better.  He has some baseline numbness/tingling in bilateral hands and feet, but has noted increased numbness in his right leg since even prior to surgery.  He also notes weakness in right leg.  He has intermittent dizziness/lightheadedness with position  changes.  He has generally been sleeping well but not as much last night.  Yesterday was first BM since surgery.    PAST MEDICAL HISTORY   Reviewed and updated in Epic.  Past Medical History:   Diagnosis Date    Arthritis     Chondrosarcoma (H)     CTS (carpal tunnel syndrome)     Gynecomastia, male     Hard to intubate 06/29/2022    Infection due to 2019 novel coronavirus 12/2020    Kidney stone 2015    90% calcium oxalate monohydrate, and  10% calcium oxalate dihydrate.    Primary chondrosarcoma of bone of pelvis (H)     Psoriasis     Skin tag     Testicle cancer, left 2008    surgery and chemotherapy    Thyroid nodule 12/10/2024    high FDG, noted on PET       SURGICAL HISTORY  Reviewed and updated in Epic.  Past Surgical History:   Procedure Laterality Date    ABDOMEN SURGERY      BIOPSY      BIOPSY BONE PELVIS Right 12/6/2024    Procedure: BIOPSY, BONE, PELVIS;  Surgeon: Juan Jose Bravo MD;  Location: [facility] OR    EXCISE EXOSTOSIS FOOT  10/25/2013    Procedure: EXCISE EXOSTOSIS FOOT;  Excision of calcaneal bone cyst left foot with allograft;  Surgeon: Marcello Jensen DPM;  Location: [facility] OR    LAPAROSCOPIC CHOLECYSTECTOMY N/A 06/29/2022    Procedure: CHOLECYSTECTOMY, LAPAROSCOPIC;  Surgeon: Cholo Agustin DO;  Location: [facility] OR    OPTICAL TRACKING SYSTEM HEMIPELVECTOMY Right 2/14/2025    Procedure: 1. HEMIPELVECTOMY, MODIFIED INTERNAL, 2. Right hip girdlestone resection, 3. Right  RETROPERITONEAL DISSECTION;  Surgeon: Juan Jose Bravo MD;  Location: [facility] OR    ORCHIECTOMY SCROTAL Left        SOCIAL HISTORY  Reviewed and updated in Epic.  Marital Status:   Living situation: lives with spouse and 3 dependent children (ages 9-12) in house with ramp to enter, plan to stay on main level until able to complete stairs (typically would have 16 stairs to 2nd-level bedroom)  Family support: supportive  Vocational History: works in [employer]; had been on light duty lately  Tobacco use: previously smoked cigars  regularly, more recently has been vaping, though not on a daily basis, denies any nicotine cravings/withdrawal  Alcohol use: one drink weekly  Illicit drug use: cannabis weekly  Social History     Socioeconomic History    Marital status:      Spouse name: Not on file    Number of children: Not on file    Years of education: Not on file    Highest education level: Not on file   Occupational History    Not on file   Tobacco Use    Smoking status: Former     Types: Cigarettes    Smokeless tobacco: Former     Types: Chew   Vaping Use    Vaping status: Never Used   Substance and Sexual Activity    Alcohol use: Not Currently    Drug use: No    Sexual activity: Yes     Partners: Female   Other Topics Concern    Parent/sibling w/ CABG, MI or angioplasty before 65F 55M? No   Social History Narrative    Not on file     Social Drivers of Health     Financial Resource Strain: Low Risk  (9/30/2024)    Financial Resource Strain     Within the past 12 months, have you or your family members you live with been unable to get utilities (heat, electricity) when it was really needed?: No   Food Insecurity: Low Risk  (9/30/2024)    Food Insecurity     Within the past 12 months, did you worry that your food would run out before you got money to buy more?: No     Within the past 12 months, did the food you bought just not last and you didn t have money to get more?: No   Transportation Needs: Low Risk  (9/30/2024)    Transportation Needs     Within the past 12 months, has lack of transportation kept you from medical appointments, getting your medicines, non-medical meetings or appointments, work, or from getting things that you need?: No   Physical Activity: Sufficiently Active (9/30/2024)    Exercise Vital Sign     Days of Exercise per Week: 5 days     Minutes of Exercise per Session: 150+ min   Stress: No Stress Concern Present (9/30/2024)    Citizen of the [place] of Occupational Health - Occupational Stress Questionnaire     Feeling  of Stress : Only a little   Social Connections: Unknown (9/30/2024)    Social Connection and Isolation Panel [NHANES]     Frequency of Communication with Friends and Family: Not on file     Frequency of Social Gatherings with Friends and Family: Once a week     Attends [employer] Services: Not on file     Active Member of Clubs or Organizations: Not on file     Attends Club or Organization Meetings: Not on file     Marital Status: Not on file   Interpersonal Safety: Low Risk  (2/14/2025)    Interpersonal Safety     Do you feel physically and emotionally safe where you currently live?: Yes     Within the past 12 months, have you been hit, slapped, kicked or otherwise physically hurt by someone?: No     Within the past 12 months, have you been humiliated or emotionally abused in other ways by your partner or ex-partner?: No   Housing Stability: Low Risk  (9/30/2024)    Housing Stability     Do you have housing? : Yes     Are you worried about losing your housing?: No       FAMILY HISTORY  Reviewed and updated in Epic.  Family History   Problem Relation Age of Onset    No Known Problems Mother     Hypertension Father     Atrial fibrillation Father     Thyroid Disease Father     Nephrolithiasis Father     Pulmonary Embolism Father     C.A.D. Maternal Grandfather     Thyroid Cancer No family hx of     Diabetes No family hx of     Anesthesia Reaction No family hx of          PRIOR FUNCTIONAL HISTORY   Pt was independent with all ADLs/IADLs, transfers, mobility and gait.     MEDICATIONS  Scheduled meds  Medications Prior to Admission   Medication Sig Dispense Refill Last Dose/Taking    [DISCONTINUED] naproxen sodium 220 MG capsule Take 220 mg by mouth every 12 hours as needed (pain).          ALLERGIES   No Known Allergies      REVIEW OF SYSTEMS  A 10 point ROS was performed and negative unless otherwise noted in HPI.     Constitutional: Negative for fever/chills.  Eyes: Negative for visual changes.  Ears, Nose, Throat:  "Negative for sore throat, nasal congestion.  Cardiovascular: Negative for chest pain, palpitations.  Respiratory: Negative for shortness of breath, cough.  Gastrointestinal: Positive for constipation.  Negative for abdominal pain, nausea, vomiting, diarrhea, appetite disturbance.  Genitourinary: Negative for dysuria, urgency, frequency, hesitancy, or incontinence.  Musculoskeletal: Positive for right hip/leg pain, right back/leg muscle spasms.   Neurologic: Positive for numbness/tingling in right leg/left foot/bilateral fingertips, intermittent dizziness/lightheadedness, right leg weakness.  Psychiatric: Positive for impaired sleep.  Negative for mood disturbance.      PHYSICAL EXAM  VITAL SIGNS:  There were no vitals taken for this visit.  BMI:  Estimated body mass index is 33.25 kg/m  as calculated from the following:    Height as of 2/14/25: 1.854 m (6' 1\").    Weight as of an earlier encounter on 2/21/25: 114.3 kg (251 lb 15.8 oz).     General: NAD, lying in bed  HEENT: NC/AT, MMM  Pulmonary: non-labored on room air, lungs CTA bilaterally  Cardiovascular: RRR, no murmurs appreciated  Abdominal: soft, non-tender, protuberant, bowel sounds present  Extremities: edema in proximal right lower extremity and scrotum; no edema in left lower extremity, no tenderness in calves, LUE PIV  MSK/neuro:   Mental Status:  alert and oriented x3    Cranial Nerves: grossly normal    Sensory: Normal to light touch in bilateral upper and lower extremities but with subjective numbness/tingling in right leg, left foot, bilateral fingertips   Strength: 5/5 in all muscle groups of bilateral upper and left lower extremity.  Right lower extremity with 4-/5 DF/PF, able to fire quad but not antigravity   Speech: clear/fluent   Cognition: intact to conversation, follows commands, responds appropriately   Gait: deferred  Skin: right groin/hip incision with Prevena wound vac present, no erythema noted beyond sponge/dressing, HUGO drain with " scant sanguinous drainage      LABS  CBC RESULTS:   Recent Labs   Lab Test 02/21/25  0618 02/20/25  0742 02/19/25  0555   WBC 9.0 8.2 7.6   RBC 2.62* 2.68* 2.59*   HGB 7.9* 8.3* 8.1*   HCT 24.6* 24.6* 24.2*   MCV 94 92 93   MCH 30.2 31.0 31.3   MCHC 32.1 33.7 33.5   RDW 13.1 13.2 13.2    338 315       Last Basic Metabolic Panel:  Recent Labs   Lab Test 02/21/25  0618 02/20/25  0742 02/19/25  0555    134* 136   POTASSIUM 3.9 3.9 4.0   CHLORIDE 100 97* 98   CO2 29 27 30*   ANIONGAP 8 10 8   * 122* 112*   BUN 15.9 13.5 13.1   CR 0.88 0.80 0.90   GFRESTIMATED >90 >90 >90   CANDICE 8.6* 8.7* 8.7*       EXAM: XR PELVIS PORT 1/2 VIEWS  LOCATION: [facility]  DATE: 2/14/2025     INDICATION: Status post Hip surgery  COMPARISON: CT 1/14/2025                                                                      IMPRESSION: Interval postoperative changes of resection of the right femoral head as well as significant portions of the right acetabulum and portions of the right-sided pubic rami. Plate and screw fixation and bone cement along the margins of the old   acetabulum. The left hip is negative for fracture with mild degenerative change. The remainder of the left side of the pelvis is negative.      Exam: CT PELVIS BONE WO CONTRAST 2/18/2025 10:30 AM     History:  post op CT following a right hemipelvectomy needed for  custom implant manufacture     Further history per EMR: Operative note 2/14/2025, hemipelvectomy,  status post orchiectomy, implantation of drug and routine implant,  tumor excision and retroperitoneal dissection.      Techniques: Multislice CT examination was performed of the pelvis with  multiplanar reconstructions displayed in multiple window settings.  Imaging was performed without IV contrast material.      Comparison: Pelvis x-ray 2/14/2025, CT abdomen and pelvis 11/19/1824,  CT pelvis 1/14/2025     Findings:      image(s) demonstrate(s)  postoperative changes right femoral  head, acetabulum & partial pubic rami resection and reconstruction  with cement and plate and screw fixation.      Bones:     Postoperative appearance of radical right hemipelvectomy for right  chondrosarcoma resection with partial pelvic reconstruction. There are  3 osteotomy sites: at the femoral neck, the innominate bone at the  level of the inferior margins of the SI joint including resection of  the entire acetabular wall of the superior pubic ramus, and the  osteotomy site just proximal to the ischial tuberosity. There is a  subtle gap/fracture involving the junction of the inferior pubic ramus  and the ischial tuberosity. With the cortical margins of the inferior  ischial tuberosity disrupted (series 2, images 321 - 337).     There is reconstruction with graft material of the right iliac wing  with plate and screw fixation connecting the right SI joint to the  superior pubic ramus on the left.     Soft tissues:   Evaluation of the soft tissue, particularly internal derangement of  joint assessment is limited with CT technique.      Widespread pockets of soft tissue gas and soft tissue edema as  expected in the recent postoperative period. Heterogeneous attenuation  within the surgical bed with edema tracking up the right paracolic  gutter compatible with uncomplicated postop edema/intraoperative blood  products.      Dependent intravesicular locule of gas compatible with recent  catheterization.     Dorsal penile edema, corresponding to surgical detachment of the  initial cavernous base of the penis from the inferior portion of the  pubic bone.      Remainder of the visualized intra-abdominal contents unremarkable.                                                                      Impression:  1. Postoperative CT for the purposes of custom implant manufacture.   2. Postoperative changes right femoral head, acetabulum & superior  pubic ramus resection and reconstruction  with cement and plate and  screw fixation.  3. Osseous gap/fracture at the junction of the ischial tuberosity and  the inferior pubic ramus on the right with apparent decortication of  the lateral aspect of the inferior cortex of the inferior pubic ramus.      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  He is now admitted to ARU on 2/21/25 for multidisciplinary rehabilitation and ongoing medical management.      Admission to acute inpatient rehab 02/21/25.    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      Impairment of ADL's: Noted to have impaired activity tolerance, impaired balance due to weakness and NWB RLE, impaired RLE sensation, impaired strength, impaired weight shifting due to NWB RLE, and pain, all affecting his ability to safely and independently perform basic ADLs.  Goal for mod I with basic ADLs from wheelchair level.  Anticipate to require supervision assist for shower transfers for safety.    Impairment of mobility:  Noted to have impaired activity tolerance, impaired balance due to weakness and NWB RLE, impaired RLE sensation, impaired strength, impaired weight shifting due to NWB RLE, and pain, all affecting his ability to safely and independently perform basic mobility.  Goal for mod I with basic bed mobility, sit > stand transfers, short  distance gait (<3 ft), ADLs, at w/c based mobility level.  Anticipate may require supervision assist for shower transfers and max assist for any stairs.    Medical Conditions    Chondrosarcoma of right innominate bone  S/p R internal hemipelvectomy on 2/14/25 with Dr. 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 Patient now s/p right modified radical hemipelvectomy of chondrosarcoma with Dr. Bravo.   - NWB RLE  - Activity restrictions: No hip restrictions. No Valsalva or contractions of abdominal wall to decrease stress on repair.  Elevate RLE as needed, may use foam ramp.  - Wound care: Prevena vac x14 days.  Once battery expires, remove and dispose.  Once removed, cover with non-permeable dressing to remain dry while showering; no soaking/submerging.  - Continue palmira-op antibiotics with cefadroxil 500 mg BID for 14 days (from hospital discharge: 2/21-3/7)  - Pain management: Tylenol 650 mg q4h PRN, Norco 5-325 1-2 tabs q4h PRN (do not exceed 4g APAP/24 hrs), atarax 25 mg q6h PRN, robaxin 750 mg q6h PRN, gabapentin 100 mg TID.  - HUGO drain: monitor drain output. Strip and empty drain per shift protocol. Discontinue drain when output < 20 ml per shift.  - DVT prophylaxis with ASA 81 mg BID x4 wks post-op  - Continue PT/OT  - Follow up with Dr. Bravo as scheduled on 3/17    Acute blood loss anemia  Pre-op Hgb ~15, dropped to 6.9 post-op and received 1 unit pRBC.  Estimated intra-op blood loss 1.5L.  Hgb recently stable ~8.  - Transfuse if Hgb <7  - Trend CBC every M/Th    Tongue pressure injury, hospital acquired  Noted post-op, assessed by WOCN, felt to be MDRPI due to ETT during surgery  - Wound care ordered by WOCN recs  - Can add orajel if ongoing discomfort or affecting oral intake    Adjustment to disability:  Clinical  psychology to eval and treat if indicated  FEN: regular diet, thin liquids  Bowel: continent.  Avoid constipation, limit valsava as above to promote healing.  Continue Miralax BID, senokot-S 1 tab BID.  Monitor and adjust regimen as indicated  Bladder: continent.  Monitor PVRs at admission  DVT Prophylaxis: ASA 81 mg BID x4 wks post-op per ortho  GI Prophylaxis: not current indicated  Code: full; confirmed on admission  Disposition: goal for home  ELOS:  14 days  Rehab prognosis:  good  Follow up Appointments on Discharge: PCP in 1-2 weeks, ortho at 4 wks post-op (3/17)        Patient was discussed with Dr. Mina Macedo, PM&R staff physician     TAMARA Hyatt-C  Physical Medicine & Rehabilitation

## 2025-02-21 NOTE — PLAN OF CARE
"Goal Outcome Evaluation:      Plan of Care Reviewed With: patient, spouse    Overall Patient Progress: improvingOverall Patient Progress: improving    Outcome Evaluation: PT AOx4, pain managed w/ prn oxycodone, on intermittent IV antibiotics awaiting palcement to [facility] when bed is available. Up w/ Ax2 using ceiling lift.    /71 (BP Location: Right arm)   Pulse 85   Temp 99  F (37.2  C) (Oral)   Resp 16   Ht 1.854 m (6' 1\")   Wt 112 kg (246 lb 14.6 oz)   SpO2 96%   BMI 32.58 kg/m    O2>90% ORA, denies feeling SOB, lightheadedness. Lungs clear, equal bilateral    Output: Voids spontaneously, denies difficulties    Last BM: 2/20   Activity: Ax2 w/ ceiling lift   Skin: R hip surgical incision   Scrotal edema  Redness to coccyx  Tongue lesion (declines magic mouthwash)  Lump to neck    Pain: Managed w/ prn norco, robaxin, atarax    CMS: CMS intact, denies N&T   Dressing: CDI   Diet: Reg, denies nausea and vomiting    LDA: R hip HUGO & wound vac   L PIV SL   Additional Info:  on high replacement protocol for Mag, Pot, Phosph         "

## 2025-02-21 NOTE — PROGRESS NOTES
Care Management Discharge Note    Discharge Date: 02/21/2025       Discharge Disposition: Acute Rehab    [facility]  [address]  [address] [place] [address]  Admissions: [phone]  RN Station: [phone]  ARU SW: [phone]     Discharge Services:  (acute therapies)    Discharge DME: None (all DME will be provided by [facility])    Discharge Transportation:  (IP Transportation Staff via hospital bed)    Private pay costs discussed: Not applicable    Does the patient's insurance plan have a 3 day qualifying hospital stay waiver?  Yes     Which insurance plan 3 day waiver is available? Alternative insurance waiver    Will the waiver be used for post-acute placement? No    PAS Confirmation Code:  (N/A)  Patient/family educated on Medicare website which has current facility and service quality ratings: yes    Education Provided on the Discharge Plan: Yes  Persons Notified of Discharge Plans: Patient, pt's spouse, Mary Ellen, bedside nurse, charge nurse, LJ Subramanian (Ortho Liaison), Dr. Ace (Hospitalist), and  LiaisonShawanda  Patient/Family in Agreement with the Plan: yes    Handoff Referral Completed: Yes, [facility] PCP: Internal handoff referral completed    Additional Information:   Liaison requested a 12:00 p.m. roll-over today.    SW met with pt and spouse at bedside after their phone conversation with  Liaison, confirmed d/t plan and answered all questions. SW confirmed with [facility] that they will be able to follow pt while at [facility].          MARLYN Bhardwaj, LSW  5 Ortho   PHONE: [phone]    SW Coverage SEARCHABLE in [employer] - search 5 Ortho SW  (or by name)  Or click on link below:  5 Ortho Vocera

## 2025-02-21 NOTE — PROGRESS NOTES
2/21/25: CHW delegated has forward a notarized copy of the HCD to [employer] [employer]. A copy has been filed in the Pt chart, and the original and a copy was given to the patient.        Suri Salcedo  Inpatient CHW  [facility]/8A/ICU/ED  [phone]

## 2025-02-21 NOTE — PROGRESS NOTES
Rehab Admissions:  I spoke w/ Jeremiah and his wife over the phone today to discuss his upcoming transfer to [facility] acute inpatient rehab. Discussed setup and structure of ARC level of care w/ PMR oversight of his medical and rehab needs, skilled rehab nursing care, and skilled PT/OT w/ ELOS of 14 days. We discussed pt's current post-surgical restrictions (no WB on RLE, no hip ROM restrictions, no Valsalva) and goals for pt while on ARC (mod IND w/ bed mobility, transfers, w/c based mobility, and ADLs, w/ SBA for shower transfers). Pt's wife inquiring about how to obtain equipment and if she needs to call insurance to discuss this. Recommended wife f/up w/ RN CC/ SW on Monday about securing hospital bed for home. Discussed location, parking options, and visitor policy. SW to bring map or brochure to pt's wife so she has the address and RN station phone number.     Thank you for the referral, we look forward to caring for Jeremiah. [employer] auth has been obtained.     Determination of admission is based upon the patient's need for an intensive, interdisciplinary approach to rehabilitation, their ability to progress, their ability to tolerate intensive therapies, their need for daily physician supervision, their need for twenty four hour nursing assistance, and their ability and willingness to participate in such a program.    Shawanda Harper CM  Rehab Liaison/  [facility] [place] [facility]  2/21/2025    11:11 AM

## 2025-02-21 NOTE — PROGRESS NOTES
[facility]    Medicine Progress Note - Hospitalist Service, GOLD TEAM 19    Date of Admission:  2/14/2025    Assessment & Plan   Gilberto Lund is a 44 year old male admitted on 2/14/2025. He he has a past medical history including cirrhosis, history of kidney stones, testicular cancer status post orchiectomy and chemotherapy in 2008.  He is hospitalized for combined surgery requiring orthopedics and urology.  Postoperatively on 2/14/2025 he was transferred to the ICU for hemodynamic instability and undifferentiated shock.  He is now hemodynamically stable and being downgraded to the medical floor by the ICU.  Medicine asked to consult for Co. medical management.     Interval changes 2/21    -no acute over night issues, up in recliner bed  -Patient is an assist of 2 persons with transfer and mobility.  - no bowel movement since Thursday, dose of senna increased to 2 tablets BID and miralax increased to twice daily  Hemoglobin posttransfusion stable.  Hb 8.3-7.9   He has not needed Dilaudid IV since yesterday for pain control.  Denies any shortness of breath, there is no any nausea abdominal pain vomiting or diarrhea.  Medically stable for discharge    # Postoperative hypotension, resolved  # Undifferentiated shock  # Acute blood loss anemia, Hb pre op was > 15.0 , came down to 6.9 post operatively, s/p 1 unit od PRBC transfusion, with Hb hanging around 8.0. started on ferrous sulfate for anemia due to blood loss   -Multifactorial 1.5 L of estimated blood loss intraoperatively.       #s/p right modified radical hemipelvectomy,, on ancef while inpatient, will be discharged on cefadroxil x2 weeks   # RLE numbness  - HUGO/woundvac in place  Plan:  Ortho Primary  Activity: Up with assist and assistive devices as needed until independent. No hip flexion past 45 degrees. No Valsalva or contractions of abdominal wall to decrease stress on repair.  Weight bearing status: NWB  "RLE   Antibiotics: Ancef while in house  Diet: Begin with clear fluids and progress diet as tolerated. Bowel regimen. Anti-emetics PRN.    DVT prophylaxis:  mg every day   Elevation: elevate RLE  as needed, may use foam ramp   Wound Care: Prevena to remain in place until POD #14, managed by ortho  Drains: monitor drain output. Strip and empty drain per shift protocol. 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 Patient now s/p right modified radical hemipelvectomy of chondrosarcoma with Dr. Bravo.  - Plan per Musculoskeletal section  -Urology consulted          Diet: Advance Diet as Tolerated: Regular Diet Adult; Regular Diet Adult    DVT Prophylaxis: Defer to primary service  Michelle Catheter: Not present  Lines: None     Cardiac Monitoring: None  Code Status: Full Code      Clinically Significant Risk Factors         # Hyponatremia: Lowest Na = 134 mmol/L in last 2 days, will monitor as appropriate  # Hypochloremia: Lowest Cl = 97 mmol/L in last 2 days, will monitor as appropriate                     # Obesity: Estimated body mass index is 32.58 kg/m  as calculated from the following:    Height as of this encounter: 1.854 m (6' 1\").    Weight as of this encounter: 112 kg (246 lb 14.6 oz).      # Financial/Environmental Concerns: none         Social Drivers of Health    Tobacco Use: Medium Risk (2/14/2025)    Patient History     Smoking Tobacco Use: " Former     Smokeless Tobacco Use: Former   Social Connections: Unknown (9/30/2024)    Social Connection and Isolation Panel [NHANES]     Frequency of Social Gatherings with Friends and Family: Once a week          Disposition Plan     Medically Ready for Discharge: Anticipated in 2-4 Days             Dominick Ace MD  Hospitalist Service, GOLD TEAM 19  [facility]  Securely message with [employer] (more info)  Text page via [facility] Paging/Directory   See signed in provider for up to date coverage information  ______________________________________________________________________    Interval History   Patient seen and evaluated.  No any acute events  Folate catheter was removed yesterday patient is able to void spontaneously.  A unit of PRBC was transfused for hemoglobin 6.9.  Denies any dizziness or shortness of breath.  Physical Exam   Vital Signs: Temp: 97.6  F (36.4  C) Temp src: Oral BP: 117/68 Pulse: 88   Resp: 18 SpO2: 96 % O2 Device: None (Room air)    Weight: 246 lbs 14.64 oz    General Appearance: Appears comfortable.  Respiratory: Without wheezes rhonchi or rales.  CTA  Cardiovascular: RRR, without murmurs  GI: Soft, nontender, plus BS  Skin: Without obvious bleeding, bruising or excoriations      Medical Decision Making       59 MINUTES SPENT BY ME on the date of service doing chart review, history, exam, documentation & further activities per the note.      Data   CBC RESULTS:   Recent Labs   Lab Test 02/21/25  0618   WBC 9.0   RBC 2.62*   HGB 7.9*   HCT 24.6*   MCV 94   MCH 30.2   MCHC 32.1   RDW 13.1            Last Comprehensive Metabolic Panel:  Lab Results   Component Value Date     02/21/2025    POTASSIUM 3.9 02/21/2025    CHLORIDE 100 02/21/2025    CO2 29 02/21/2025    ANIONGAP 8 02/21/2025     (H) 02/21/2025    BUN 15.9 02/21/2025    CR 0.88 02/21/2025    GFRESTIMATED >90 02/21/2025    CANDICE 8.6 (L) 02/21/2025

## 2025-02-21 NOTE — PLAN OF CARE
"Goal Outcome Evaluation:      Plan of Care Reviewed With: patient    Overall Patient Progress: improvingOverall Patient Progress: improving       VS: /68 (BP Location: Right arm)   Pulse 85   Temp 98.8  F (37.1  C) (Oral)   Resp 16   Ht 1.854 m (6' 1\")   Wt 112 kg (246 lb 14.6 oz)   SpO2 97%   BMI 32.58 kg/m      O2: > 92% on RA, denies SOB and chest pain   Output: Voids spontaneously and adequately   Last BM: 02/20/2025   Activity: Ax2 with ceiling lift, not OOB this shift   Skin: Lesion on tongue, blanchable redness to coccyx   Pain: 7/10, pain managed with PRN's   CMS: AO x4, denies numbness and tingling    Dressing: CDI, wound vac (prevena), and HUGO drain    Diet: Regular    LDA: L PIV SL   Equipment: IV pole, call light, and personal belongings    Plan: Continue POC   Additional Info:           "

## 2025-02-21 NOTE — PLAN OF CARE
FOCUS/GOAL  Pain management, Mobility, Psychosocial needs, Safety management, and Prevention of secondary complications    ASSESSMENT, INTERVENTIONS AND CONTINUING PLAN FOR GOAL:    Goal Outcome Evaluation:       Pt arrived on unit with spouse and belongings. Reports sig pain in R LE upon arrival. Removed Rooke boot, as there is no order for its necessity, pt reports improvement in pain symptoms following removal. Administered PRN pain meds     Pt Aox4  Calls appropriately  Needs in reach and safety measures in place.   Cont POC    Mobility: A2 golvo  Bowel/Bladder: cont of both, LBM yesterday  Skin: R groin surgical inc. L PIV  O2: RA  Diet: R/T/W  Psychosocial: appropriate to situation  Pain: surgical site  Tubes/Lines/Drains: wound vac (prevena), L PIV, and HUGO drain site  Misc:

## 2025-02-21 NOTE — PROGRESS NOTES
Orthopedic Surgery Progress Note 02/21/2025    S: No acute events overnight. Pain controlled. Voiding spontaneously.  Pain well controlled. Denies any new concerns. Had a bowel movement yesterday.  Reports some stiffness in RLE this morning.     O:  Temp: 98.8  F (37.1  C) Temp src: Oral BP: 117/68 Pulse: 85   Resp: 16 SpO2: 97 % O2 Device: None (Room air)      Exam:  Gen: No acute distress, resting comfortably in bed.  Resp: Non-labored breathing  Cardio: Regular rate via peripheral pulse  MSK:     RLE:  - Dressings c/d/I, Prevena vac in place holding suction  - Fires Quad, TA, GSC, EHL, FHL  - SILT femoral/tibial/sural/saphenous/DP/SP nerves  - PT/DP pulses 1+, foot wwp     Drain output: 165 ml output over 24 hrs from HUGO, 0 ml from Prevena.    Recent Labs   Lab 02/20/25  0742 02/19/25  0555 02/18/25  0555   WBC 8.2 7.6 10.1   HGB 8.3* 8.1* 8.0*    315 271       Assessment: Gilberto Lund is a 44 year old male s/p R internal hemipelvectomy on 2/14/25 with Dr. Bravo. Doing well.       - Restrictions: No valsalva, no contractions of abdominal wall  - Repeat Hgb transfuse if <7, currently asymptomatic  - Pending ARU, appreciate SW/CC assistance     Plan:  Ortho Primary  Activity: Up with assist and assistive devices as needed until independent. No hip restrictions. No Valsalva or contractions of abdominal wall to decrease stress on repair.  Weight bearing status: NWB RLE   Antibiotics: Ancef while in house then discharge on Cefadroxil x 2 weeks  Diet: Begin with clear fluids and progress diet as tolerated. Bowel regimen. Anti-emetics PRN.    DVT prophylaxis:  mg every day   Elevation: elevate RLE  as needed, may use foam ramp   Wound Care: Prevena to remain in place until POD #14, managed by ortho  Drains: monitor drain output. Strip and empty drain per shift protocol. Discontinue drain when output < 20 ml per shift   Michelle: keep in place until POD #2-3  Pain management: Orals PRN, IV for  breakthrough only  X-rays: to be completed in PACU  Physical Therapy:  Mobilization, ROM, ADL's  Occupational Therapy:  ADL's  Labs:  Trend Hgb on PODs #1 & 2  Consults: PT, OT. Hospitalist, appreciate assistance in caring for this patient throughout the perioperative period  Disposition:  Pending progress with therapies, pain control on orals, and medical stability, anticipate discharge   Follow up: Plan for follow up with Dr. Bravo 4 weeks.        George Boone MD  Adult Reconstruction Fellow

## 2025-02-22 ENCOUNTER — APPOINTMENT (OUTPATIENT)
Dept: PHYSICAL THERAPY | Facility: CLINIC | Age: 45
DRG: 949 | End: 2025-02-22
Attending: PHYSICAL MEDICINE & REHABILITATION
Payer: COMMERCIAL

## 2025-02-22 ENCOUNTER — APPOINTMENT (OUTPATIENT)
Dept: OCCUPATIONAL THERAPY | Facility: CLINIC | Age: 45
DRG: 949 | End: 2025-02-22
Attending: PHYSICAL MEDICINE & REHABILITATION
Payer: COMMERCIAL

## 2025-02-22 PROCEDURE — 97166 OT EVAL MOD COMPLEX 45 MIN: CPT | Mod: GO

## 2025-02-22 PROCEDURE — 250N000013 HC RX MED GY IP 250 OP 250 PS 637: Performed by: PHYSICAL MEDICINE & REHABILITATION

## 2025-02-22 PROCEDURE — 97530 THERAPEUTIC ACTIVITIES: CPT | Mod: GO

## 2025-02-22 PROCEDURE — 250N000013 HC RX MED GY IP 250 OP 250 PS 637: Performed by: PHYSICIAN ASSISTANT

## 2025-02-22 PROCEDURE — 97530 THERAPEUTIC ACTIVITIES: CPT | Mod: GP

## 2025-02-22 PROCEDURE — 97163 PT EVAL HIGH COMPLEX 45 MIN: CPT | Mod: GP

## 2025-02-22 PROCEDURE — 97110 THERAPEUTIC EXERCISES: CPT | Mod: GO

## 2025-02-22 PROCEDURE — 128N000003 HC R&B REHAB

## 2025-02-22 PROCEDURE — 97535 SELF CARE MNGMENT TRAINING: CPT | Mod: GO

## 2025-02-22 RX ADMIN — ACETAMINOPHEN 650 MG: 325 TABLET, FILM COATED ORAL at 13:37

## 2025-02-22 RX ADMIN — GABAPENTIN 100 MG: 100 CAPSULE ORAL at 19:08

## 2025-02-22 RX ADMIN — ACETAMINOPHEN 650 MG: 325 TABLET, FILM COATED ORAL at 21:47

## 2025-02-22 RX ADMIN — HYDROXYZINE HYDROCHLORIDE 25 MG: 25 TABLET, FILM COATED ORAL at 21:47

## 2025-02-22 RX ADMIN — Medication 10 MG: at 21:47

## 2025-02-22 RX ADMIN — OXYCODONE HYDROCHLORIDE 10 MG: 5 TABLET ORAL at 06:36

## 2025-02-22 RX ADMIN — ACETAMINOPHEN 650 MG: 325 TABLET, FILM COATED ORAL at 10:34

## 2025-02-22 RX ADMIN — GABAPENTIN 100 MG: 100 CAPSULE ORAL at 07:54

## 2025-02-22 RX ADMIN — CEFADROXIL 500 MG: 500 CAPSULE ORAL at 20:05

## 2025-02-22 RX ADMIN — POLYETHYLENE GLYCOL 3350 17 G: 17 POWDER, FOR SOLUTION ORAL at 20:04

## 2025-02-22 RX ADMIN — ACETAMINOPHEN 650 MG: 325 TABLET, FILM COATED ORAL at 19:08

## 2025-02-22 RX ADMIN — METHOCARBAMOL 750 MG: 750 TABLET ORAL at 07:54

## 2025-02-22 RX ADMIN — SENNOSIDES AND DOCUSATE SODIUM 1 TABLET: 50; 8.6 TABLET ORAL at 20:05

## 2025-02-22 RX ADMIN — METHOCARBAMOL 750 MG: 750 TABLET ORAL at 19:08

## 2025-02-22 RX ADMIN — OXYCODONE HYDROCHLORIDE 10 MG: 5 TABLET ORAL at 10:34

## 2025-02-22 RX ADMIN — GABAPENTIN 100 MG: 100 CAPSULE ORAL at 13:38

## 2025-02-22 RX ADMIN — SENNOSIDES AND DOCUSATE SODIUM 1 TABLET: 50; 8.6 TABLET ORAL at 07:56

## 2025-02-22 RX ADMIN — ACETAMINOPHEN 650 MG: 325 TABLET, FILM COATED ORAL at 06:36

## 2025-02-22 RX ADMIN — CEFADROXIL 500 MG: 500 CAPSULE ORAL at 07:57

## 2025-02-22 RX ADMIN — OXYCODONE HYDROCHLORIDE 10 MG: 5 TABLET ORAL at 02:30

## 2025-02-22 RX ADMIN — ASPIRIN 162 MG: 81 TABLET ORAL at 07:54

## 2025-02-22 RX ADMIN — METHOCARBAMOL 750 MG: 750 TABLET ORAL at 15:57

## 2025-02-22 RX ADMIN — OXYCODONE HYDROCHLORIDE 10 MG: 5 TABLET ORAL at 20:05

## 2025-02-22 RX ADMIN — METHOCARBAMOL 750 MG: 750 TABLET ORAL at 12:00

## 2025-02-22 RX ADMIN — OXYCODONE HYDROCHLORIDE 10 MG: 5 TABLET ORAL at 15:57

## 2025-02-22 ASSESSMENT — ACTIVITIES OF DAILY LIVING (ADL)
ADLS_ACUITY_SCORE: 44
PREVIOUS_RESPONSIBILITIES: MEAL PREP;HOUSEKEEPING;LAUNDRY;SHOPPING;YARDWORK;MEDICATION MANAGEMENT;FINANCES;DRIVING;SCHOOL
ADLS_ACUITY_SCORE: 40
ADLS_ACUITY_SCORE: 40
ADLS_ACUITY_SCORE: 44
ADLS_ACUITY_SCORE: 44
IADL_COMMENTS: INDEPENDENT
ADLS_ACUITY_SCORE: 44
ADLS_ACUITY_SCORE: 44
ADLS_ACUITY_SCORE: 40
ADLS_ACUITY_SCORE: 44
ADLS_ACUITY_SCORE: 40
ADLS_ACUITY_SCORE: 40
ADLS_ACUITY_SCORE: 44
ADLS_ACUITY_SCORE: 40
ADLS_ACUITY_SCORE: 44
ADLS_ACUITY_SCORE: 40
ADLS_ACUITY_SCORE: 44
ADLS_ACUITY_SCORE: 40
ADLS_ACUITY_SCORE: 40
ADLS_ACUITY_SCORE: 44
ADLS_ACUITY_SCORE: 44

## 2025-02-22 NOTE — PROGRESS NOTES
02/22/25 1025   Appointment Info   Signing Clinician's Name / Credentials (OT) Ander Belle OTRL   Living Environment   People in Home child(angelo), dependent;spouse   Current Living Arrangements house   Home Accessibility wheelchair accessible;stairs within home   Number of Stairs, Within Home, Primary greater than 10 stairs   Living Environment Comments pt has ramp into the house and plas to stay on main floor.pt has a half bathroom with no shower on main . WC accessible home.   Self-Care   Usual Activity Tolerance good   Current Activity Tolerance good   Equipment Currently Used at Home none   Instrumental Activities of Daily Living (IADL)   Previous Responsibilities meal prep;housekeeping;laundry;shopping;yardwork;medication management;finances;driving;school   IADL Comments independent   General Information   Onset of Illness/Injury or Date of Surgery 02/14/25   Referring Physician Alena Gomez PA-C   Additional Occupational Profile Info/Pertinent History of Current Problem 44-year-old male with a past medical history of testicular cancer status post left orchiectomy and chemotherapy, and kidney stones who presented to the hospital on 2/14/2025 for planned right hemipelvectomy for innominate bone chondrosarcoma which was diagnosed approximately 4 months ago.R HEMIPELVECTOMY, modified internal,R hip girdlestone resection, RETROPERITONEAL dissection,   Existing Precautions/Restrictions weight bearing;abdominal;other (see comments)  (strict abd precaution, and NWB of RLE/hip. NO TURNING to R side in bed.  unable to engage abdominal muscles when sitting up.. pt needs RLE supported at all time during bed mobility)   Left Upper Extremity (Weight-bearing Status) other (see comments)  (no pushing/pulling to avoid engaging abdominal muscles)   Right Upper Extremity (Weight-bearing Status) other (see comments)  (no pushing/pulling to avoid engaging abdominal muscles)   Left Lower Extremity (Weight-bearing Status)  full weight-bearing (FWB)   Right Lower Extremity (Weight-bearing Status) non weight-bearing (NWB);other (see comments)  (s/p hemipelvectomy and girdlestone resection)   Cognitive Status Examination   Orientation Status orientation to person, place and time   Cognitive Status Comments WFL   Visual Perception   Visual Impairment/Limitations corrective lenses full-time   Sensory   Sensory Quick Adds left LE;other (see comments)  (BUE hands tingling and numbness at baseline due to hx of chemotherapy 10+yrs ago)   Range of Motion Comprehensive   General Range of Motion no range of motion deficits identified   Strength Comprehensive (MMT)   General Manual Muscle Testing (MMT) Assessment no strength deficits identified   Coordination   Upper Extremity Coordination No deficits were identified   Bed Mobility   Comment (Bed Mobility) no turning to R side in bed   Clinical Impression   Criteria for Skilled Therapeutic Interventions Met (OT) Yes, treatment indicated   OT Diagnosis decline in level of I and safety in I/ADLs   OT Problem List-Impairments impacting ADL problems related to;activity tolerance impaired;balance;flexibility;mobility;range of motion (ROM);strength;post-surgical precautions   Assessment of Occupational Performance 5 or more Performance Deficits   Identified Performance Deficits bathing, toileting, g/h, dressing,   Planned Therapy Interventions (OT) ADL retraining;balance training;IADL retraining;bed mobility training;fine motor coordination training;ROM;strengthening;transfer training;visual perception;progressive activity/exercise;home program guidelines;risk factor education   Clinical Decision Making Complexity (OT) detailed assessment/moderate complexity   Clinical Impression Comments OT: pt presenting with significantly declined level of I and safety in I/ADLs due to post surgical restrictions, imbalance, decreased activity tolerance, inability to WB on RLE or hip, restirction on abdominal  engagement. Pt is s/p hemipelvectomy and girdlestone resection and other surgeries to remove cancer and now requires total A x2 for overall transfers and mobility. pt I at PLOF. pt will benefit from skilled OT service to increase level of I and safety in I/ADLs.   OT Total Evaluation Time   OT Eval, Moderate Complexity Minutes (55554) 15   OT Goals   Therapy Frequency (OT) 6 times/week   OT Goals Hygiene/Grooming;Upper Body Dressing;Upper Body Bathing;Lower Body Dressing;Lower Body Bathing;Transfers;Toilet Transfer/Toileting;Meal Preparation;Home Management   OT: Hygiene/Grooming modified independent;from wheelchair   OT: Upper Body Dressing Modified independent;Independent;from wheelchair;including set-up/clothing retrieval   OT: Lower Body Dressing Minimal assist;from wheelchair   OT: Upper Body Bathing Modified independent;within precautions   OT: Lower Body Bathing Modified independent;with precautions;Supervision/stand-by assist   OT: Transfer Modified independent   OT: Toilet Transfer/Toileting cleaning and garment management;within precautions;toilet transfer;Modified independent   OT: Meal Preparation Modified independent;from wheelchair   OT: Home Management Modified independent;Minimal assist;with light demand household tasks;from wheelchair   Self-Care/Home Management   Self-Care/Home Mgmt/ADL, Compensatory, Meal Prep Minutes (40829) 30   OT Discharge Planning   OT Plan lift transfer to dependent showerchair with leg rest   Total Session Time   Timed Code Treatment Minutes 30   Total Session Time (sum of timed and untimed services) 45   Post Acute Settings Only   What unit is patient on? Acute Rehab   Oral Hygiene   Describe performance set up and SBA   Grooming (except oral cares)   Grooming Comment set up and SBA   Upper Body Dressing   Describe performance set up and SBA   Clothing Utilized Hospital gown   Lower Body Dressing putting on/taking off footwear   Describe performance dependent with socks    Shower/Bathe self   Describe performance sponge bathing mod A for RLE and back

## 2025-02-22 NOTE — PLAN OF CARE
Goal Outcome Evaluation:      Plan of Care Reviewed With: patient    Overall Patient Progress: no change    Orientation: AOX4  Bowel: Continent LBM: 2/20  Bladder: Continent; uses urinal independently at bedside. PVRs done  Pain: r groin/hip incision; PRN oxycodone and scheduled pain pills given  Ambulation/Transfers: A2 with golvo  Diet/Liquids: tolerating diet well; good appetite noted; took pills whole with thin liquids  Tubes/Lines/Drains:  HUGO drain to R thigh with 20 ml output this shift; Prevena wound VAC to R groin/hip incision  PIV RA  Skin:  R groin/hip incision, mepilex to sacrum for protection    Uses call light appropriately. Able to make needs known. Bed alarm on for safety. Continue POC

## 2025-02-22 NOTE — PROGRESS NOTES
Discharge Planner Post-Acute Rehab PT:     Discharge Plan: home with spouse assist, ramp to enter, 16 steps with 1 +1/2 rail to enter his main bedroom and shower;   likely able to modify first level initially;  home health PT    Precautions: NWB RLE (pelvic resection with antibiotic spacer) no valsalva or active abdominal contraction; abdominal precautions; R LE pain control; fall risk; do not roll onto R side    Current Status:  Bed Mobility: max A with R LE to the L;  not indicated to the R due to precautions  Transfer: glovo total assist x 3 bed to reclining chair  Gait: not indicated at this time, TBA  Stairs: not indicated at this time, TBA  Balance: supported sitting today for pain control and safety; TBA as pt progressess    Outcome Measures:   TBA    Assessment:  Pt demonstrates impaired functional mobility, gait, balance, ROM, strength, functionally limiting pain, all secondary to partial hemipelvectomy procedure (modified, see MD notes);  this was done secondary to chondrosarcoma.  He is appropriate for skilled PT to help him return home safely with family assist;        Other Barriers to Discharge (DME, Family Training, etc):   Pain, post surgical status, functional mobility, medical complexity; equipment, family training.

## 2025-02-22 NOTE — PROGRESS NOTES
"   02/22/25 0815   Appointment Info   Signing Clinician's Name / Credentials (PT) Rolando Brenner, PT   Rehab Comments (PT) PT: pt resting supine, alert and awake, agreable to PT eval.  pain 3/10 at rest; 10/10 at worst,   Living Environment   People in Home child(angelo), dependent;spouse   Current Living Arrangements house   Home Accessibility wheelchair accessible;stairs within home   Number of Stairs, Within Home, Primary greater than 10 stairs  ((see comments below))   Living Environment Comments Pt reports ~ flight 16 steps from main level to the bedroom (shower); rail L whole way, R banister/rail 1/2 of the way.  ramp access into the home; 1st floor can be modified initially with a bed and bathroom, but no shower.   Self-Care   Usual Activity Tolerance good   Current Activity Tolerance good   Equipment Currently Used at Home none   General Information   Pertinent History of Current Problem (include personal factors and/or comorbidities that impact the POC) \"Gilberto Lund is a 44 year old male with a past medical history of ~4 months of right hip pain found to have right innominate bone chondrosarcoma, remote history of testicular cancer (NSGCT; 2008) s/p left orchiectomy and chemotherapy, and kidney stones who was admitted on 2/14/25 s/p right hemipelvectomy with hospital course complicated by undifferentiated shock, acute blood loss anemia, constipation, and pain.  He is now admitted to ARU on 2/21/25 for multidisciplinary rehabilitation and ongoing medical management.        Admission to acute inpatient rehab 02/21/25.    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 \"  per MD notes   Existing Precautions/Restrictions abdominal;fall  (no valsalva; no abdominal contraction)   Weight-Bearing Status - RLE nonweight-bearing   Cognition   Cognitive Status Comments no changes per pt's " report   Integumentary/Edema   Integumentary/Edema Comments incision R hip/abdomen; denies other areas of concerns   Strength (Manual Muscle Testing)   Strength Comments L LE and B UE grossly WFL's R LE NT secondary to precautions   Bed Mobility   Comment, (Bed Mobility) see pm note   Transfers   Comment, (Transfers) see pm note   Gait/Stairs (Locomotion)   Comment, (Gait/Stairs) not indicated at this time   Sensory Examination   Sensory Perception Comments PT: subjectively reports previous peripheral neuropathy in B hands and feet distally (since chemo in his 20's) R LE grossly tingling/numb   Muscle Tone   Muscle Tone Comments unremarkable.   Clinical Impression   Criteria for Skilled Therapeutic Intervention Yes, treatment indicated   PT Diagnosis (PT) Pt demonstrates impaired functional mobility, gait, balance, ROM, strength, functionally limiting pain, all secondary to partial hemipelvectomy procedure (modified, see MD notes);  this was done secondary to chondrosarcoma.  He is appropriate for skilled PT to help him return home safely with family assist;   Influenced by the following impairments as in PT dx   Functional limitations due to impairments as in PT dx   Clinical Presentation (PT Evaluation Complexity) evolving   Clinical Presentation Rationale post surgical status, functional status, medical complexity   Clinical Decision Making (Complexity) high complexity   Planned Therapy Interventions (PT) balance training;bed mobility training;gait training;groups;home exercise program;neuromuscular re-education;patient/family education;postural re-education;ROM (range of motion);stair training;strengthening;stretching;transfer training;wheelchair management/propulsion training;progressive activity/exercise;risk factor education;home program guidelines;other (see comments)  (R LE and abdominal post surgical protection with all)   Risk & Benefits of therapy have been explained care plan/treatment goals  reviewed;evaluation/treatment results reviewed;risks/benefits reviewed;current/potential barriers reviewed;participants voiced agreement with care plan;participants included;patient   Clinical Impression Comments pt is very limited due to pain and unique R LE surgery, estimated POC to return home safely 3 weeks, adapt PT goals as indicated pending progress   PT Total Evaluation Time   PT Eval, High Complexity Minutes (90497) 30   Physical Therapy Goals   PT Frequency 6x/week   PT Predicted Duration/Target Date for Goal Attainment 03/14/25   PT Goals Bed Mobility;Transfers;Wheelchair Mobility;PT Goal 1;PT Goal 2   PT: Bed Mobility Modified independent;Supine to/from sit;Within precautions   PT: Transfers Modified independent;Bed to/from chair;Within precautions   PT: Wheelchair Mobility 150 feet;manual wheelchair  (following precautions)   PT: Goal 1 pt to perform car transfer with cga to min A x 1 following precautions   PT: Goal 2 pt to be mod indep with a medically appropriate home program tolerating 30-60 min 3-5x a week within precautions, with a focus on L LE and B UE gross strength, R LE protection.   Therapeutic Activity   Therapeutic Activities: dynamic activities to improve functional performance Minutes (43139) 15   Treatment Detail/Skilled Intervention PT: reviewed pt's chart, and imaging relative to PT POC and precautions; discussed abdominal and LE precautions; educated pt in safe L LE and B UE activity initially to decrease the effects of immobility.   PT Discharge Planning   PT Plan PT: GG functional mobility as able, supine/sit; scooting, provide w/c, assess w/c mobility; R LE protection with all mobility due to transected pelvis and antiboiotic spacer, pain control.   Physical Therapy Time and Intention   Timed Code Treatment Minutes 15   Total Session Time (sum of timed and untimed services) 45   PT - Acute Rehab Center Time   Individual Time (minutes) - PT 45  (30 eval 15 TA)   Group Time  (minutes) - PT 0   Concurrent Time (minutes) - PT 0   Co-Treatment Time (minutes) - PT 0   ARC Total Session Time (minutes) - PT 45   ARC Daily Total Session Time   PT ARC Daily Total Session Time 45   ARC Daily Rehab Total Minutes 45

## 2025-02-22 NOTE — PHARMACY-MEDICATION REGIMEN REVIEW
Pharmacy Medication Regimen Review  Gilberto Lund is a 44 year old male who is currently in the Acute Rehab Unit.    Assessment:   Upon review of the medications and patient chart the following irregularities were found:   Medications without appropriate indications/durations: aspirin    Plan:   Consider adding stop date to aspirin order, plan per ortho discharge summary was to continue for 4 weeks post op (through 3/14).     Attending provider will be sent this note for review.  If there are any emergent issues noted above, pharmacist will contact provider directly by phone.      Pharmacy will periodically review the resident's medication regimen for any PRN medications not administered in > 72 hours and discontinue them. The pharmacist will discuss gradual dose reductions of psychopharmacologic medications with interdisciplinary team on a regular basis.    Please contact pharmacy if the above does not answer specific medication questions/concerns.    Background:  A pharmacist has reviewed all medications and pertinent medical history today.  Medications were reviewed for appropriate use and any irregularities found are listed with recommendations.      Current Facility-Administered Medications:     acetaminophen (TYLENOL) tablet 650 mg, 650 mg, Oral, Q4H While awake, Lizy Macedo MD, 650 mg at 02/22/25 1337    aspirin EC tablet 162 mg, 162 mg, Oral, Daily, Alena Gomez PA-C, 162 mg at 02/22/25 0754    cefadroxil (DURICEF) capsule 500 mg, 500 mg, Oral, BID, Alena Gomez PA-C, 500 mg at 02/22/25 0757    gabapentin (NEURONTIN) capsule 100 mg, 100 mg, Oral, TID, Alena Gomez PA-C, 100 mg at 02/22/25 1338    hydrOXYzine HCl (ATARAX) tablet 25 mg, 25 mg, Oral, Q6H PRN, Alena Gomez PA-C, 25 mg at 02/21/25 1638    melatonin tablet 10 mg, 10 mg, Oral, At Bedtime PRN, Lizy Macedo MD, 10 mg at 02/21/25 2038    methocarbamol (ROBAXIN) tablet 750 mg, 750 mg, Oral, 4x  Daily, Lizy Macedo MD, 750 mg at 02/22/25 1200    naloxone (NARCAN) injection 0.2 mg, 0.2 mg, Intravenous, Q2 Min PRN **OR** naloxone (NARCAN) injection 0.4 mg, 0.4 mg, Intravenous, Q2 Min PRN **OR** naloxone (NARCAN) injection 0.2 mg, 0.2 mg, Intramuscular, Q2 Min PRN **OR** naloxone (NARCAN) injection 0.4 mg, 0.4 mg, Intramuscular, Q2 Min PRN, Lizy Macedo MD    ondansetron (ZOFRAN) tablet 4 mg, 4 mg, Oral, Q6H PRN, Lizy Macedo MD    oxyCODONE (ROXICODONE) tablet 5 mg, 5 mg, Oral, Q4H PRN **OR** oxyCODONE (ROXICODONE) tablet 10 mg, 10 mg, Oral, Q4H PRN, Lizy Macedo MD, 10 mg at 02/22/25 1034    polyethylene glycol (MIRALAX) Packet 17 g, 17 g, Oral, BID, Alena Gomez PA-C, 17 g at 02/21/25 2036    senna-docusate (SENOKOT-S/PERICOLACE) 8.6-50 MG per tablet 1 tablet, 1 tablet, Oral, BID, Alena Gomez PA-C, 1 tablet at 02/22/25 0756  No current outpatient prescriptions on file.   PMH: cirrhosis, history of kidney stones, testicular cancer status post orchiectomy and chemotherapy in 2008

## 2025-02-22 NOTE — PLAN OF CARE
Discharge Planner Post-Acute Rehab OT:     Discharge Plan: home with spouse    Precautions: NWB RLE, abdominal precaution, no turning to R side in bed, painful RLE, support during bed mobility and transfer No hip restrictions per chart. No Valsalva or contractions of abdominal wall to decrease stress on repair.  Elevate RLE as needed, may use foam ramp.    Current Status:  ADLs:  Mobility: ceiling lift total Ax2, Ax1 to support RLE up to minimize pain/discomfort, bedmobility Ax2, one supporting RLE to minimize pain,    Grooming: set up and SBA sitting up in bed  Dressing: UB dressing SBA sitting up in bed, LB TBA   Bathing: sponge bathing SBA for UB and mod A for LB   Toileting: TBA -planning for lift transfer Ax2 to dependent chair Ax1 for supporting RLE  IADLs: Indepdendent at [facility]. 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 Pt will benefit from skilled OT service to reach max potential level of I and safety in I/ADLs. Pt I with all I/ADLs at [facility]. Not requires total A with all transfers and mobility     Other Barriers to Discharge (DME, Family Training, etc): house has ramp and is WC accessible. Able to stay on main floor. Pt has half bath on main with no shower.

## 2025-02-22 NOTE — PLAN OF CARE
"Goal Outcome Evaluation:      Plan of Care Reviewed With: patient    Overall Patient Progress: improvingOverall Patient Progress: improving  Patient transfers with liko lift/golvo. Plans to move to room 541 for lift and more space for equipment.    Patient wants to \"stay on top of pain\" during therapy. Oxydocodone given at 1030 along with scheduled meds throughout the day.    Left hip prevena wound vac is on and functioning. Left leg HUGO drain stripped and emptied. Output 5cc this shift.             "

## 2025-02-22 NOTE — PLAN OF CARE
FOCUS/GOAL  Pain management, Medical management, and Mobility    ASSESSMENT, INTERVENTIONS AND CONTINUING PLAN FOR GOAL:  Pt sleeping throughout the night.  Pain management is a priority especially when repositioning.  Do not position on right side.  Non-weight bearing on left leg.  Right side supported with pillows.  Is very tender and sensitive on right hip during repositioning, reposition with extreme caution.  Uses call light appropriately.  VSS.  Continent of bowel/bladder, uses bedside urinal independently.  HUGO drain on right side had no drainage but was fully inflated so hard to tell if that was what was preventing it from draining.  Deflated slightly to allow for drainage during next shift.    Goal Outcome Evaluation:

## 2025-02-23 ENCOUNTER — APPOINTMENT (OUTPATIENT)
Dept: OCCUPATIONAL THERAPY | Facility: CLINIC | Age: 45
DRG: 949 | End: 2025-02-23
Attending: PHYSICAL MEDICINE & REHABILITATION
Payer: COMMERCIAL

## 2025-02-23 ENCOUNTER — APPOINTMENT (OUTPATIENT)
Dept: PHYSICAL THERAPY | Facility: CLINIC | Age: 45
DRG: 949 | End: 2025-02-23
Attending: PHYSICAL MEDICINE & REHABILITATION
Payer: COMMERCIAL

## 2025-02-23 PROCEDURE — 97110 THERAPEUTIC EXERCISES: CPT | Mod: GO

## 2025-02-23 PROCEDURE — 99231 SBSQ HOSP IP/OBS SF/LOW 25: CPT | Mod: GC | Performed by: STUDENT IN AN ORGANIZED HEALTH CARE EDUCATION/TRAINING PROGRAM

## 2025-02-23 PROCEDURE — 250N000013 HC RX MED GY IP 250 OP 250 PS 637: Performed by: PHYSICAL MEDICINE & REHABILITATION

## 2025-02-23 PROCEDURE — 97535 SELF CARE MNGMENT TRAINING: CPT | Mod: GO

## 2025-02-23 PROCEDURE — 128N000003 HC R&B REHAB

## 2025-02-23 PROCEDURE — 250N000013 HC RX MED GY IP 250 OP 250 PS 637: Performed by: PHYSICIAN ASSISTANT

## 2025-02-23 PROCEDURE — 97530 THERAPEUTIC ACTIVITIES: CPT | Mod: GP

## 2025-02-23 RX ADMIN — ACETAMINOPHEN 650 MG: 325 TABLET, FILM COATED ORAL at 05:20

## 2025-02-23 RX ADMIN — CEFADROXIL 500 MG: 500 CAPSULE ORAL at 07:44

## 2025-02-23 RX ADMIN — ACETAMINOPHEN 650 MG: 325 TABLET, FILM COATED ORAL at 22:09

## 2025-02-23 RX ADMIN — GABAPENTIN 100 MG: 100 CAPSULE ORAL at 20:16

## 2025-02-23 RX ADMIN — METHOCARBAMOL 750 MG: 750 TABLET ORAL at 12:08

## 2025-02-23 RX ADMIN — GABAPENTIN 100 MG: 100 CAPSULE ORAL at 13:53

## 2025-02-23 RX ADMIN — OXYCODONE HYDROCHLORIDE 10 MG: 5 TABLET ORAL at 22:09

## 2025-02-23 RX ADMIN — OXYCODONE HYDROCHLORIDE 10 MG: 5 TABLET ORAL at 18:16

## 2025-02-23 RX ADMIN — OXYCODONE HYDROCHLORIDE 10 MG: 5 TABLET ORAL at 00:08

## 2025-02-23 RX ADMIN — METHOCARBAMOL 750 MG: 750 TABLET ORAL at 16:26

## 2025-02-23 RX ADMIN — Medication 10 MG: at 22:09

## 2025-02-23 RX ADMIN — POLYETHYLENE GLYCOL 3350 17 G: 17 POWDER, FOR SOLUTION ORAL at 07:45

## 2025-02-23 RX ADMIN — METHOCARBAMOL 750 MG: 750 TABLET ORAL at 20:16

## 2025-02-23 RX ADMIN — CEFADROXIL 500 MG: 500 CAPSULE ORAL at 20:16

## 2025-02-23 RX ADMIN — ASPIRIN 162 MG: 81 TABLET ORAL at 07:44

## 2025-02-23 RX ADMIN — SENNOSIDES AND DOCUSATE SODIUM 1 TABLET: 50; 8.6 TABLET ORAL at 07:45

## 2025-02-23 RX ADMIN — OXYCODONE HYDROCHLORIDE 10 MG: 5 TABLET ORAL at 05:21

## 2025-02-23 RX ADMIN — ACETAMINOPHEN 650 MG: 325 TABLET, FILM COATED ORAL at 09:51

## 2025-02-23 RX ADMIN — OXYCODONE HYDROCHLORIDE 10 MG: 5 TABLET ORAL at 09:53

## 2025-02-23 RX ADMIN — OXYCODONE HYDROCHLORIDE 10 MG: 5 TABLET ORAL at 13:52

## 2025-02-23 RX ADMIN — ACETAMINOPHEN 650 MG: 325 TABLET, FILM COATED ORAL at 13:53

## 2025-02-23 RX ADMIN — SENNOSIDES AND DOCUSATE SODIUM 1 TABLET: 50; 8.6 TABLET ORAL at 20:16

## 2025-02-23 RX ADMIN — POLYETHYLENE GLYCOL 3350 17 G: 17 POWDER, FOR SOLUTION ORAL at 20:16

## 2025-02-23 RX ADMIN — ACETAMINOPHEN 650 MG: 325 TABLET, FILM COATED ORAL at 18:17

## 2025-02-23 RX ADMIN — METHOCARBAMOL 750 MG: 750 TABLET ORAL at 07:44

## 2025-02-23 RX ADMIN — GABAPENTIN 100 MG: 100 CAPSULE ORAL at 07:44

## 2025-02-23 RX ADMIN — HYDROXYZINE HYDROCHLORIDE 25 MG: 25 TABLET, FILM COATED ORAL at 22:09

## 2025-02-23 ASSESSMENT — ACTIVITIES OF DAILY LIVING (ADL)
ADLS_ACUITY_SCORE: 40

## 2025-02-23 NOTE — PROGRESS NOTES
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  He is now admitted to ARU on 2/21/25 for multidisciplinary rehabilitation and ongoing medical management.      Admission to acute inpatient rehab 02/21/25.    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 Please refer to last note from primary team for a more complete problem list and plan of care.  --No acute issues overnight.  --Functionally,   PT 2/22/2025  Patient meeting goals for wheelchair mobility 1 is modified independent for bed to chair and back with precautions.  And ambulating 150 feet with manual wheelchair.  Was able to perform car transfers with contact-guard assist to minimal assist x 1 with precautions.  --Vitals stable.   --No lab today. Hgb 7.9 on 2/21/25 patient asymptomatic and without any active bleed.  Recommend ongoing monitoring every Monday/Thursday other concerns arise.  Plan transfuse for hemoglobin of less than 7.  --Continue ongoing medical management.  Tolerating current pain medication regimen will not make adjustments this weekend.  Unless pain level changes.  --Continue therapies and plan of care.         Interval history:   RN report reviewed: Patient required repositioning throughout the night and as needed oxycodone  at midnight due to pain.  Remained continent for bowel and bladder with LBM on 2/21.  HUGO drain still in place 15 cc of drainage overnight.    Patient reports he is doing well and pain is well managed at the current doses and frequency. He thinks the frequency is slightly spaced out but otherwise adequate and does not need to be adjusted. His other concern with his wife (who is an RN) was his hgb which is 7.9. they were wondering if he will need a transfusion. He does not endorse worsening fatigue, SOB or dizziness. We reassured him monitoring is ok as he is asymptomatic at that hgb and as he has no active bleeding.     Denies fever, chills, CP, SOB, N/V, abdominal pain, new pain or weakness/numbness/tingling. No new issues with bowel or bladder. Functionally, he is improving.            Physical Exam:     Vitals:    02/22/25 0619 02/22/25 1158 02/22/25 1607 02/23/25 0753   BP: 123/72 123/65 (!) 144/72 116/68   BP Location: Right arm Right arm Right arm Right arm   Pulse: 82 77 84 81   Resp: 16 18 18 18   Temp: 98.5  F (36.9  C)  98.4  F (36.9  C)    TempSrc: Oral  Oral    SpO2: 96% 94% 97% 96%   Weight:       Height:         Gen: NAD, seated up in chair, pleasant and cooperative   Heart: RRR  Lungs: clear breath sounds b/l  Abd: soft and non-tender, HUGO drain in place.  Ext: wwp, no edema in BLE, no tenderness in calves  MSK/neuro: alert and oriented. speech fluent. moves BUE and BLE volitionally.          Data:   CBC:   Recent Labs   Lab Test 02/21/25 0618   WBC 9.0   RBC 2.62*   HGB 7.9*   MCV 94   MCH 30.2   MCHC 32.1   RDW 13.1        BMP:   Recent Labs   Lab Test 02/21/25 0618      POTASSIUM 3.9   CHLORIDE 100   CO2 29   BUN 15.9   CR 0.88   *     Other labs:     Scheduled meds  Current Facility-Administered Medications   Medication Dose Route Frequency Provider Last Rate Last Admin    acetaminophen (TYLENOL) tablet 650 mg  650 mg Oral Q4H While awake Lizy Macedo MD   650 mg at  02/23/25 0520    aspirin EC tablet 162 mg  162 mg Oral Daily Alena Gomez PA-C   162 mg at 02/23/25 0744    cefadroxil (DURICEF) capsule 500 mg  500 mg Oral BID Alena Gomez PA-C   500 mg at 02/23/25 0744    gabapentin (NEURONTIN) capsule 100 mg  100 mg Oral TID Alena Gomez PA-C   100 mg at 02/23/25 0744    methocarbamol (ROBAXIN) tablet 750 mg  750 mg Oral 4x Daily Lizy Macedo MD   750 mg at 02/23/25 0744    polyethylene glycol (MIRALAX) Packet 17 g  17 g Oral BID Alena Gomez PA-C   17 g at 02/23/25 0745    senna-docusate (SENOKOT-S/PERICOLACE) 8.6-50 MG per tablet 1 tablet  1 tablet Oral BID Alena Gomez PA-C   1 tablet at 02/23/25 0745       PRN meds:  Current Facility-Administered Medications   Medication Dose Route Frequency Provider Last Rate Last Admin    hydrOXYzine HCl (ATARAX) tablet 25 mg  25 mg Oral Q6H PRN Alena Gomez PA-C   25 mg at 02/22/25 2147    melatonin tablet 10 mg  10 mg Oral At Bedtime PRN Lizy Macedo MD   10 mg at 02/22/25 2147    naloxone (NARCAN) injection 0.2 mg  0.2 mg Intravenous Q2 Min PRN Lizy Macedo MD        Or    naloxone (NARCAN) injection 0.4 mg  0.4 mg Intravenous Q2 Min PRN Lizy Macedo MD        Or    naloxone (NARCAN) injection 0.2 mg  0.2 mg Intramuscular Q2 Min PRN Lizy Macedo MD        Or    naloxone (NARCAN) injection 0.4 mg  0.4 mg Intramuscular Q2 Min PRN Lizy Macedo MD        ondansetron (ZOFRAN) tablet 4 mg  4 mg Oral Q6H PRN Lizy Macedo MD        oxyCODONE (ROXICODONE) tablet 5 mg  5 mg Oral Q4H PRN Lizy Macedo MD        Or    oxyCODONE (ROXICODONE) tablet 10 mg  10 mg Oral Q4H PRN Lizy Macedo MD   10 mg at 02/23/25 0521       Patient was staffed with Dr. Rehana Armendariz who agrees with assessment and plan.    Shasta Vasquez MD  Resident Physician PGY-4  Physical Medicine and Rehabilitation

## 2025-02-23 NOTE — PROGRESS NOTES
Discharge Planner Post-Acute Rehab PT:     Discharge Plan: home with spouse assist, ramp to enter, 16 steps with 1 +1/2 rail to enter his main bedroom and shower;   likely able to modify first level initially;  home health PT    Precautions: NWB RLE (pelvic resection with antibiotic spacer) no valsalva or active abdominal contraction; abdominal precautions; R LE pain control; fall risk; do not roll onto R side    Current Status:  Bed Mobility: max A with R LE to the L;  not indicated to the R due to precautions  Transfer: glovo total assist x 2 bed to reclining chair (careful monitoring for sling placement and R LE neutral for pain control)  Gait: not indicated at this time, TBA  Stairs: not indicated at this time, TBA  Balance: supported sitting today for pain control and safety; TBA as pt progressess    Outcome Measures:   TBA    Assessment:  Pt demonstrates impaired functional mobility, gait, balance, ROM, strength, functionally limiting pain, all secondary to partial hemipelvectomy procedure (modified, see MD notes);  this was done secondary to chondrosarcoma.  He is appropriate for skilled PT to help him return home safely with family assist;      Tolerated ceiling lift transfer bed to w/c in am; unilateral and bilateral hip scooting for transfer prep in recliner in pm.  Partial hip lift from recliner with B UE and L LE following NWB 5 reps x 2 sets; managed pain post 4/10    Continue to coordinate an appropriate w/c for improved patient independence and mobility.        Other Barriers to Discharge (DME, Family Training, etc):   Pain, post surgical status, functional mobility, medical complexity; equipment, family training.

## 2025-02-23 NOTE — PLAN OF CARE
Goal Outcome Evaluation:      Plan of Care Reviewed With: patient    Overall Patient Progress: improvingOverall Patient Progress: improving    Patient participated in therapy.   Pain meds given in anticipation of pain in therapy.  Oxycodone PRN given twice as well as scheduled pain medications.  Prevena wound vac is intact and functioning. HUGO drain on right leg also intact. 5 ml out this shift. Night shift RN said 15 ml at night.  Transfers with liko lift to recliner.

## 2025-02-23 NOTE — PLAN OF CARE
Discharge Planner Post-Acute Rehab OT:      Discharge Plan: home with spouse     Precautions: NWB RLE, abdominal precaution, use extension loop on sling to avoid excessive flexion  no turning to R side in bed, painful RLE, support during bed mobility and transfer No hip restrictions per chart. No Valsalva or contractions of abdominal wall to decrease stress on repair.  Elevate RLE as needed, may use foam ramp.     Current Status:  ADLs:  Mobility: ceiling lift total Ax2, Ax1 to support RLE up to minimize pain/discomfort, bedmobility Ax2, one supporting RLE to minimize pain, use extension loop on sling to avoid excessive flexion        Grooming: set up and SBA sitting up in bed  Dressing: UB dressing SBA sitting up in bed, LB mod A sitting in recliner and bridging/leaning to L using reacher, max A for socks/shoes   Bathing: sponge bathing SBA for UB and mod A for LB   Toileting: lift transfer Ax2 to dependent chair Ax1 for supporting RLE  IADLs: Indepdendent at [facility]. 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 Pt will benefit from skilled OT service to reach max potential level of I and safety in I/ADLs. Pt I with all I/ADLs at [facility]. Not requires total A with all transfers and mobility      Other Barriers to Discharge (DME, Family Training, etc): house has ramp and is WC accessible. Able to stay on main floor. Pt has half bath on main with no shower.

## 2025-02-23 NOTE — PLAN OF CARE
Goal Outcome Evaluation:      Plan of Care Reviewed With: patient    Overall Patient Progress: no changeOverall Patient Progress: no change    A&Ox4, Ax2 w/liko. Continent BB - LBM 2/21 to commode & urinal. Diet regular, thin, whole. Pervana wound vac in place. HUGO drain in place. Denies chest pain & SOB. C/O pain in R pelvic area - managed with scheduled tylenol, robaxin, gabapentin, PRN oxy 2x, & atarax x1. Pt on RA. Bed low, locked, & call light within. VSS, no acute changes this shift. Will continue POC.    Additional info: sticky left for MD if LPIV can be taken out.

## 2025-02-23 NOTE — PLAN OF CARE
FOCUS/GOAL  Pain management, Safety management, and Prevention of secondary complications    ASSESSMENT, INTERVENTIONS AND CONTINUING PLAN FOR GOAL:  Pt repositioned throughout the night.  R hip and leg supported with pillows.  PRN oxycodone 10mg given around 0000.  Calls appropriately, alert and oriented x4.  Continent of bowel/bladder, uses bedside urinal.  LBM 2/21.  Transfers assist of 2 with liko lift.  15cc of drainage from HUGO.  Goal Outcome Evaluation:

## 2025-02-23 NOTE — CONSULTS
Social Work: Initial Assessment with Discharge Plan    Patient Name: Gilberto Lund  : 1980  Age: 44 year old  MRN: [id number]  Completed assessment with: Chart review and interview with patient.   Admitted to ARU: 25    Presenting Information   Date of SW assessment: 2025  Health Care Directive: Copy in Chart  Primary Health Care Agent: Patient/self.   Secondary Health Care Agent: Spouse Mary Ellen  Living Situation: Lives in house with spouse, 3 dependent children (ages 9-12). Ramp to enter house, 16 stairs to second level to access bedroom w/ walk in shower and tub shower. Once disch home, plan to stay on main level until able to complete stairs.   Previous Functional Status: ind with I/ADLs at baseline; wife is able to assist.  DME available:  See therapy evaluation for more information   Patient and family understanding of hospitalization: Appropriate and Pleasant.  Cultural/Language/Spiritual Considerations:  white male, English speaking, not Amish    Physical Health  Reason for admission:Jeremiah Lund is a 44 year old male s/p R modified radical hemipelvectomy on 25. He is now NWB on RLE, no hip ROM restrictions, and has abdominal precautions (no straining thru abdomen) and will need skilled PT and OT services to optimize his functional mobility and ADLs in setting of these restrictions. He also has new pain, fatigue, weakness, impaired balance, and impaired weight shifting. He is currently requiring max A to roll and mod A x2 to achieve partial stand. He requires use of ceiling lift for bed >chair tranfers. He will requiring coordinated interdisciplinary care on acute rehab to optimize his medical and functional recovery and to secure appropriate DME to facilitate safe return home, while minimizing risk for clinical complications. He is a young, active male who is motivated and has excellent support for return home.     Provider Information   Primary Care  "Physician:Marcello Abreu [facility] will schedule PCP apt at discharge.   : None    Mental Health/Chemical Dependency:   Diagnosis: None  Alcohol/Tobacco/Narcotis: None  Support/Services in Place: N/A  Services Needed/Recommended: [place] and Health Psychology support while on ARU available.   Sexuality/Intimacy: Not discussed     Support System  Marital Status: , living with spouse  Family support: none identified by pt  Other support available: Reports supportive friends  Areas of fulfillment/katy: Hunting, fishing, outdoors    Community Resources  Current in home services: None  Previous services: Juni recent    Financial/Employment/Education  Employment Status: No longer employed  Income Source: Spouse is working, but currently on leave to assist with pt.   Financial Concerns:  Pt is no longer working. Referred to Critical access hospital programs if financial needs arise  Education: High school and 1 year of community college  Insurance: [employer] FULLY INSURED     ------------------------------------------------------------------------------------------------------------  JORGE Pain Assessment    Pain Effect on Sleep  Over the past 5 days, how much of the time has pain made it hard for you to sleep at night?\"    3. Frequently    Pain Interference with Therapy Activities  \"Over the past 5 days, how often have you limited your participation in rehabilitation therapy sessions due to pain?\"  1. Rarely or not at all    Pain Interference with Day-to-Day Activities  \"Over the past 5 days, how often have you limited your day-to-day activities (excluding rehabilitation therapy sessions) because of pain?\"  3. Frequently  -------------------------------------------------------------------------------------------------------------    TRANSPORTATION:    Has lack of transportation kept you from medical appointments, meetings, work, or from getting things needed for daily living?  A. Yes, it has kept me from " medical appointments or from getting my medications  B. Yes, it has kept me from non-medical meetings, appointments, work or from getting things that I need  C. No  X. Patient Unable to respond  Y. Patient declines to respond  -------------------------------------------------------------------------------------------------------------  Health Literacy:   How Often do you need to have someone help you when you read instructions, pamphlets, or other written material from your doctor or pharmacy?  0.       Never  1.       Rarely  2.       Sometimes  3.       Often  4.       Always  5.       Patient declines to respond  6.       Patient unable to respond  ------------------------------------------------------------------------------------------------------------    SOCIAL ISOLATION  How often do you feel lonely or isolated from those around you?  0.       Never  1.       Rarely  2.       Sometimes  3.       Often  4.       Always  5.       Patient declines to respond  6.       Patient unable to respond  -------------------------------------------------------------------------------------------------------------    Discharge Plan     Patient and family discharge goal: To be determined, pending progress  Provided Education on discharge plan: Evaluations and discharge recommendations pending.   Patient agreeable to discharge plan:  Pending further discussion. Evaluations and discharge recommendations pending.   Provided education and attained signature for Medicare IM and IRF Patient Rights and Privacy Information provided to patient : Yes  Provided patient with [place] [facility]/ [facility] [place] Resources: N/A  Barriers to discharge: stairs within home - but can remain on main level     Discharge Recommendations   Disposition: TBD, pending therapy updates  Transportation Needs: Patient, family/friends, paid transport, insurance transport (if applicable)   Name of Transportation Company and Phone: TBD    Additional  comments   Discharge TBD, ELOS 14 days. Evals and discharge needs pending.     SW met with pt at bedside and introduced self and role.     SW will continue to remain available for patient and family support, discharge planning, and access to resources.    ITZ Ortiz BSW  [place]   [facility]

## 2025-02-24 ENCOUNTER — APPOINTMENT (OUTPATIENT)
Dept: OCCUPATIONAL THERAPY | Facility: CLINIC | Age: 45
DRG: 949 | End: 2025-02-24
Attending: PHYSICAL MEDICINE & REHABILITATION
Payer: COMMERCIAL

## 2025-02-24 ENCOUNTER — PATIENT OUTREACH (OUTPATIENT)
Dept: CARE COORDINATION | Facility: CLINIC | Age: 45
End: 2025-02-24
Payer: COMMERCIAL

## 2025-02-24 ENCOUNTER — APPOINTMENT (OUTPATIENT)
Dept: PHYSICAL THERAPY | Facility: CLINIC | Age: 45
DRG: 949 | End: 2025-02-24
Attending: PHYSICAL MEDICINE & REHABILITATION
Payer: COMMERCIAL

## 2025-02-24 LAB
ANION GAP SERPL CALCULATED.3IONS-SCNC: 9 MMOL/L (ref 7–15)
BUN SERPL-MCNC: 15.7 MG/DL (ref 6–20)
CALCIUM SERPL-MCNC: 9 MG/DL (ref 8.8–10.4)
CHLORIDE SERPL-SCNC: 101 MMOL/L (ref 98–107)
CREAT SERPL-MCNC: 0.99 MG/DL (ref 0.67–1.17)
EGFRCR SERPLBLD CKD-EPI 2021: >90 ML/MIN/1.73M2
ERYTHROCYTE [DISTWIDTH] IN BLOOD BY AUTOMATED COUNT: 13.2 % (ref 10–15)
GLUCOSE SERPL-MCNC: 98 MG/DL (ref 70–99)
HCO3 SERPL-SCNC: 27 MMOL/L (ref 22–29)
HCT VFR BLD AUTO: 26.9 % (ref 40–53)
HGB BLD-MCNC: 8.7 G/DL (ref 13.3–17.7)
MAGNESIUM SERPL-MCNC: 2.2 MG/DL (ref 1.7–2.3)
MCH RBC QN AUTO: 31 PG (ref 26.5–33)
MCHC RBC AUTO-ENTMCNC: 32.3 G/DL (ref 31.5–36.5)
MCV RBC AUTO: 96 FL (ref 78–100)
PLATELET # BLD AUTO: 477 10E3/UL (ref 150–450)
POTASSIUM SERPL-SCNC: 4.8 MMOL/L (ref 3.4–5.3)
RBC # BLD AUTO: 2.81 10E6/UL (ref 4.4–5.9)
SODIUM SERPL-SCNC: 137 MMOL/L (ref 135–145)
WBC # BLD AUTO: 9 10E3/UL (ref 4–11)

## 2025-02-24 PROCEDURE — 97535 SELF CARE MNGMENT TRAINING: CPT | Mod: GO

## 2025-02-24 PROCEDURE — 99232 SBSQ HOSP IP/OBS MODERATE 35: CPT | Performed by: PHYSICIAN ASSISTANT

## 2025-02-24 PROCEDURE — 82374 ASSAY BLOOD CARBON DIOXIDE: CPT | Performed by: PHYSICIAN ASSISTANT

## 2025-02-24 PROCEDURE — 85018 HEMOGLOBIN: CPT | Performed by: PHYSICIAN ASSISTANT

## 2025-02-24 PROCEDURE — 250N000013 HC RX MED GY IP 250 OP 250 PS 637: Performed by: PHYSICAL MEDICINE & REHABILITATION

## 2025-02-24 PROCEDURE — 80048 BASIC METABOLIC PNL TOTAL CA: CPT | Performed by: PHYSICIAN ASSISTANT

## 2025-02-24 PROCEDURE — 250N000013 HC RX MED GY IP 250 OP 250 PS 637: Performed by: PHYSICIAN ASSISTANT

## 2025-02-24 PROCEDURE — 97530 THERAPEUTIC ACTIVITIES: CPT | Mod: GP | Performed by: PHYSICAL THERAPIST

## 2025-02-24 PROCEDURE — 128N000003 HC R&B REHAB

## 2025-02-24 PROCEDURE — 97535 SELF CARE MNGMENT TRAINING: CPT | Mod: GO | Performed by: OCCUPATIONAL THERAPIST

## 2025-02-24 PROCEDURE — 83735 ASSAY OF MAGNESIUM: CPT | Performed by: PHYSICIAN ASSISTANT

## 2025-02-24 PROCEDURE — 36415 COLL VENOUS BLD VENIPUNCTURE: CPT | Performed by: PHYSICIAN ASSISTANT

## 2025-02-24 RX ADMIN — GABAPENTIN 100 MG: 100 CAPSULE ORAL at 07:56

## 2025-02-24 RX ADMIN — OXYCODONE HYDROCHLORIDE 10 MG: 5 TABLET ORAL at 03:14

## 2025-02-24 RX ADMIN — SENNOSIDES AND DOCUSATE SODIUM 1 TABLET: 50; 8.6 TABLET ORAL at 07:57

## 2025-02-24 RX ADMIN — CEFADROXIL 500 MG: 500 CAPSULE ORAL at 07:57

## 2025-02-24 RX ADMIN — POLYETHYLENE GLYCOL 3350 17 G: 17 POWDER, FOR SOLUTION ORAL at 07:56

## 2025-02-24 RX ADMIN — GABAPENTIN 100 MG: 100 CAPSULE ORAL at 20:21

## 2025-02-24 RX ADMIN — METHOCARBAMOL 750 MG: 750 TABLET ORAL at 20:21

## 2025-02-24 RX ADMIN — CEFADROXIL 500 MG: 500 CAPSULE ORAL at 20:21

## 2025-02-24 RX ADMIN — ACETAMINOPHEN 650 MG: 325 TABLET, FILM COATED ORAL at 06:28

## 2025-02-24 RX ADMIN — ACETAMINOPHEN 650 MG: 325 TABLET, FILM COATED ORAL at 13:17

## 2025-02-24 RX ADMIN — METHOCARBAMOL 750 MG: 750 TABLET ORAL at 11:03

## 2025-02-24 RX ADMIN — OXYCODONE HYDROCHLORIDE 10 MG: 5 TABLET ORAL at 07:56

## 2025-02-24 RX ADMIN — GABAPENTIN 100 MG: 100 CAPSULE ORAL at 13:17

## 2025-02-24 RX ADMIN — OXYCODONE HYDROCHLORIDE 10 MG: 5 TABLET ORAL at 12:15

## 2025-02-24 RX ADMIN — ACETAMINOPHEN 650 MG: 325 TABLET, FILM COATED ORAL at 11:03

## 2025-02-24 RX ADMIN — METHOCARBAMOL 750 MG: 750 TABLET ORAL at 07:57

## 2025-02-24 RX ADMIN — SENNOSIDES AND DOCUSATE SODIUM 1 TABLET: 50; 8.6 TABLET ORAL at 20:21

## 2025-02-24 RX ADMIN — OXYCODONE HYDROCHLORIDE 10 MG: 5 TABLET ORAL at 20:26

## 2025-02-24 RX ADMIN — METHOCARBAMOL 750 MG: 750 TABLET ORAL at 16:37

## 2025-02-24 RX ADMIN — OXYCODONE HYDROCHLORIDE 10 MG: 5 TABLET ORAL at 16:37

## 2025-02-24 RX ADMIN — ACETAMINOPHEN 650 MG: 325 TABLET, FILM COATED ORAL at 17:31

## 2025-02-24 RX ADMIN — ACETAMINOPHEN 650 MG: 325 TABLET, FILM COATED ORAL at 20:21

## 2025-02-24 RX ADMIN — ASPIRIN 162 MG: 81 TABLET ORAL at 07:56

## 2025-02-24 ASSESSMENT — ACTIVITIES OF DAILY LIVING (ADL)
ADLS_ACUITY_SCORE: 40

## 2025-02-24 NOTE — PROGRESS NOTES
[facility]   Acute Rehabilitation Unit  Daily progress note    INTERVAL HISTORY  Weekend and therapy notes reviewed, no acute events reported.    Gilberto Lund was seen up in his room this morning, with spouse present.  He reports that overall pain has been better managed the past few days and seems to be tolerating oxycodone.  He denies any nausea or other unwanted side effects.  Does not feel it makes him overall drowsy.  He has not been sleeping very well, in part due to pain/discomfort but also hospital environment.  He found he actually slept much better in the recliner.  He declined any changes to sleep or pain medication regimen at this time.  He is still having some swelling in his right thigh and scrotal area but stable to gradually improving.  Bowels are moving fine, he had a soft BM yesterday.  He is noting minimal drainage from HUGO drain.  Reviewed plans for drain and wound vac.  He denies any dizziness or lightheadedness, shortness of breath, cough, fever/chills.  He is interested in meeting with dietician to review protein supplements available, both for wound healing and sometimes using as meal replacement.  Also had some general questions about ARU course and discharge planning.  Spouse would like to participate in team rounds on Wednesday but also for family training prior to discharge.    With therapies, he is currently needing max A for bed mobility, lift with total assist x2 for transfers.    MEDICATIONS  Current Facility-Administered Medications   Medication Dose Route Frequency Provider Last Rate Last Admin    acetaminophen (TYLENOL) tablet 650 mg  650 mg Oral Q4H While awake Lizy Macedo MD   650 mg at 02/24/25 0628    aspirin EC tablet 162 mg  162 mg Oral Daily Alena Gomez PA-C   162 mg at 02/24/25 0756    cefadroxil (DURICEF) capsule 500 mg  500 mg Oral BID Alena Gomez PA-C   500 mg at 02/24/25 0757    gabapentin  "(NEURONTIN) capsule 100 mg  100 mg Oral TID Alena Gomez PA-C   100 mg at 02/24/25 0756    methocarbamol (ROBAXIN) tablet 750 mg  750 mg Oral 4x Daily Lizy Macedo MD   750 mg at 02/24/25 0757    polyethylene glycol (MIRALAX) Packet 17 g  17 g Oral BID Alena Gomez PA-C   17 g at 02/24/25 0756    senna-docusate (SENOKOT-S/PERICOLACE) 8.6-50 MG per tablet 1 tablet  1 tablet Oral BID Alena Gomez PA-C   1 tablet at 02/24/25 0757          Current Facility-Administered Medications   Medication Dose Route Frequency Provider Last Rate Last Admin    hydrOXYzine HCl (ATARAX) tablet 25 mg  25 mg Oral Q6H PRN Alena Gomez PA-C   25 mg at 02/23/25 2209    melatonin tablet 10 mg  10 mg Oral At Bedtime PRN Lizy Macedo MD   10 mg at 02/23/25 2209    naloxone (NARCAN) injection 0.2 mg  0.2 mg Intravenous Q2 Min PRN Lizy Macedo MD        Or    naloxone (NARCAN) injection 0.4 mg  0.4 mg Intravenous Q2 Min PRN Lizy Macedo MD        Or    naloxone (NARCAN) injection 0.2 mg  0.2 mg Intramuscular Q2 Min PRN Lizy Macedo MD        Or    naloxone (NARCAN) injection 0.4 mg  0.4 mg Intramuscular Q2 Min PRN Lizy Macedo MD        ondansetron (ZOFRAN) tablet 4 mg  4 mg Oral Q6H PRN Lizy Macedo MD        oxyCODONE (ROXICODONE) tablet 5 mg  5 mg Oral Q4H PRN Lizy Macedo MD        Or    oxyCODONE (ROXICODONE) tablet 10 mg  10 mg Oral Q4H PRN Lizy Macedo MD   10 mg at 02/24/25 0756        PHYSICAL EXAM  /70 (BP Location: Right arm)   Pulse 77   Temp 98.1  F (36.7  C) (Oral)   Resp 16   Ht 1.854 m (6' 1\")   Wt 113.4 kg (250 lb)   SpO2 96%   BMI 32.98 kg/m    Gen: NAD, sitting up in chair  HEENT: NC/AT, MMM  Cardio: RRR, no murmurs  Pulm: non-labored on room air, lungs CTA bilaterally  Abd: soft, non-tender, protuberant, bowel sounds present  Ext: edema in right proximal lower extremity  Neuro/MSK: awake, alert, moving " all extremities spontaneously in chair    LABS  CBC RESULTS:   Recent Labs   Lab Test 02/24/25  0632 02/21/25  0618 02/20/25  0742   WBC 9.0 9.0 8.2   RBC 2.81* 2.62* 2.68*   HGB 8.7* 7.9* 8.3*   HCT 26.9* 24.6* 24.6*   MCV 96 94 92   MCH 31.0 30.2 31.0   MCHC 32.3 32.1 33.7   RDW 13.2 13.1 13.2   * 360 338       Last Basic Metabolic Panel:  Recent Labs   Lab Test 02/24/25  0632 02/21/25  0618 02/20/25  0742    137 134*   POTASSIUM 4.8 3.9 3.9   CHLORIDE 101 100 97*   CO2 27 29 27   ANIONGAP 9 8 10   GLC 98 116* 122*   BUN 15.7 15.9 13.5   CR 0.99 0.88 0.80   GFRESTIMATED >90 >90 >90   CANDICE 9.0 8.6* 8.7*         Rehabilitation   Admission to acute inpatient rehab 02/21/25.    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       Impairment of ADL's: Noted to have impaired activity tolerance, impaired balance due to weakness and NWB RLE, impaired RLE sensation, impaired strength, impaired weight shifting due to NWB RLE, and pain, all affecting his ability to safely and independently perform basic ADLs.  Goal for mod I with basic ADLs from wheelchair level.  Anticipate to require supervision assist for shower transfers for safety.     Impairment of mobility:  Noted to have impaired activity tolerance, impaired balance due to weakness and NWB RLE, impaired RLE sensation, impaired strength, impaired weight shifting due to NWB RLE, and pain, all affecting his ability to safely and independently perform basic mobility.  Goal for mod I with basic bed mobility, sit > stand transfers, short distance gait (<3 ft), ADLs, at w/c based mobility level.  Anticipate may require supervision assist for shower transfers and max assist for any  stairs.    Continue comprehensive acute inpatient rehabilitation program with multidisciplinary approach including therapies, rehab nursing, and physiatry following. See interval history for updates.      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  He is now admitted to ARU on 2/21/25 for multidisciplinary rehabilitation and ongoing medical management.      Medical Conditions  New actions/orders/updates for today are in blue.     Chondrosarcoma of right innominate bone  S/p R internal hemipelvectomy on 2/14/25 with Dr. 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 Patient now s/p right modified radical hemipelvectomy of chondrosarcoma with Dr. Bravo.   - NWB RLE  - Activity restrictions: No hip restrictions. No Valsalva or contractions of abdominal wall to decrease stress on repair.  Elevate RLE as needed, may use foam ramp.  - Wound care: Prevena vac x14 days.  Once battery expires, remove and dispose.  Once removed, cover with non-permeable dressing to remain dry while showering; no soaking/submerging.  - Continue palmira-op antibiotics with cefadroxil 500 mg BID for 14 days (from hospital discharge: 2/21-3/7)  - Pain management: Tylenol 650 mg q4h PRN, atarax 25 mg q6h PRN, robaxin 750 mg QID, gabapentin 100 mg TID, oxycodone 5-10 mg q4h PRN (changed from [place] at ARU admission due to sub-optimal pain control and anticipated increased  activity level).  Wean opioids as able  - HUGO drain: monitor drain output. Strip and empty drain per shift protocol. Discontinue drain when output < 20 ml per shift.  Appears last 2 shifts have been 2.5 mL, 5 mL, 15 mL but will connect with nursing to clarify documentation.  If output remains <20 mL per shift, will connect with ortho in next 1-2 days to consider removal.  - DVT prophylaxis with ASA 81 mg BID x4 wks post-op  - Continue PT/OT  - Follow up with Dr. Bravo as scheduled on 3/17     Acute blood loss anemia  Pre-op Hgb ~15, dropped to 6.9 post-op and received 1 unit pRBC.  Estimated intra-op blood loss 1.5L.  Hgb recently stable ~8.  2/24: Hgb stable/improving at 8.7  - Transfuse if Hgb <7  - Trend CBC every M/Th    Thrombocytosis  New on 2/24 to 477.  Suspect reactive  - Trend CBC every M/Th     Tongue pressure injury, hospital acquired  Noted post-op, assessed by WOCN, felt to be MDRPI due to ETT during surgery  - Wound care ordered by WOCN recs  - Can add orajel if ongoing discomfort or affecting oral intake    Insomnia  - Continue PTA melatonin 10 mg at HS PRN     Adjustment to disability:  Clinical psychology to eval and treat if indicated  FEN: regular diet, thin liquids  Bowel: continent.  Avoid constipation, limit valsava as above to promote healing.  Continue Miralax BID, senokot-S 1 tab BID.  Monitor and adjust regimen as indicated  Bladder: continent.  PVRs at admission without evidence of retention  DVT Prophylaxis: ASA 81 mg BID x4 wks post-op per ortho  GI Prophylaxis: not current indicated  Code: full; confirmed on admission  Disposition: goal for home  ELOS:  14 days  Follow up Appointments on Discharge: PCP in 1-2 weeks, ortho at 4 wks post-op (3/17)      35 minutes spent on the date of service doing chart review, history and exam, documentation, and further activities as noted above.       Plan of care was discussed with Dr. Mina Macedo, PM&R Staff Physician    Alena Gomez,  PA-C  Physical Medicine & Rehabilitation

## 2025-02-24 NOTE — PLAN OF CARE
"Goal Outcome Evaluation:      Plan of Care Reviewed With: patient    Overall Patient Progress: improvingOverall Patient Progress: improving    VS: /70 (BP Location: Right arm)   Pulse 77   Temp 98.1  F (36.7  C) (Oral)   Resp 16   Ht 1.854 m (6' 1\")   Wt 113.4 kg (250 lb)   SpO2 96%   BMI 32.98 kg/m       O2: SpO2 > 96 and stable on RA.  Denies chest pain and SOB.    Output: Voids spontaneously without difficulty to bathroom / using beside urinal    Last BM: 2/24 denies abdominal discomfort. BS active / passing flatus.    Activity: Up with A2 liko   Skin: WDL except, incision site    Pain: Pain managed with scheduled and PRN pain meds    CMS: Intact, AOx4. Denies numbness and tingling.   Dressing:    Diet: Regular diet. Denies nausea/vomiting.      LDA:  No PIV access.    Equipment: IV pole, personal belongings,    Plan: Safety precautions maintained / Continue with plan of care. Call light within reach, pt able to make needs known.    Additional Info:            "

## 2025-02-24 NOTE — PROGRESS NOTES
Clinic Care Coordination Contact  Care Coordination Transition Communication    Clinical Data: Patient was hospitalized at [facility] from 2/14/2025 to 2/21/2025 with diagnosis of Primary chondrosarcoma of bone of pelvis.     Assessment: Patient has transitioned to TCU/ARU for short term rehabilitation:    Facility Name: [facility]    Transition Communication:  Notified facility of Primary Care- Care Coordination support via Epic fax.    Plan: Care Coordinator will await notification from facility staff informing of patient's discharge plans/needs. Care Coordinator will review chart and outreach to facility staff every 4 weeks and as needed.     Sia Zambrano RN Care Coordination   [facility]  [place], [place]  Email: [email or web address]  Phone: [phone]

## 2025-02-24 NOTE — PLAN OF CARE
FOCUS/GOAL  Bowel management, Pain management, and Prevention of secondary complications    ASSESSMENT, INTERVENTIONS AND CONTINUING PLAN FOR GOAL:  Pt sleeping in reclining chair tonight.  Has been sleeping throughout shift.   Uses call light appropriately.  Uses urinal independently.  Has PRN oxycodone q 4hr 10mg for pain.  Right hip very painful when positioning patient.  Continent of bowel/bladder; LBM 2/23.  Regular thin diet, takes pills whole with water.  Goal Outcome Evaluation:

## 2025-02-24 NOTE — PLAN OF CARE
Goal Outcome Evaluation:      Plan of Care Reviewed With: patient    Overall Patient Progress: improvingOverall Patient Progress: improving    A&Ox4, Ax2 w/liko & NWB to R leg. Continent BB - LBM 2/23 to purple shower chair & urinal. Diet regular, thin, whole. Pervana wound vac in place. HUGO draining 2.5 ml. Pt denies chest pains & SOB. C/O pain in RLE - managed w/ scheduled pains meds & prn oxy & atarax. LPIV taken out. Pt sleeping in recliner & refused chair alarm. VSS, no acute changes this shift. Will continue POC.

## 2025-02-24 NOTE — PLAN OF CARE
Discharge Planner Post-Acute Rehab PT:      Discharge Plan: Home with spouse, ramp to enter. Home care PT.     Precautions: NWB RLE (pelvic resection with antibiotic spacer) no valsalva or active abdominal contraction; abdominal precautions; R LE pain control; fall risk; do not roll onto R side     Current Status:  Bed Mobility: Max A for R LE mgmt  Transfer: Liko lift Ax2. Min A for R LE mgmt slide board level surfaces  Gait: Not safe  Stairs: Not safe  Balance: Good static, dynamic seated balance     Outcome Measures:   **     Assessment:  WC obtained for use in room and on unit, with pt doing well to quickly learn WC mgmt skills including brakes, armrest. Progressed to slide board transfers in therapy, min A for R LE mgmt, along with assist for slide board placement and removal, with the pt doing well to complete WC push up in order to place/remove slide board and avoid rolling onto R hip.     Other Barriers to Discharge (DME, Family Training, etc):   Home measurements sheet provided 2/24.  Family training to be scheduled.  Equipment: Will need K3 manual WC, (extended) slide board, drop arm commode, ?medical transportation pending car and walker transfer progression.

## 2025-02-24 NOTE — PLAN OF CARE
Discharge Planner Post-Acute Rehab OT:      Discharge Plan: home with spouse PRN; HC therapies     Precautions: NWB RLE, abdominal precaution, use extension loop on sling to avoid excessive flexion  no turning to R side in bed, painful RLE.     Current Status:  ADLs:  Mobility: ceiling lift total Ax2    Grooming: SBA at eob or recliner  Dressing: UB dressing SBA sitting up in bed, LB mod A sitting in recliner and bridging/leaning to L using reacher, max A for socks/shoes   Bathing: sponge bathing SBA for UB and mod A for LB   Toileting: lift transfer Ax2 to dependent chair Ax1 for supporting RLE  IADLs: Indepdendent at OF. TBA, goal to be mod I with light tasks WC based  Vision/Cognition: WNL, glasses full time      Assessment: Practicing bed mob this date. Pt needing total A to move RLE and pt in much pain despite having oxy 1 hr ago. Will relay this to medical team. Pt attempting to stand with walker but unable to d/t pain    PM: Pt completes SB tx and toileting on drop arm commode. Jayne for mgmt of RLE with SB.     Other Barriers to Discharge (DME, Family Training, etc): house has ramp and is WC accessible. Able to stay on main floor. Pt has half bath on main with no shower.

## 2025-02-24 NOTE — PLAN OF CARE
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 Pt is s/p hemipelvectomy and girdlestone resection and other surgeries to remove cancer and now requires total A x2 for overall transfers and mobility. pt I at PLOF. pt will benefit from skilled OT service to increase level of I and safety in I/ADLs.    Communication/Cognition/Swallow:  No concerns    Intensity of therapy:   PT 90 minutes, 6x/week, for 21 days  OT 90 minutes, 6 times/week, for 21 days    Orthotics  None  Education wound care  Neuropsychology Testing: No  Other:  None      Medical Prognosis: Good      Physician summary statement: Pt progressing SB transfers and WC mobility.  Will be predominantly WC based for mobility and ADLs.      Discharge destination: prior home  Discharge rehabilitation needs: home care, PT, and OT      Estimated length of stay: 21 days      Rehabilitation Physician Lizy Macedo MD

## 2025-02-25 ENCOUNTER — APPOINTMENT (OUTPATIENT)
Dept: OCCUPATIONAL THERAPY | Facility: CLINIC | Age: 45
DRG: 949 | End: 2025-02-25
Attending: PHYSICAL MEDICINE & REHABILITATION
Payer: COMMERCIAL

## 2025-02-25 ENCOUNTER — APPOINTMENT (OUTPATIENT)
Dept: PHYSICAL THERAPY | Facility: CLINIC | Age: 45
DRG: 949 | End: 2025-02-25
Attending: PHYSICAL MEDICINE & REHABILITATION
Payer: COMMERCIAL

## 2025-02-25 PROCEDURE — 250N000013 HC RX MED GY IP 250 OP 250 PS 637: Performed by: PHYSICIAN ASSISTANT

## 2025-02-25 PROCEDURE — 97535 SELF CARE MNGMENT TRAINING: CPT | Mod: GO | Performed by: OCCUPATIONAL THERAPIST

## 2025-02-25 PROCEDURE — 99231 SBSQ HOSP IP/OBS SF/LOW 25: CPT | Performed by: PHYSICIAN ASSISTANT

## 2025-02-25 PROCEDURE — 97530 THERAPEUTIC ACTIVITIES: CPT | Mod: GP | Performed by: PHYSICAL THERAPIST

## 2025-02-25 PROCEDURE — 128N000003 HC R&B REHAB

## 2025-02-25 PROCEDURE — 250N000013 HC RX MED GY IP 250 OP 250 PS 637: Performed by: PHYSICAL MEDICINE & REHABILITATION

## 2025-02-25 PROCEDURE — 97530 THERAPEUTIC ACTIVITIES: CPT | Mod: GO | Performed by: OCCUPATIONAL THERAPIST

## 2025-02-25 PROCEDURE — 97110 THERAPEUTIC EXERCISES: CPT | Mod: GP | Performed by: PHYSICAL THERAPIST

## 2025-02-25 RX ADMIN — OXYCODONE HYDROCHLORIDE 10 MG: 5 TABLET ORAL at 10:41

## 2025-02-25 RX ADMIN — METHOCARBAMOL 750 MG: 750 TABLET ORAL at 13:37

## 2025-02-25 RX ADMIN — GABAPENTIN 100 MG: 100 CAPSULE ORAL at 13:37

## 2025-02-25 RX ADMIN — CEFADROXIL 500 MG: 500 CAPSULE ORAL at 20:31

## 2025-02-25 RX ADMIN — METHOCARBAMOL 750 MG: 750 TABLET ORAL at 15:23

## 2025-02-25 RX ADMIN — METHOCARBAMOL 750 MG: 750 TABLET ORAL at 20:31

## 2025-02-25 RX ADMIN — ACETAMINOPHEN 650 MG: 325 TABLET, FILM COATED ORAL at 06:14

## 2025-02-25 RX ADMIN — SENNOSIDES AND DOCUSATE SODIUM 1 TABLET: 50; 8.6 TABLET ORAL at 20:31

## 2025-02-25 RX ADMIN — SENNOSIDES AND DOCUSATE SODIUM 1 TABLET: 50; 8.6 TABLET ORAL at 08:08

## 2025-02-25 RX ADMIN — OXYCODONE HYDROCHLORIDE 10 MG: 5 TABLET ORAL at 06:14

## 2025-02-25 RX ADMIN — ACETAMINOPHEN 650 MG: 325 TABLET, FILM COATED ORAL at 20:31

## 2025-02-25 RX ADMIN — GABAPENTIN 100 MG: 100 CAPSULE ORAL at 08:08

## 2025-02-25 RX ADMIN — GABAPENTIN 100 MG: 100 CAPSULE ORAL at 20:31

## 2025-02-25 RX ADMIN — ACETAMINOPHEN 650 MG: 325 TABLET, FILM COATED ORAL at 13:37

## 2025-02-25 RX ADMIN — ACETAMINOPHEN 650 MG: 325 TABLET, FILM COATED ORAL at 17:51

## 2025-02-25 RX ADMIN — OXYCODONE HYDROCHLORIDE 10 MG: 5 TABLET ORAL at 15:22

## 2025-02-25 RX ADMIN — ACETAMINOPHEN 650 MG: 325 TABLET, FILM COATED ORAL at 10:42

## 2025-02-25 RX ADMIN — METHOCARBAMOL 750 MG: 750 TABLET ORAL at 08:08

## 2025-02-25 RX ADMIN — OXYCODONE HYDROCHLORIDE 10 MG: 5 TABLET ORAL at 20:34

## 2025-02-25 RX ADMIN — CEFADROXIL 500 MG: 500 CAPSULE ORAL at 08:08

## 2025-02-25 RX ADMIN — ASPIRIN 162 MG: 81 TABLET ORAL at 08:08

## 2025-02-25 RX ADMIN — POLYETHYLENE GLYCOL 3350 17 G: 17 POWDER, FOR SOLUTION ORAL at 08:07

## 2025-02-25 ASSESSMENT — ACTIVITIES OF DAILY LIVING (ADL)
ADLS_ACUITY_SCORE: 42
ADLS_ACUITY_SCORE: 40
ADLS_ACUITY_SCORE: 42
ADLS_ACUITY_SCORE: 40
ADLS_ACUITY_SCORE: 42
ADLS_ACUITY_SCORE: 40
ADLS_ACUITY_SCORE: 40
ADLS_ACUITY_SCORE: 42
ADLS_ACUITY_SCORE: 42
ADLS_ACUITY_SCORE: 40
ADLS_ACUITY_SCORE: 42
ADLS_ACUITY_SCORE: 40
ADLS_ACUITY_SCORE: 42
ADLS_ACUITY_SCORE: 40
ADLS_ACUITY_SCORE: 42
ADLS_ACUITY_SCORE: 42
ADLS_ACUITY_SCORE: 40
ADLS_ACUITY_SCORE: 42
ADLS_ACUITY_SCORE: 42
ADLS_ACUITY_SCORE: 40

## 2025-02-25 NOTE — PROGRESS NOTES
"CLINICAL NUTRITION SERVICES - ASSESSMENT NOTE    RECOMMENDATIONS FOR MDs/PROVIDERS TO ORDER:  None at this time    Malnutrition Status:    Patient does not meet two of the established criteria necessary for diagnosing malnutrition    Registered Dietitian Interventions:  - Ensure Max (chocolate) with dinner  - Special K bar (PB chocolate) at HS  - Additional snacks/supplements PRN  - Provided Supplement List  - Nutrition education: discussed increased protein needs post op, encouraged protein source with each meal/snack    Future/Additional Recommendations:  Monitor PO intake, supplement use, labs, and weight trends     REASON FOR ASSESSMENT  Provider order - interested in protein supplements to optimize healing    SUBJECTIVE INFORMATION  Assessed patient in room.    NUTRITION HISTORY  Pt typically consumes 2 meals/day and snacks between meals. Pt had some decreased appetite for a few days post op but is now eating well. He has not noticed any changes in weight. He has been eating 2 meals/day during admission and getting some food from home. He has some muscle milk protein drinks in the unit fridge. He is interested in supplements and high protein snacks. He would like Special K bar sent in the evening so he can eat it in the morning before therapies. He would also like to try Ensure Max. Pt has supplement list and is aware he may order additional snacks/supplements PRN.    CURRENT NUTRITION ORDERS  Diet: Regular    CURRENT INTAKE/TOLERANCE  100% per flowsheets since admission. Per [employer], pt ordering 1 meal/day from room service. Getting food from home.    LABS  Nutrition-relevant labs: Reviewed    MEDICATIONS  Nutrition-relevant medications:  miralax, senna-docusate    ANTHROPOMETRICS  Height: 185.4 cm (6' 1\")  Most Recent Weight: 113.4 kg (250 lb)  IBW: 83.6 kg  % IBW: 136%  BMI (kg/m ): Obesity Class I BMI 30-34.9  Weight History:   Wt Readings from Last 15 Encounters:   02/21/25 113.4 kg (250 lb)   02/21/25 " 114.3 kg (251 lb 15.8 oz)   02/06/25 104.3 kg (230 lb)   01/13/25 104.3 kg (230 lb)   01/08/25 104.3 kg (230 lb)   01/07/25 102.1 kg (225 lb)   12/06/24 103.6 kg (228 lb 6.3 oz)   12/02/24 105.7 kg (233 lb)   11/21/24 104.3 kg (230 lb)   10/28/24 105.2 kg (232 lb)   10/02/24 100.2 kg (221 lb)   06/29/22 101.8 kg (224 lb 8 oz)   06/28/22 102.1 kg (225 lb)   04/07/21 105.4 kg (232 lb 6.4 oz)   09/02/15 113.4 kg (250 lb)   No weight loss noted. Pt reports UBW of ~230 lbs. Recent weights possibly inaccurate vs fluid up. Will continue to monitor weights.    Dosing Weight: 91 kg, based on adjusted wt    ASSESSED NUTRITION NEEDS  Estimated Energy Needs: 8283-5254 kcals/day (25 - 30 kcals/kg)  Justification: Maintenance  Estimated Protein Needs: 109-137 grams protein/day (1.2 - 1.5 grams of pro/kg)  Justification: Increased needs and Wound healing  Estimated Fluid Needs: 1 mL/kcal  Justification: Maintenance    SYSTEM FINDINGS    Skin/wounds: tongue pressure injury, hospital acquired - felt to be MDRPI due to ETT during surgery. Wound vac on right pelvis    GI symptoms: none noted during visit    MALNUTRITION  % Intake: Decreased intake does not meet criteria  % Weight Loss: None noted  Subcutaneous Fat Loss: None observed  Muscle Loss: None observed  Fluid Accumulation/Edema: None noted  Malnutrition Diagnosis: Patient does not meet two of the established criteria necessary for diagnosing malnutrition  Malnutrition Present on Admission: No    NUTRITION DIAGNOSIS  Predicted inadequate nutrient intake (kcal/pro)  related to potential for variation in intake and menu fatigue.     INTERVENTIONS  Medical food supplement therapy  Nutrition education - discussed role of RD in care. Discussed available snacks/supplements to help meet protein needs post op. Encouraged protein source at each meal and snack. Reviewed sources of protein.     Goals  Patient to consume % of nutritionally adequate meal trays TID, or the equivalent  with supplements/snacks.     Monitoring/Evaluation  Progress toward goals will be monitored and evaluated per policy.     Barbie Laguna RD, LD  Available via phone and Vocera  Phone: [phone]  Vocera: 5R Acute Rehab Clinical Dietitian  Weekend/Holiday Vocera: Weekend Holiday Clinical Dietitian [Multi Site Groups]

## 2025-02-25 NOTE — PLAN OF CARE
Discharge Planner Post-Acute Rehab PT:     Discharge Plan: Home with spouse, ramp to enter. Home care PT.     Precautions: NWB RLE (pelvic resection with antibiotic spacer); no valsalva or active abdominal contraction; abdominal precautions; R LE pain control; fall risk; do not roll onto R side    Current Status:  Bed Mobility: max assist to manage right L/E  Transfer: Liko lift transfer & assist x 2 with nsg staff; intermittent min assist for right L/E management with level sliding board transfers.   Gait: not safe  Stairs: not safe  Balance: good static & dynamic sitting balance    Outcome Measures:   TBD    Assessment: patient demonstrating progress with level sliding board transfers, including management of right L/E with use of leg  - intermittent min assist from PT for recliner to/from W/C sliding board transfers to left & right; able to direct PT to assist with steps of W/C management & W/C positioning.     Other Barriers to Discharge (DME, Family Training, etc):   Home measurements sheet provided 2/24.  Family training to be scheduled.  Equipment: Will need K3 manual WC, (extended) slide board, drop arm commode, ?medical transportation pending car and walker transfer progression.

## 2025-02-25 NOTE — PROGRESS NOTES
[facility]   Acute Rehabilitation Unit  Daily progress note    INTERVAL HISTORY  Gilberto Lund was seen up in his room this morning.  No acute events reported overnight.  He reports to have slept well last night, again in the recliner.  Overall pain at right hip seems to be well-controlled, though with some expected increase with activity.  He feels he is tolerating oxycodone and getting adequate relief with current regimen.  He denies any nausea or excessive drowsiness.  He has had soft/formed bowel movements the past 2 days, none yet today.  He is noting very minimal output to drain.  He has some ongoing swelling in right thigh/hip and scrotum.  He denies dizziness or lightheadedness, chest pain, shortness of breath.  Denies other concerns or questions at this time.    With PT yesterday, they obtained wheelchair for use in room and on unit, with pt doing well to quickly learn WC mgmt skills including brakes, armrest. Progressed to slide board transfers in therapy, min A for R LE mgmt, along with assist for slide board placement and removal, with the pt doing well to complete WC push up in order to place/remove slide board and avoid rolling onto R hip.     MEDICATIONS  Current Facility-Administered Medications   Medication Dose Route Frequency Provider Last Rate Last Admin    acetaminophen (TYLENOL) tablet 650 mg  650 mg Oral Q4H While awake Lizy Macedo MD   650 mg at 02/25/25 0614    aspirin EC tablet 162 mg  162 mg Oral Daily Alena Gomez PA-C   162 mg at 02/25/25 0808    cefadroxil (DURICEF) capsule 500 mg  500 mg Oral BID Alena Gomez PA-C   500 mg at 02/25/25 0808    gabapentin (NEURONTIN) capsule 100 mg  100 mg Oral TID Aelna Gomez PA-C   100 mg at 02/25/25 0808    methocarbamol (ROBAXIN) tablet 750 mg  750 mg Oral 4x Daily Lizy Macedo MD   750 mg at 02/25/25 0808    polyethylene glycol (MIRALAX) Packet 17 g  17 g Oral  "BID Alena Gomez PA-C   17 g at 02/25/25 0807    senna-docusate (SENOKOT-S/PERICOLACE) 8.6-50 MG per tablet 1 tablet  1 tablet Oral BID Alena Gomez PA-C   1 tablet at 02/25/25 0808          Current Facility-Administered Medications   Medication Dose Route Frequency Provider Last Rate Last Admin    hydrOXYzine HCl (ATARAX) tablet 25 mg  25 mg Oral Q6H PRN Alena Gomez PA-C   25 mg at 02/23/25 2209    melatonin tablet 10 mg  10 mg Oral At Bedtime PRN Lizy Macedo MD   10 mg at 02/23/25 2209    naloxone (NARCAN) injection 0.2 mg  0.2 mg Intravenous Q2 Min PRN Lizy Macedo MD        Or    naloxone (NARCAN) injection 0.4 mg  0.4 mg Intravenous Q2 Min PRLizy Mckeon MD        Or    naloxone (NARCAN) injection 0.2 mg  0.2 mg Intramuscular Q2 Min PRLizy Mckeon MD        Or    naloxone (NARCAN) injection 0.4 mg  0.4 mg Intramuscular Q2 Min PRN Lizy Macedo MD        ondansetron (ZOFRAN) tablet 4 mg  4 mg Oral Q6H PRN Lizy Macedo MD        oxyCODONE (ROXICODONE) tablet 5 mg  5 mg Oral Q4H PRN Lizy Macedo MD        Or    oxyCODONE (ROXICODONE) tablet 10 mg  10 mg Oral Q4H PRN Lizy Macedo MD   10 mg at 02/25/25 0614        PHYSICAL EXAM  /72 (BP Location: Right arm, Patient Position: Chair, Cuff Size: Adult Regular)   Pulse 82   Temp 98.1  F (36.7  C) (Oral)   Resp 16   Ht 1.854 m (6' 1\")   Wt 113.4 kg (250 lb)   SpO2 97%   BMI 32.98 kg/m    Gen: NAD, sitting up in chair  HEENT: NC/AT, MMM  Cardio: RRR, no murmurs  Pulm: non-labored on room air, lungs CTA bilaterally  Abd: soft, non-tender, protuberant, bowel sounds present  Ext: edema in right proximal lower extremity, wound vac in place with suction, HUGO drain with scant sanguinous drainage in bulb  Neuro/MSK: awake, alert, moving all extremities spontaneously in chair    LABS  CBC RESULTS:   Recent Labs   Lab Test 02/24/25  0632 02/21/25  0618 02/20/25  0742 "   WBC 9.0 9.0 8.2   RBC 2.81* 2.62* 2.68*   HGB 8.7* 7.9* 8.3*   HCT 26.9* 24.6* 24.6*   MCV 96 94 92   MCH 31.0 30.2 31.0   MCHC 32.3 32.1 33.7   RDW 13.2 13.1 13.2   * 360 338       Last Basic Metabolic Panel:  Recent Labs   Lab Test 02/24/25  0632 02/21/25  0618 02/20/25  0742    137 134*   POTASSIUM 4.8 3.9 3.9   CHLORIDE 101 100 97*   CO2 27 29 27   ANIONGAP 9 8 10   GLC 98 116* 122*   BUN 15.7 15.9 13.5   CR 0.99 0.88 0.80   GFRESTIMATED >90 >90 >90   CANDICE 9.0 8.6* 8.7*         Rehabilitation   Admission to acute inpatient rehab 02/21/25.    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       Impairment of ADL's: Noted to have impaired activity tolerance, impaired balance due to weakness and NWB RLE, impaired RLE sensation, impaired strength, impaired weight shifting due to NWB RLE, and pain, all affecting his ability to safely and independently perform basic ADLs.  Goal for mod I with basic ADLs from wheelchair level.  Anticipate to require supervision assist for shower transfers for safety.     Impairment of mobility:  Noted to have impaired activity tolerance, impaired balance due to weakness and NWB RLE, impaired RLE sensation, impaired strength, impaired weight shifting due to NWB RLE, and pain, all affecting his ability to safely and independently perform basic mobility.  Goal for mod I with basic bed mobility, sit > stand transfers, short distance gait (<3 ft), ADLs, at w/c based mobility level.  Anticipate may require supervision assist for shower transfers and max assist for any stairs.    Continue comprehensive acute inpatient rehabilitation program with multidisciplinary approach including therapies, rehab nursing, and  physiatry following. See interval history for updates.      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  He is now admitted to ARU on 2/21/25 for multidisciplinary rehabilitation and ongoing medical management.      Medical Conditions  New actions/orders/updates for today are in blue.     Chondrosarcoma of right innominate bone  S/p R internal hemipelvectomy on 2/14/25 with Dr. 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 Patient now s/p right modified radical hemipelvectomy of chondrosarcoma with Dr. Bravo.   - NWB RLE  - Activity restrictions: No hip restrictions. No Valsalva or contractions of abdominal wall to decrease stress on repair.  Elevate RLE as needed, may use foam ramp.  - Wound care: Prevena vac x14 days.  Once battery expires, remove and dispose.  Once removed, cover with non-permeable dressing to remain dry while showering; no soaking/submerging.  - Continue palmira-op antibiotics with cefadroxil 500 mg BID for 14 days (from hospital discharge: 2/21-3/7)  - Pain management: Tylenol 650 mg q4h PRN, atarax 25 mg q6h PRN, robaxin 750 mg QID, gabapentin 100 mg TID, oxycodone 5-10 mg q4h PRN (changed from [place] at ARU admission due to sub-optimal pain control and anticipated increased activity level).  Wean opioids as able  - HUGO drain: monitor drain output. Strip and empty drain per shift protocol. Discontinue drain when  output < 20 ml per shift.  Drain output appears to have been <20 mL per shift.  Will reach out to ortho to ask about drain removal.  - DVT prophylaxis with ASA 81 mg BID x4 wks post-op  - Continue PT/OT  - Follow up with Dr. Bravo as scheduled on 3/17     Acute blood loss anemia  Pre-op Hgb ~15, dropped to 6.9 post-op and received 1 unit pRBC.  Estimated intra-op blood loss 1.5L.  Hgb recently stable ~8.  2/24: Hgb stable/improving at 8.7  - Transfuse if Hgb <7  - Trend CBC every M/Th    Thrombocytosis  New on 2/24 to 477.  Suspect reactive  - Trend CBC every M/Th     Tongue pressure injury, hospital acquired  Noted post-op, assessed by WOCN, felt to be MDRPI due to ETT during surgery  - Wound care ordered by WOCN recs  - Can add orajel if ongoing discomfort or affecting oral intake    Insomnia  - Continue PTA melatonin 10 mg at HS PRN     Adjustment to disability:  Clinical psychology to eval and treat if indicated  FEN: regular diet, thin liquids  Bowel: continent.  Avoid constipation, limit valsava as above to promote healing.  Continue Miralax BID, senokot-S 1 tab BID.  Monitor and adjust regimen as indicated  Bladder: continent.  PVRs at admission without evidence of retention  DVT Prophylaxis: ASA 81 mg BID x4 wks post-op per ortho  GI Prophylaxis: not current indicated  Code: full; confirmed on admission  Disposition: goal for home  ELOS:  14 days  Follow up Appointments on Discharge: PCP in 1-2 weeks, ortho at 4 wks post-op (3/17)      30 minutes spent on the date of service doing chart review, history and exam, documentation, and further activities as noted above.       Plan of care was discussed with Dr. Mina Macedo, PM&R Staff Physician    TAMARA Hyatt-ANYI  Physical Medicine & Rehabilitation

## 2025-02-25 NOTE — PLAN OF CARE
Discharge Planner Post-Acute Rehab OT:      Discharge Plan: home with spouse PRN; HC therapies     Precautions: NWB RLE, abdominal precaution, No turning on R side     Current Status:  ADLs:  Mobility: Lift x2 nsg; Min A SBT with therapy only (help with RLE at times)    Grooming: Mod I in wc  Dressing: UB dressing SBA sitting up in bed, LB mod A sitting in recliner and bridging/leaning to L using reacher, max A for socks/shoes   Bathing: sponge bathing SBA for UB and mod A for LB   Toileting: lift x2 to platform commode nsg; Min A SBT with therapies only  IADLs: wife can help at discharge; IND in all prior  Vision/Cognition: Intact/WNL     Assessment: Great progression in SBT using leg  for RLE to go from recliner>wc>platform commode. Pt ordering platform commode and hip kit with extra wide sock aid today. Anticipate that pt will be able to go home sooner if medically able once able to be mod I in SBT.     Other Barriers to Discharge (DME, Family Training, etc):  Pt ordering platform commode/drop arm; and hip kit 2/25/25  Will need drop arm wheelchair

## 2025-02-25 NOTE — PLAN OF CARE
"Goal Outcome Evaluation:      Plan of Care Reviewed With: patient    Overall Patient Progress: improvingOverall Patient Progress: improving    VS: /74   Pulse 83   Temp 97.3  F (36.3  C) (Oral)   Resp 16   Ht 1.854 m (6' 1\")   Wt 113.4 kg (250 lb)   SpO2 97%   BMI 32.98 kg/m     O2: SpO2 > 97 and stable on RA. LS clear and equal bilaterally. Denies chest pain and SOB.    Output: Voids spontaneously without difficulty using urinal.   Last BM: 2/25, denies abdominal discomfort. BS active / passing flatus.    Activity: Up with A x 2 Liko lift.    Skin: WDL except, R-hip/ thigh surgical wound.   Pain: Pain managed with scheduled and PRN oxycodone, latest used at 1525 hrs.    CMS: Intact, AOx4. Denies numbness and tingling.   Dressing: Surgical dressing are CDI   Diet: Regular diet. Denies nausea/vomiting. Pt has good appetite for meals. Pt had door dash dinner.   LDA: HUGO drain/ wound vac to R-hip/ thigh.    Equipment: Personal belongings,    Plan: Fall/ Limb precautions maintained / Continue with plan of care. Call light within reach, pt able to make needs known. Pt refused chair alarm and safety check completed.    Additional Info: Family visiting.            "

## 2025-02-25 NOTE — PLAN OF CARE
Goal Outcome Evaluation:      Plan of Care Reviewed With: patient    Overall Patient Progress: no changeOverall Patient Progress: no change         Pt.alert oreintedx4. Calls appropriately. C/o of leg pain PRN Oxycodone 10 mg given and scheduled Tylenol. HUGO drain output 10 ml. Slept most of the shift, Refused repositioning. Safety measures in place. Call light within reach. Will continue with current POC..

## 2025-02-25 NOTE — PLAN OF CARE
Goal Outcome Evaluation:      Plan of Care Reviewed With: patient    Overall Patient Progress: no changeOverall Patient Progress: no change Patient is alert and oriented x 4. Pain managed with PRN and scheduled pain medication and was effective at a time. Patient denied headache, dizziness, CP or SOB. A-2 liko. Wound intact with prevena wound vac. HUGO drain on right thigh intact scant drainage. VS WDL. No care concern at this time. Patient states he sleeps on recliner. Call light is with in reach urinal is with in reach.

## 2025-02-26 ENCOUNTER — APPOINTMENT (OUTPATIENT)
Dept: PHYSICAL THERAPY | Facility: CLINIC | Age: 45
DRG: 949 | End: 2025-02-26
Attending: PHYSICAL MEDICINE & REHABILITATION
Payer: COMMERCIAL

## 2025-02-26 ENCOUNTER — APPOINTMENT (OUTPATIENT)
Dept: OCCUPATIONAL THERAPY | Facility: CLINIC | Age: 45
DRG: 949 | End: 2025-02-26
Attending: PHYSICAL MEDICINE & REHABILITATION
Payer: COMMERCIAL

## 2025-02-26 PROCEDURE — 97535 SELF CARE MNGMENT TRAINING: CPT | Mod: GO | Performed by: OCCUPATIONAL THERAPIST

## 2025-02-26 PROCEDURE — 250N000013 HC RX MED GY IP 250 OP 250 PS 637: Performed by: PHYSICIAN ASSISTANT

## 2025-02-26 PROCEDURE — 97110 THERAPEUTIC EXERCISES: CPT | Mod: GP | Performed by: PHYSICAL THERAPIST

## 2025-02-26 PROCEDURE — 250N000013 HC RX MED GY IP 250 OP 250 PS 637: Performed by: PHYSICAL MEDICINE & REHABILITATION

## 2025-02-26 PROCEDURE — 99232 SBSQ HOSP IP/OBS MODERATE 35: CPT | Mod: FS | Performed by: PHYSICIAN ASSISTANT

## 2025-02-26 PROCEDURE — 97530 THERAPEUTIC ACTIVITIES: CPT | Mod: GP | Performed by: PHYSICAL THERAPIST

## 2025-02-26 PROCEDURE — 128N000003 HC R&B REHAB

## 2025-02-26 RX ADMIN — ACETAMINOPHEN 650 MG: 325 TABLET, FILM COATED ORAL at 10:22

## 2025-02-26 RX ADMIN — ACETAMINOPHEN 650 MG: 325 TABLET, FILM COATED ORAL at 06:25

## 2025-02-26 RX ADMIN — METHOCARBAMOL 750 MG: 750 TABLET ORAL at 16:08

## 2025-02-26 RX ADMIN — ACETAMINOPHEN 650 MG: 325 TABLET, FILM COATED ORAL at 20:40

## 2025-02-26 RX ADMIN — METHOCARBAMOL 750 MG: 750 TABLET ORAL at 20:40

## 2025-02-26 RX ADMIN — CEFADROXIL 500 MG: 500 CAPSULE ORAL at 20:40

## 2025-02-26 RX ADMIN — OXYCODONE HYDROCHLORIDE 10 MG: 5 TABLET ORAL at 18:17

## 2025-02-26 RX ADMIN — ACETAMINOPHEN 650 MG: 325 TABLET, FILM COATED ORAL at 14:11

## 2025-02-26 RX ADMIN — GABAPENTIN 100 MG: 100 CAPSULE ORAL at 14:11

## 2025-02-26 RX ADMIN — METHOCARBAMOL 750 MG: 750 TABLET ORAL at 12:18

## 2025-02-26 RX ADMIN — OXYCODONE HYDROCHLORIDE 10 MG: 5 TABLET ORAL at 06:25

## 2025-02-26 RX ADMIN — OXYCODONE HYDROCHLORIDE 10 MG: 5 TABLET ORAL at 10:21

## 2025-02-26 RX ADMIN — OXYCODONE HYDROCHLORIDE 10 MG: 5 TABLET ORAL at 22:55

## 2025-02-26 RX ADMIN — GABAPENTIN 100 MG: 100 CAPSULE ORAL at 08:49

## 2025-02-26 RX ADMIN — OXYCODONE HYDROCHLORIDE 10 MG: 5 TABLET ORAL at 14:11

## 2025-02-26 RX ADMIN — GABAPENTIN 100 MG: 100 CAPSULE ORAL at 20:40

## 2025-02-26 RX ADMIN — ACETAMINOPHEN 650 MG: 325 TABLET, FILM COATED ORAL at 18:17

## 2025-02-26 RX ADMIN — POLYETHYLENE GLYCOL 3350 17 G: 17 POWDER, FOR SOLUTION ORAL at 08:51

## 2025-02-26 RX ADMIN — ASPIRIN 162 MG: 81 TABLET ORAL at 08:49

## 2025-02-26 RX ADMIN — SENNOSIDES AND DOCUSATE SODIUM 1 TABLET: 50; 8.6 TABLET ORAL at 08:49

## 2025-02-26 RX ADMIN — METHOCARBAMOL 750 MG: 750 TABLET ORAL at 08:49

## 2025-02-26 RX ADMIN — CEFADROXIL 500 MG: 500 CAPSULE ORAL at 08:49

## 2025-02-26 RX ADMIN — SENNOSIDES AND DOCUSATE SODIUM 1 TABLET: 50; 8.6 TABLET ORAL at 20:40

## 2025-02-26 ASSESSMENT — ACTIVITIES OF DAILY LIVING (ADL)
ADLS_ACUITY_SCORE: 44
ADLS_ACUITY_SCORE: 42
ADLS_ACUITY_SCORE: 44
ADLS_ACUITY_SCORE: 42
ADLS_ACUITY_SCORE: 44
ADLS_ACUITY_SCORE: 44
ADLS_ACUITY_SCORE: 42
ADLS_ACUITY_SCORE: 42
ADLS_ACUITY_SCORE: 44
ADLS_ACUITY_SCORE: 42
ADLS_ACUITY_SCORE: 44
ADLS_ACUITY_SCORE: 44
ADLS_ACUITY_SCORE: 42
ADLS_ACUITY_SCORE: 44

## 2025-02-26 NOTE — PROGRESS NOTES
SPIRITUAL HEALTH SERVICES Consult Note  I met with Jeremiah this morning to introduce spiritual care. He was resting in his chair when I was there. He said that he did not need any emotional or spiritual support today. I let him know that I would be available if any spiritual care needs come up.       Ryley Alexander M.Div.  Associate

## 2025-02-26 NOTE — PLAN OF CARE
Discharge Planner Post-Acute Rehab OT:      Discharge Plan: home with spouse PRN; HC therapies     Precautions: NWB RLE, abdominal precaution, No turning on R side     Current Status:  ADLs:  Mobility: Lift x2 nsg; Min A SBT with therapy only (help with RLE at times)    Grooming: Mod I in wc  Dressing: UB dressing SBA sitting up in bed, LB mod A sitting in recliner and bridging/leaning to L using reacher, max A for socks/shoes   Bathing: Lift x2 from recliner<>purple shower chair; Min A to wash/dry RLE   Toileting: lift x2 to platform commode nsg; Min A SBT with therapies only  IADLs: wife can help at discharge; IND in all prior  Vision/Cognition: Intact/WNL     Assessment: Pt completing first full shower since coming to unit. Extended time needed for lift, and to cover HUGO site. Continues to need second person assist to manage painful RLE, though pain is significantly better since last few days. Pt needing extra time to complete donning/doffing of pj pants in recliner. Pt plans to use this same method of dressing/undressing in a recliner as well.     Other Barriers to Discharge (DME, Family Training, etc):  Pt ordering platform commode/drop arm; and hip kit 2/25/25  Will need drop arm wheelchair

## 2025-02-26 NOTE — PLAN OF CARE
FOCUS/GOAL  Pain management, Mobility, and Safety management    ASSESSMENT, INTERVENTIONS AND CONTINUING PLAN FOR GOAL:  Pt in reclining chair to sleep.  Continent of bowel and bladder; uses bedside urinal independently.  Very alert and oriented; uses call light appropriately.  Pain management is priority, gets oxycodone PRN q 4hr.  Regular thin diet, takes pills whole.  Transfers with eduar kitty.  Goal Outcome Evaluation:

## 2025-02-26 NOTE — PROGRESS NOTES
[facility]   Acute Rehabilitation Unit  Daily progress note    INTERVAL HISTORY  Gilberto Lund was seen up in his room this morning during team rounds, with wife joining by phone.  No acute events reported overnight.  Patient reports that overall pain management has been ok since changes at ARU admission.  No nausea with oxycodone.  He is noting ongoing gradual improvements overall.  He is currently sleeping in recliner, but indicates that he would like to consider hospital bed at home to have that option.      With therapies, he is making progress with slide board transfers.  Wheelchair has been ordered by therapies.  Patient and family are working on other home modifications and equipment.  Will plan for family training with spouse prior to discharge.   will also provide some transportation resources.  Will plan for home care initially at discharge.  For full functional updates, see team rounds note from today.    MEDICATIONS  Current Facility-Administered Medications   Medication Dose Route Frequency Provider Last Rate Last Admin    acetaminophen (TYLENOL) tablet 650 mg  650 mg Oral Q4H While awake Lizy Macedo MD   650 mg at 02/26/25 0625    aspirin EC tablet 162 mg  162 mg Oral Daily Alena Gomez PA-C   162 mg at 02/25/25 0808    cefadroxil (DURICEF) capsule 500 mg  500 mg Oral BID Alena Gomez PA-C   500 mg at 02/25/25 2031    gabapentin (NEURONTIN) capsule 100 mg  100 mg Oral TID Alena Gomez PA-C   100 mg at 02/25/25 2031    methocarbamol (ROBAXIN) tablet 750 mg  750 mg Oral 4x Daily Lizy Macedo MD   750 mg at 02/25/25 2031    polyethylene glycol (MIRALAX) Packet 17 g  17 g Oral BID Alena Gomez PA-C   17 g at 02/25/25 0807    senna-docusate (SENOKOT-S/PERICOLACE) 8.6-50 MG per tablet 1 tablet  1 tablet Oral BID Alena Gomez PA-C   1 tablet at 02/25/25 2031          Current Facility-Administered  "Medications   Medication Dose Route Frequency Provider Last Rate Last Admin    hydrOXYzine HCl (ATARAX) tablet 25 mg  25 mg Oral Q6H PRN Alena Gomez PA-C   25 mg at 02/23/25 2209    melatonin tablet 10 mg  10 mg Oral At Bedtime PRN Lizy Macedo MD   10 mg at 02/23/25 2209    naloxone (NARCAN) injection 0.2 mg  0.2 mg Intravenous Q2 Min PRN Lizy Macedo MD        Or    naloxone (NARCAN) injection 0.4 mg  0.4 mg Intravenous Q2 Min PRN Lizy Macedo MD        Or    naloxone (NARCAN) injection 0.2 mg  0.2 mg Intramuscular Q2 Min PRN Lizy Macedo MD        Or    naloxone (NARCAN) injection 0.4 mg  0.4 mg Intramuscular Q2 Min PRN Lizy Macedo MD        ondansetron (ZOFRAN) tablet 4 mg  4 mg Oral Q6H PRN Lizy Macedo MD        oxyCODONE (ROXICODONE) tablet 5 mg  5 mg Oral Q4H PRN Lizy Macedo MD        Or    oxyCODONE (ROXICODONE) tablet 10 mg  10 mg Oral Q4H PRN Lizy Macedo MD   10 mg at 02/26/25 0625        PHYSICAL EXAM  /74   Pulse 83   Temp 97.3  F (36.3  C) (Oral)   Resp 16   Ht 1.854 m (6' 1\")   Wt 113.4 kg (250 lb)   SpO2 97%   BMI 32.98 kg/m    Gen: NAD, sitting up in chair  HEENT: NC/AT  Pulm: non-labored on room air  Abd: protuberant  Ext: edema in right proximal lower extremity, wound vac in place with suction, HUGO drain with scant sanguinous drainage in bulb  Neuro/MSK: awake, alert, moving all extremities spontaneously in chair  *Full exam deferred today for conversation    LABS  CBC RESULTS:   Recent Labs   Lab Test 02/24/25  0632 02/21/25  0618 02/20/25  0742   WBC 9.0 9.0 8.2   RBC 2.81* 2.62* 2.68*   HGB 8.7* 7.9* 8.3*   HCT 26.9* 24.6* 24.6*   MCV 96 94 92   MCH 31.0 30.2 31.0   MCHC 32.3 32.1 33.7   RDW 13.2 13.1 13.2   * 360 338       Last Basic Metabolic Panel:  Recent Labs   Lab Test 02/24/25  0632 02/21/25  0618 02/20/25  0742    137 134*   POTASSIUM 4.8 3.9 3.9   CHLORIDE 101 100 97*   CO2 27 " 29 27   ANIONGAP 9 8 10   GLC 98 116* 122*   BUN 15.7 15.9 13.5   CR 0.99 0.88 0.80   GFRESTIMATED >90 >90 >90   CANDICE 9.0 8.6* 8.7*         Rehabilitation   Admission to acute inpatient rehab 02/21/25.    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       Impairment of ADL's: Noted to have impaired activity tolerance, impaired balance due to weakness and NWB RLE, impaired RLE sensation, impaired strength, impaired weight shifting due to NWB RLE, and pain, all affecting his ability to safely and independently perform basic ADLs.  Goal for mod I with basic ADLs from wheelchair level.  Anticipate to require supervision assist for shower transfers for safety.     Impairment of mobility:  Noted to have impaired activity tolerance, impaired balance due to weakness and NWB RLE, impaired RLE sensation, impaired strength, impaired weight shifting due to NWB RLE, and pain, all affecting his ability to safely and independently perform basic mobility.  Goal for mod I with basic bed mobility, sit > stand transfers, short distance gait (<3 ft), ADLs, at w/c based mobility level.  Anticipate may require supervision assist for shower transfers and max assist for any stairs.    Continue comprehensive acute inpatient rehabilitation program with multidisciplinary approach including therapies, rehab nursing, and physiatry following. See interval history for updates.      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  He is now admitted to ARU on 2/21/25 for multidisciplinary rehabilitation and ongoing medical management.      Medical Conditions  New actions/orders/updates for today are in blue.     Chondrosarcoma of right innominate bone  S/p R internal hemipelvectomy on 2/14/25 with Dr. 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 Patient now s/p right modified radical hemipelvectomy of chondrosarcoma with Dr. Bravo.   - NWB RLE  - Activity restrictions: No hip restrictions. No Valsalva or contractions of abdominal wall to decrease stress on repair.  Elevate RLE as needed, may use foam ramp.  - Wound care: Prevena vac x14 days.  Once battery expires, remove and dispose.  Once removed, cover with non-permeable dressing to remain dry while showering; no soaking/submerging.  - Continue palmira-op antibiotics with cefadroxil 500 mg BID for 14 days (from hospital discharge: 2/21-3/7)  - Pain management: Tylenol 650 mg q4h PRN, atarax 25 mg q6h PRN, robaxin 750 mg QID, gabapentin 100 mg TID, oxycodone 5-10 mg q4h PRN (changed from [place] at ARU admission due to sub-optimal pain control and anticipated increased activity level).  Wean opioids as able  - HUGO drain: monitor drain output. Strip and empty drain per shift protocol.  Previous recs were to discontinue when <20 mL per shift which has consistently been the case at ARU.  Discussed with ortho who assessed today and indicated to keep in place for 2 weeks.  Later ortho team reported that there actually was good amount of drainage during their assessment, so was potentially kinked.  Will plan to leave in place at this time and monitor.  - DVT prophylaxis  with ASA 81 mg BID x4 wks post-op  - Continue PT/OT  - Follow up with Dr. Bravo as scheduled on 3/17     Acute blood loss anemia  Pre-op Hgb ~15, dropped to 6.9 post-op and received 1 unit pRBC.  Estimated intra-op blood loss 1.5L.  Hgb recently stable ~8.  2/24: Hgb stable/improving at 8.7  - Transfuse if Hgb <7  - Trend CBC every M/Th    Thrombocytosis  New on 2/24 to 477.  Suspect reactive  - Trend CBC every M/Th     Tongue pressure injury, hospital acquired  Noted post-op, assessed by WOCN, felt to be MDRPI due to ETT during surgery  - Wound care ordered by [facility]OCN recs  - Can add orajel if ongoing discomfort or affecting oral intake    Insomnia  - Continue PTA melatonin 10 mg at HS PRN       Adjustment to disability:  Clinical psychology to eval and treat if indicated  FEN: regular diet, thin liquids  Bowel: continent.  Avoid constipation, limit valsava as above to promote healing.  Continue Miralax BID, senokot-S 1 tab BID.  Monitor and adjust regimen as indicated  Bladder: continent.  PVRs at admission without evidence of retention  DVT Prophylaxis: ASA 81 mg BID x4 wks post-op per ortho  GI Prophylaxis: not current indicated  Code: full; confirmed on admission  Disposition: home with home care  ELOS: given progress, now anticipating able to discharge home on 3/4/25  Follow up Appointments on Discharge: PCP in 1-2 weeks, ortho at 4 wks post-op (3/17)      40 minutes spent on the date of service doing chart review, history and exam, documentation, and further activities as noted above.       Patient was seen and discussed with Dr. Mina Macedo, PM&R Staff Physician, as part of a shared visit    Alena Gomez PA-C  Physical Medicine & Rehabilitation

## 2025-02-26 NOTE — PROGRESS NOTES
Orthopedic Surgery Progress Note 02/26/2025    S: No acute events overnight. Pain controlled. Making progress in rehab. Denies any new concerns.    O:  Temp: 97.3  F (36.3  C) Temp src: Oral BP: 128/74 Pulse: 83   Resp: 16 SpO2: 97 % O2 Device: None (Room air)      Exam:  Gen: No acute distress, resting comfortably in bed.  Resp: Non-labored breathing  Cardio: Regular rate via peripheral pulse  MSK:     RLE:  - Dressings c/d/I, Prevena vac in place holding suction  - Fires Quad, TA, GSC, EHL, FHL  - SILT femoral/tibial/sural/saphenous/DP/SP nerves  - PT/DP pulses 1+, foot wwp     Drain output: Minimal output over 24 hrs from HUGO, 0 ml from Prevena    Recent Labs   Lab 02/24/25  0632 02/21/25  0618 02/20/25  0742   WBC 9.0 9.0 8.2   HGB 8.7* 7.9* 8.3*   * 360 338         Assessment:  Gilberto Lund is a 44 year old male s/p R internal hemipelvectomy on 2/14/25 with Dr. Bravo. Doing well.       - Restrictions: No valsalva, no contractions of abdominal wall until second stage surgery.      Recs:  Activity: Up with assist and assistive devices as needed until independent. No hip restrictions. No Valsalva or contractions of abdominal wall to decrease stress on repair.  Weight bearing status: NWB RLE   Antibiotics: Cefadroxil x 2 weeks postop  DVT prophylaxis:  mg every day   Elevation: elevate RLE  as needed, may use foam ramp   Wound Care: Prevena to remain in place until POD #14, managed by ortho  Drains: monitor drain output. Strip and empty drain per shift protocol. Keep in place for 2 weeks postop    Follow up: Plan for follow up with Dr. Bravo 4 weeks.    Orthopedic surgery staff for this patient is Dr. Bravo. Discussed.    --  Buck Amador MD  Orthopedic Surgery PGY-4

## 2025-02-26 NOTE — PLAN OF CARE
Discharge Planner Post-Acute Rehab PT:      Discharge Plan: Home with spouse, ramp to enter. Home care PT.     Precautions: NWB RLE (pelvic resection with antibiotic spacer); no valsalva or active abdominal contraction; abdominal precautions; R LE pain control but no hip restrictions; fall risk; do not roll onto R side     Current Status:  Bed Mobility: Mod A for R LE mgmt  Transfer: Min A<>SBA slide board transfer  Gait: not safe  Stairs: not safe  Balance: good static & dynamic sitting balance     Assessment: Continues to be demonstrating improving ease, efficiency, and IND with slide board transfers. Occasional cues for WC placement. Orders initiated for K3 WC and hospital bed through [employer].     Other Barriers to Discharge (DME, Family Training, etc):   Home measurements sheet provided 2/24.  Family training Sun 3/2 - awaiting time from wife.  Equipment: Ordering slide board and drop arm commode through [employer].  K3 WC order initiated through [employer] on 2/26.  Hospital bed order initiated through [employer] on 2/26.

## 2025-02-26 NOTE — PROGRESS NOTES
"VS: /65 (BP Location: Right arm)   Pulse 75   Temp 97.9  F (36.6  C) (Oral)   Resp 18   Ht 1.854 m (6' 1\")   Wt 113.4 kg (250 lb)   SpO2 95%   BMI 32.98 kg/m     O2: SpO2 stable on RA. LS clear and equal bilaterally. Denies chest pain and SOB.    Output: Voids spontaneously without difficulty to beside urinal.    Last BM: 2/25, denies abdominal discomfort. BS active / passing flatus.    Activity: Assist of two with Liko lift.    Skin: Wound vac.    Pain: Pain managed with PRN Oxycodone every four hours.    CMS: Intact, AOx4.    Dressing: Dressings intact.    Diet: Regular diet. Denies nausea/vomiting.    LDA: No IV.    Equipment: Personal belongings at bedside.    Plan: Continue with plan of care. Call light within reach, pt able to make needs known.    Additional Info: HUGO drain 250 ml output.        "

## 2025-02-26 NOTE — PLAN OF CARE
Acute Rehab Care Conference/Team Rounds      Type: Team Rounds    Present: Dr. Macedo, Lizy Enriquez MD , Alena Gomez, PAHeatherC,  Agnes Rasmussen Neuropsychologist, Asif Castañeda, PT  ,  Theresa Aragon, OTR, Itz Coelho SLP, Anna Russell , Barbie Laguna RD, PPS Coordinator Patito Sin RN, Ashely Macias Patient.    Discharge Barriers/Treatment/Education    Rehab Diagnosis: R hemipelvectomy    Active Medical Co-morbidities/Prognosis: Postoperative pain, history of testicular cancer status post orchiectomy and chemotherapy, chemo induced polyneuropathy, acute blood loss anemia, thrombocytosis, hospital-acquired pressure injury of tongue, insomnia    Safety: calls appropriately.  Sleeps in recliner    Pain: consistent high pain ratings controlled with scheduled tylenol and PRN oxycodone 10mg q 4hr    Medications, Skin, Tubes/Lines: Wound VAC, HUGO drain    Swallowing/Nutrition: No swallowing concerns    Bowel/Bladder: continent x2; uses bedside urinal independently.  LBM 2/25.    Psychosocial:Lives in house with spouse, 3 dependent children (ages 9-12). Ramp to enter house, 16 stairs to second level to access bedroom w/ walk in shower and tub shower. Once disch home, plan to stay on main level until able to complete stairs.     ADLs/IADLs:   Mobility: Lift x2 nsg; Min A SBT with therapy only (help with RLE at times)    Grooming: Mod I in wc  Dressing: UB dressing SBA sitting up in bed, LB mod A sitting in recliner and bridging/leaning to L using reacher, max A for socks/shoes   Bathing: sponge bathing SBA for UB and mod A for LB   Toileting: Lift x2 to platform commode nsg; Min A SBT with therapies only  IADLs: wife can help at discharge; IND in all prior  Vision/Cognition: Intact    Pt's main goal is to be able to SBT to commode/wc. Wife can help with iadls and some light adls as well, but cannot provide much physical support. Anticipate pt can discharge sooner if able  to meet this goal. Pt works very hard in therapy but can be limited by pain with his RLE. Pt purchasing hip kit and drop arm commode for at home.    Mobility:   Bed Mobility: max assist to manage right L/E  Transfer: Liko lift transfer & assist x 2 with nsg staff; intermittent min assist for right L/E management with level sliding board transfers.   Gait: not safe  Stairs: not safe  Balance: good static & dynamic sitting balance  Pt has been making considerable progress with slide board transfers, primarily min A for R LE mgmt and variable slide board placement and removal. Pain continues to be a significant limiting factor, but he remains highly motivated and engaged t/o. Will plan to obtain 20x18 WC with elevating legrests. He already has a recliner chair he plans to sit and sleep in, and a ramp to enter his home. Will need to figure out transportation/car transfer.    Cognition/Language:    Community Re-Entry: Pt will have a manual WC with caregiver support for community mobility.    Transportation: TBD.    Decision maker: self    Plan of Care and goals reviewed and updated.    Discharge Plan/Recommendations    Fall Precautions: continue    Patient/Family input to goals: Yes    Anticipated rehab needs following discharge: Home health PT, OT    Anticipated care giver support after discharge: Spouse    Estimated length of stay: 11 days    Overall plan for the patient: She is making excellent progress with fiber transfers and wheelchair mobility.  Anticipate shorter length of stay than initially thought.  Have adjusted tentative discharge date to 3/4/2025 and appropriate to continue with current plan of care.      Utilization Review and Continued Stay Justification    Medical Necessity Criteria:    For any criteria that is not met, please document reason and plan for discharge, transfer, or modification of plan of care to address.    Requires intensive rehabilitation program to treat functional deficits?:  Yes    Requires 3x per week or greater involvement of rehabilitation physician to oversee rehabilitation program?: Yes    Requires rehabilitation nursing interventions?: Yes    Patient is making functional progress?: Yes    There is a potential for additional functional progress? Yes    Patient is participating in therapy 3 hours per day a minimum of 5 days per week or 15 hours per week in 7 day period?:Yes    Has discharge needs that require coordinated discharge planning approach?:Yes      Barriers/Concerns related to meeting medical necessity criteria:  None    Team Plan to Address Concern:  N/A      Final Physician Sign off    Statement of Approval: I have reviewed and agree with the recommendations and documentation in this team rounds note.       Patient Goals    Social Work Goals: Confirm discharge recommendations with therapy, coordinate safe discharge plan and remain available to support and assist as needed.      Therapy Frequency (OT): 6 times/week  Discharge date: 3/14/25  OT: Hygiene/Grooming: modified independent, from wheelchair  OT: Upper Body Dressing: Modified independent, Independent, from wheelchair, including set-up/clothing retrieval  OT: Lower Body Dressing: Minimal assist, from wheelchair  OT: Upper Body Bathing: Modified independent, within precautions  OT: Lower Body Bathing: Modified independent, with precautions, Supervision/stand-by assist  OT: Transfer: Modified independent  OT: Toilet Transfer/Toileting: cleaning and garment management, within precautions, toilet transfer, Modified independent  OT: Meal Preparation: Modified independent, from wheelchair  OT: Home Management: Modified independent, Minimal assist, with light demand household tasks, from wheelchair     PT Predicted Duration/Target Date for Goal Attainment: 03/14/25  PT Frequency: 6x/week  PT: Bed Mobility: Modified independent, Supine to/from sit, Within precautions  PT: Transfers: Modified independent, Bed to/from chair,  Within precautions  PT: Wheelchair Mobility: 150 feet, manual wheelchair (following precautions)  PT: Goal 1: pt to perform car transfer with cga to min A x 1 following precautions  PT: Goal 2: pt to be mod indep with a medically appropriate home program tolerating 30-60 min 3-5x a week within precautions, with a focus on L LE and B UE gross strength, R LE protection.                                                      Patient Goal:  Pain Management: pt will demonstrate safe and effective pain mgmt techniques while on ARU                             Goal: Skin Integrity: pt will remain free from PI while on ARU via good skin hygiene and repositioning tech                                Goal: Falls: pt will remain free from falls while on ARU via effective and appropriate use of call light            Goal Outcome Evaluation:

## 2025-02-27 ENCOUNTER — PREP FOR PROCEDURE (OUTPATIENT)
Dept: ORTHOPEDICS | Facility: CLINIC | Age: 45
End: 2025-02-27
Payer: COMMERCIAL

## 2025-02-27 ENCOUNTER — APPOINTMENT (OUTPATIENT)
Dept: PHYSICAL THERAPY | Facility: CLINIC | Age: 45
DRG: 949 | End: 2025-02-27
Attending: PHYSICAL MEDICINE & REHABILITATION
Payer: COMMERCIAL

## 2025-02-27 ENCOUNTER — APPOINTMENT (OUTPATIENT)
Dept: OCCUPATIONAL THERAPY | Facility: CLINIC | Age: 45
DRG: 949 | End: 2025-02-27
Attending: PHYSICAL MEDICINE & REHABILITATION
Payer: COMMERCIAL

## 2025-02-27 DIAGNOSIS — Z85.830 H/O CHONDROSARCOMA: Primary | ICD-10-CM

## 2025-02-27 LAB
ANION GAP SERPL CALCULATED.3IONS-SCNC: 10 MMOL/L (ref 7–15)
BUN SERPL-MCNC: 16.1 MG/DL (ref 6–20)
CALCIUM SERPL-MCNC: 8.9 MG/DL (ref 8.8–10.4)
CHLORIDE SERPL-SCNC: 102 MMOL/L (ref 98–107)
CREAT SERPL-MCNC: 0.89 MG/DL (ref 0.67–1.17)
EGFRCR SERPLBLD CKD-EPI 2021: >90 ML/MIN/1.73M2
ERYTHROCYTE [DISTWIDTH] IN BLOOD BY AUTOMATED COUNT: 13.3 % (ref 10–15)
GLUCOSE SERPL-MCNC: 101 MG/DL (ref 70–99)
HCO3 SERPL-SCNC: 26 MMOL/L (ref 22–29)
HCT VFR BLD AUTO: 26.9 % (ref 40–53)
HGB BLD-MCNC: 8.7 G/DL (ref 13.3–17.7)
MAGNESIUM SERPL-MCNC: 2.1 MG/DL (ref 1.7–2.3)
MCH RBC QN AUTO: 30.9 PG (ref 26.5–33)
MCHC RBC AUTO-ENTMCNC: 32.3 G/DL (ref 31.5–36.5)
MCV RBC AUTO: 95 FL (ref 78–100)
PLATELET # BLD AUTO: 543 10E3/UL (ref 150–450)
POTASSIUM SERPL-SCNC: 4.1 MMOL/L (ref 3.4–5.3)
RBC # BLD AUTO: 2.82 10E6/UL (ref 4.4–5.9)
SODIUM SERPL-SCNC: 138 MMOL/L (ref 135–145)
WBC # BLD AUTO: 9 10E3/UL (ref 4–11)

## 2025-02-27 PROCEDURE — 97110 THERAPEUTIC EXERCISES: CPT | Mod: GP | Performed by: PHYSICAL THERAPIST

## 2025-02-27 PROCEDURE — 128N000003 HC R&B REHAB

## 2025-02-27 PROCEDURE — 97535 SELF CARE MNGMENT TRAINING: CPT | Mod: GO | Performed by: OCCUPATIONAL THERAPIST

## 2025-02-27 PROCEDURE — 97530 THERAPEUTIC ACTIVITIES: CPT | Mod: GP

## 2025-02-27 PROCEDURE — 250N000013 HC RX MED GY IP 250 OP 250 PS 637: Performed by: PHYSICIAN ASSISTANT

## 2025-02-27 PROCEDURE — 82374 ASSAY BLOOD CARBON DIOXIDE: CPT | Performed by: PHYSICIAN ASSISTANT

## 2025-02-27 PROCEDURE — 99231 SBSQ HOSP IP/OBS SF/LOW 25: CPT | Performed by: PHYSICAL MEDICINE & REHABILITATION

## 2025-02-27 PROCEDURE — 36415 COLL VENOUS BLD VENIPUNCTURE: CPT | Performed by: PHYSICIAN ASSISTANT

## 2025-02-27 PROCEDURE — 85014 HEMATOCRIT: CPT | Performed by: PHYSICIAN ASSISTANT

## 2025-02-27 PROCEDURE — 80048 BASIC METABOLIC PNL TOTAL CA: CPT | Performed by: PHYSICIAN ASSISTANT

## 2025-02-27 PROCEDURE — 97110 THERAPEUTIC EXERCISES: CPT | Mod: GO

## 2025-02-27 PROCEDURE — 250N000013 HC RX MED GY IP 250 OP 250 PS 637: Performed by: PHYSICAL MEDICINE & REHABILITATION

## 2025-02-27 PROCEDURE — 83735 ASSAY OF MAGNESIUM: CPT | Performed by: PHYSICIAN ASSISTANT

## 2025-02-27 PROCEDURE — 97530 THERAPEUTIC ACTIVITIES: CPT | Mod: GP | Performed by: PHYSICAL THERAPIST

## 2025-02-27 RX ORDER — POLYETHYLENE GLYCOL 3350 17 G/17G
17 POWDER, FOR SOLUTION ORAL DAILY PRN
Status: DISCONTINUED | OUTPATIENT
Start: 2025-02-27 | End: 2025-03-04 | Stop reason: HOSPADM

## 2025-02-27 RX ORDER — POLYETHYLENE GLYCOL 3350 17 G/17G
17 POWDER, FOR SOLUTION ORAL DAILY
Status: DISCONTINUED | OUTPATIENT
Start: 2025-02-27 | End: 2025-03-04 | Stop reason: HOSPADM

## 2025-02-27 RX ADMIN — ACETAMINOPHEN 650 MG: 325 TABLET, FILM COATED ORAL at 06:27

## 2025-02-27 RX ADMIN — GABAPENTIN 100 MG: 100 CAPSULE ORAL at 07:46

## 2025-02-27 RX ADMIN — OXYCODONE HYDROCHLORIDE 10 MG: 5 TABLET ORAL at 11:48

## 2025-02-27 RX ADMIN — CEFADROXIL 500 MG: 500 CAPSULE ORAL at 07:47

## 2025-02-27 RX ADMIN — OXYCODONE HYDROCHLORIDE 10 MG: 5 TABLET ORAL at 15:57

## 2025-02-27 RX ADMIN — ACETAMINOPHEN 650 MG: 325 TABLET, FILM COATED ORAL at 20:20

## 2025-02-27 RX ADMIN — CEFADROXIL 500 MG: 500 CAPSULE ORAL at 20:20

## 2025-02-27 RX ADMIN — METHOCARBAMOL 750 MG: 750 TABLET ORAL at 11:48

## 2025-02-27 RX ADMIN — ACETAMINOPHEN 650 MG: 325 TABLET, FILM COATED ORAL at 13:59

## 2025-02-27 RX ADMIN — ACETAMINOPHEN 650 MG: 325 TABLET, FILM COATED ORAL at 18:01

## 2025-02-27 RX ADMIN — METHOCARBAMOL 750 MG: 750 TABLET ORAL at 15:57

## 2025-02-27 RX ADMIN — SENNOSIDES AND DOCUSATE SODIUM 1 TABLET: 50; 8.6 TABLET ORAL at 07:46

## 2025-02-27 RX ADMIN — GABAPENTIN 100 MG: 100 CAPSULE ORAL at 20:21

## 2025-02-27 RX ADMIN — METHOCARBAMOL 750 MG: 750 TABLET ORAL at 07:46

## 2025-02-27 RX ADMIN — OXYCODONE HYDROCHLORIDE 10 MG: 5 TABLET ORAL at 20:19

## 2025-02-27 RX ADMIN — ASPIRIN 162 MG: 81 TABLET ORAL at 07:46

## 2025-02-27 RX ADMIN — ACETAMINOPHEN 650 MG: 325 TABLET, FILM COATED ORAL at 10:32

## 2025-02-27 RX ADMIN — METHOCARBAMOL 750 MG: 750 TABLET ORAL at 20:20

## 2025-02-27 RX ADMIN — GABAPENTIN 100 MG: 100 CAPSULE ORAL at 13:59

## 2025-02-27 RX ADMIN — OXYCODONE HYDROCHLORIDE 10 MG: 5 TABLET ORAL at 07:46

## 2025-02-27 RX ADMIN — OXYCODONE HYDROCHLORIDE 10 MG: 5 TABLET ORAL at 02:55

## 2025-02-27 RX ADMIN — SENNOSIDES AND DOCUSATE SODIUM 1 TABLET: 50; 8.6 TABLET ORAL at 20:20

## 2025-02-27 RX ADMIN — POLYETHYLENE GLYCOL 3350 17 G: 17 POWDER, FOR SOLUTION ORAL at 07:54

## 2025-02-27 ASSESSMENT — ACTIVITIES OF DAILY LIVING (ADL)
ADLS_ACUITY_SCORE: 44
ADLS_ACUITY_SCORE: 42
ADLS_ACUITY_SCORE: 44
ADLS_ACUITY_SCORE: 44
ADLS_ACUITY_SCORE: 42
ADLS_ACUITY_SCORE: 44
ADLS_ACUITY_SCORE: 44
ADLS_ACUITY_SCORE: 42
ADLS_ACUITY_SCORE: 44
ADLS_ACUITY_SCORE: 42
ADLS_ACUITY_SCORE: 44

## 2025-02-27 NOTE — PROGRESS NOTES
[facility]   Acute Rehabilitation Unit  Daily progress note    INTERVAL HISTORY  Gilberto Lund was seen in his room this morning.  No acute events overnight, and this morning has no new concerns or complaints.  Continues to have pain in the R hip, but stable and manageable.  Denies chest pain, shortness of breath, fevers, or chills.  HUGO drain was previously noted to have low output, however it is now evident that it was actually blocked or kinked which has now been cleared, and subsequently has a large amount of serosanguinous drainage into the bulb.  Given the ongoing drainage, we discussed that he will likely discharge home with the HUGO drain which we teach teach him to manage.  Plan will still be to remove wound vac prior to discharge.  Family able to come in for family training this weekend.    Current Functional Status:  PT:  Bed Mobility: Mod A for R LE mgmt  Transfer: Min A<>SBA slide board transfer  Gait: not safe  Stairs: not safe  Balance: good static & dynamic sitting balance     Assessment: Continues to be demonstrating improving ease, efficiency, and IND with slide board transfers. Occasional cues for WC placement. Orders initiated for K3 WC and hospital bed through [employer].    OT:  ADLs:  Mobility: Lift x2 nsg; Min A SBT with therapy only (help with RLE at times)    Grooming: Mod I in wc  Dressing: UB dressing SBA sitting up in bed, LB mod A sitting in recliner and bridging/leaning to L using reacher, max A for socks/shoes   Bathing: Lift x2 from recliner<>purple shower chair; Min A to wash/dry RLE   Toileting: lift x2 to platform commode nsg; Min A SBT with therapies only  IADLs: wife can help at discharge; IND in all prior  Vision/Cognition: Intact/WNL     Assessment: focus on SB transf. Recliner/w/c with CGA, set up. See flow sheet for details.    MEDICATIONS  Current Facility-Administered Medications   Medication Dose Route Frequency Provider Last Rate Last  Admin    acetaminophen (TYLENOL) tablet 650 mg  650 mg Oral Q4H While awake Lizy Macedo MD   650 mg at 02/27/25 1032    aspirin EC tablet 162 mg  162 mg Oral Daily Alena Gomez PA-C   162 mg at 02/27/25 0746    cefadroxil (DURICEF) capsule 500 mg  500 mg Oral BID Alena Gomez PA-C   500 mg at 02/27/25 0747    gabapentin (NEURONTIN) capsule 100 mg  100 mg Oral TID Alena Gomez PA-C   100 mg at 02/27/25 0746    methocarbamol (ROBAXIN) tablet 750 mg  750 mg Oral 4x Daily Lizy Macedo MD   750 mg at 02/27/25 1148    polyethylene glycol (MIRALAX) Packet 17 g  17 g Oral Daily Alena Gomez PA-C   17 g at 02/27/25 0754    senna-docusate (SENOKOT-S/PERICOLACE) 8.6-50 MG per tablet 1 tablet  1 tablet Oral BID Alena Gomez PA-C   1 tablet at 02/27/25 0746          Current Facility-Administered Medications   Medication Dose Route Frequency Provider Last Rate Last Admin    hydrOXYzine HCl (ATARAX) tablet 25 mg  25 mg Oral Q6H PRN Alena Gomez PA-C   25 mg at 02/23/25 2209    melatonin tablet 10 mg  10 mg Oral At Bedtime PRN Lizy Macedo MD   10 mg at 02/23/25 2209    naloxone (NARCAN) injection 0.2 mg  0.2 mg Intravenous Q2 Min PRN Lizy Macedo MD        Or    naloxone (NARCAN) injection 0.4 mg  0.4 mg Intravenous Q2 Min PRN Lizy Macedo MD        Or    naloxone (NARCAN) injection 0.2 mg  0.2 mg Intramuscular Q2 Min PRN Lizy Macedo MD        Or    naloxone (NARCAN) injection 0.4 mg  0.4 mg Intramuscular Q2 Min PRN Lizy Macedo MD        ondansetron (ZOFRAN) tablet 4 mg  4 mg Oral Q6H PRN Lizy Macedo MD        oxyCODONE (ROXICODONE) tablet 5 mg  5 mg Oral Q4H PRN Lizy Macedo MD        Or    oxyCODONE (ROXICODONE) tablet 10 mg  10 mg Oral Q4H PRN Lizy Macedo MD   10 mg at 02/27/25 1148    polyethylene glycol (MIRALAX) Packet 17 g  17 g Oral Daily PRN Alena Gomez PA-C         "    PHYSICAL EXAM  /74 (BP Location: Right arm)   Pulse 75   Temp 97.9  F (36.6  C) (Oral)   Resp 18   Ht 1.854 m (6' 1\")   Wt 113.4 kg (250 lb)   SpO2 97%   BMI 32.98 kg/m    Gen: NAD, lying in bed  HEENT: NC/AT  CVS: RRR, S1+S2, no m/r/g  Pulm: CTA b/l, non-labored on room air  Abd: Soft, NT/ND, BS+  Ext: edema in right proximal lower extremity, wound vac in place with suction, HUGO drain with serosanguinous drainage present  Neuro/MSK: awake, alert, answers appropriately, follows commands    LABS  CBC RESULTS:   Recent Labs   Lab Test 02/27/25  0600 02/24/25  0632 02/21/25  0618   WBC 9.0 9.0 9.0   RBC 2.82* 2.81* 2.62*   HGB 8.7* 8.7* 7.9*   HCT 26.9* 26.9* 24.6*   MCV 95 96 94   MCH 30.9 31.0 30.2   MCHC 32.3 32.3 32.1   RDW 13.3 13.2 13.1   * 477* 360       Last Basic Metabolic Panel:  Recent Labs   Lab Test 02/27/25  0600 02/24/25  0632 02/21/25  0618    137 137   POTASSIUM 4.1 4.8 3.9   CHLORIDE 102 101 100   CO2 26 27 29   ANIONGAP 10 9 8   * 98 116*   BUN 16.1 15.7 15.9   CR 0.89 0.99 0.88   GFRESTIMATED >90 >90 >90   CANDICE 8.9 9.0 8.6*         Rehabilitation   Admission to acute inpatient rehab 02/21/25.    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       Impairment of ADL's: Noted to have impaired activity tolerance, impaired balance due to weakness and NWB RLE, impaired RLE sensation, impaired strength, impaired weight shifting due to NWB RLE, and pain, all affecting his ability to safely and independently perform basic ADLs.  Goal for mod I with basic ADLs from wheelchair level.  Anticipate to require supervision assist for shower transfers for safety.     Impairment of mobility:  Noted to have impaired " activity tolerance, impaired balance due to weakness and NWB RLE, impaired RLE sensation, impaired strength, impaired weight shifting due to NWB RLE, and pain, all affecting his ability to safely and independently perform basic mobility.  Goal for mod I with basic bed mobility, sit > stand transfers, short distance gait (<3 ft), ADLs, at w/c based mobility level.  Anticipate may require supervision assist for shower transfers and max assist for any stairs.    Continue comprehensive acute inpatient rehabilitation program with multidisciplinary approach including therapies, rehab nursing, and physiatry following. See interval history for updates.      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  He is now admitted to ARU on 2/21/25 for multidisciplinary rehabilitation and ongoing medical management.      Medical Conditions  New actions/orders/updates for today are in blue.     Chondrosarcoma of right innominate bone  S/p R internal hemipelvectomy on 2/14/25 with Dr. 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 Patient now s/p right modified radical hemipelvectomy of chondrosarcoma with Dr. Bravo.   - NWB RLE  - Activity restrictions: No hip restrictions. No Valsalva or contractions of abdominal wall to decrease stress on repair.  Elevate RLE as needed, may use foam ramp.  - Wound care: Prevena vac x14 days.  Once battery expires, remove  and dispose.  Once removed, cover with non-permeable dressing to remain dry while showering; no soaking/submerging.  - Continue palmira-op antibiotics with cefadroxil 500 mg BID for 14 days (from hospital discharge: 2/21-3/7)  - Pain management: Tylenol 650 mg q4h PRN, atarax 25 mg q6h PRN, robaxin 750 mg QID, gabapentin 100 mg TID, oxycodone 5-10 mg q4h PRN (changed from [place] at ARU admission due to sub-optimal pain control and anticipated increased activity level).  Wean opioids as able  - HUGO drain: monitor drain output. Strip and empty drain per shift protocol.  Previous recs were to discontinue when <20 mL per shift which has consistently been the case at ARU.  Discussed with ortho who assessed and indicated to keep in place for 2 weeks, however later ortho team reported that there actually was good amount of drainage during their assessment, so was potentially kinked.  Will plan to leave in place at this time and monitor.  Pt will likely discharge home with the drain, will need education on management  - DVT prophylaxis with ASA 81 mg BID x4 wks post-op  - Continue PT/OT  - Follow up with Dr. Bravo as scheduled on 3/17     Acute blood loss anemia  Pre-op Hgb ~15, dropped to 6.9 post-op and received 1 unit pRBC.  Estimated intra-op blood loss 1.5L.  Hgb recently stable ~8.  2/27: Hgb stable at 8.7  - Transfuse if Hgb <7  - Trend CBC every M/Th    Thrombocytosis  Platelets 543 on 2/27.  Suspect reactive  - Trend CBC every M/Th     Tongue pressure injury, hospital acquired  Noted post-op, assessed by WOCN, felt to be MDRPI due to ETT during surgery  - Wound care ordered by WOCN recs  - Can add orajel if ongoing discomfort or affecting oral intake    Insomnia  - Continue PTA melatonin 10 mg at HS PRN       Adjustment to disability:  Clinical psychology to eval and treat if indicated  FEN: regular diet, thin liquids  Bowel: continent.  Avoid constipation, limit valsava as above to promote healing.  Decrease Miralax to  daily, continue senokot-S 1 tab BID.  Monitor and adjust regimen as indicated  Bladder: continent.  PVRs at admission without evidence of retention  DVT Prophylaxis: ASA 81 mg BID x4 wks post-op per ortho  GI Prophylaxis: not current indicated  Code: full; confirmed on admission  Disposition: home with home care  ELOS: given progress, now anticipating able to discharge home on 3/4/25  Follow up Appointments on Discharge: PCP in 1-2 weeks, ortho at 4 wks post-op (3/17)      30 minutes spent on the date of service doing chart review, history and exam, documentation, and further activities as noted above.       Mina Macedo MD  Department of Rehabilitation Medicine

## 2025-02-27 NOTE — PLAN OF CARE
Discharge Planner Post-Acute Rehab PT:      Discharge Plan: Home with spouse, ramp to enter. Home care PT.     Precautions: NWB RLE (pelvic resection with antibiotic spacer); no valsalva or active abdominal contraction; abdominal precautions; R LE pain control but no hip restrictions; fall risk; do not roll onto R side     Current Status:  Bed Mobility: SBA<>min A with leg  for R LE mgmt  Transfer: SBA slide board transfer with decreasing assist for set-up  Gait: not safe  Stairs: not safe  Balance: good static & dynamic sitting balance     Assessment: Continues to be demonstrating improving ease, efficiency, and IND with slide board transfers. Occasional cues for WC placement. Has been in contact with [employer] for WC and hospital bed delivery. FT set for Sat 3/1 from 2:30-4pm.     Other Barriers to Discharge (DME, Family Training, etc):   Home measurements sheet provided 2/24.  Family training Sat 3/1 - 2:30-4pm.  Equipment: Ordering slide board and drop arm commode through [employer].  K3 WC order initiated through [employer] on 2/26.  Hospital bed order initiated through [employer] on 2/26.

## 2025-02-27 NOTE — PROGRESS NOTES
ARU Social Work Progress Notes     [employer] Application completed with patient and faxed [phone].     Faxed confirmation received and emailed as well.      TERRIE Beltrán  [facility], Acute Inpatient Rehab Unit   [address] [place] [address], [address] [place] [address]  Phone: [phone]  Fax: [phone]

## 2025-02-27 NOTE — PLAN OF CARE
Goal Outcome Evaluation:      Plan of Care Reviewed With: patient    Overall Patient Progress: no changeOverall Patient Progress: no change     Patient alert and oriented, able to make needs known  Slept most of the night  Pain managed by scheduled and PRN medication, utilized PRN oxycodone x1 this shift, no respiratory distress, appeared to be comfortable on recliner, pt prefers to sleep on the recliner  Continent of Bladder and Bowel, utilized urinal independently at night, LBM 2/26/2025  Safety checks done, fall prevention maintained, call light in reach  No raised concern overnight  Plan of care ongoing.

## 2025-02-27 NOTE — PLAN OF CARE
Discharge Planner Post-Acute Rehab OT:      Discharge Plan: home with spouse PRN; HC therapies     Precautions: NWB RLE, abdominal precaution, No turning on R side     Current Status:  ADLs:  Mobility: Lift x2 nsg; Min A SBT with therapy only (help with RLE at times)    Grooming: Mod I in wc  Dressing: UB dressing SBA sitting up in bed, LB mod A sitting in recliner and bridging/leaning to L using reacher, max A for socks/shoes   Bathing: Lift x2 from recliner<>purple shower chair; Min A to wash/dry RLE   Toileting: lift x2 to platform commode nsg; Min A SBT with therapies only  IADLs: wife can help at discharge; IND in all prior  Vision/Cognition: Intact/WNL     Assessment: focus on SB transf. Recliner/w/c with CGA, set up. See flow sheet for details.     Other Barriers to Discharge (DME, Family Training, etc):  Pt ordering platform commode/drop arm; and hip kit 2/25/25  Will need drop arm wheelchair

## 2025-02-27 NOTE — PLAN OF CARE
Goal Outcome Evaluation:      Plan of Care Reviewed With: patient    Overall Patient Progress: improving     Outcome: Pain better controlled today per pt     Shift: 1530 to 1930  VS: Temp: 97.9  F (36.6  C) Temp src: Oral BP: 117/74 Pulse: 74   Resp: 18 SpO2: 96 % O2 Device: None (Room air)    Neuro: A&Ox4, endorses N/T to BLE.  Pain: Endorses pain to R hip/thigh, managed with scheduled medications and PRN Oxycodone x1 this shift.  Cardiac: Denies CP.  Respiratory: Denies SOB, >95% on RA.  Diet/Appetite: Regular diet, takes medications whole with thin liquids.  /GI: Continent of B&B, LBM 2/25 uses BSC and urinal.  Activity: Ax2 liko lift for transfers.  LDAs: Wound vac to R hip; HUGO drain to R thigh.  Skin: Skin intact except for R hip incision and HUGO drain site.  Infection/Isolation: Standard precautions maintained.  Other: In chair at end of shift.  Plan: Pain management, continue POC.

## 2025-02-27 NOTE — PLAN OF CARE
Goal Outcome Evaluation:      Plan of Care Reviewed With: patient    Overall Patient Progress: improvingOverall Patient Progress: improving    Patient is alert and oriented able to use a call light appropriately. Assist of x 2 with Rafaela/zara. Cont of KAYLEEN GARCIA 2/25 on a bedside commode. Has HUGO drain and it is draining well. Has a wound Vac and it is draining well too. Likes to stay on the recliner. Requested Oxycodone with relief. Call light within reach, bed alarm on. Refused to take scheduled Miralax. Nursing staff will continue with POC.

## 2025-02-27 NOTE — PLAN OF CARE
Goal Outcome Evaluation:      Plan of Care Reviewed With: patient    Overall Patient Progress: no change     Outcome: No acute changes this shift.    Shift: 0700 to 1500  VS: Temp: 97.9  F (36.6  C) Temp src: Oral BP: 130/74 Pulse: 75   Resp: 18 SpO2: 97 % O2 Device: None (Room air)    Neuro: A&Ox4, denies N/T.  Pain: Endorses pain, managed with scheduled medications and PRN Oxycodone x2 this shift.  Cardiac: Denies CP.  Respiratory: Denies SOB, >95% on RA.  Diet/Appetite: Regular diet, takes medications whole with thin liquids. Denies N/V.  /GI: Continent of B&B, uses urinal, LBM yesterday 2/26.  Activity: Ax2 liko lift for transfers.  LDAs: Wound vac to R hip; HUGO drain to R thigh.  Skin: Skin intact except for R hip incision and HUGO drain site.  Infection/Isolation: Standard precautions.  Plan: Continue POC.

## 2025-02-27 NOTE — PROGRESS NOTES
"  [facility]   Acute Rehabilitation Unit  Daily progress note    INTERVAL HISTORY  Gilberto Lund was seen up in his room this morning.  No acute events reported overnight.  He reports that pain overall remains unchanged.  Pain is most prominent in right leg/thigh region.  He is noting increasing shooting and pins/needles pain in that area.  He is interested in trying to increase gabapentin dose with goal of hoping to decrease reliance on oxycodone.  He is getting relief with oxycodone and can tell when a dose is \"wearing off\".  He is not noticing unwanted side effects with oxycodone.  He also feels addition of robaxin has been helpful.  He acknowledges that activity level has increased.  He has persistent swelling in his right leg and scrotum.  Still having drainage to HUGO drain but none in vac.  He is having daily soft/formed bowel movements.  He denies dizziness or lightheadedness, shortness of breath, nausea.  Plan is for family training this weekend with his spouse.  He is overall feeling prepared for discharge home next week.  He denies other questions or concerns at this time.    With therapies, he is demonstrating improving ease, efficiency, and independent with slide board transfers.  Therapy team has initiated wheelchair and hospital bed orders.  Plans for family training with spouse tomorrow.    MEDICATIONS  Current Facility-Administered Medications   Medication Dose Route Frequency Provider Last Rate Last Admin    acetaminophen (TYLENOL) tablet 650 mg  650 mg Oral Q4H While awake Lizy Macedo MD   650 mg at 02/27/25 0627    aspirin EC tablet 162 mg  162 mg Oral Daily Alena Gomez PA-C   162 mg at 02/27/25 0746    cefadroxil (DURICEF) capsule 500 mg  500 mg Oral BID Alena Gomez PA-C   500 mg at 02/27/25 0747    gabapentin (NEURONTIN) capsule 100 mg  100 mg Oral TID Alena Gomez PA-C   100 mg at 02/27/25 0746    methocarbamol " "(ROBAXIN) tablet 750 mg  750 mg Oral 4x Daily Lizy Macedo MD   750 mg at 02/27/25 0746    polyethylene glycol (MIRALAX) Packet 17 g  17 g Oral Daily Alena Gomez PA-C   17 g at 02/27/25 0754    senna-docusate (SENOKOT-S/PERICOLACE) 8.6-50 MG per tablet 1 tablet  1 tablet Oral BID Alena Gomez PA-C   1 tablet at 02/27/25 0746          Current Facility-Administered Medications   Medication Dose Route Frequency Provider Last Rate Last Admin    hydrOXYzine HCl (ATARAX) tablet 25 mg  25 mg Oral Q6H PRN Alena Gomez PA-C   25 mg at 02/23/25 2209    melatonin tablet 10 mg  10 mg Oral At Bedtime PRN Lizy Macedo MD   10 mg at 02/23/25 2209    naloxone (NARCAN) injection 0.2 mg  0.2 mg Intravenous Q2 Min PRN Lizy Macedo MD        Or    naloxone (NARCAN) injection 0.4 mg  0.4 mg Intravenous Q2 Min PRN Lizy Macedo MD        Or    naloxone (NARCAN) injection 0.2 mg  0.2 mg Intramuscular Q2 Min PRN Lizy Macedo MD        Or    naloxone (NARCAN) injection 0.4 mg  0.4 mg Intramuscular Q2 Min PRN Lizy Macedo MD        ondansetron (ZOFRAN) tablet 4 mg  4 mg Oral Q6H PRN Lizy Macedo MD        oxyCODONE (ROXICODONE) tablet 5 mg  5 mg Oral Q4H PRN Lizy Macedo MD        Or    oxyCODONE (ROXICODONE) tablet 10 mg  10 mg Oral Q4H PRN Lizy Macedo MD   10 mg at 02/27/25 0746    polyethylene glycol (MIRALAX) Packet 17 g  17 g Oral Daily PRN Alena Gomez PA-C            PHYSICAL EXAM  /74 (BP Location: Right arm)   Pulse 75   Temp 97.9  F (36.6  C) (Oral)   Resp 18   Ht 1.854 m (6' 1\")   Wt 113.4 kg (250 lb)   SpO2 97%   BMI 32.98 kg/m    Gen: NAD, sitting up in chair  HEENT: NC/AT  Pulm: non-labored on room air  Abd: protuberant  Ext: edema in right lower extremity diffusely (more prominent proximally), wound vac in place with suction, HUGO drain with mild sanguinous drainage in bulb  Neuro/MSK: awake, alert, " moving all extremities spontaneously in chair    LABS  CBC RESULTS:   Recent Labs   Lab Test 02/27/25  0600 02/24/25  0632 02/21/25  0618   WBC 9.0 9.0 9.0   RBC 2.82* 2.81* 2.62*   HGB 8.7* 8.7* 7.9*   HCT 26.9* 26.9* 24.6*   MCV 95 96 94   MCH 30.9 31.0 30.2   MCHC 32.3 32.3 32.1   RDW 13.3 13.2 13.1   * 477* 360       Last Basic Metabolic Panel:  Recent Labs   Lab Test 02/27/25  0600 02/24/25  0632 02/21/25  0618    137 137   POTASSIUM 4.1 4.8 3.9   CHLORIDE 102 101 100   CO2 26 27 29   ANIONGAP 10 9 8   * 98 116*   BUN 16.1 15.7 15.9   CR 0.89 0.99 0.88   GFRESTIMATED >90 >90 >90   CANDICE 8.9 9.0 8.6*         Rehabilitation   Admission to acute inpatient rehab 02/21/25.    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       Impairment of ADL's: Noted to have impaired activity tolerance, impaired balance due to weakness and NWB RLE, impaired RLE sensation, impaired strength, impaired weight shifting due to NWB RLE, and pain, all affecting his ability to safely and independently perform basic ADLs.  Goal for mod I with basic ADLs from wheelchair level.  Anticipate to require supervision assist for shower transfers for safety.     Impairment of mobility:  Noted to have impaired activity tolerance, impaired balance due to weakness and NWB RLE, impaired RLE sensation, impaired strength, impaired weight shifting due to NWB RLE, and pain, all affecting his ability to safely and independently perform basic mobility.  Goal for mod I with basic bed mobility, sit > stand transfers, short distance gait (<3 ft), ADLs, at w/c based mobility level.  Anticipate may require supervision assist for shower transfers and max assist for any  stairs.    Continue comprehensive acute inpatient rehabilitation program with multidisciplinary approach including therapies, rehab nursing, and physiatry following. See interval history for updates.      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  He is now admitted to ARU on 2/21/25 for multidisciplinary rehabilitation and ongoing medical management.      Medical Conditions  New actions/orders/updates for today are in blue.     Chondrosarcoma of right innominate bone  S/p R internal hemipelvectomy on 2/14/25 with Dr. 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 Patient now s/p right modified radical hemipelvectomy of chondrosarcoma with Dr. Bravo.   - NWB RLE  - Activity restrictions: No hip restrictions. No Valsalva or contractions of abdominal wall to decrease stress on repair.  Elevate RLE as needed, may use foam ramp.  - Wound care: Prevena vac x14 days.  Once battery expires, remove and dispose.  Once removed, cover with non-permeable dressing to remain dry while showering; no soaking/submerging.  Some alarm on vac this morning but ortho was able to troubleshoot, appreciate assistance.  - Continue palmira-op antibiotics with cefadroxil 500 mg BID for 14 days (from hospital discharge: 2/21-3/7)  - Pain management: Tylenol 650 mg q4h PRN, atarax 25 mg q6h PRN, robaxin 750 mg QID, gabapentin 100 mg TID, oxycodone 5-10 mg q4h PRN  (changed from [place] at ARU admission due to sub-optimal pain control and anticipated increased activity level).  Wean opioids as able  - HUGO drain: monitor drain output. Strip and empty drain per shift protocol.  Previous recs were to discontinue when <20 mL per shift which has consistently been the case at ARU.  Discussed with ortho who assessed on 2/26 and indicated to keep in place for 2 weeks.  Later ortho team reported that there actually was good amount of drainage during their assessment, so was potentially kinked.  Will plan to leave in place at this time and monitor.  2/28: now with increased drain output: 50-250mL per shift over last 48 hours.  Ortho re-assessed today and recommended to continue drain but will consider removal on Monday pending output.  In event that he will discharge home with the drain, will need education on management   - DVT prophylaxis with ASA 81 mg BID x4 wks post-op  - Continue PT/OT  - Follow up with Dr. Bravo as scheduled on 3/17     Acute blood loss anemia  Pre-op Hgb ~15, dropped to 6.9 post-op and received 1 unit pRBC.  Estimated intra-op blood loss 1.5L.  Hgb recently stable ~8.  2/27: Hgb stable at 8.7  - Transfuse if Hgb <7  - Trend CBC every M/Th    Thrombocytosis  New on 2/24 to 477.  Suspect reactive  2/27: platelets up further to 543  - Trend CBC every M/Th     Tongue pressure injury, hospital acquired  Noted post-op, assessed by WOCN, felt to be MDRPI due to ETT during surgery  - Wound care ordered by WOCN recs    Insomnia  - Continue PTA melatonin 10 mg at HS PRN       Adjustment to disability:  Clinical psychology to eval and treat if indicated  FEN: regular diet, thin liquids  Bowel: continent.  Avoid constipation, limit valsava as above to promote healing.  Continue Miralax daily (decreased 2/27), senokot-S 1 tab BID.  Having daily bowel movements.  Monitor and adjust regimen as indicated  Bladder: continent.  PVRs at admission without evidence of retention  DVT  Prophylaxis: ASA 81 mg BID x4 wks post-op per ortho  GI Prophylaxis: not current indicated  Code: full; confirmed on admission  Disposition: home with home care  ELOS: target discharge date 3/4/25  Follow up Appointments on Discharge: PCP in 1-2 weeks, ortho at 4 wks post-op (3/17)      30 minutes spent on the date of service doing chart review, history and exam, documentation, and further activities as noted above.       Plan of care was discussed with Dr. Mina Macedo, PM&R Staff Physician    TAMARA Hyatt-C  Physical Medicine & Rehabilitation

## 2025-02-28 ENCOUNTER — APPOINTMENT (OUTPATIENT)
Dept: PHYSICAL THERAPY | Facility: CLINIC | Age: 45
DRG: 949 | End: 2025-02-28
Attending: PHYSICAL MEDICINE & REHABILITATION
Payer: COMMERCIAL

## 2025-02-28 ENCOUNTER — APPOINTMENT (OUTPATIENT)
Dept: OCCUPATIONAL THERAPY | Facility: CLINIC | Age: 45
DRG: 949 | End: 2025-02-28
Attending: PHYSICAL MEDICINE & REHABILITATION
Payer: COMMERCIAL

## 2025-02-28 PROCEDURE — 97530 THERAPEUTIC ACTIVITIES: CPT | Mod: GP

## 2025-02-28 PROCEDURE — 250N000013 HC RX MED GY IP 250 OP 250 PS 637: Performed by: PHYSICIAN ASSISTANT

## 2025-02-28 PROCEDURE — 97535 SELF CARE MNGMENT TRAINING: CPT | Mod: GO | Performed by: OCCUPATIONAL THERAPIST

## 2025-02-28 PROCEDURE — 128N000003 HC R&B REHAB

## 2025-02-28 PROCEDURE — 250N000013 HC RX MED GY IP 250 OP 250 PS 637: Performed by: PHYSICAL MEDICINE & REHABILITATION

## 2025-02-28 PROCEDURE — 97110 THERAPEUTIC EXERCISES: CPT | Mod: GO | Performed by: OCCUPATIONAL THERAPIST

## 2025-02-28 PROCEDURE — 97110 THERAPEUTIC EXERCISES: CPT | Mod: GP

## 2025-02-28 PROCEDURE — 99231 SBSQ HOSP IP/OBS SF/LOW 25: CPT | Performed by: PHYSICIAN ASSISTANT

## 2025-02-28 RX ORDER — GABAPENTIN 300 MG/1
300 CAPSULE ORAL 3 TIMES DAILY
Status: DISCONTINUED | OUTPATIENT
Start: 2025-02-28 | End: 2025-03-03

## 2025-02-28 RX ADMIN — GABAPENTIN 300 MG: 300 CAPSULE ORAL at 20:15

## 2025-02-28 RX ADMIN — CEFADROXIL 500 MG: 500 CAPSULE ORAL at 08:21

## 2025-02-28 RX ADMIN — METHOCARBAMOL 750 MG: 750 TABLET ORAL at 20:15

## 2025-02-28 RX ADMIN — SENNOSIDES AND DOCUSATE SODIUM 1 TABLET: 50; 8.6 TABLET ORAL at 08:21

## 2025-02-28 RX ADMIN — GABAPENTIN 100 MG: 100 CAPSULE ORAL at 08:21

## 2025-02-28 RX ADMIN — ASPIRIN 162 MG: 81 TABLET ORAL at 08:21

## 2025-02-28 RX ADMIN — OXYCODONE HYDROCHLORIDE 10 MG: 5 TABLET ORAL at 12:14

## 2025-02-28 RX ADMIN — CEFADROXIL 500 MG: 500 CAPSULE ORAL at 20:15

## 2025-02-28 RX ADMIN — METHOCARBAMOL 750 MG: 750 TABLET ORAL at 12:15

## 2025-02-28 RX ADMIN — METHOCARBAMOL 750 MG: 750 TABLET ORAL at 08:21

## 2025-02-28 RX ADMIN — ACETAMINOPHEN 650 MG: 325 TABLET, FILM COATED ORAL at 05:51

## 2025-02-28 RX ADMIN — GABAPENTIN 300 MG: 300 CAPSULE ORAL at 14:26

## 2025-02-28 RX ADMIN — ACETAMINOPHEN 650 MG: 325 TABLET, FILM COATED ORAL at 10:41

## 2025-02-28 RX ADMIN — OXYCODONE HYDROCHLORIDE 10 MG: 5 TABLET ORAL at 20:19

## 2025-02-28 RX ADMIN — POLYETHYLENE GLYCOL 3350 17 G: 17 POWDER, FOR SOLUTION ORAL at 08:20

## 2025-02-28 RX ADMIN — SENNOSIDES AND DOCUSATE SODIUM 1 TABLET: 50; 8.6 TABLET ORAL at 20:15

## 2025-02-28 RX ADMIN — ACETAMINOPHEN 650 MG: 325 TABLET, FILM COATED ORAL at 17:59

## 2025-02-28 RX ADMIN — ACETAMINOPHEN 650 MG: 325 TABLET, FILM COATED ORAL at 14:27

## 2025-02-28 RX ADMIN — ACETAMINOPHEN 650 MG: 325 TABLET, FILM COATED ORAL at 20:15

## 2025-02-28 RX ADMIN — OXYCODONE HYDROCHLORIDE 10 MG: 5 TABLET ORAL at 16:17

## 2025-02-28 RX ADMIN — METHOCARBAMOL 750 MG: 750 TABLET ORAL at 16:17

## 2025-02-28 RX ADMIN — OXYCODONE HYDROCHLORIDE 10 MG: 5 TABLET ORAL at 04:16

## 2025-02-28 RX ADMIN — OXYCODONE HYDROCHLORIDE 10 MG: 5 TABLET ORAL at 08:20

## 2025-02-28 ASSESSMENT — ACTIVITIES OF DAILY LIVING (ADL)
ADLS_ACUITY_SCORE: 44
ADLS_ACUITY_SCORE: 52
ADLS_ACUITY_SCORE: 52
ADLS_ACUITY_SCORE: 44
ADLS_ACUITY_SCORE: 52
ADLS_ACUITY_SCORE: 44
ADLS_ACUITY_SCORE: 44
ADLS_ACUITY_SCORE: 42
ADLS_ACUITY_SCORE: 42
ADLS_ACUITY_SCORE: 44
ADLS_ACUITY_SCORE: 42
ADLS_ACUITY_SCORE: 44
ADLS_ACUITY_SCORE: 48
ADLS_ACUITY_SCORE: 52

## 2025-02-28 NOTE — PROGRESS NOTES
Orthopedic Surgery Progress Note 02/28/2025    S: No acute events overnight. Pain controlled. No new concerns.     O:  Temp: 97  F (36.1  C) Temp src: Oral BP: 125/68 Pulse: 74   Resp: 18 SpO2: 97 % O2 Device: None (Room air)      Exam:  Gen: No acute distress, resting comfortably in bed.  Resp: Non-labored breathing  Cardio: Regular rate via peripheral pulse  MSK:     RLE:  - Dressings c/d/I, Prevena vac in place holding suction. Troubleshot this morning  - Fires Quad, TA, GSC, EHL, FHL  - SILT femoral/tibial/sural/saphenous/DP/SP nerves  - PT/DP pulses 1+, foot wwp     Drain output: 340cc in 24 hours in HUGO drain, 0 ml from Prevena    Recent Labs   Lab 02/27/25  0600 02/24/25  0632   WBC 9.0 9.0   HGB 8.7* 8.7*   * 477*         Assessment: Gilberto Lund is a 44 year old male s/p R internal hemipelvectomy on 2/14/25 with Dr. Bravo. Doing well.       - Restrictions: No valsalva, no contractions of abdominal wall until second stage surgery.      Recs:  Activity: Up with assist and assistive devices as needed until independent. No hip restrictions. No Valsalva or contractions of abdominal wall to decrease stress on repair.  Weight bearing status: NWB RLE   Antibiotics: Cefadroxil x 2 weeks postop  DVT prophylaxis:  mg every day   Elevation: elevate RLE  as needed, may use foam ramp   Wound Care: Prevena to remain in place until POD #14, managed by ortho  Drains: monitor drain output. Strip and empty drain per shift protocol. Keep in place for 2 weeks postop. Possible removal Monday 3/3.      Follow up: Plan for follow up with Dr. Bravo 4 weeks.     Orthopedic surgery staff for this patient is Dr. Bravo. Discussed.    --  Buck Amador MD  Orthopedic Surgery PGY-4

## 2025-02-28 NOTE — PROGRESS NOTES
ARU Social Work Progress Notes     Home Care Referral sent and waiting on accepting agency.      TERRIE Beltrán  [facility], Acute Inpatient Rehab Unit   [facility] [address] [place] [address]  Phone: [phone]  Fax: [phone]

## 2025-02-28 NOTE — PLAN OF CARE
Goal Outcome Evaluation:      Plan of Care Reviewed With: patient    Overall Patient Progress: no changeOverall Patient Progress: no change  Patient is alert and oriented x 4. R hip surgical site intact with wound vac and R thigh HUGO drain also intact and draining. R hip pain PRN oxycodone every 4 hrs and scheduled pain medication given, was effective per patient report. Regular/thin good appetite. VS WDL. No care concern at this time. Call light is with in reach alarm is on.

## 2025-02-28 NOTE — PLAN OF CARE
Goal Outcome Evaluation:      Plan of Care Reviewed With: patient    Overall Patient Progress: no changeOverall Patient Progress: no change     Patient alert and oriented, able to make needs known  Slept most of the night, patient now sleeping on bed, appeared to be comfortable during rounding checks  Pain managed by scheduled and PRN Oxycodone utilized x1 this shift  No respiratory distress  Continent of Bladder and Bowel, utilized urinal independently at night, no BM this shift  Safety checks done, fall prevention maintained, call light in reach  No significant changed overnight  Plan of care ongoing.     Wound vac intermittently beeping, reinforced the dressing and secure the canister, not resolved, notified on call.   Ortho came reinforce the dressing all the way down to the back (edge of the dressing) leaked indicator turn off. No beeping thereafter.

## 2025-02-28 NOTE — PLAN OF CARE
Discharge Planner Post-Acute Rehab OT:      Discharge Plan: home with spouse PRN; HC therapies     Precautions: NWB RLE, abdominal precaution, No turning on R side     Current Status:  ADLs:  Mobility: Lift x2 nsg; Min A SBT with therapy only (help with RLE at times)    Grooming: Mod I in wc  Dressing: UB dressing SBA sitting up in bed, LB mod A sitting in recliner and bridging/leaning to L using reacher, max A for socks/shoes   Bathing: Lift x2 from recliner<>purple shower chair; Min A to wash/dry RLE   Toileting: lift x2 to platform commode nsg; Min A SBT with therapies only  IADLs: wife can help at discharge; IND in all prior  Vision/Cognition: Intact/WNL     Assessment: Pt continuing to make progress with SBT from recliner>wc>commode. Pt doing well with pericare and managing clothing. Pt improving activity tolerance and navigating wc with self propelling from room<>cafeteria. Anticipate pt will be able to use SBT with nursing soon.     Other Barriers to Discharge (DME, Family Training, etc):  Pt ordering platform commode/drop arm; and hip kit 2/25/25  Will need drop arm wheelchair

## 2025-02-28 NOTE — PLAN OF CARE
Discharge Planner Post-Acute Rehab PT:      Discharge Plan: Home with spouse, ramp to enter. Home care PT.     Precautions: NWB RLE (pelvic resection with antibiotic spacer); no valsalva or active abdominal contraction; abdominal precautions; R LE pain control but no hip restrictions; fall risk; do not roll onto R side     Current Status:  Bed Mobility: SBA<>min A with leg  for R LE mgmt  Transfer: SBA slide board transfer with decreasing assist for set-up  Gait: not safe  Stairs: not safe  Balance: good static & dynamic sitting balance     Assessment: Continued improvement in SBT with occasional cues for chair positioning. Tilt table for upright standing tolerance on LLE, maintaining RLE NWB x20 minutes. Pt reports improved pain during activity compared to previous, along with ability to increase tilt to 65 degrees.      Other Barriers to Discharge (DME, Family Training, etc):   Home measurements sheet provided 2/24.  Family training Sat 3/1 - 2:30-4pm.  Equipment: Ordering slide board and drop arm commode through [employer].  K3 WC order initiated through [employer] on 2/26.  Hospital bed order initiated through [employer] on 2/26.

## 2025-02-28 NOTE — PLAN OF CARE
"Discharge Planner Post-Acute Rehab PT:      Discharge Plan: Home with spouse, ramp to enter. Home care PT.     Precautions: NWB RLE (pelvic resection with antibiotic spacer); no valsalva or active abdominal contraction; abdominal precautions; R LE pain control but no hip restrictions; fall risk; do not roll onto R side     Current Status:  Bed Mobility: SBA<>min A with leg  for R LE mgmt  Transfer: SBA slide board transfer with decreasing assist for set-up  Gait: not safe  Stairs: not safe  Balance: good static & dynamic sitting balance     Assessment: Continued improvement in SBT with occasional cues for chair positioning. Tilt table for upright standing tolerance on LLE, maintaining RLE NWB x20 minutes. Pt reports improved pain during activity compared to previous, along with ability to increase tilt to 65 degrees. Discussion re: discharge planning in PM session. Wife and pt questioning benefit of Rooke boot since it causes pain to keep hip in neutral rotation. Pt states \"the muscles are nowhere where they will be, so I don't really see the benefit of using it if it won't carryover to post-surgery\" Discussed benefit of preventing PF contracture, but to follow MD/primary PT recommendations re: possible graded use/rotation re: significant pain during use.      Other Barriers to Discharge (DME, Family Training, etc):   Home measurements sheet provided 2/24.  Family training Sat 3/1 - 2:30-4pm.  Equipment: Ordering slide board and drop arm commode through [employer]  K3 WC order initiated through [employer] on 2/26.  Hospital bed order initiated through [employer] on 2/26.  "

## 2025-03-01 ENCOUNTER — APPOINTMENT (OUTPATIENT)
Dept: PHYSICAL THERAPY | Facility: CLINIC | Age: 45
DRG: 949 | End: 2025-03-01
Attending: PHYSICAL MEDICINE & REHABILITATION
Payer: COMMERCIAL

## 2025-03-01 ENCOUNTER — APPOINTMENT (OUTPATIENT)
Dept: OCCUPATIONAL THERAPY | Facility: CLINIC | Age: 45
DRG: 949 | End: 2025-03-01
Attending: PHYSICAL MEDICINE & REHABILITATION
Payer: COMMERCIAL

## 2025-03-01 PROCEDURE — 97535 SELF CARE MNGMENT TRAINING: CPT | Mod: GO | Performed by: OCCUPATIONAL THERAPIST

## 2025-03-01 PROCEDURE — 97530 THERAPEUTIC ACTIVITIES: CPT | Mod: GP | Performed by: PHYSICAL THERAPIST

## 2025-03-01 PROCEDURE — 250N000013 HC RX MED GY IP 250 OP 250 PS 637: Performed by: PHYSICIAN ASSISTANT

## 2025-03-01 PROCEDURE — 97150 GROUP THERAPEUTIC PROCEDURES: CPT | Mod: GP

## 2025-03-01 PROCEDURE — 250N000013 HC RX MED GY IP 250 OP 250 PS 637: Performed by: PHYSICAL MEDICINE & REHABILITATION

## 2025-03-01 PROCEDURE — 128N000003 HC R&B REHAB

## 2025-03-01 RX ADMIN — ACETAMINOPHEN 650 MG: 325 TABLET, FILM COATED ORAL at 06:02

## 2025-03-01 RX ADMIN — CEFADROXIL 500 MG: 500 CAPSULE ORAL at 08:06

## 2025-03-01 RX ADMIN — OXYCODONE HYDROCHLORIDE 10 MG: 5 TABLET ORAL at 16:53

## 2025-03-01 RX ADMIN — CEFADROXIL 500 MG: 500 CAPSULE ORAL at 20:20

## 2025-03-01 RX ADMIN — OXYCODONE HYDROCHLORIDE 10 MG: 5 TABLET ORAL at 22:43

## 2025-03-01 RX ADMIN — METHOCARBAMOL 750 MG: 750 TABLET ORAL at 08:07

## 2025-03-01 RX ADMIN — GABAPENTIN 300 MG: 300 CAPSULE ORAL at 13:25

## 2025-03-01 RX ADMIN — OXYCODONE HYDROCHLORIDE 10 MG: 5 TABLET ORAL at 08:07

## 2025-03-01 RX ADMIN — ACETAMINOPHEN 650 MG: 325 TABLET, FILM COATED ORAL at 10:11

## 2025-03-01 RX ADMIN — METHOCARBAMOL 750 MG: 750 TABLET ORAL at 12:36

## 2025-03-01 RX ADMIN — ACETAMINOPHEN 650 MG: 325 TABLET, FILM COATED ORAL at 18:17

## 2025-03-01 RX ADMIN — OXYCODONE HYDROCHLORIDE 10 MG: 5 TABLET ORAL at 02:33

## 2025-03-01 RX ADMIN — ACETAMINOPHEN 650 MG: 325 TABLET, FILM COATED ORAL at 20:20

## 2025-03-01 RX ADMIN — ACETAMINOPHEN 650 MG: 325 TABLET, FILM COATED ORAL at 13:25

## 2025-03-01 RX ADMIN — SENNOSIDES AND DOCUSATE SODIUM 1 TABLET: 50; 8.6 TABLET ORAL at 20:20

## 2025-03-01 RX ADMIN — METHOCARBAMOL 750 MG: 750 TABLET ORAL at 16:13

## 2025-03-01 RX ADMIN — GABAPENTIN 300 MG: 300 CAPSULE ORAL at 08:06

## 2025-03-01 RX ADMIN — POLYETHYLENE GLYCOL 3350 17 G: 17 POWDER, FOR SOLUTION ORAL at 08:04

## 2025-03-01 RX ADMIN — SENNOSIDES AND DOCUSATE SODIUM 1 TABLET: 50; 8.6 TABLET ORAL at 08:06

## 2025-03-01 RX ADMIN — ASPIRIN 162 MG: 81 TABLET ORAL at 08:07

## 2025-03-01 RX ADMIN — OXYCODONE HYDROCHLORIDE 10 MG: 5 TABLET ORAL at 12:36

## 2025-03-01 RX ADMIN — GABAPENTIN 300 MG: 300 CAPSULE ORAL at 20:20

## 2025-03-01 RX ADMIN — METHOCARBAMOL 750 MG: 750 TABLET ORAL at 20:20

## 2025-03-01 ASSESSMENT — ACTIVITIES OF DAILY LIVING (ADL)
ADLS_ACUITY_SCORE: 54
ADLS_ACUITY_SCORE: 52
ADLS_ACUITY_SCORE: 54

## 2025-03-01 NOTE — PLAN OF CARE
Pt attended Falls Prevention class today with group of 4 patients. Pt selected for class due to documented gait deficit and falls risk. Class includes education in falls risks, how to decrease that risk through behavior and home modifications and energy conservation; and instruction in available equipment designed to increase home safety. Pt was able to verbalize understanding of materials and participated appropriately in the discussion and problem-solving segments of the class.   Pt was instructed in fall prevention foundational information, strategies, equipment and environmental changes to prevent falls.  Patient will be quizzed later in order to demonstrate comprehension of material.    Rolando Brenner, PT  3/1/2025

## 2025-03-01 NOTE — PLAN OF CARE
"Goal Outcome Evaluation:      Plan of Care Reviewed With: patient    Overall Patient Progress: improvingOverall Patient Progress: improving    VS: /76 (BP Location: Left arm)   Pulse 102   Temp 97  F (36.1  C) (Oral)   Resp 16   Ht 1.854 m (6' 1\")   Wt 113.4 kg (250 lb)   SpO2 97%   BMI 32.98 kg/m       O2: SpO2 97% on RA. Denies chest pain and SOB.    Output: Cont x 2   Last BM: 2/26   Activity: Up with A x 2 liko    Skin: WDL except, surgical site    Pain: Pain managed with prn with relief   CMS: Intact, Alert and oriented. Denies numbness and tingling.   Dressing: CDI   Diet: Regular diet, thin liquids takes pills. Denies nausea/vomiting.    LDA: Wound vac, and HUGO drain all draining well.   Equipment: IV pole, personal belongings,    Plan: Continue with plan of care. Call light within reach, pt able to make needs known.   Bed alarm on   Additional Info: Requested Oxycodone with relief       "

## 2025-03-01 NOTE — PLAN OF CARE
Discharge Planner Post-Acute Rehab OT:      Discharge Plan: home with spouse PRN; HC therapies     Precautions: NWB RLE, abdominal precaution, No turning on R side     Current Status:  ADLs:  Mobility: Lift x2 nsg; Min A SBT with therapy only (help with RLE at times)    Grooming: Mod I in wc  Dressing: UB dressing SBA sitting up in bed, LB mod A sitting in recliner and bridging/leaning to L using reacher, max A for socks/shoes   Bathing: Lift x2 from recliner<>purple shower chair; Min A to wash/dry RLE   Toileting: lift x2 to platform commode nsg; Min A SBT with therapies only  IADLs: wife can help at discharge; IND in all prior  Vision/Cognition: Intact/WNL     Assessment: FT done today with wife, Floresita. She is able to provide CGA/SBA upon return home with transfers to commode/wheelchair/bed, etc. Also able to help with IADL tasks. Pt ordered commode and hip kit already. On track for home with HC this Tuesday.     Other Barriers to Discharge (DME, Family Training, etc):  Pt ordering platform commode/drop arm; and hip kit 2/25/25  Will need drop arm wheelchair

## 2025-03-01 NOTE — PROGRESS NOTES
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 He is now admitted to ARU on 2/21/25 for multidisciplinary rehabilitation and ongoing medical management.     --Vitals stable. No labs today.  --Continue ongoing medical management.  --Continue therapies and plan of care.           Interval history:   Patient seen and examined at bedside. Per nursing report, no acute events overnight. He has a R hip surgical site intact with wound vac and R thigh HUGO drain also intact and draining. He also takes prn oxycodone for pain, which is adequately managing his pain. He states that once the oxycodone wears off,his pain is rather severe, but he is managing ok.     Today, patient states that he is doing ok. Sleep is very broken up at night. Denies fever, chills, CP, SOB, N/V, abdominal pain, new pain or weakness/numbness/tingling.     The patient is fully participating in therapies and is making progress. He is NWB RLE 2/2 pelvic resection with antibiotic spacer.           Physical Exam:   VS:   Vitals:    02/27/25 1608 02/28/25 0752 02/28/25 1500 03/01/25 0753   BP: 125/68 121/65 138/76 120/77   BP Location: Right arm Right arm Left arm Right arm   Pulse: 74 71 102 79   Resp: 18 16 16 16   Temp: 97  F (36.1  C)   97.6  F (36.4  C)   TempSrc: Oral Oral  Oral   SpO2: 97% 96% 97% 97%   Weight:       Height:         Gen: NAD, resting comfortably in bedside recliner  Lungs: breathing unlabored on room air  Abd: soft and  non-tender  Ext: no edema in BLE, no calf tenderness  MSK/neuro: Alert, speech fluent, moves all four extremities volitionally.          Data:   Scheduled meds  Current Facility-Administered Medications   Medication Dose Route Frequency Provider Last Rate Last Admin    acetaminophen (TYLENOL) tablet 650 mg  650 mg Oral Q4H While awake Lizy Macedo MD   650 mg at 03/01/25 1011    aspirin EC tablet 162 mg  162 mg Oral Daily Alena Gomez PA-C   162 mg at 03/01/25 0807    cefadroxil (DURICEF) capsule 500 mg  500 mg Oral BID Alena Gomez PA-C   500 mg at 03/01/25 0806    gabapentin (NEURONTIN) capsule 300 mg  300 mg Oral TID Alena Gomez PA-C   300 mg at 03/01/25 0806    methocarbamol (ROBAXIN) tablet 750 mg  750 mg Oral 4x Daily Lizy Macedo MD   750 mg at 03/01/25 0807    polyethylene glycol (MIRALAX) Packet 17 g  17 g Oral Daily Alena Gomez PA-C   17 g at 03/01/25 0804    senna-docusate (SENOKOT-S/PERICOLACE) 8.6-50 MG per tablet 1 tablet  1 tablet Oral BID Alena Gomez PA-C   1 tablet at 03/01/25 0806       PRN meds:  Current Facility-Administered Medications   Medication Dose Route Frequency Provider Last Rate Last Admin    hydrOXYzine HCl (ATARAX) tablet 25 mg  25 mg Oral Q6H PRN Alena Gomez PA-C   25 mg at 02/23/25 2209    melatonin tablet 10 mg  10 mg Oral At Bedtime PRN Lizy Macedo MD   10 mg at 02/23/25 2209    naloxone (NARCAN) injection 0.2 mg  0.2 mg Intravenous Q2 Min PRN Lizy Macedo MD        Or    naloxone (NARCAN) injection 0.4 mg  0.4 mg Intravenous Q2 Min PRN Lizy Macedo MD        Or    naloxone (NARCAN) injection 0.2 mg  0.2 mg Intramuscular Q2 Min PRN Lizy Macedo MD        Or    naloxone (NARCAN) injection 0.4 mg  0.4 mg Intramuscular Q2 Min PRN Lizy Macedo MD        ondansetron (ZOFRAN) tablet 4 mg  4 mg Oral Q6H PRN Lizy Macedo MD        oxyCODONE (ROXICODONE) tablet 5 mg   5 mg Oral Q4H PRN Lizy Macedo MD        Or    oxyCODONE (ROXICODONE) tablet 10 mg  10 mg Oral Q4H PRN Lizy Macedo MD   10 mg at 03/01/25 0807    polyethylene glycol (MIRALAX) Packet 17 g  17 g Oral Daily PRN Alena Gomez PA-C Stephanie E. Standal, MD  Physical Medicine and Rehabilitation     I spent a total of 25 minutes face-to-face and managing the care of Gilberto Lund. Over 50% of my time on the unit was spent counseling the patient and coordinating care. Please see note for details.

## 2025-03-01 NOTE — PLAN OF CARE
Goal Outcome Evaluation:      Plan of Care Reviewed With: patient    Overall Patient Progress: no changeOverall Patient Progress: no change  Patient is alert and oriented x 4. R hip surgical site intact with wound vac and R thigh HUGO drain also intact and draining. R hip pain PRN oxycodone every 4 hrs and scheduled pain medication given, Regular/thin good appetite. VS WDL. No care concern at this time. Call light is with in reach alarm is on.

## 2025-03-01 NOTE — PLAN OF CARE
"Goal Outcome Evaluation:       Plan of Care Reviewed With: patient     Overall Patient Progress: improvingOverall Patient Progress: improving     FOCUS/GOAL     Bowel management, Bladder management, Nutrition/Feeding/Swallowing precautions, Mobility, Skin integrity, and Safety management            VS: /76 (BP Location: Left arm)   Pulse 102   Temp 97  F (36.1  C) (Oral)   Resp 16   Ht 1.854 m (6' 1\")   Wt 113.4 kg (250 lb)   SpO2 97%   BMI 32.98 kg/m       O2: 97% RA   Output: Continent; Urinal at bedside   Last BM: Continent, bedside commode 2/26/2025   Activity: A x2 liko lift   Skin: R hip HUGO drain, R wound vac @ 125   Pain: Managed with PRN Oxycodone Q4, sched. Tylenol and robaxin 4x daily    CMS: Denies SOB, chest pain, headache   Dressing: HUGO site CDI, wound vac CDI   Diet: Regular, thin, pills whole   LDA: R wound vac and R HUGO drain   Equipment: Wound vac, bedside commode, wheelchair   Plan: Pain management, PT/OT continue POC   Additional Info:                    "

## 2025-03-01 NOTE — DISCHARGE INSTRUCTIONS
PCP in 1-2 weeks - [phone] , ortho at 4 wks post-op (3/17)     Follow up appointments    PCP  You are scheduled to see Dr. Abreu on March 10, 2025 at 3:00 pm.    Address [address] [facility] [address]   Phone  [phone]     Orthopedics  You are scheduled to see Dr. Bravo on March 17, 2025 at 2:20 pm.    Address [address] [facility] [address] [facility] [address]   Phone  [phone]         [facility]   Monday-Friday, 7:30 AM to 4 PM  Phone: [phone]  TTY: [phone]  Please call to follow-up

## 2025-03-01 NOTE — PLAN OF CARE
Discharge Planner Post-Acute Rehab PT:      Discharge Plan: Home with spouse, ramp to enter. Home care PT.     Precautions: NWB RLE (pelvic resection with antibiotic spacer); no valsalva or active abdominal contraction; abdominal precautions; R LE pain control but no hip restrictions; fall risk; do not roll onto R side     Current Status:  Bed Mobility: SBA<>min A with leg  for R LE mgmt  Transfer: SBA slide board transfer with decreasing assist for set-up  Gait: not safe  Stairs: not safe  Balance: good static & dynamic sitting balance     Assessment: FT successfully completed with pt's spouse. Pt upgraded to SBA slide board transfers with nursing; truthfully nearing/at mod I, but current WC with difficult to remove armrests. All on track for anticipated discharge Tuesday, minus still awaiting drop arm commode from [employer], scheduled to be delivered btwn 3/3-3/5.     Other Barriers to Discharge (DME, Family Training, etc):   Home measurements sheet provided 2/24.  Family training completed Sat 3/1.  Equipment: Ordering slide board and drop arm commode through [employer].  K3 WC order initiated through [employer] on 2/26.  Hospital bed order initiated through [employer] on 2/26.

## 2025-03-02 ENCOUNTER — APPOINTMENT (OUTPATIENT)
Dept: OCCUPATIONAL THERAPY | Facility: CLINIC | Age: 45
DRG: 949 | End: 2025-03-02
Attending: PHYSICAL MEDICINE & REHABILITATION
Payer: COMMERCIAL

## 2025-03-02 PROCEDURE — 97110 THERAPEUTIC EXERCISES: CPT | Mod: GO

## 2025-03-02 PROCEDURE — 97535 SELF CARE MNGMENT TRAINING: CPT | Mod: GO

## 2025-03-02 PROCEDURE — 250N000013 HC RX MED GY IP 250 OP 250 PS 637: Performed by: PHYSICIAN ASSISTANT

## 2025-03-02 PROCEDURE — 250N000013 HC RX MED GY IP 250 OP 250 PS 637: Performed by: PHYSICAL MEDICINE & REHABILITATION

## 2025-03-02 PROCEDURE — 128N000003 HC R&B REHAB

## 2025-03-02 RX ADMIN — METHOCARBAMOL 750 MG: 750 TABLET ORAL at 08:59

## 2025-03-02 RX ADMIN — GABAPENTIN 300 MG: 300 CAPSULE ORAL at 14:45

## 2025-03-02 RX ADMIN — ACETAMINOPHEN 650 MG: 325 TABLET, FILM COATED ORAL at 18:44

## 2025-03-02 RX ADMIN — ACETAMINOPHEN 650 MG: 325 TABLET, FILM COATED ORAL at 06:38

## 2025-03-02 RX ADMIN — ACETAMINOPHEN 650 MG: 325 TABLET, FILM COATED ORAL at 20:16

## 2025-03-02 RX ADMIN — CEFADROXIL 500 MG: 500 CAPSULE ORAL at 20:16

## 2025-03-02 RX ADMIN — POLYETHYLENE GLYCOL 3350 17 G: 17 POWDER, FOR SOLUTION ORAL at 08:58

## 2025-03-02 RX ADMIN — GABAPENTIN 300 MG: 300 CAPSULE ORAL at 20:16

## 2025-03-02 RX ADMIN — SENNOSIDES AND DOCUSATE SODIUM 1 TABLET: 50; 8.6 TABLET ORAL at 20:16

## 2025-03-02 RX ADMIN — OXYCODONE HYDROCHLORIDE 10 MG: 5 TABLET ORAL at 18:44

## 2025-03-02 RX ADMIN — ACETAMINOPHEN 650 MG: 325 TABLET, FILM COATED ORAL at 14:45

## 2025-03-02 RX ADMIN — CEFADROXIL 500 MG: 500 CAPSULE ORAL at 08:59

## 2025-03-02 RX ADMIN — OXYCODONE HYDROCHLORIDE 10 MG: 5 TABLET ORAL at 10:38

## 2025-03-02 RX ADMIN — ASPIRIN 162 MG: 81 TABLET ORAL at 08:59

## 2025-03-02 RX ADMIN — SENNOSIDES AND DOCUSATE SODIUM 1 TABLET: 50; 8.6 TABLET ORAL at 08:59

## 2025-03-02 RX ADMIN — GABAPENTIN 300 MG: 300 CAPSULE ORAL at 08:59

## 2025-03-02 RX ADMIN — METHOCARBAMOL 750 MG: 750 TABLET ORAL at 16:26

## 2025-03-02 RX ADMIN — OXYCODONE HYDROCHLORIDE 10 MG: 5 TABLET ORAL at 14:45

## 2025-03-02 RX ADMIN — OXYCODONE HYDROCHLORIDE 10 MG: 5 TABLET ORAL at 06:37

## 2025-03-02 RX ADMIN — METHOCARBAMOL 750 MG: 750 TABLET ORAL at 12:08

## 2025-03-02 RX ADMIN — METHOCARBAMOL 750 MG: 750 TABLET ORAL at 20:16

## 2025-03-02 RX ADMIN — OXYCODONE HYDROCHLORIDE 10 MG: 5 TABLET ORAL at 23:14

## 2025-03-02 RX ADMIN — ACETAMINOPHEN 650 MG: 325 TABLET, FILM COATED ORAL at 10:38

## 2025-03-02 ASSESSMENT — ACTIVITIES OF DAILY LIVING (ADL)
ADLS_ACUITY_SCORE: 44
ADLS_ACUITY_SCORE: 54
ADLS_ACUITY_SCORE: 44
ADLS_ACUITY_SCORE: 54
ADLS_ACUITY_SCORE: 44
ADLS_ACUITY_SCORE: 54
ADLS_ACUITY_SCORE: 44
ADLS_ACUITY_SCORE: 54
ADLS_ACUITY_SCORE: 44
ADLS_ACUITY_SCORE: 54

## 2025-03-02 NOTE — PLAN OF CARE
Discharge Planner Post-Acute Rehab OT:      Discharge Plan: home with spouse PRN; HC therapies     Precautions: NWB RLE, abdominal precaution, No turning on R side     Current Status:  ADLs:  Mobility:SBA-Min A SBT  Grooming: Mod I in wc  Dressing: UB dressing SBA sitting up in bed, LB mod A sitting in recliner and bridging/leaning to L using reacher, max A for socks/shoes   Bathing: Lift x2 from recliner<>purple shower chair; Min A to wash/dry RLE   Toileting: lift x2 to platform commode nsg; Min A SBT with therapies only  IADLs: wife can help at discharge; IND in all prior  Vision/Cognition: Intact/WNL     Assessment: Facilitated -based community navigation task; good safety and functional endurance with self-propel, required Min A at times to navigate curb cut outs d/t footrests and upward graded inclines ~30 degrees.      Other Barriers to Discharge (DME, Family Training, etc):  Pt ordering platform commode/drop arm; and hip kit 2/25/25  Will need drop arm wheelchair

## 2025-03-02 NOTE — PLAN OF CARE
"Goal Outcome Evaluation:      Plan of Care Reviewed With: patient    Overall Patient Progress: improvingOverall Patient Progress: improving    VS: /69 (BP Location: Left arm)   Pulse 86   Temp 97.8  F (36.6  C) (Oral)   Resp 16   Ht 1.854 m (6' 1\")   Wt 113.4 kg (250 lb)   SpO2 96%   BMI 32.98 kg/m       O2: SpO2 96% on RA. Denies chest pain and SOB.    Output: Cont x 2   Last BM: 3/1   Activity: Up with A x 1 sliding board    Skin: WDL except, R hip/thigh incision   Pain: Pain managed with prn with relief   CMS: Intact, Alert and oriented. Denies numbness and tingling.   Dressing: CDI   Diet: Regular diet, thin liquids takes pills whole. Denies nausea/vomiting.    LDA: Wound vac and HUGO drain intact.   Equipment: Wound vac and HUGO drain      Plan: Continue with plan of care. Call light within reach, pt able to make needs known.   Bed alarm on   Additional Info: Requested Oxycodone x 2  Slept well through the night        "

## 2025-03-02 NOTE — PLAN OF CARE
Goal Outcome Evaluation: 0700-1930 Pt is alert and oriented. Continent of bladder using urinal at the bedside. Continent of medium BM on the toilet this shift. Up SBA with slide board to chair/bed/toilet. Requested and received oxycodone x 3 this shift for right hip and leg pain. Wound vac intact. Right HUGO drain is patent and intact. Uses call light appropriately, able to make needs known.       Plan of Care Reviewed With: patient    Overall Patient Progress: improvingOverall Patient Progress: improving

## 2025-03-03 LAB
ANION GAP SERPL CALCULATED.3IONS-SCNC: 10 MMOL/L (ref 7–15)
BUN SERPL-MCNC: 14.3 MG/DL (ref 6–20)
CALCIUM SERPL-MCNC: 8.8 MG/DL (ref 8.8–10.4)
CHLORIDE SERPL-SCNC: 106 MMOL/L (ref 98–107)
CREAT SERPL-MCNC: 0.96 MG/DL (ref 0.67–1.17)
EGFRCR SERPLBLD CKD-EPI 2021: >90 ML/MIN/1.73M2
ERYTHROCYTE [DISTWIDTH] IN BLOOD BY AUTOMATED COUNT: 13.4 % (ref 10–15)
GLUCOSE SERPL-MCNC: 95 MG/DL (ref 70–99)
HCO3 SERPL-SCNC: 26 MMOL/L (ref 22–29)
HCT VFR BLD AUTO: 29.5 % (ref 40–53)
HGB BLD-MCNC: 9.3 G/DL (ref 13.3–17.7)
MAGNESIUM SERPL-MCNC: 2 MG/DL (ref 1.7–2.3)
MCH RBC QN AUTO: 30.1 PG (ref 26.5–33)
MCHC RBC AUTO-ENTMCNC: 31.5 G/DL (ref 31.5–36.5)
MCV RBC AUTO: 96 FL (ref 78–100)
PLATELET # BLD AUTO: 611 10E3/UL (ref 150–450)
POTASSIUM SERPL-SCNC: 4.3 MMOL/L (ref 3.4–5.3)
RBC # BLD AUTO: 3.09 10E6/UL (ref 4.4–5.9)
SODIUM SERPL-SCNC: 142 MMOL/L (ref 135–145)
WBC # BLD AUTO: 5.9 10E3/UL (ref 4–11)

## 2025-03-03 PROCEDURE — 97535 SELF CARE MNGMENT TRAINING: CPT | Mod: GO | Performed by: OCCUPATIONAL THERAPIST

## 2025-03-03 PROCEDURE — 85018 HEMOGLOBIN: CPT | Performed by: PHYSICIAN ASSISTANT

## 2025-03-03 PROCEDURE — 97110 THERAPEUTIC EXERCISES: CPT | Mod: GP

## 2025-03-03 PROCEDURE — 97530 THERAPEUTIC ACTIVITIES: CPT | Mod: GP | Performed by: PHYSICAL THERAPIST

## 2025-03-03 PROCEDURE — 97530 THERAPEUTIC ACTIVITIES: CPT | Mod: GP

## 2025-03-03 PROCEDURE — 250N000013 HC RX MED GY IP 250 OP 250 PS 637: Performed by: PHYSICIAN ASSISTANT

## 2025-03-03 PROCEDURE — 250N000013 HC RX MED GY IP 250 OP 250 PS 637: Performed by: PHYSICAL MEDICINE & REHABILITATION

## 2025-03-03 PROCEDURE — 80048 BASIC METABOLIC PNL TOTAL CA: CPT | Performed by: PHYSICIAN ASSISTANT

## 2025-03-03 PROCEDURE — 128N000003 HC R&B REHAB

## 2025-03-03 PROCEDURE — 83735 ASSAY OF MAGNESIUM: CPT | Performed by: PHYSICIAN ASSISTANT

## 2025-03-03 PROCEDURE — 36415 COLL VENOUS BLD VENIPUNCTURE: CPT | Performed by: PHYSICIAN ASSISTANT

## 2025-03-03 PROCEDURE — 97110 THERAPEUTIC EXERCISES: CPT | Mod: GP | Performed by: PHYSICAL THERAPIST

## 2025-03-03 RX ORDER — ASPIRIN 81 MG/1
162 TABLET ORAL DAILY
Qty: 20 TABLET | Refills: 0 | Status: SHIPPED | OUTPATIENT
Start: 2025-03-05 | End: 2025-03-15

## 2025-03-03 RX ORDER — OXYCODONE HYDROCHLORIDE 5 MG/1
TABLET ORAL
Qty: 48 TABLET | Refills: 0 | Status: SHIPPED | OUTPATIENT
Start: 2025-03-03 | End: 2025-03-10

## 2025-03-03 RX ORDER — AMOXICILLIN 250 MG
1 CAPSULE ORAL 2 TIMES DAILY
Qty: 60 TABLET | Refills: 0 | Status: SHIPPED | OUTPATIENT
Start: 2025-03-03

## 2025-03-03 RX ORDER — METHOCARBAMOL 750 MG/1
750 TABLET, FILM COATED ORAL 4 TIMES DAILY PRN
Qty: 90 TABLET | Refills: 0 | Status: SHIPPED | OUTPATIENT
Start: 2025-03-03

## 2025-03-03 RX ORDER — ACETAMINOPHEN 325 MG/1
650 TABLET ORAL EVERY 4 HOURS PRN
COMMUNITY
Start: 2025-03-03

## 2025-03-03 RX ORDER — CEFADROXIL 500 MG/1
500 CAPSULE ORAL 2 TIMES DAILY
Qty: 6 CAPSULE | Refills: 0 | Status: SHIPPED | OUTPATIENT
Start: 2025-03-04 | End: 2025-03-07

## 2025-03-03 RX ORDER — GABAPENTIN 400 MG/1
400 CAPSULE ORAL 3 TIMES DAILY
Qty: 90 CAPSULE | Refills: 0 | Status: SHIPPED | OUTPATIENT
Start: 2025-03-03

## 2025-03-03 RX ORDER — PHENOL 1.4 %
10 AEROSOL, SPRAY (ML) MUCOUS MEMBRANE
COMMUNITY
Start: 2025-03-03

## 2025-03-03 RX ORDER — POLYETHYLENE GLYCOL 3350 17 G/17G
17 POWDER, FOR SOLUTION ORAL DAILY
COMMUNITY
Start: 2025-03-03

## 2025-03-03 RX ADMIN — ACETAMINOPHEN 650 MG: 325 TABLET, FILM COATED ORAL at 21:15

## 2025-03-03 RX ADMIN — OXYCODONE HYDROCHLORIDE 10 MG: 5 TABLET ORAL at 10:19

## 2025-03-03 RX ADMIN — OXYCODONE HYDROCHLORIDE 10 MG: 5 TABLET ORAL at 21:15

## 2025-03-03 RX ADMIN — ACETAMINOPHEN 650 MG: 325 TABLET, FILM COATED ORAL at 18:37

## 2025-03-03 RX ADMIN — ACETAMINOPHEN 650 MG: 325 TABLET, FILM COATED ORAL at 10:18

## 2025-03-03 RX ADMIN — POLYETHYLENE GLYCOL 3350 17 G: 17 POWDER, FOR SOLUTION ORAL at 08:20

## 2025-03-03 RX ADMIN — METHOCARBAMOL 750 MG: 750 TABLET ORAL at 15:58

## 2025-03-03 RX ADMIN — GABAPENTIN 400 MG: 300 CAPSULE ORAL at 15:22

## 2025-03-03 RX ADMIN — ACETAMINOPHEN 650 MG: 325 TABLET, FILM COATED ORAL at 15:22

## 2025-03-03 RX ADMIN — ACETAMINOPHEN 650 MG: 325 TABLET, FILM COATED ORAL at 06:34

## 2025-03-03 RX ADMIN — OXYCODONE HYDROCHLORIDE 10 MG: 5 TABLET ORAL at 15:58

## 2025-03-03 RX ADMIN — GABAPENTIN 400 MG: 300 CAPSULE ORAL at 21:16

## 2025-03-03 RX ADMIN — METHOCARBAMOL 750 MG: 750 TABLET ORAL at 08:21

## 2025-03-03 RX ADMIN — ASPIRIN 162 MG: 81 TABLET ORAL at 08:20

## 2025-03-03 RX ADMIN — OXYCODONE HYDROCHLORIDE 10 MG: 5 TABLET ORAL at 06:34

## 2025-03-03 RX ADMIN — METHOCARBAMOL 750 MG: 750 TABLET ORAL at 12:16

## 2025-03-03 RX ADMIN — GABAPENTIN 300 MG: 300 CAPSULE ORAL at 08:21

## 2025-03-03 RX ADMIN — SENNOSIDES AND DOCUSATE SODIUM 1 TABLET: 50; 8.6 TABLET ORAL at 21:16

## 2025-03-03 RX ADMIN — SENNOSIDES AND DOCUSATE SODIUM 1 TABLET: 50; 8.6 TABLET ORAL at 08:21

## 2025-03-03 RX ADMIN — METHOCARBAMOL 750 MG: 750 TABLET ORAL at 21:16

## 2025-03-03 RX ADMIN — CEFADROXIL 500 MG: 500 CAPSULE ORAL at 21:16

## 2025-03-03 RX ADMIN — CEFADROXIL 500 MG: 500 CAPSULE ORAL at 08:20

## 2025-03-03 ASSESSMENT — ACTIVITIES OF DAILY LIVING (ADL)
ADLS_ACUITY_SCORE: 47
ADLS_ACUITY_SCORE: 43
ADLS_ACUITY_SCORE: 47
ADLS_ACUITY_SCORE: 43
ADLS_ACUITY_SCORE: 47
ADLS_ACUITY_SCORE: 43
ADLS_ACUITY_SCORE: 47
ADLS_ACUITY_SCORE: 43
ADLS_ACUITY_SCORE: 43
ADLS_ACUITY_SCORE: 47
ADLS_ACUITY_SCORE: 47
ADLS_ACUITY_SCORE: 43
ADLS_ACUITY_SCORE: 43
ADLS_ACUITY_SCORE: 47
ADLS_ACUITY_SCORE: 43
ADLS_ACUITY_SCORE: 47

## 2025-03-03 NOTE — PROGRESS NOTES
Plan of Care Reviewed With: Patient    Overall Patient Progress: Improving    Neuro/Orientation: A&Ox4. Uses call light appropriately, able to make needs known.  Respiratory: Stable on RA. Denies chest pain/SOB.  Skin: WDL X surgical site R hip.  Bladder: Continent, uses bedside urinal.  Bowel: Continent. No BM this shift.  LBM: 3/2/25  Diet: Regular diet, thin liquids, pills whole. Prefers Gatorade to water for pills.  Activity/Mobility: A1 w/ slide board.  Tubes/Lines/Drains: HUGO drain draining well.  Pain: Managed with scheduled and PRN medication. Requested oxycodone with relief this shift.  Additional Info: Wound vac removed this shift.     Continue with plan of care. Call light within reach. Expected discharge 3/4/25.

## 2025-03-03 NOTE — PLAN OF CARE
Discharge Planner Post-Acute Rehab PT:      Discharge Plan: Home with spouse, ramp to enter. Home care PT.     Precautions: NWB RLE (pelvic resection with antibiotic spacer); no valsalva or active abdominal contraction; abdominal precautions; R LE pain control but no hip restrictions; fall risk; do not roll onto R side     Current Status:  Bed Mobility: SBA<>min A with leg  for R LE mgmt  Transfer: Supervised slide board transfer and independent directing set up if needed.  Gait: not safe/unable to tolerate d/t pain in proximal R hip/pelvic region  Stairs: not safe  Balance: good static & dynamic sitting balance     Assessment: FT successfully completed with pt's spouse. New wc with flip back armrests allows Supervised/independence with sliding board transfers. Discharge Tuesday, pending drop arm commode from [employer], scheduled to be delivered btwn 3/3-3/5.     Other Barriers to Discharge (DME, Family Training, etc):   Home measurements sheet provided 2/24.  Family training completed Sat 3/1.  Equipment: Ordering slide board and drop arm commode through [employer].  K3 WC order initiated through [employer] on 2/26.  Hospital bed order initiated through [employer] on 2/26.

## 2025-03-03 NOTE — PLAN OF CARE
Goal Outcome Evaluation: [id number] Pt is alert and oriented. Continent of bladder using urinal independently. Continent of medium BM using the toilet this shift. Up assist of 1 with slide board. Pain managed with scheduled medications and PRN oxycodone. Wound vac and HUGO drain intact to right hip. Uses call light appropriately, able to make needs known.       Plan of Care Reviewed With: patient    Overall Patient Progress: improving

## 2025-03-03 NOTE — PROGRESS NOTES
[facility]   Acute Rehabilitation Unit  Daily progress note    INTERVAL HISTORY  Weekend and therapy notes reviewed, no acute events reported.    Gilberto Lund was seen up in his room this morning.  He reports overall to be feeling well.  He was pleased with how therapy training went with his spouse this weekend.  Equipment is all on track.  Pain overall has been improved with increased dose of gabapentin.  He wants to work towards weaning opioids, discussed various other pharmacologic and non-pharmacologic interventions.  Wound vac battery  over weekend but sponges/dressing remains in place, so that was removed at bedside today.  Incision healing well though some skin irritation from adhesive and removal of adhesive.  Reviewed medications and follow-up appointments post-discharge.  He denies other concerns or questions regarding plans for discharge tomorrow.      With therapies, OT yesterday facilitated wc-based community navigation task; good safety and functional endurance with self-propel, required Min A at times to navigate curb cut outs d/t footrests and upward graded inclines ~30 degrees.     MEDICATIONS  Current Facility-Administered Medications   Medication Dose Route Frequency Provider Last Rate Last Admin    acetaminophen (TYLENOL) tablet 650 mg  650 mg Oral Q4H While awake Lizy Macedo MD   650 mg at 25 1018    aspirin EC tablet 162 mg  162 mg Oral Daily Alena Gomez PA-C   162 mg at 25 0820    cefadroxil (DURICEF) capsule 500 mg  500 mg Oral BID Alena Gomez PA-C   500 mg at 25 0820    gabapentin (NEURONTIN) capsule 300 mg  300 mg Oral TID Alena Gomez PA-C   300 mg at 25 0821    methocarbamol (ROBAXIN) tablet 750 mg  750 mg Oral 4x Daily Lizy Macedo MD   750 mg at 25 0821    polyethylene glycol (MIRALAX) Packet 17 g  17 g Oral Daily Alena Gomez PA-C   17 g at 25  "0820    senna-docusate (SENOKOT-S/PERICOLACE) 8.6-50 MG per tablet 1 tablet  1 tablet Oral BID Alena Gomez PA-C   1 tablet at 03/03/25 0821          Current Facility-Administered Medications   Medication Dose Route Frequency Provider Last Rate Last Admin    hydrOXYzine HCl (ATARAX) tablet 25 mg  25 mg Oral Q6H PRN Alena Gomez PA-C   25 mg at 02/23/25 2209    melatonin tablet 10 mg  10 mg Oral At Bedtime PRN Lizy Macedo MD   10 mg at 02/23/25 2209    naloxone (NARCAN) injection 0.2 mg  0.2 mg Intravenous Q2 Min PRN Lizy Macedo MD        Or    naloxone (NARCAN) injection 0.4 mg  0.4 mg Intravenous Q2 Min PRN Lizy Macedo MD        Or    naloxone (NARCAN) injection 0.2 mg  0.2 mg Intramuscular Q2 Min PRN Lizy Macedo MD        Or    naloxone (NARCAN) injection 0.4 mg  0.4 mg Intramuscular Q2 Min PRN Lizy Macedo MD        ondansetron (ZOFRAN) tablet 4 mg  4 mg Oral Q6H PRN Lizy Macedo MD        oxyCODONE (ROXICODONE) tablet 5 mg  5 mg Oral Q4H PRN Lizy Macedo MD        Or    oxyCODONE (ROXICODONE) tablet 10 mg  10 mg Oral Q4H PRN Lizy Macedo MD   10 mg at 03/03/25 1019    polyethylene glycol (MIRALAX) Packet 17 g  17 g Oral Daily PRN Alena Gomez PA-C            PHYSICAL EXAM  /78 (BP Location: Right arm, Patient Position: Semi-Dinero's, Cuff Size: Adult Regular)   Pulse 78   Temp 98.2  F (36.8  C) (Oral)   Resp 18   Ht 1.854 m (6' 1\")   Wt 113.4 kg (250 lb)   SpO2 98%   BMI 32.98 kg/m    Gen: NAD, sitting up in chair  HEENT: NC/AT  Pulm: non-labored on room air  Abd: protuberant  Ext: edema in right lower extremity diffusely (more prominent proximally), HUGO drain with mild sanguinous drainage in bulb, groin/right hip and right thigh incisions healing well without erythema or drainage  Neuro/MSK: awake, alert, moving all extremities spontaneously in chair    LABS  CBC RESULTS:   Recent Labs   Lab Test " 03/03/25  0626 02/27/25  0600 02/24/25  0632   WBC 5.9 9.0 9.0   RBC 3.09* 2.82* 2.81*   HGB 9.3* 8.7* 8.7*   HCT 29.5* 26.9* 26.9*   MCV 96 95 96   MCH 30.1 30.9 31.0   MCHC 31.5 32.3 32.3   RDW 13.4 13.3 13.2   * 543* 477*       Last Basic Metabolic Panel:  Recent Labs   Lab Test 03/03/25  0626 02/27/25  0600 02/24/25  0632    138 137   POTASSIUM 4.3 4.1 4.8   CHLORIDE 106 102 101   CO2 26 26 27   ANIONGAP 10 10 9   GLC 95 101* 98   BUN 14.3 16.1 15.7   CR 0.96 0.89 0.99   GFRESTIMATED >90 >90 >90   CANDICE 8.8 8.9 9.0         Rehabilitation   Admission to acute inpatient rehab 02/21/25.    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       Impairment of ADL's: Noted to have impaired activity tolerance, impaired balance due to weakness and NWB RLE, impaired RLE sensation, impaired strength, impaired weight shifting due to NWB RLE, and pain, all affecting his ability to safely and independently perform basic ADLs.  Goal for mod I with basic ADLs from wheelchair level.  Anticipate to require supervision assist for shower transfers for safety.     Impairment of mobility:  Noted to have impaired activity tolerance, impaired balance due to weakness and NWB RLE, impaired RLE sensation, impaired strength, impaired weight shifting due to NWB RLE, and pain, all affecting his ability to safely and independently perform basic mobility.  Goal for mod I with basic bed mobility, sit > stand transfers, short distance gait (<3 ft), ADLs, at w/c based mobility level.  Anticipate may require supervision assist for shower transfers and max assist for any stairs.    Continue comprehensive acute inpatient rehabilitation program with multidisciplinary approach  including therapies, rehab nursing, and physiatry following. See interval history for updates.      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  He is now admitted to ARU on 25 for multidisciplinary rehabilitation and ongoing medical management.      Medical Conditions  New actions/orders/updates for today are in blue.     Chondrosarcoma of right innominate bone  S/p R internal hemipelvectomy on 25 with Dr. 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 Patient now s/p right modified radical hemipelvectomy of chondrosarcoma with Dr. Bravo.   - NWB RLE  - Activity restrictions: No hip restrictions. No Valsalva or contractions of abdominal wall to decrease stress on repair.  Elevate RLE as needed, may use foam ramp.  - Wound care: Prevena vac battery has  and vac sponges removed today.  Incision healing well, some skin irritation from adhesive.  Per previous ortho notes, now that vac removed, cover with non-permeable dressing to remain dry while showering; no soaking/submerging.   - Continue palmira-op antibiotics with cefadroxil 500 mg BID for 14 days (from hospital discharge: -3/7)  - Pain management: Tylenol 650 mg q4h PRN, atarax 25 mg q6h PRN, robaxin 750 mg QID, oxycodone 5-10 mg q4h PRN (changed from [place] at ARU admission due to sub-optimal pain control and anticipated increased activity level).  Wean opioids as  able.  Patient notes that increase in gabapentin dose has been helpful for overall pain management.  Requests to increase again with goals to start weaning oxycodone.  Will increase gabapentin to 400 mg TID and can continue to work with OP team at discharge to optimize pain regimen.  - HUGO drain: monitor drain output. Strip and empty drain per shift protocol.  Previous recs were to discontinue when <20 mL per shift which has consistently been the case at ARU.  Discussed with ortho who assessed on 2/26 and indicated to keep in place for 2 weeks.  Later ortho team reported that there actually was good amount of drainage during their assessment, so was potentially kinked.  Will plan to leave in place at this time and monitor.  3/3: drain output: 50-150mL per shift over last 48 hours.  Per previous ortho notes had planned to reassess today but given output, would anticipate plan to discharge home with drain in place  - DVT prophylaxis with ASA 81 mg BID x4 wks post-op  - Continue PT/OT  - Follow up with Dr. Bravo as scheduled on 3/17     Acute blood loss anemia  Pre-op Hgb ~15, dropped to 6.9 post-op and received 1 unit pRBC.  Estimated intra-op blood loss 1.5L.  Hgb recently stable ~8.  3/3: Hgb stable at 9.3  - Transfuse if Hgb <7  - Trend CBC every M/Th    Thrombocytosis  New on 2/24 to 477.  Suspect reactive  3/3: platelets up further to 611  - Trend CBC every M/Th     Tongue pressure injury, hospital acquired  Noted post-op, assessed by WOCN, felt to be MDRPI due to ETT during surgery  - Wound care ordered by WOCN recs    Insomnia  - Continue PTA melatonin 10 mg at HS PRN       Adjustment to disability:  Clinical psychology to eval and treat if indicated  FEN: regular diet, thin liquids  Bowel: continent.  Avoid constipation, limit valsava as above to promote healing.  Continue Miralax daily (decreased 2/27), senokot-S 1 tab BID.  Having daily bowel movements.  Monitor and adjust regimen as indicated  Bladder:  continent.  PVRs at admission without evidence of retention  DVT Prophylaxis: ASA 81 mg BID x4 wks post-op per ortho  GI Prophylaxis: not current indicated  Code: full; confirmed on admission  Disposition: home with home care  ELOS: target discharge date 3/4/25  Follow up Appointments on Discharge: PCP in 1-2 weeks, ortho at 4 wks post-op (3/17)      35 minutes spent on the date of service doing chart review, history and exam, documentation, and further activities as noted above.       Plan of care was discussed with Dr. Mina Macedo, PM&R Staff Physician    Alena Gomez PA-C  Physical Medicine & Rehabilitation

## 2025-03-03 NOTE — PROGRESS NOTES
"                                                           ARU Social Work Progress Notes      met with patient and completed IRF. Patient set to discharge tomorrow with Home Care. Home Care information was received with patient. Patient wanted a ride set-up for discharge for tomorrow.  called [employer] with patient; ride set up for tomorrow at 12:00 pm.        IRF-JORGE Pain Assessment    Pain Effect on Sleep  \"Over the past 5 days, how much of the time has pain made it hard for you to sleep at night?\"    4. Almost constantly     Pain Interference with Therapy Activities  \"Over the past 5 days, how often have you limited your participation in rehabilitation therapy sessions due to pain?\"   1. Rarely or not at all    Pain Interference with Day-to-Day Activities  \"Over the past 5 days, how often have you limited your day-to-day activities (excluding rehabilitation therapy sessions) because of pain?\"   1. Rarely or not at all       [employer]  [phone]  12:00pm-12:30pm      TERRIE Beltrán  [facility], Acute Inpatient Rehab Unit   [address] [place] [address], [address]th [place] [address]  Phone: [phone]  Fax: [phone]    "

## 2025-03-03 NOTE — PROGRESS NOTES
ARU Social Work Progress Notes     [facility]  [address]  [facility] [address]  Tel;[phone]  Fax:[phone]    Nurse, physical therapy and occupational therapy.  -You will get a call after you have discharged from [facility] to schedule the first home care visit. The home health nurse should come out within the first 24-48 hours. If you don't get a call from them within the first 48 hours, please call to follow up.     Anna [facility], Acute Inpatient Rehab Unit   [facility] [address], [address]th [place] [address]  Phone: [phone]  Fax: [phone]

## 2025-03-03 NOTE — DISCHARGE SUMMARY
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 Bravo (ortho) and Dr. Lyman (urology).     Hospital course was further complicated by undifferentiated shock, acute blood loss anemia (required 1 unit pRBC transfusion), constipation, and pain.      During acute hospitalization, patient was seen and evaluated by PT and OT, who collectively recommended that patient would benefit from ongoing therapies in the acute inpatient rehabilitation setting.    He was admitted to ARU on 2/21/25 for ongoing multidisciplinary rehabilitation and ongoing medical management.  While at ARU, multiple medication adjustments were made to facilitate improved pain control and optimize therapy participation.  He continued to have regular bowel movements.  He completed 2-week course with Prevena wound vac to his surgical site with no drainage and it was removed on 3/3 with no wound concerns.  HUGO drain was removed on 3/4 by ortho team.  He was discharged home with home care therapies.  See below for additional details regarding rehab and medical course at  ARU.    REHABILITATION COURSE  PT: Discharged to home with home therapy.    Current Status:  Bed Mobility: SBA<>min A with leg  for R LE mgmt  Transfer: Supervised slide board transfer and independent directing set up if needed.  Gait: not safe/unable to tolerate d/t pain in proximal R hip/pelvic region  Stairs: not safe  Balance: good static & dynamic sitting balance    OT: Discharged to home with home therapy.     Progress towards therapy goal(s). See goals on Care Plan in [facility] electronic health record for goal details.  Goals partially met.  Barriers to achieving goals:   discharge from facility.     Therapy recommendation(s):    Continued therapy is recommended.  Rationale/Recommendations:  Continue to progress independence in adls/iadls from wc in home; home safety assessment.     ADLs:  Mobility: SBA SBT  Grooming: Mod I in wc  Dressing: Mod I reclined in   Bathing: SBA SBT from wc<>tub bench  Toileting: SBA SBT from <>platform commode  IADLs: wife can help at discharge; IND in all prior; not addressed in ARU  Vision/Cognition: Intact/WNL    MEDICAL COURSE  Chondrosarcoma of right innominate bone  S/p R internal hemipelvectomy on 2/14/25 with Dr. 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 Patient now s/p right modified radical hemipelvectomy of chondrosarcoma with Dr. Bravo.   - NWB RLE  - Activity restrictions: No hip restrictions. No Valsalva or contractions of abdominal wall to decrease stress on repair.  Elevate RLE as needed, may use foam ramp.  - Wound care: per previous ortho notes, now that vac removed, cover with non-permeable dressing to remain dry while showering; no soaking/submerging.   - Continue palmira-op antibiotics with cefadroxil 500 mg BID for 14 days (from hospital discharge: 2/21-3/7)  -  Pain management: Tylenol 650 mg q4h PRN, atarax 25 mg q6h PRN, gabapentin 400 mg TID (increased on 3/2), robaxin 750 mg QID, oxycodone 5-10 mg q4h PRN.  Using ~50 mg/day at discharge, provided script for 1-week taper but will likely need to work with PCP and/or surgical team as outpatient to continue wean.  It did seem that recent increase in gabapentin was helpful, could consider further uptitration as indicated/tolerated.  - HUGO drain removed per ortho on 3/4  - DVT prophylaxis with ASA 81 mg BID x4 wks post-op  - Continue PT/OT  - Follow up with Dr. Bravo as scheduled on 3/17     Acute blood loss anemia  Pre-op Hgb ~15, dropped to 6.9 post-op and received 1 unit pRBC.  Estimated intra-op blood loss 1.5L.  As of 3/3, Hgb stable to improving ~9.  - Follow up with PCP in 1-2 weeks, repeat CBC at that time     Thrombocytosis  New on 2/24 to 477.  Suspect reactive.  Remained uptrending to 611 on 3/3.  - Follow up with PCP in 1-2 weeks, repeat CBC at that time     Tongue pressure injury, hospital acquired  Noted post-op, assessed by WOCN, felt to be MDRPI due to ETT during surgery     Insomnia  - Continue PTA melatonin 10 mg at HS PRN    DISCHARGE MEDICATIONS  Current Discharge Medication List        START taking these medications    Details   melatonin 10 MG TABS tablet Take 1 tablet (10 mg) by mouth nightly as needed for sleep.    Associated Diagnoses: Status post surgery; Primary chondrosarcoma of bone of pelvis (H)      oxyCODONE (ROXICODONE) 5 MG tablet Take 1-2 tablets (5-10 mg) by mouth every 4 hours as needed for moderate pain for 2 days, THEN 1-2 tablets (5-10 mg) every 6 hours as needed for moderate pain for 2 days, THEN 1-2 tablets (5-10 mg) every 8 hours as needed for moderate pain for 2 days, THEN 1-2 tablets (5-10 mg) 2 times daily as needed for moderate pain.  Qty: 48 tablet, Refills: 0    Associated Diagnoses: Status post surgery; Primary chondrosarcoma of bone of pelvis (H)           CONTINUE these  medications which have CHANGED    Details   acetaminophen (TYLENOL) 325 MG tablet Take 2 tablets (650 mg) by mouth every 4 hours as needed for mild pain.    Associated Diagnoses: Status post surgery; Primary chondrosarcoma of bone of pelvis (H)      aspirin 81 MG EC tablet Take 2 tablets (162 mg) by mouth daily for 10 days.  Qty: 20 tablet, Refills: 0    Associated Diagnoses: Status post surgery      cefadroxil (DURICEF) 500 MG capsule Take 1 capsule (500 mg) by mouth 2 times daily for 3 days.  Qty: 6 capsule, Refills: 0    Associated Diagnoses: Status post surgery; Primary chondrosarcoma of bone of pelvis (H)      gabapentin (NEURONTIN) 400 MG capsule Take 1 capsule (400 mg) by mouth 3 times daily.  Qty: 90 capsule, Refills: 0    Associated Diagnoses: Status post surgery; Primary chondrosarcoma of bone of pelvis (H)      methocarbamol (ROBAXIN) 750 MG tablet Take 1 tablet (750 mg) by mouth 4 times daily as needed for muscle spasms.  Qty: 90 tablet, Refills: 0    Associated Diagnoses: Status post surgery; Primary chondrosarcoma of bone of pelvis (H)      polyethylene glycol (MIRALAX) 17 GM/Dose powder Take 17 g by mouth daily. While on opioids    Associated Diagnoses: Status post surgery; Primary chondrosarcoma of bone of pelvis (H)      senna-docusate (SENOKOT-S/PERICOLACE) 8.6-50 MG tablet Take 1 tablet by mouth 2 times daily. While on opioids  Qty: 60 tablet, Refills: 0    Associated Diagnoses: Status post surgery; Primary chondrosarcoma of bone of pelvis (H)           STOP taking these medications       ferrous sulfate (FEROSUL) 325 (65 Fe) MG tablet Comments:   Reason for Stopping:         HYDROcodone-acetaminophen (NORCO) 5-325 MG tablet Comments:   Reason for Stopping:         hydrOXYzine HCl (ATARAX) 25 MG tablet Comments:   Reason for Stopping:                 DISCHARGE INSTRUCTIONS AND FOLLOW UP  Discharge Procedure Orders   Home Care Referral   Referral Priority: Routine: Next available opening Referral  Type: Home Health Therapies & Aides   Number of Visits Requested: 1     Reason for your hospital stay   Order Comments: You were admitted for rehabilitation after right hemipelvectomy for chondrosarcoma.     Activity   Order Comments: Your activity upon discharge: activity as tolerated and as directed by therapy and surgical teams. No weightbearing on right leg.  No specific hip range of motion restrictions, but no valsalva (bearing down as if when having a bowel movement) or contractions of the abdominal wall.  We recommend ongoing use of slide board for transfers and wheelchair for mobility.  We have referred you to home care therapies to continue to progress your function and independence.     Order Specific Question Answer Comments   Is discharge order? Yes      ADULT [facility] Follow-up and recommended labs and tests   Order Comments: Follow up: Follow up with ortho surgery within 2 weeks (scheduled on 3/17)  to evaluate after surgery.    Appointments on [place] and/or [facility] (with [facility] or [facility] provider or service). Call [phone] if you haven't heard regarding these appointments within 7 days of discharge.     Diet   Order Comments: Follow this diet upon discharge: Regular Diet; Thin Liquids     Order Specific Question Answer Comments   Is discharge order? Yes      Hospital Follow-up with Existing Primary Care Provider (PCP)   Standing Status: Future Standing Exp. Date: 04/02/25   Order Comments: Please see details below          Order Specific Question Answer Comments   Schedule Primary Care visit within 7 Days           PHYSICAL EXAMINATION    Most recent Vital Signs:   Vitals:    03/01/25 1618 03/02/25 0846 03/02/25 1638 03/03/25 0639   BP: 115/69 134/76 112/70 127/78   BP Location: Left arm Left arm Left arm Right arm   Patient Position:    Semi-Dinero's   Cuff Size:    Adult Regular   Pulse: 86 68 65 78   Resp: 16 16 16 18   Temp: 97.8  F (36.6  C) 98.1  F (36.7  C) 98.2  F (36.8   C)    TempSrc: Oral Oral Oral    SpO2: 96% 97% 96% 98%   Weight:       Height:           Gen: NAD, resting in bed  HEENT: NC/AT, MMM  Cardio: RRR, no murmurs  Pulm: non-labored on room air, lungs CTA bilaterally  Abd: soft, non-tender, non-distended, bowel sounds present  Extr: diffuse edema in right lower extremity, worse proximally, HUGO drain with mild sanguinous drainage in bulb, groin/right hip/right thigh incisions healing well without erythema or drainage  Neuro/MSK: awake, alert, moving all extremities actively in bed    35 minutes spent in discharge, including >50% in counseling and coordination of care, medication review and plan of care recommended on follow up.     Discharge summary was forwarded to Marcello Abreu (PCP) at the time of discharge, so as to bridge from hospital to outpatient care.     It was our pleasure to care for Gilberto Lund during this hospitalization. Please do not hesitate to contact me should there be questions regarding the hospital course or discharge plan.        Alena Gomez PA-C  Physical Medicine & Rehabilitation

## 2025-03-03 NOTE — PLAN OF CARE
Occupational Therapy Discharge Summary    Reason for therapy discharge:    Discharged to home with home therapy.    Progress towards therapy goal(s). See goals on Care Plan in [facility] electronic health record for goal details.  Goals partially met.  Barriers to achieving goals:   discharge from facility.    Therapy recommendation(s):    Continued therapy is recommended.  Rationale/Recommendations:  Continue to progress independence in adls/iadls from wc in home; home safety assessment.    ADLs:  Mobility: SBA SBT  Grooming: Mod I in wc  Dressing: Mod I reclined in wc  Bathing: SBA SBT from <>tub bench  Toileting: SBA SBT from <>platform commode  IADLs: wife can help at discharge; IND in all prior; not addressed in ARU  Vision/Cognition: Intact/WNL

## 2025-03-04 VITALS
HEART RATE: 66 BPM | BODY MASS INDEX: 33.13 KG/M2 | HEIGHT: 73 IN | TEMPERATURE: 98.4 F | SYSTOLIC BLOOD PRESSURE: 132 MMHG | WEIGHT: 250 LBS | OXYGEN SATURATION: 98 % | RESPIRATION RATE: 17 BRPM | DIASTOLIC BLOOD PRESSURE: 83 MMHG

## 2025-03-04 PROCEDURE — 250N000013 HC RX MED GY IP 250 OP 250 PS 637: Performed by: PHYSICIAN ASSISTANT

## 2025-03-04 PROCEDURE — 250N000013 HC RX MED GY IP 250 OP 250 PS 637: Performed by: PHYSICAL MEDICINE & REHABILITATION

## 2025-03-04 RX ADMIN — OXYCODONE HYDROCHLORIDE 10 MG: 5 TABLET ORAL at 05:53

## 2025-03-04 RX ADMIN — OXYCODONE HYDROCHLORIDE 10 MG: 5 TABLET ORAL at 12:01

## 2025-03-04 RX ADMIN — SENNOSIDES AND DOCUSATE SODIUM 1 TABLET: 50; 8.6 TABLET ORAL at 09:10

## 2025-03-04 RX ADMIN — OXYCODONE HYDROCHLORIDE 10 MG: 5 TABLET ORAL at 01:44

## 2025-03-04 RX ADMIN — HYDROXYZINE HYDROCHLORIDE 25 MG: 25 TABLET, FILM COATED ORAL at 01:44

## 2025-03-04 RX ADMIN — METHOCARBAMOL 750 MG: 750 TABLET ORAL at 09:09

## 2025-03-04 RX ADMIN — ACETAMINOPHEN 650 MG: 325 TABLET, FILM COATED ORAL at 05:53

## 2025-03-04 RX ADMIN — ACETAMINOPHEN 650 MG: 325 TABLET, FILM COATED ORAL at 09:10

## 2025-03-04 RX ADMIN — CEFADROXIL 500 MG: 500 CAPSULE ORAL at 09:09

## 2025-03-04 RX ADMIN — GABAPENTIN 400 MG: 300 CAPSULE ORAL at 09:10

## 2025-03-04 RX ADMIN — ASPIRIN 162 MG: 81 TABLET ORAL at 09:09

## 2025-03-04 RX ADMIN — POLYETHYLENE GLYCOL 3350 17 G: 17 POWDER, FOR SOLUTION ORAL at 09:09

## 2025-03-04 ASSESSMENT — ACTIVITIES OF DAILY LIVING (ADL)
ADLS_ACUITY_SCORE: 43

## 2025-03-04 NOTE — PLAN OF CARE
Goal Outcome Evaluation:      Plan of Care Reviewed With: patient    Overall Patient Progress: improvingOverall Patient Progress: improving     Pt alert and oriented x4. Denies chest pain and shortness of breath. Continent of both, LBM 3/2. Pain managed with PRN oxycodone x2. HUGO drain striped for 40mL. Able to make needs known. Call light within reach, bed in low position.

## 2025-03-04 NOTE — PROGRESS NOTES
Discharge Plan     Discharge Date: 03/04/2025    Discharge Disposition: Home     [facility]  [address]  [facility] [address]  Tel;[phone]  Fax:[phone]     Nurse, physical therapy and occupational therapy.  -You will get a call after you have discharged from [facility] to schedule the first home care visit. The home health nurse should come out within the first 24-48 hours. If you don't get a call from them within the first 48 hours, please call to follow up.      Discharge Services:  Home PT/OT/RN      ORDERS AND DISCHARGE SUMMARY WILL NEED TO BE FAXED TO      Discharge Supplies: All DME supplied by PT/OT      Discharge Transportation: [facility] [employer] with patient; ride set up for tomorrow at 12:00 pm.    [employer]   Monday-Friday, 7:30 AM to 4 PM  Phone: [phone]  TTY: [phone]      TERRIE Beltrán  [facility], Acute Inpatient Rehab Unit   [address] [place] [address], [address]th [place] [address]  Phone: [phone]  Fax: [phone]

## 2025-03-04 NOTE — PLAN OF CARE
FOCUS/GOAL  Pain management, Discharge planning, and Safety management    ASSESSMENT, INTERVENTIONS AND CONTINUING PLAN FOR GOAL:  Pt transfers with SB to w/c.  Pain management for R hip pain controlled with PRN oxycodone and scheduled tylenol.  Pt is discharging today.  Needs HUGO drain education, passing to morning shift.  Uses call light appropriately, alert and oriented x4.  Continent of bowel/bladder; LBM 3/3.  10mg oxycodone given around 0145  Goal Outcome Evaluation:

## 2025-03-04 NOTE — PROGRESS NOTES
"    Shift: 6795-9496  VS:/83 (BP Location: Right arm)   Pulse 66   Temp 98.4  F (36.9  C) (Oral)   Resp 17   Ht 1.854 m (6' 1\")   Wt 113.4 kg (250 lb)   SpO2 98%   BMI 32.98 kg/m      Pain: Prn oxycodone given  Neuro: A&Ox4.  Cardiac: WDL. Denied chest pain  Respiratory: WDL. On RA  Diet/Appetite: Regular diet.   /GI: Continent of B&B. LBM 3/3. Voiding spontaneously via urinal  LDA's: None  Skin: No new skin issues noted.  Activity: Sliding board to w/c  Plan: Precautions maintained / Continue with plan of care. Call light within reach.Able to make needs known.     Discharge instruction reviewed.  Patient verbalized understanding. Transported to the main lobby by transport via w/c. Family awaiting at main lobby. All belongings packed and spouse took to car. Patient discharged  "

## 2025-03-04 NOTE — PROGRESS NOTES
Assessment: Gilberto Lund is a 44 year old male s/p R internal hemipelvectomy on 2/14/25 with Dr. Bravo. Doing well and discharging home today from acute rehabilitation unit.     Today:  HUGO Drain removed prior to discharge today.     Juwan Milton DNP-[facility]   Orthopaedic Surgery

## 2025-03-05 ENCOUNTER — TELEPHONE (OUTPATIENT)
Dept: FAMILY MEDICINE | Facility: CLINIC | Age: 45
End: 2025-03-05

## 2025-03-05 ENCOUNTER — TRANSFERRED RECORDS (OUTPATIENT)
Dept: HEALTH INFORMATION MANAGEMENT | Facility: CLINIC | Age: 45
End: 2025-03-05

## 2025-03-05 ENCOUNTER — PATIENT OUTREACH (OUTPATIENT)
Dept: CARE COORDINATION | Facility: CLINIC | Age: 45
End: 2025-03-05
Payer: COMMERCIAL

## 2025-03-05 NOTE — TELEPHONE ENCOUNTER
Dr. Haile Pedraza:    Received a call from Wendy the home care nurse out of [facility] (call back is [phone] and ok to leave voicemail as is secured voicemail).    Requesting verbal orders from you as patient is switching to establish with you instead of current PCP.    Requesting verbal orders for skilled nursing eval and treat for wound assessment and disease management.    1 visit per week for 6 weeks.    Also requesting Physical therapy and OT for eval and treat.      Please advise home care orders.      FILIPE Hernandez

## 2025-03-05 NOTE — PROGRESS NOTES
Clinic Care Coordination Contact  [facility]/Voicemail    Clinical Data: Care Coordinator Outreach      Outreach Documentation Number of Outreach Attempt   3/5/2025  10:02 AM 1       Left message on patient's voicemail with call back information and requested return call.  Writer provided her direct line for call back if calling before 4 pm. Otherwise, CC RN provided Adilene direct line if calling back after 4 pm today.       Plan: Care Coordinator will try to reach patient again in 1-2 business days.    Ngoc Tinajero RN, BSN, PHN Care Coordinator  [place], [place], and [facility]   Phone: [phone]   (Covering for Adilene CC RN)

## 2025-03-05 NOTE — Clinical Note
Alexandru Head,  I am sorry, no one is answering their phone today. Patient will need a 2nd call. Thank you, Ngoc

## 2025-03-06 ENCOUNTER — PATIENT OUTREACH (OUTPATIENT)
Dept: CARE COORDINATION | Facility: CLINIC | Age: 45
End: 2025-03-06
Payer: COMMERCIAL

## 2025-03-06 NOTE — PROGRESS NOTES
Clinic Care Coordination Contact  Transitions of Care Outreach SW  Chief Complaint   Patient presents with    Clinic Care Coordination - Post Hospital     SW discharge from TCU       Most Recent Admission Date: Admitted to [facility] 2/14/25- then to Acute rehab on 2/21/25  Most Recent Admission Diagnosis: Chondrosarcoma of right innominate bone     Most Recent Discharge Date: discharged from [facility] on 2/21/25 and from ARU on 3/4/25  Most Recent Discharge Diagnosis: Chondrosarcoma of right innominate bone     Transitions of Care Assessment    Discharge Assessment  How are you doing now that you are home?: a little better  How are your symptoms? (Red Flag symptoms escalate to triage hotline per guidelines): Improved  Do you know how to contact your clinic care team if you have future questions or changes to your health status? : Yes  Does the patient have their discharge instructions? : Yes  Does the patient have questions regarding their discharge instructions? : No  Were you started on any new medications or were there changes to any of your previous medications? : No  Does the patient have all of their medications?: Yes  Do you have questions regarding any of your medications? : No  Do you have all of your needed medical supplies or equipment (DME)?  (i.e. oxygen tank, CPAP, cane, etc.): Yes     Spoke with pt. Glad he is back at home with wife and 3 children.  Home Care is involved. Has all necessary equipment and medications. . Has appt with PCP tomorrow And Ortho on 3/17/25  No questions. Offered little information. Just said he is good.   Explained CC. Pt feels no need for ongoing CC at this time          Follow up Plan          Future Appointments   Date Time Provider Department Center   3/7/2025  3:00 PM Haile Pedraza DO [facility]   3/17/2025  2:20 PM Juan Jose Bravo MD [facility]     PLAN: Pt will follow with PCP and specialist . Work with Home Care  CC will plan no further outreach at this  time.      ITZ Wiley / cassius Manzano RN  [facility]   Care Coordination  [facility]  3/6/2025 10:04 AM   Outpatient Plan as outlined on AVS reviewed with patient.    For any urgent concerns, please contact our 24 hour nurse triage line: [phone] ([phone])

## 2025-03-06 NOTE — TELEPHONE ENCOUNTER
Outgoing call to Wendy to relay provider message. Pt has appointment tomorrow at 3 pm with Dr. Pedraza. She states they are also needing a POLST signed and that the patient is a full code. Note added to appointment.     After pt sees Dr. Pedraza nursing staff can then request orders for skilled nursing 1x a week x 6 weeks and PT/OT eval and treat. Wendy stated understanding.    PA/NP only visit

## 2025-03-10 NOTE — PROGRESS NOTES
[facility] Physicians, Orthopaedic Oncology Surgery Consultation  by Juan Jose Bravo M.D.    Gilberto Lund MRN# [id number]    YOB: 1980     Requesting physician: Marcello Abreu MD PCP  System, Provider Not In     Background history:  DX:  Intermediate grade chondrosarcoma of right innominate bone  Remote history of testicular CA with chemotherapy, no radiation      TREATMENTS:  2008, Testicular CA excision and chemotherapy  10/25/2013, Excision of calcaneal bone cyst left foot with allograft,  [facility]  12/2/24, CT guided core needle biopsy R innominate bone (Jean-Pierre) [facility]  12/6/24, Right pelvis open biopsy, (Lawrence), [facility]  2/14/25, Right hip Girdlestone resection, right retroperitoneal dissection and modified internal hemipelvectomy, (Lawrence), surgical margins negative, [facility]       Jeremiah returns for recheck 4 weeks after the above-mentioned surgery consisting of internal hemipelvectomy and resection of his chondrosarcoma.  He is doing well and has not had any fever or wound drainage.  He is eating well and urinating without difficulty.  He can perform sliding board transfers on his own.  He is not yet standing.  He remains on ASA for DVT prophylaxis.  He has stopped his postoperative antibiotic therapy.    Examination:  His triradiate incision is healing well.  The inferior portion of the midpoint of the triradiate incision has minor sloughing which is superficial and less than 1 cm in size.  No surrounding erythema tenderness or findings to suggest deep infection.    He can extend his right knee against gravity and perform calf isometric and motion exercises.    No labs or radiographs performed today.    Impression and plan:  Excellent recovery to date with no evidence of postoperative complication.  Remain on ASA for DVT prophylaxis  Discussed activity level.  He may perform sliding board transfers.  Okay to stand with weight on left lower extremity as possible, while allowing gravity  traction to the right lower extremity.  Patient is to remain nonweightbearing on the right lower extremity  Physiotherapy should work on quadricep strengthening with knee extension curls against resistance, ankle dorsi flexion and plantarflexion exercises against resistance, intermittent manual traction to the right lower extremity to maintain stretching of his hip musculature in anticipation of subsequent reconstruction of his hip joint would be advisable.  Maximum traction that can be applied without undue patient discomfort would be acceptable.  Discussed diet with high-protein and iron intake.  No need for increased calorie consumption at this juncture.  Bilateral lower extremity ultrasound today to screen for DVT  Preop labs: CRP, CHEM survey, CBC, type and screen.  Anticipate surgical date in first or second week may after custom device completed.  Acetabular modular liner from [employer] will be machined into the implant.  Anticipate usage of [employer]uy femoral stem ingrowth.       MD Chuck Hummel Family Professor  Oncology and Adult Reconstructive Surgery  Dept Orthopaedic Surgery, [facility]  [phone] office, [phone] pager  [email or web address]

## 2025-03-12 DIAGNOSIS — Z98.890 STATUS POST SURGERY: ICD-10-CM

## 2025-03-12 DIAGNOSIS — C41.4 PRIMARY CHONDROSARCOMA OF BONE OF PELVIS (H): ICD-10-CM

## 2025-03-12 RX ORDER — OXYCODONE HYDROCHLORIDE 5 MG/1
TABLET ORAL
Qty: 48 TABLET | Refills: 0 | Status: SHIPPED | OUTPATIENT
Start: 2025-03-12 | End: 2025-03-19

## 2025-03-12 RX ORDER — ASPIRIN 81 MG/1
162 TABLET ORAL DAILY
Qty: 20 TABLET | Refills: 0 | Status: SHIPPED | OUTPATIENT
Start: 2025-03-12

## 2025-03-12 NOTE — TELEPHONE ENCOUNTER
- A call was placed to the patient's wife. Phone was handed to patient, wife was on speaker.      Patient requested refill of narcotic medication. The following was discussed:     1) How often are you taking the oxycodone? How many tablets each time?  Taking 1 (sometimes 2) tablets every 4 hours as needed moderate-severe pain  *refill requested for this medication    2) Can you rate your pain (0-10) before and after administration?  Before: 8  After: 5     3) Are you taking any other pain medications?  tylenol every 4 hours as needed moderate-severe pain  **refill not needed at this time   robaxin four times daily as needed  **refill not needed at this time   gabapentin  three times daily as needed  **refill not needed at this time   - currently taking these medications on a consistent schedule.     4) Are you adhering to the elevating when at rest and icing for 20 minutes every hour?  Yes    5) Where is the pain?  Pain is located at the location of surgery/incision, Denies calf pain, and Has been improving since surgery     - We discussed the importance of tapering the use of the narcotic medications. I said the most successful tapering strategy is to first, decrease the number of tablets you take to the minimum prescribed. Then, increase the amount of time between doses. I explained the bedtime dose can help with comfort during sleep and is typically the last dose to be discontinued after surgery. Patient agreed to work on tapering as able.      - Pharmacy was verified. I let the patient know the request for opoid medications go onto our PA so they may not be filled immediately and someone may be reaching out with further questions.     - Gave phone number for clinic and after-hours line.     - Let them know about surgical date on 4/29. They wanted to ensure implant would be ready. I let them know I was told it will be but I will send an email to confirm. (Email sent to on\[facility]\"" to confirm.)     - Will schedule PAC  once we get final confirmation about implant is ready.     -Wondering if Aspirin needs to be continued as patient is not weight bearing. They requested I check in with . I let them know I would and get back to them.     **update, spoke with Dr. Bravo. Will keep patient on Aspirin 81mg BID until a week before next surgery. Refill will be placed. Requesting US to rule out DVT a week pre-op as well. I will order and schedule this.     - Patient verbalized understanding of plan and all questions were answered. Call back number to clinic was given and patient was told to call if they had an further questions.

## 2025-03-12 NOTE — TELEPHONE ENCOUNTER
Medication Refill Request    Oxycodone 2 days left  [id number] - [place], [place] - [address]  AT [address]     Wondering if he needs to remain on Asprin as a blood thinner because he isn't walking

## 2025-03-12 NOTE — TELEPHONE ENCOUNTER
- A call was placed to the patient's wife.     - I let her know spoke with Dr. Bravo. Will keep patient on Aspirin 81mg BID until a week before next surgery. Refill will be placed. Requesting US to rule out DVT a week pre-op as well. I will order and schedule this once we have confirmation from [employer] about implant and confirm surgical date of 4/29.     - Patient's wife verbalized understanding of plan and all questions were answered. Call back number to clinic was given and patient's wife was told to call if they had an further questions.

## 2025-03-17 ENCOUNTER — OFFICE VISIT (OUTPATIENT)
Dept: ORTHOPEDICS | Facility: CLINIC | Age: 45
End: 2025-03-17
Payer: COMMERCIAL

## 2025-03-17 ENCOUNTER — TELEPHONE (OUTPATIENT)
Dept: ORTHOPEDICS | Facility: CLINIC | Age: 45
End: 2025-03-17

## 2025-03-17 DIAGNOSIS — C41.4 PRIMARY CHONDROSARCOMA OF BONE OF PELVIS (H): ICD-10-CM

## 2025-03-17 DIAGNOSIS — Z98.890 STATUS POST SURGERY: Primary | ICD-10-CM

## 2025-03-17 PROCEDURE — 99024 POSTOP FOLLOW-UP VISIT: CPT | Performed by: ORTHOPAEDIC SURGERY

## 2025-03-17 PROCEDURE — 1125F AMNT PAIN NOTED PAIN PRSNT: CPT | Performed by: ORTHOPAEDIC SURGERY

## 2025-03-17 NOTE — TELEPHONE ENCOUNTER
"Per email from Dr. Bravo on 3/13/25:    \"I spoke with the patient and reviewed the pros/cons of this decision.  He would like to pursue the [employer]uy cup option in his custom pelvic implant and is willing to wait the additional 3 weeks of time for manufacture and prep.    Therefore, per your estimates, we would anticipate a shipping date around 1st week May and target a surgical date of 5/6, 5/9, or 5/13.\"    Patient is tentatively rescheduled for surgery on 5/13/25.    Isamar  Perioperative   [phone]    "

## 2025-03-18 ENCOUNTER — DOCUMENTATION ONLY (OUTPATIENT)
Dept: ORTHOPEDICS | Facility: CLINIC | Age: 45
End: 2025-03-18
Payer: COMMERCIAL

## 2025-03-18 NOTE — PROGRESS NOTES
Received Completed forms Yes   Faxed Forms Faxed To: Sal  Fax Number: [phone]   Sent to HIM (Date) 3/18/25

## 2025-03-19 ENCOUNTER — CARE COORDINATION (OUTPATIENT)
Dept: ORTHOPEDICS | Facility: CLINIC | Age: 45
End: 2025-03-19
Payer: COMMERCIAL

## 2025-03-19 ENCOUNTER — MYC MEDICAL ADVICE (OUTPATIENT)
Dept: ORTHOPEDICS | Facility: CLINIC | Age: 45
End: 2025-03-19
Payer: COMMERCIAL

## 2025-03-19 ENCOUNTER — HOSPITAL ENCOUNTER (OUTPATIENT)
Dept: ULTRASOUND IMAGING | Facility: CLINIC | Age: 45
Discharge: HOME | End: 2025-03-19
Attending: ORTHOPAEDIC SURGERY
Payer: COMMERCIAL

## 2025-03-19 DIAGNOSIS — I82.453 ACUTE DEEP VEIN THROMBOSIS (DVT) OF BOTH PERONEAL VEINS (H): Primary | ICD-10-CM

## 2025-03-19 DIAGNOSIS — C41.4 PRIMARY CHONDROSARCOMA OF BONE OF PELVIS (H): ICD-10-CM

## 2025-03-19 DIAGNOSIS — I82.452 ACUTE DEEP VEIN THROMBOSIS (DVT) OF LEFT PERONEAL VEIN (H): ICD-10-CM

## 2025-03-19 DIAGNOSIS — Z98.890 STATUS POST SURGERY: ICD-10-CM

## 2025-03-19 DIAGNOSIS — I82.451 ACUTE DEEP VEIN THROMBOSIS (DVT) OF RIGHT PERONEAL VEIN (H): ICD-10-CM

## 2025-03-19 PROCEDURE — 93970 EXTREMITY STUDY: CPT

## 2025-03-19 NOTE — PROGRESS NOTES
- A call was placed to the patient. Wife was on speaker phone.     - Per US: IMPRESSION:     Bilateral peroneal veins in the calves are noncompressible consistent with DVT. No DVT at or above the knees.     - I spoke with Dr. Bravo and we will start patient on eliquis starter pack. Pharmacy confirmed.     - I let them know he should stop his aspirin so we only want him taking one blood thinner.     - Home care nurse would like orders for wound for medihoney and calcium alginate. Wife said she would send a MyChart with photos. I let her know I would review with Dr. Bravo tomorrow and get back to her.     - I let him know he will need to reach out to PCP as they will be the ones to manage long term. They agreed to do so.     - Patient verbalized understanding of plan and all questions were answered. Call back number to clinic was given and patient was told to call if they had an further questions.

## 2025-03-20 NOTE — TELEPHONE ENCOUNTER
- Pete placed to come care nurse and gave verbal orders for dressing changes with Calcium alginate with dressing changes as needed.

## 2025-03-26 ENCOUNTER — MYC REFILL (OUTPATIENT)
Dept: ORTHOPEDICS | Facility: CLINIC | Age: 45
End: 2025-03-26
Payer: COMMERCIAL

## 2025-03-26 DIAGNOSIS — Z98.890 STATUS POST SURGERY: ICD-10-CM

## 2025-03-26 DIAGNOSIS — C41.4 PRIMARY CHONDROSARCOMA OF BONE OF PELVIS (H): ICD-10-CM

## 2025-03-27 RX ORDER — OXYCODONE HYDROCHLORIDE 5 MG/1
5 TABLET ORAL EVERY 6 HOURS PRN
Qty: 20 TABLET | Refills: 0 | Status: SHIPPED | OUTPATIENT
Start: 2025-03-27

## 2025-03-28 ENCOUNTER — TELEPHONE (OUTPATIENT)
Dept: FAMILY MEDICINE | Facility: CLINIC | Age: 45
End: 2025-03-28

## 2025-03-28 PROBLEM — Z98.890 STATUS POST SURGERY: Status: ACTIVE | Noted: 2025-03-28

## 2025-03-28 PROBLEM — I82.453: Status: ACTIVE | Noted: 2025-03-28

## 2025-03-28 NOTE — TELEPHONE ENCOUNTER
Forms/Letter Request    Type of form/letter: Home Health Certification      Do we have the form/letter: Yes: faxed to clinic: [phone]    Who is the form from? Home care    Where did/will the form come from? form was faxed in    When is form/letter needed by: when done    How would you like the form/letter returned: Fax : [phone]    Order details/form description: home health cert and POC-7 pages    Order number (if applicable): cert. Period 3/5/25-5/3/25

## 2025-04-01 ENCOUNTER — DOCUMENTATION ONLY (OUTPATIENT)
Dept: ANTICOAGULATION | Facility: CLINIC | Age: 45
End: 2025-04-01
Payer: COMMERCIAL

## 2025-04-01 ENCOUNTER — MYC REFILL (OUTPATIENT)
Dept: ORTHOPEDICS | Facility: CLINIC | Age: 45
End: 2025-04-01
Payer: COMMERCIAL

## 2025-04-01 DIAGNOSIS — C41.4 PRIMARY CHONDROSARCOMA OF BONE OF PELVIS (H): ICD-10-CM

## 2025-04-01 DIAGNOSIS — Z98.890 STATUS POST SURGERY: ICD-10-CM

## 2025-04-01 NOTE — TELEPHONE ENCOUNTER
Unable to locate forms. Called [facility] home [facility] to get forms re faxed to [phone]   4/1/25 9:30am

## 2025-04-01 NOTE — PROGRESS NOTES
Anticoagulant Therapeutic Duplication    Duplicate orders identified: same medication but different dose, form, frequency or route    Duplicate orders appropriate-has starter pack order and ongoing therapy order     Active anticoagulant: apixaban (Eliquis)    Plan made per [facility] anticoagulation protocol.    Suri Hairston RN  4/1/2025

## 2025-04-03 RX ORDER — OXYCODONE HYDROCHLORIDE 5 MG/1
5 TABLET ORAL EVERY 6 HOURS PRN
Qty: 20 TABLET | Refills: 0 | Status: SHIPPED | OUTPATIENT
Start: 2025-04-03

## 2025-04-09 ENCOUNTER — MYC REFILL (OUTPATIENT)
Dept: ORTHOPEDICS | Facility: CLINIC | Age: 45
End: 2025-04-09
Payer: COMMERCIAL

## 2025-04-09 DIAGNOSIS — Z98.890 STATUS POST SURGERY: ICD-10-CM

## 2025-04-09 DIAGNOSIS — C41.4 PRIMARY CHONDROSARCOMA OF BONE OF PELVIS (H): ICD-10-CM

## 2025-04-09 RX ORDER — OXYCODONE HYDROCHLORIDE 5 MG/1
5 TABLET ORAL EVERY 8 HOURS PRN
Qty: 26 TABLET | Refills: 0 | Status: SHIPPED | OUTPATIENT
Start: 2025-04-09

## 2025-04-23 NOTE — PROGRESS NOTES
[facility] Physicians, Orthopaedic Oncology Surgery Consultation  by Juan Jose Bravo M.D.    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      TREATMENTS:  2008, Testicular CA excision and chemotherapy  10/25/2013, Excision of calcaneal bone cyst left foot with allograft,  [facility]  12/2/24, CT guided core needle biopsy R innominate bone (Jean-Pierre) [facility]  12/6/24, Right pelvis open biopsy, ([place]), [facility]  2/14/25, Right hip Girdlestone resection, right retroperitoneal dissection and modified internal hemipelvectomy, (Lawrence), surgical margins negative, [facility]     Jeremiah is seen today for recheck after the above-mentioned surgery.  He is doing well.  His postoperative course was complicated by the presence of isolated peroneal vein thrombus bilaterally.  He has had marked reduction in pain and he is only taking Tylenol on a daily basis and 5 mg oxycodone at nighttime periodically.    There has been some drainage from the midportion of his abdominal incision.  The lateral thigh incision is healed well.    Examination confirms presence of stitch abscess related wound healing issues in the anterior inferior abdomen along the Pfannenstiel portion of the incision.  I debrided this today after sterile prep and removed several portions of the PDS suture.  Bacitracin ointment and a dry gauze dressing were applied.    Impression and plan:  Work on wound healing.  Discontinue placing any foreign objects and use bacitracin with dry gauze dressing only.  Will avoid antibiotic therapy as this may promote a yeast or fungal infection at this location.  He may increase his activity and be standing while toe-touch weightbearing only.  He may walk  with a walker.  I advised him he may perform aquatic walking exercises as well.  Advised patient to use hair clipper in his groin and palmira-incisional area but avoid use of a razor blade.  Will delay surgical reconstruction of his pelvis until the wound is healed and matured.  I see no medical urgency and advised patient there would be benefit to waiting and delaying his surgery as his risk profile will decrease over time as wound matures and his health improves.  The degree of soft tissue formation and swelling and edema about the hip will decrease as well.    Return in 3 weeks for recheck of his wound.  Continue Eliquis at 5 mg twice daily dosage.  Dressing management discussed with patient.    MD Chuck Hummel Family Professor  Oncology and Adult Reconstructive Surgery  Dept Orthopaedic Surgery, [facility]  [phone] office, [phone] pager  [email or web address]

## 2025-04-28 ENCOUNTER — HOSPITAL ENCOUNTER (OUTPATIENT)
Dept: ULTRASOUND IMAGING | Facility: CLINIC | Age: 45
Discharge: HOME | End: 2025-04-28
Attending: ORTHOPAEDIC SURGERY | Admitting: ORTHOPAEDIC SURGERY
Payer: COMMERCIAL

## 2025-04-28 DIAGNOSIS — I82.453 ACUTE DEEP VEIN THROMBOSIS (DVT) OF BOTH PERONEAL VEINS (H): ICD-10-CM

## 2025-04-28 PROCEDURE — 93970 EXTREMITY STUDY: CPT

## 2025-05-01 ENCOUNTER — OFFICE VISIT (OUTPATIENT)
Dept: ORTHOPEDICS | Facility: CLINIC | Age: 45
End: 2025-05-01
Payer: COMMERCIAL

## 2025-05-01 ENCOUNTER — ANCILLARY PROCEDURE (OUTPATIENT)
Dept: GENERAL RADIOLOGY | Facility: CLINIC | Age: 45
End: 2025-05-01
Attending: ORTHOPAEDIC SURGERY
Payer: COMMERCIAL

## 2025-05-01 DIAGNOSIS — C41.4 PRIMARY CHONDROSARCOMA OF BONE OF PELVIS (H): Primary | ICD-10-CM

## 2025-05-01 DIAGNOSIS — C41.4 PRIMARY CHONDROSARCOMA OF BONE OF PELVIS (H): ICD-10-CM

## 2025-05-01 RX ORDER — OXYCODONE HYDROCHLORIDE 5 MG/1
5 TABLET ORAL
Qty: 30 TABLET | Refills: 0 | Status: SHIPPED | OUTPATIENT
Start: 2025-05-01 | End: 2025-05-31

## 2025-05-01 NOTE — LETTER
5/1/2025      Gilberto Lund  [address]  [place] [address]      Dear Colleague,    Thank you for referring your patient, Gilberto Lund, to the [facility] [place]. Please see a copy of my visit note below.          [facility] Physicians, Orthopaedic Oncology Surgery Consultation  by Juan Jose Bravo M.D.    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      TREATMENTS:  2008, Testicular CA excision and chemotherapy  10/25/2013, Excision of calcaneal bone cyst left foot with allograft,  [facility]  12/2/24, CT guided core needle biopsy R innominate bone (Jean-Pierre) [facility]  12/6/24, Right pelvis open biopsy, ([place]), [facility]  2/14/25, Right hip Girdlestone resection, right retroperitoneal dissection and modified internal hemipelvectomy, (Lawrence), surgical margins negative, [facility]     Jeremiah is seen today for recheck after the above-mentioned surgery.  He is doing well.  His postoperative course was complicated by the presence of isolated peroneal vein thrombus bilaterally.  He has had marked reduction in pain and he is only taking Tylenol on a daily basis and 5 mg oxycodone at nighttime periodically.    There has been some drainage from the midportion of his abdominal incision.  The lateral thigh incision is healed well.    Examination confirms presence of stitch abscess related wound healing issues in the anterior inferior abdomen along the Pfannenstiel portion of the incision.  I debrided this today after sterile prep and removed several portions of the PDS suture.  Bacitracin ointment and a dry gauze dressing were applied.    Impression and plan:  Work on wound healing.  Discontinue placing any  foreign objects and use bacitracin with dry gauze dressing only.  Will avoid antibiotic therapy as this may promote a yeast or fungal infection at this location.  He may increase his activity and be standing while toe-touch weightbearing only.  He may walk with a walker.  I advised him he may perform aquatic walking exercises as well.  Advised patient to use hair clipper in his groin and palmira-incisional area but avoid use of a razor blade.  Will delay surgical reconstruction of his pelvis until the wound is healed and matured.  I see no medical urgency and advised patient there would be benefit to waiting and delaying his surgery as his risk profile will decrease over time as wound matures and his health improves.  The degree of soft tissue formation and swelling and edema about the hip will decrease as well.    Return in 3 weeks for recheck of his wound.  Continue Eliquis at 5 mg twice daily dosage.  Dressing management discussed with patient.    MD Chuck Hummel Family Professor  Oncology and Adult Reconstructive Surgery  Dept Orthopaedic Surgery, [facility]  [phone] office, [phone] pager  [email or web address]        Again, thank you for allowing me to participate in the care of your patient.        Sincerely,        Juan Jose Bravo MD    Electronically signed

## 2025-05-13 NOTE — PROGRESS NOTES
[facility] Physicians, Orthopaedic Oncology Surgery Consultation  by Juan Jose Bravo M.D.    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    TREATMENTS:  2008, Testicular CA excision and chemotherapy  10/25/2013, Excision of calcaneal bone cyst left foot with allograft,  [facility]  12/2/24, CT guided core needle biopsy R innominate bone (Jean-Pierre) [facility]  12/6/24, Right pelvis open biopsy, (Lawrence), [facility]  2/14/25, Right hip Girdlestone resection, right retroperitoneal dissection and modified internal hemipelvectomy, (Lawrence), surgical margins negative, [facility]     Gilberto is seen for preoperative evaluation.  He has been undergoing wound care for small area within his inguinal region that had yet to completely epithelialized.  On that examination there is no drainage.  It is epithelialized.    He is able to stand and maintain a nonweightbearing stance on the right lower extremity.  He can use a sliding board transfer to a bed.    Impression and plan:  We reviewed his planned upcoming surgery for custom pelvic reconstruction followed by total hip arthroplasty.    Postoperatively I expect a 3-month period of toe-touch weightbearing status, using a walker, will be prescribed.  Global hip dislocation precautions will be prescribed.  Patient's wife expressed a preference for TCU placement postoperatively.    Risk benefits alternatives of the planned surgical procedure were reviewed with the patient and his wife.  Diagrams of the planned custom implant were also reviewed.  All questions were answered like to proceed as outlined in our discussion today.  He has been on Eliquis of 5 mg twice daily.  He will discontinue his dose 3 days  prior to surgical procedure and we will resume dosage at a reduced 2.5 mg twice daily schedule postoperatively.  Virtual PACs preoperative visit has already been arranged.      Juan Jose Bravo MD  [facility] Family Professor  Oncology and Adult Reconstructive Surgery  Dept Orthopaedic Surgery, [facility]  [phone] office, [phone] pager  [email or web address]      Total combined visit time and work time before and after clinic visit, independent of trainee, on encounter date = 30 min

## 2025-05-20 NOTE — TELEPHONE ENCOUNTER
FUTURE VISIT INFORMATION      SURGERY INFORMATION:  Date: 6/3/25  Location: [place]  Surgeon:  Juan Jose Bravo  Anesthesia Type:  General  Procedure: CUSTOM PELVIC AND HIP REPLACEMENT   Consult: 5/1/25    RECORDS REQUESTED FROM:       Primary Care Provider: Dr. Haile Mendiola    Pertinent Medical History: DVT, hx testicular ca, rx oxycodone, hard to intubate

## 2025-05-22 ENCOUNTER — OFFICE VISIT (OUTPATIENT)
Dept: ORTHOPEDICS | Facility: CLINIC | Age: 45
End: 2025-05-22
Payer: COMMERCIAL

## 2025-05-22 DIAGNOSIS — C41.4 PRIMARY CHONDROSARCOMA OF BONE OF PELVIS (H): Primary | ICD-10-CM

## 2025-05-22 NOTE — LETTER
5/22/2025      Gilberto Lund  [address]  [place] [address]      Dear Colleague,    Thank you for referring your patient, Gilberto Lund, to the [facility]OP[facility] [place]. Please see a copy of my visit note below.          [facility] Physicians, Orthopaedic Oncology Surgery Consultation  by Juan Jose Bravo M.D.    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    TREATMENTS:  2008, Testicular CA excision and chemotherapy  10/25/2013, Excision of calcaneal bone cyst left foot with allograft,  [facility]  12/2/24, CT guided core needle biopsy R innominate bone (Jean-Pierre) [facility]  12/6/24, Right pelvis open biopsy, (Lawrence), [facility]  2/14/25, Right hip Girdlestone resection, right retroperitoneal dissection and modified internal hemipelvectomy, (Lawrence), surgical margins negative, [facility]     Gilberto is seen for preoperative evaluation.  He has been undergoing wound care for small area within his inguinal region that had yet to completely epithelialized.  On that examination there is no drainage.  It is epithelialized.    He is able to stand and maintain a nonweightbearing stance on the right lower extremity.  He can use a sliding board transfer to a bed.    Impression and plan:  We reviewed his planned upcoming surgery for custom pelvic reconstruction followed by total hip arthroplasty.    Postoperatively I expect a 3-month period of toe-touch weightbearing status, using a walker, will be prescribed.  Global hip dislocation precautions will be prescribed.  Patient's wife expressed a preference for TCU placement postoperatively.    Risk benefits alternatives of the planned surgical procedure were reviewed  with the patient and his wife.  Diagrams of the planned custom implant were also reviewed.  All questions were answered like to proceed as outlined in our discussion today.  He has been on Eliquis of 5 mg twice daily.  He will discontinue his dose 3 days prior to surgical procedure and we will resume dosage at a reduced 2.5 mg twice daily schedule postoperatively.  Virtual PACs preoperative visit has already been arranged.      Juan Jose Bravo MD  [facility] Family Professor  Oncology and Adult Reconstructive Surgery  Dept Orthopaedic Surgery, [facility]  [phone] office, [phone] pager  [email or web address]      Total combined visit time and work time before and after clinic visit, independent of trainee, on encounter date = 30 min      Again, thank you for allowing me to participate in the care of your patient.        Sincerely,        Juan Jose Bravo MD    Electronically signed

## 2025-05-22 NOTE — NURSING NOTE
Teaching Flowsheet     Visit Type: In Clinic    Time Start: 1105  Time End: 1120  Total Time Spent: 15 minutes    Surgeon: Dr. Bravo  Location of Surgery (known or anticipated): [facility]  Type of Anesthesia: General    Pertinent Medical History: reviewed on the up to date problem list in the chart  Were medical conditions reviewed and appropriate for location? Yes  BMI: Obesity Grade I BMI 30-34.9    Relevant Diagnosis: H/O chondrosarcoma    Teaching Topic: custom pelvic and hip replacement, REMOVAL, HARDWARE, PELVIS, ANTERIOR APPROACH, REMOVAL, ANTIBIOTIC BEADS OR ANTIBIOTIC SPACER, pelvis     Person(s) involved in teaching:   Patient and Wife  : No.     Caregiver//  Name: Mary Ellen Lund  Phone Number: [phone]   Relationship: Wife  Consent to communicate on file Yes       Motivation Level:  Asks Questions: Yes  Eager to Learn: Yes  Cooperative: Yes  Receptive (willing/able to accept information): Yes  Any cultural factors/Lutheran beliefs that may influence understanding or compliance? No     Patient demonstrates understanding of the following:  Reason for the appointment, diagnosis and treatment plan: Yes  Knowledge of proper use of medications and conditions for which they are ordered (with special attention to potential side effects or drug interactions): Yes  Which situations necessitate calling provider and whom to contact: Yes     Teaching Concerns Addressed: all concerns addressed     Proper use and care of medical equip, care aids, etc.: Yes  Nutritional needs and diet plan: Yes  Pain management techniques: Yes  Wound Care: Yes  How and/when to access community resources: Yes  Need for pre-op with in 30 days: YES, to be done with PAC.      Does patient have difficulty getting a ride to appointments (post-ops, PT/OT): No     Instructional Materials Used/Given:  two bottles of chlorhexidine soap, a surgery packet, and a joint booklet given to patient in clinic.     -  Important contact info/ phone numbers: emphasizing clinic number and after hours number  - Map/ location of surgery  - Showering instructions  - Stop light tool  - Your Guide to Joint Replacement Booklet   - Guidelines for post op antibiotics before dentist appointments or procedure.  -  Instructions to sign up for online joint replacement class and Care Companion through [employer].     Additionally the following was discussed with patient:  - Caregiver// listed above, will be driving the patient to surgery and staying with them for 4-5 days after surgery.  - Need to schedule a dentist appointment and get done any recommended dental work prior to surgery.   - Online joint replacement class and the use of [employer] to send educational messages. Asked patient to sign up for join class during visit and patient declined to sign up at this time and stated will sign up later. Sheet with sign up instruction given to patient.   - Told patient to check in with insurance to check about coverage.       -Next step: surgery scheduled for 6/3 and pre-op scheduled with PAC for 5/27.

## 2025-05-27 ENCOUNTER — PRE VISIT (OUTPATIENT)
Dept: SURGERY | Facility: CLINIC | Age: 45
End: 2025-05-27

## 2025-05-27 ENCOUNTER — ANESTHESIA EVENT (OUTPATIENT)
Dept: SURGERY | Facility: CLINIC | Age: 45
End: 2025-05-27
Payer: COMMERCIAL

## 2025-05-27 ENCOUNTER — VIRTUAL VISIT (OUTPATIENT)
Dept: SURGERY | Facility: CLINIC | Age: 45
End: 2025-05-27
Payer: COMMERCIAL

## 2025-05-27 VITALS — HEIGHT: 73 IN | BODY MASS INDEX: 32.98 KG/M2

## 2025-05-27 DIAGNOSIS — Z85.830 H/O CHONDROSARCOMA: ICD-10-CM

## 2025-05-27 DIAGNOSIS — Z01.818 PRE-OPERATIVE EXAMINATION: Primary | ICD-10-CM

## 2025-05-27 PROCEDURE — 98006 SYNCH AUDIO-VIDEO EST MOD 30: CPT | Performed by: PHYSICIAN ASSISTANT

## 2025-05-27 ASSESSMENT — LIFESTYLE VARIABLES: TOBACCO_USE: 0

## 2025-05-27 ASSESSMENT — PAIN SCALES - GENERAL: PAINLEVEL_OUTOF10: NO PAIN (0)

## 2025-05-27 ASSESSMENT — ENCOUNTER SYMPTOMS
SEIZURES: 0
DYSRHYTHMIAS: 0

## 2025-05-27 NOTE — PATIENT INSTRUCTIONS
Preparing for Your Surgery      Name:  Gilberto Lund   MRN:  [id number]   :  1980   Today's Date:  2025     The [place] [employer] [address] Construction Project                                Timeline 2025 -2025    This project will affect travel to the [facility] and [facility], as well as the [facility].      Please check the [facility] [address] project website for the most up to date information and give yourself additional time to reach your destination.        Arriving for surgery:  Surgery date:  6/3/25  Arrival time:  8:45 am  Surgery time: 11:15 am    Please come to:     Please come to:       [facility] [address]   [address] [place]  [address]     The Green Ramp for patients and visitors is beneath the [facility]. The parking facility entrance is at the intersection of [address] [place] [address] and [address]. Patients and visitors who self-park will receive the reduced hospital parking rate (no ticket validation needed).     [employer] parking, located at the [facility] main entrance on [address] [place] [address], is available Monday - Friday from 7 am to 3:30 pm.     Discounted parking pass options can be purchased from  attendants during business hours.     -Check in at the security desk in the [facility] ([facility]   Lobby. They will direct you to the correct elevators.   -Proceed to the 3rd floor, Adult Surgery Waiting Lounge. [phone]     If you need directions, a wheelchair or escort please stop at the Information Desk in the lobby.  Inform the information person you are here for surgery; a wheelchair and escort to [address] will be provided.   An escort to the Adult Surgery Waiting Lounge will be provided.       OPTIMAL RECOVERY AFTER SURGERY - start this the day before surgery      Begin hydrating yourself by drinking at least 8-10 glasses of clear liquids for 24 hours before surgery:      Suggested clear liquids:   Water    Clear Juices   Clear Broth   Non- carbonated beverages    (Crystal Light or Joseph Aid)   Sodas    (Sprite, 7 up, ginger ale, seltzer)   Gatorade            Drink clear liquids up until 4 hours before your surgery.       We would like you to purchase a drink such as Gatorade or Ensure Clear (not the milkshake type).  Drink this before bedtime and the morning of surgery drink between 8-10 ounces or until you feel hydrated.        Keeping well hydrated leads to your veins being plump, you wake up faster, and you are less likely to be nauseated. Start drinking water as soon as you can after surgery and advance to clear liquids and food as tolerated.  IV fluids contain salt, drinking fluids will minimize the amount of IV fluids you need and decrease the amount of salt you get.                 The most common reason for the patient to be readmitted is dehydration. Staying hydrated after you go home from the hospital is very important.  Ensure or Ensure Clear are good options to keep you hydrated.      What can I eat or drink?  -  You may eat and drink normally up to 8 hours prior to arrival time. (Until 12:45 am)  -  You may have clear liquids until 2 hours prior to arrival time. (Until 6:45 am)    Examples of clear liquids:  Water  Clear broth  Juices (apple, white grape, white cranberry  and cider) without pulp  Noncarbonated, powder based beverages  (lemonade and Joseph-Aid)  Sodas (Sprite, 7-Up, ginger ale and seltzer)  Coffee or tea (without milk or cream)  Gatorade    -  No Alcohol or cannabis products for at least 24 hours before surgery.     Which medicines can I take?    Hold Aspirin for 7 days before surgery.   Hold Multivitamins for 7 days before surgery.  Hold Supplements for 7 days before surgery.  Hold Ibuprofen (Advil, Motrin) for 1 day(s) before surgery--unless  otherwise directed by surgeon.  Hold Naproxen (Aleve) for 4 days before surgery.  Hold Apixaban (Eliquis) for 3 days before surgery. Last dose to be 5/30/25 pm.    -  PLEASE TAKE these medications the day of surgery or per your usual routine:  Tylenol if needed  Gabapentin (Neurontin)  Methocarbamol (Robaxin) if needed  Oxycodone if needed    How do I prepare myself?  - Please take 2 showers (one the night prior to surgery and one the morning of surgery) using Scrubcare or Hibiclens soap.    Use this soap only from the neck to your toes. Avoid genital area      Leave the soap on your skin for one minute--then rinse thoroughly.      You may use your own shampoo and conditioner. No other hair products.   - Please remove all jewelry and body piercings.  - No lotions, deodorants or fragrance.  - No makeup or fingernail polish.   - Bring your ID and insurance card.    -For patients being admitted to the [facility]  Family members are to take the patient belongings with them and place them in the lockers provided in the Family Lounge.  Please limit the items you bring to 1 bag as the lockers are small.      -If you use a CPAP machine, please bring the CPAP machine, tubing, and mask to hospital.    -If you have a Deep Brain Stimulator, Spinal Cord Stimulator, or any Neuro Stimulator device---you must bring the remote control to the hospital.      ALL PATIENTS GOING HOME THE SAME DAY OF SURGERY ARE REQUIRED TO HAVE A RESPONSIBLE ADULT TO DRIVE AND BE IN ATTENDANCE WITH THEM FOR 24 HOURS FOLLOWING SURGERY.    Covid testing policy as of 12/06/2022  Your surgeon will notify and schedule you for a COVID test if one is needed before surgery--please direct any questions or COVID symptoms to your surgeon      Questions or Concerns:    - For any questions regarding the day of surgery or your hospital stay, please contact the Pre Admission Nursing Office at [phone].       - If you have health changes between today and  your surgery, please call your surgeon.       - For questions after surgery, please call your surgeons office.           Current Visitor Guidelines    2 adult visitors for adult patients in the pre op area    If additional visitors come (beyond a patient care attendant or a group home caregiver), the additional visitors will be asked to wait in the main lobby of the hospital    Visiting hours: 8 a.m. to 8:30 p.m.    Patients confirmed or suspected to have symptoms of COVID 19 or flu:     No visitors allowed for adult patients.   Children (under age 18) can have 1 named visitor.     People who are sick or showing symptoms of COVID 19 or flu:    Are not allowed to visit patients--we can only make exceptions in special situations.       Please follow these guidelines for your visit:          Please maintain social distance          Masking is optional--however at times you may be asked to wear a mask for the safety of yourself and others     Clean your hands with alcohol hand . Do this when you arrive at and leave the building and patient room,    And again after you touch your mask or anything in the room.     Go directly to and from the room you are visiting.     Stay in the patient s room during your visit. Limit going to other places in the hospital as much as possible     Leave bags and jackets at home or in the car.     For everyone s health, please don t come and go during your visit. That includes for smoking   during your visit.

## 2025-05-27 NOTE — PROVIDER NOTIFICATION
05/27/25 1428   Discharge Planning   Patient/Family Anticipates Transition to inpatient rehabilitation facility   Concerns to be Addressed no discharge needs identified   Living Arrangements   People in Home child(angelo), dependent;spouse   Type of Residence Private Residence   Is your private residence a single family home or apartment? Single family home   Number of Stairs, Within Home, Primary greater than 10 stairs   Stair Railings, Within Home, Primary railings safe and in good condition   Once home, are you able to live on one level? Yes   Which level? Main Level   Which rooms are not on the main level? Bathroom   Equipment Currently Used at Home wheelchair, manual   Support System   Support Systems Spouse/Significant Other   Do you have someone available to stay with you one or two nights once you are home? Yes   Blood   Known Bleeding Disorder or Coagulopathy No   Does the patient have any Hoahaoism/cultural preferences related to blood products? No   Education   Has the patient scheduled or completed pre-op total joint education, either in class or online, in the last 12 months? Yes   Patient attended total joint pre-op class/received pre-op teaching  online   Falls Risk   Has the patient been identified as a high falls risk before surgery? (fallen in the last 6 months, history of falls, unsteady gait, or balance issues) No

## 2025-05-27 NOTE — H&P
Pre-Operative H & P     CC:  Preoperative exam to assess for increased cardiopulmonary risk while undergoing surgery and anesthesia.    Date of Encounter: 5/27/2025  Primary Care Physician:  Haile Pedraza     Reason for visit:   Encounter Diagnoses   Name Primary?    Pre-operative examination Yes    H/O chondrosarcoma        HPI  Gilberto Lund is a 44 year old male who presents for pre-operative H & P in preparation for  Procedure Information       Case: [id number] Date/Time: 06/03/25 1115    Procedures:       CUSTOM PELVIC AND HIP REPLACEMENT (Right: Hip)      REMOVAL, HARDWARE, PELVIS, ANTERIOR APPROACH (Right: Pelvis) - Code only      REMOVAL, ANTIBIOTIC BEADS OR ANTIBIOTIC SPACER, Pelvis (Right: Hip)    Anesthesia type: General    Diagnosis: H/O chondrosarcoma [Z85.830]    Pre-op diagnosis: H/O chondrosarcoma [Z85.830]    Location:  OR [facility] / [facility]    Providers: Juan Jose Bravo MD            The patient is a 44 year old man who has a past medical history significant for former smoker, anemia, thrombocytosis, history of post op bilateral peroneal DVT, thyroid nodule, history of nephrolithiasis, history of testicular cancer, prosiasis, and 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 The patient has continued to meet with Dr. Bravo and is scheduled for the follow up procedure as above.     History is obtained from the patient and chart review    Hx of abnormal bleeding or anti-platelet use: Eliquis       Past Medical History  Past Medical History:   Diagnosis Date    Arthritis     Chondrosarcoma (H)     CTS (carpal tunnel syndrome)     Gynecomastia, male     H/O chondrosarcoma     Hard to intubate 06/29/2022    Infection due to 2019 novel coronavirus 12/2020    Kidney stone 2015    90% calcium oxalate monohydrate, and  10% calcium oxalate dihydrate.    Primary chondrosarcoma of bone of pelvis (H)     Psoriasis     Skin tag     Testicle cancer, left 2008    surgery and chemotherapy    Thyroid nodule 12/10/2024     high FDG, noted on PET       Past Surgical History  Past Surgical History:   Procedure Laterality Date    ABDOMEN SURGERY      BIOPSY      BIOPSY BONE PELVIS Right 12/6/2024    Procedure: BIOPSY, BONE, PELVIS;  Surgeon: Juan Jose Bravo MD;  [facility]    EXCISE EXOSTOSIS FOOT  10/25/2013    Procedure: EXCISE EXOSTOSIS FOOT;  Excision of calcaneal bone cyst left foot with allograft;  Surgeon: Marcello Jensen DPM;  Location: [facility]    LAPAROSCOPIC CHOLECYSTECTOMY N/A 06/29/2022    Procedure: CHOLECYSTECTOMY, LAPAROSCOPIC;  Surgeon: Cholo Agustin DO;  Location: [facility] OR    OPTICAL TRACKING SYSTEM HEMIPELVECTOMY Right 2/14/2025    Procedure: 1. HEMIPELVECTOMY, MODIFIED INTERNAL, 2. Right hip girdlestone resection, 3. Right  RETROPERITONEAL DISSECTION;  Surgeon: Juan Jose Bravo MD;  [facility]    ORCHIECTOMY SCROTAL Left        Prior to Admission Medications  Current Outpatient Medications   Medication Sig Dispense Refill    acetaminophen (TYLENOL) 325 MG tablet Take 2 tablets (650 mg) by mouth every 4 hours as needed for mild pain.      apixaban ANTICOAGULANT (ELIQUIS) 5 MG tablet Take 1 tablet (5 mg) by mouth 2 times daily. 60 tablet 1    gabapentin (NEURONTIN) 400 MG capsule Take 1 capsule (400 mg) by mouth 3 times daily. 270 capsule 0    methocarbamol (ROBAXIN) 750 MG tablet Take 1 tablet (750 mg) by mouth 4 times daily as needed for muscle spasms. 360 tablet 1    oxyCODONE (ROXICODONE) 5 MG tablet Take 1 tablet (5 mg) by mouth nightly as needed for pain. 30 tablet 0       Allergies  No Known Allergies    Social History  Social History     Socioeconomic History    Marital status:      Spouse name: Not on file    Number of children: Not on file    Years of education: Not on file    Highest education level: Not on file   Occupational History    Not on file   Tobacco Use    Smoking status: Former     Types: Cigarettes     Passive exposure: Past    Smokeless tobacco: Former     Types:  Chew   Vaping Use    Vaping status: Never Used   Substance and Sexual Activity    Alcohol use: Not Currently    Drug use: No    Sexual activity: Yes     Partners: Female   Other Topics Concern    Parent/sibling w/ CABG, MI or angioplasty before 65F 55M? No   Social History Narrative    Not on file     Social Drivers of Health     Financial Resource Strain: Low Risk  (3/4/2025)    Financial Resource Strain     Within the past 12 months, have you or your family members you live with been unable to get utilities (heat, electricity) when it was really needed?: No   Food Insecurity: Low Risk  (3/4/2025)    Food Insecurity     Within the past 12 months, did you worry that your food would run out before you got money to buy more?: No     Within the past 12 months, did the food you bought just not last and you didn t have money to get more?: No   Transportation Needs: Low Risk  (3/4/2025)    Transportation Needs     Within the past 12 months, has lack of transportation kept you from medical appointments, getting your medicines, non-medical meetings or appointments, work, or from getting things that you need?: No   Physical Activity: Sufficiently Active (9/30/2024)    Exercise Vital Sign     Days of Exercise per Week: 5 days     Minutes of Exercise per Session: 150+ min   Stress: No Stress Concern Present (9/30/2024)    [employer] - Occupational Stress Questionnaire     Feeling of Stress : Only a little   Social Connections: Unknown (9/30/2024)    Social Connection and Isolation Panel [NHANES]     Frequency of Communication with Friends and Family: Not on file     Frequency of Social Gatherings with Friends and Family: Once a week     Attends Denominational Services: Not on file     Active Member of Clubs or Organizations: Not on file     Attends Club or Organization Meetings: Not on file     Marital Status: Not on file   Interpersonal Safety: Low Risk  (2/21/2025)    Interpersonal Safety     Do you  feel physically and emotionally safe where you currently live?: Yes     Within the past 12 months, have you been hit, slapped, kicked or otherwise physically hurt by someone?: No     Within the past 12 months, have you been humiliated or emotionally abused in other ways by your partner or ex-partner?: No   Housing Stability: High Risk (3/4/2025)    Housing Stability     Do you have housing? : No     Are you worried about losing your housing?: No       Family History  Family History   Problem Relation Age of Onset    No Known Problems Mother     Hypertension Father     Atrial fibrillation Father     Thyroid Disease Father     Nephrolithiasis Father     Pulmonary Embolism Father     C.A.D. Maternal Grandfather     Thyroid Cancer No family hx of     Diabetes No family hx of     Anesthesia Reaction No family hx of        Review of Systems  The complete review of systems is negative other than noted in the HPI or here.   Anesthesia Evaluation   Pt has had prior anesthetic. Type: General and Regional.    History of anesthetic complications  - difficult airway.      ROS/MED HX  ENT/Pulmonary:  - neg pulmonary ROS  (-) tobacco use and recent URI   Neurologic:  - neg neurologic ROS  (-) no seizures and no CVA   Cardiovascular:     (+)  - -   -  - -                                 No previous cardiac testing  (-) taking anticoagulants/antiplatelets, PALMA and arrhythmias   METS/Exercise Tolerance: 3 - Able to walk 1-2 blocks without stopping Comment: Post surgery able to stand but is non weight bearing on RLE   Hematologic: Comments: Bilateral peroneal vein thrombi 3/19/25    Thrombocytosis     (+) History of blood clots,    pt is anticoagulated, anemia, history of blood transfusion, no previous transfusion reaction,        Musculoskeletal: Comment: Chondrosarcoma of pelvis - s/p hemipelvectomy       GI/Hepatic:  - neg GI/hepatic ROS  (-) GERD   Renal/Genitourinary:     (+) renal disease,      Nephrolithiasis ,       Endo:      (+)          thyroid problem, thyroid nodule,           Psychiatric/Substance Use:  - neg psychiatric ROS     Infectious Disease:  - neg infectious disease ROS     Malignancy: Comment: History of bleomycin as part of chemotherapy for testicular cancer.  (+) Malignancy, History of Other.Other CA testicular cancer Remission status post Surgery and Chemo.    Other:  - neg other ROS          Virtual visit -  No vitals were obtained    Physical Exam  Constitutional: Awake, alert, cooperative, no apparent distress, and appears stated age.  Eyes: Pupils equal  HENT: Normocephalic  Respiratory: non labored breathing   Neurologic: Awake, alert, oriented to name, place and time.   Neuropsychiatric: Calm, cooperative. Normal affect.      Prior Labs/Diagnostic Studies   All labs and imaging pertinent to the visit personally reviewed     EKG/ stress test - if available please see in ROS above     The patient's records and results pertinent to the visit personally reviewed by this provider.     Outside records reviewed from: Care Everywhere      Assessment    Gilberto Lund is a 44 year old male seen as a PAC referral for risk assessment and optimization for anesthesia.    Plan/Recommendations  Pt will be optimized for the proposed procedure.  See below for details on the assessment, risk, and preoperative recommendations    NEUROLOGY  - No history of TIA, CVA or seizure  - Chronic Pain: Oxycodone 5 pm at bedtime  On chronic opioids, morphine equivalent = 7.5 MME/day  -Post Op delirium risk factors:  No risk identified    ENT  - Patient has previous history of complicated airway.   Previously documented difficult airway on 6/29/22. Had general anesthesia with intubation on 2/14/25 with the use of Video laryngoscopy and listed as easy.     Mallampati: Unable to assess  TM: Unable to assess      Placement Date: 06/29/22; Placement Time: 0734 (created via procedure documentation); Mask Ventilation: 2 (easy mask); Induction  "Type: Intravenous; Ease of Intubation: Difficult (abnormal) (small mouth opening, possibly the result of inadequate paralytic); Technique: Direct laryngoscopy; ETT Type: Single; Tube Size: 8 mm; DL Blade Size: Saeed 2; Grade View: 1; Adjucts: Stylet; Placement Person: CRNA; Attempts: 1; Depth: 23 cm       Placement Date: 02/14/25; Placement Time: 0813 (created via procedure documentation); Mask Ventilation: 1; Induction Type: Intravenous; Ease of Intubation: Easy; Technique: Direct laryngoscopy, Video laryngoscopy; Tube Size: 8 mm; DL Blade Size: Saeed 1; VL Blade Size: Glide scope 4 (hyperangle); Grade View: 1; Adjucts: Stylet; Placement Person: CRNA; Attempts: 1       CARDIAC  - No history of CAD, Hypertension, and Afib  - METS (Metabolic Equivalents)  Patient performs 4 or more METS exercise without symptoms             Total Score: 0      RCRI-Low risk: Class 2 0.9% complication rate             Total Score: 1    RCRI: High Risk Surgery    ~ The patient had good exercise tolerance prior to his 2/2025 surgery and has been non weight bearing since then but is able to get around with self propelled wheelchair and stand with walker. He denies any cardiac symptoms.     PULMONARY  - Obstructive Sleep Apnea  ELLIE Low Risk             Total Score: 1    ELLIE: Male      Denies asthma, cough or use of inhaler  He did have Bleomycin as part of chemotherapy for previous testicular cancer. No respiratory issues.   - Tobacco History    History   Smoking Status    Former    Types: Cigarettes   Smokeless Tobacco    Former    Types: Chew       GI   Denies GERD  PONV Medium Risk  Total Score: 2           1 AN PONV: Patient is not a current smoker    1 AN PONV: Intended Post Op Opioids        /RENAL  - Baseline Creatinine  0.96  ~ History of nephrolithiasis       ENDOCRINE    - BMI: Estimated body mass index is 32.98 kg/m  as calculated from the following:    Height as of this encounter: 1.854 m (6' 1\").    Weight as of 2/21/25: " 113.4 kg (250 lb).  Obesity (BMI >30)  - No history of Diabetes Mellitus  - Thyroid nodule. Has seen endocrinology but planning biopsy after surgeries for his chondrosarcoma.     HEME  VTE High Risk 3%             Total Score: 11    VTE: Male    VTE: Family Hx of VTE    VTE: Current cancer      - Coagulopathy second to Apixaban (Eliquis) - the patient was diagnosed with bilateral peroneal DVT post op on 3/19/25. Message was sent to surgical team as recommendation for minimal delay for elective surgery after DVT is 90 days which the patient is short by 2 weeks. Per Dr. Bravo recommendation for Eliquis hold is for 3 days prior to surgery.   - Chronic  anemia and thrombocytosis - seen on post op labs on 3/3/25.   Recommend perioperative use of blood conservation techniques intraoperatively and close monitoring for postoperative bleeding.  Iron infusion ordered preoperatively based on ferritin value  A type and screen has been ordered for this patient  ~ The patient had blood transfusion on 2/17/25.     MSK  Patient is NOT Frail             Total Score: 0        ONCOLOGY/IMMUNOLOGY  - History of testicular cancer s/p chemotherapy and orchiectomy   ~ Chondrosarcoma s/p hemipelvectomy 2/14/25. Procedure as above.     Different anesthesia methods/types have been discussed with the patient, but they are aware that the final plan will be decided by the assigned anesthesia provider on the date of service.    The patient is optimized for their procedure. AVS with information on surgery time/arrival time, meds and NPO status given by nursing staff. No further diagnostic testing indicated.    Please refer to the physical examination documented by the anesthesiologist in the anesthesia record on the day of surgery.    Video-Visit Details    Type of service:  Video Visit    Provider received verbal consent for a Video Visit from the patient? Yes     Originating Location (pt. Location): Home    Distant Location (provider location):   Off-site  Mode of Communication:  Video Conference via broadbandchoices    37 minutes were spent on the date of the encounter performing chart review, history and exam, documentation and/or discussion with other providers about the issues documented above.    Senia Shelby PA-C  Preoperative Assessment Center  [facility]  [facility]  Phone: [phone]  Fax: [phone]

## 2025-05-27 NOTE — PROGRESS NOTES
Jeremiah is a 44 year old who is being evaluated via a billable video visit.    How would you like to obtain your AVS? MyChart  If the video visit is dropped, the invitation should be resent by: Text to cell phone: [phone]      Subjective   Jeremiah is a 44 year old, presenting for the following health issues:  Pre-Op Exam    HPI            Physical Exam

## 2025-05-28 ENCOUNTER — PREP FOR PROCEDURE (OUTPATIENT)
Dept: OTHER | Facility: CLINIC | Age: 45
End: 2025-05-28
Payer: COMMERCIAL

## 2025-05-28 LAB
ABO + RH BLD: NORMAL
BLD GP AB SCN SERPL QL: NEGATIVE
SPECIMEN EXP DATE BLD: NORMAL

## 2025-05-28 NOTE — PROGRESS NOTES
SURGERY PLAN (PRE-OP PLAN)    Patient Position (indicated by x):    Supine     Supine with torso rolled up on a bump   X  Floppy lateral on torso length bean bag     Lateral decubitus, bean bag, full length     Lateral decubitus, Wixson hip positioner     Safety paddle side supports x 2 clamped to side rail     Lithotomy, both legs in yellow padded leg red     Lithotomy, single leg in yellow padded leg red     Prone on blanket rolls/round gel pad     Prone on Americo (arched) frame on Amarjit table     Single thigh in orange arthroscopy clamp     Beach chair semi recumbent     Spider limb positioner   X  Prevena (customizable)   X  Split drapes U- drapes     Revision DEVONTE drape with plastic side bags for leg     Extremity drape     Shoulder pack drape     Laparotomy drape   X  Michelle catheter   X  Prep right lower extremity in, impervious and polyester stockinette     General Equipment Requests (indicated by x):      C-Arm with C-Armor drape     C-Arm (video capable, Convergent Radiotherapy 9900 model)   X  O-Arm with Stealth imaging     Fracture Table   X  Spine table that allows for legs to flex or break (check with Saul), Amarjit XR Table (back up)     SurgiGraphic 6000 (diving board) fluoro table     Cell saver   X  Deep retractors (don Mosley - various widths)   X Ortho major tray   X  Vascular tray     Bone graft, kapner gouges   X  Midas Pedro Medtronic taya, electric motor   X  Midas metal cutting blades     [place] BMAC stem cell     Vancomycin 1 gram powder     Zometa 4 mg vials     Depo Medrol steroid     Blunt Pelvic Retractor (.55, Blunt Hohmann with  slight bend)     (1) Portable hand held radiation detector machine for sentinel node biopsy and (2) Lymphazurin   X  [place] Osteotomes     Trauma/Fixation Requests (indicated by x):  X  K-wire tray     Small Frag AO plates     Locking periarticular plates - AO     Locking periarticular prox humerus plate - Elizabeth/Nephew     Pelvic recon plates (curved &  straight), 3.5 & 4.5 mm     Lobito palmira-prosthetic fracture plate   X  Synthes 3.5 mm and 4.5 mm hex drivers   X  KrowdPad (s) from pelvic set for screw removal   X  Synthes Universal hardware removal set     AO, 95 degree condylar blade plate     AO, 3.0, 3.5, 4.0, 4.5 mm Cannulated screws     AO, 6.5, 7.0, 7.3 mm Cannulated screws- Stainless Steel     AO, 6.5 mm Cannulated screws- Titanium     Lobito cerclage cable plates     AO IM nails     AO Large Ex Fix     AO Small Ex Fix     Large pelvic bone clamps     Large fx reduction forcepts - AO     Bone clamps - Large (Amarjit, Julia, Chantell)     Bone clamps - Medium (Amarjit)     DEVONTE Requests (indicated by x):    Biomet ECHO Bimetric ingrowth stem     Biomet Integral cemented stem     Biomet RB cup, 28+32 heads   X  Onkos custom flaca-pelvis implant   X  Guidewires for Onkos locking towers     Elizabeth Neph BHR resurfacing DEVONTE with PayTouch navigation unit (need 2 weeks advance notice)   X  "[employer]uy Actis femoral stems   X  Depuy Corail femoral stems (on hold)   X  V I Ouy [place]ourt liners     Biomet Regenerex TM cup + augments     [place] Tritanium TM cup +  augments     [place] GAP II acet cage + cemented poly cup     Biomet OSS prox femur replacement DEVONTE     Biomet OSS Compress fixation     IM flexible reamers + guidewire, < 9 mm     IM flexible reamers + guidewire, > 9 mm     Allograft: femoral head     Allograft: distal femur     Allograft: proximal tibia     Bone mill     Allograft cancellous chips     Allograft cancellous crushed     Nazario Butt Troch Claw + cable, insertion instruments     Nazario Butt beaded cerclage cable, green cable  x2 , insertion instruments     Lobito CTR Troch cable plate     AO pelvic instruments and clamps (angled) Blunt Hohmann & angled Bowman, large bone reduction forceps       Plan:  -Removal of antibiotic spacer of right hemipelvis  -Revision total hip arthroplasty (reconstruction of right hemipelvis with custom implant  with Onkos and Depuy)        George Boone MD  Adult Joint Reconstruction Fellow  Dept Orthopaedic Surgery, [facility]

## 2025-05-29 ENCOUNTER — LAB (OUTPATIENT)
Dept: LAB | Facility: CLINIC | Age: 45
End: 2025-05-29
Payer: COMMERCIAL

## 2025-05-29 ENCOUNTER — DOCUMENTATION ONLY (OUTPATIENT)
Dept: ORTHOPEDICS | Facility: CLINIC | Age: 45
End: 2025-05-29

## 2025-05-29 DIAGNOSIS — Z85.830 H/O CHONDROSARCOMA: ICD-10-CM

## 2025-05-29 DIAGNOSIS — Z01.818 PRE-OPERATIVE EXAMINATION: ICD-10-CM

## 2025-05-29 LAB
ANION GAP SERPL CALCULATED.3IONS-SCNC: 13 MMOL/L (ref 7–15)
BUN SERPL-MCNC: 18 MG/DL (ref 6–20)
CALCIUM SERPL-MCNC: 9.6 MG/DL (ref 8.8–10.4)
CHLORIDE SERPL-SCNC: 101 MMOL/L (ref 98–107)
CREAT SERPL-MCNC: 0.84 MG/DL (ref 0.67–1.17)
EGFRCR SERPLBLD CKD-EPI 2021: >90 ML/MIN/1.73M2
ERYTHROCYTE [DISTWIDTH] IN BLOOD BY AUTOMATED COUNT: 13.3 % (ref 10–15)
GLUCOSE SERPL-MCNC: 127 MG/DL (ref 70–99)
HCO3 SERPL-SCNC: 26 MMOL/L (ref 22–29)
HCT VFR BLD AUTO: 44.2 % (ref 40–53)
HGB BLD-MCNC: 14.4 G/DL (ref 13.3–17.7)
HOLD SPECIMEN: NORMAL
MCH RBC QN AUTO: 27.9 PG (ref 26.5–33)
MCHC RBC AUTO-ENTMCNC: 32.6 G/DL (ref 31.5–36.5)
MCV RBC AUTO: 86 FL (ref 78–100)
PLATELET # BLD AUTO: 336 10E3/UL (ref 150–450)
POTASSIUM SERPL-SCNC: 4.4 MMOL/L (ref 3.4–5.3)
RBC # BLD AUTO: 5.16 10E6/UL (ref 4.4–5.9)
SODIUM SERPL-SCNC: 140 MMOL/L (ref 135–145)
WBC # BLD AUTO: 6.1 10E3/UL (ref 4–11)

## 2025-05-29 NOTE — PROGRESS NOTES
Received Completed forms Yes   Faxed Forms Faxed To: ang  Fax Number: [phone]   Sent to HIM (Date) 5/29/25

## 2025-05-30 ENCOUNTER — RESULTS FOLLOW-UP (OUTPATIENT)
Dept: SURGERY | Facility: CLINIC | Age: 45
End: 2025-05-30

## 2025-06-02 ASSESSMENT — LIFESTYLE VARIABLES: TOBACCO_USE: 0

## 2025-06-02 ASSESSMENT — ENCOUNTER SYMPTOMS
DYSRHYTHMIAS: 0
SEIZURES: 0

## 2025-06-02 NOTE — ANESTHESIA PREPROCEDURE EVALUATION
Pre-Operative H & P     ADDENDUM:  The patient completed pre op labs which are within normal limits. Dr. Bravo communicated back and he felt that the isolated calf vein DVT without proximal vein involvement is a low risk situation and not concerning. As well without progression of the DVT and the urgency for surgery due to disability for the patient felt that surgery should proceed as scheduled.    Latest Reference Range & Units 05/29/25 10:50   Sodium 135 - 145 mmol/L 140   Potassium 3.4 - 5.3 mmol/L 4.4   Chloride 98 - 107 mmol/L 101   Carbon Dioxide (CO2) 22 - 29 mmol/L 26   Urea Nitrogen 6.0 - 20.0 mg/dL 18.0   Creatinine 0.67 - 1.17 mg/dL 0.84   GFR Estimate >60 mL/min/1.73m2 >90   Calcium 8.8 - 10.4 mg/dL 9.6   Anion Gap 7 - 15 mmol/L 13   Glucose 70 - 99 mg/dL 127 (H)   WBC 4.0 - 11.0 10e3/uL 6.1   Hemoglobin 13.3 - 17.7 g/dL 14.4   Hematocrit 40.0 - 53.0 % 44.2   Platelet Count 150 - 450 10e3/uL 336   RBC Count 4.40 - 5.90 10e6/uL 5.16   MCV 78 - 100 fL 86   MCH 26.5 - 33.0 pg 27.9   MCHC 31.5 - 36.5 g/dL 32.6   RDW 10.0 - 15.0 % 13.3   (H): Data is abnormally high    CC:  Preoperative exam to assess for increased cardiopulmonary risk while undergoing surgery and anesthesia.    Date of Encounter: 5/27/2025  Primary Care Physician:  Haile Pedraza     Reason for visit:   No diagnosis found.      HPI  Gilberto Lund is a 44 year old male who presents for pre-operative H & P in preparation for  Procedure Information       Case: [id number] Date/Time: 06/03/25 1115    Procedures:       CUSTOM PELVIC AND HIP REPLACEMENT (Right: Hip)      REMOVAL, HARDWARE, PELVIS, ANTERIOR APPROACH (Right: Pelvis) - Code only      REMOVAL, ANTIBIOTIC BEADS OR ANTIBIOTIC SPACER, Pelvis (Right: Hip)    Anesthesia type: General    Diagnosis: H/O chondrosarcoma [Z85.830]    Pre-op diagnosis: H/O chondrosarcoma [Z85.830]    [facility]    Providers: Juan Jose Bravo MD            The patient is a 44 year old man who has  a past medical history significant for former smoker, anemia, thrombocytosis, history of post op bilateral peroneal DVT, thyroid nodule, history of nephrolithiasis, history of testicular cancer, prosiasis, and 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 The patient has continued to meet with Dr. Bravo and is scheduled for the follow up procedure as above.     History is obtained from the patient and chart review    Hx of abnormal bleeding or anti-platelet use: Eliquis       Past Medical History  Past Medical History:   Diagnosis Date    Arthritis     Chondrosarcoma (H)     CTS (carpal tunnel syndrome)     Gynecomastia, male     H/O chondrosarcoma     Hard to intubate 06/29/2022    Infection due to 2019 novel coronavirus 12/2020    Kidney stone 2015    90% calcium oxalate monohydrate, and  10% calcium oxalate dihydrate.    Primary chondrosarcoma of bone of pelvis (H)     Psoriasis     Skin tag     Testicle cancer, left 2008    surgery and chemotherapy    Thyroid nodule 12/10/2024    high FDG, noted on PET       Past Surgical History  Past Surgical History:   Procedure Laterality Date    ABDOMEN SURGERY      BIOPSY      BIOPSY BONE PELVIS Right 12/6/2024    Procedure: BIOPSY, BONE, PELVIS;  Surgeon: Juan Jose Bravo MD;  [facility]    EXCISE EXOSTOSIS FOOT  10/25/2013    Procedure: EXCISE EXOSTOSIS FOOT;  Excision of calcaneal bone cyst left foot with allograft;  Surgeon: Marcello Jensen DPM;  Location: [facility]    LAPAROSCOPIC CHOLECYSTECTOMY N/A 06/29/2022    Procedure: CHOLECYSTECTOMY, LAPAROSCOPIC;  Surgeon: Cholo Agustin DO;  [place] [facility] [place]    OPTICAL TRACKING SYSTEM HEMIPELVECTOMY Right 2/14/2025    Procedure: 1. HEMIPELVECTOMY, MODIFIED INTERNAL, 2. Right hip girdlestone resection, 3. Right  RETROPERITONEAL DISSECTION;  Surgeon: Juan Joes Bravo MD;  [facility]    ORCHIECTOMY SCROTAL Left        Prior to Admission Medications  Current Outpatient Medications   Medication Sig Dispense  Refill    acetaminophen (TYLENOL) 325 MG tablet Take 2 tablets (650 mg) by mouth every 4 hours as needed for mild pain.      gabapentin (NEURONTIN) 400 MG capsule Take 1 capsule (400 mg) by mouth 3 times daily. 270 capsule 0    methocarbamol (ROBAXIN) 750 MG tablet Take 1 tablet (750 mg) by mouth 4 times daily as needed for muscle spasms. 360 tablet 1       Allergies  No Known Allergies    Social History  Social History     Socioeconomic History    Marital status:      Spouse name: Not on file    Number of children: Not on file    Years of education: Not on file    Highest education level: Not on file   Occupational History    Not on file   Tobacco Use    Smoking status: Former     Types: Cigarettes     Passive exposure: Past    Smokeless tobacco: Former     Types: Chew   Vaping Use    Vaping status: Never Used   Substance and Sexual Activity    Alcohol use: Not Currently    Drug use: No    Sexual activity: Yes     Partners: Female   Other Topics Concern    Parent/sibling w/ CABG, MI or angioplasty before 65F 55M? No   Social History Narrative    Not on file     Social Drivers of Health     Financial Resource Strain: Low Risk  (3/4/2025)    Financial Resource Strain     Within the past 12 months, have you or your family members you live with been unable to get utilities (heat, electricity) when it was really needed?: No   Food Insecurity: Low Risk  (3/4/2025)    Food Insecurity     Within the past 12 months, did you worry that your food would run out before you got money to buy more?: No     Within the past 12 months, did the food you bought just not last and you didn t have money to get more?: No   Transportation Needs: Low Risk  (3/4/2025)    Transportation Needs     Within the past 12 months, has lack of transportation kept you from medical appointments, getting your medicines, non-medical meetings or appointments, work, or from getting things that you need?: No   Physical Activity: Sufficiently Active  (9/30/2024)    Exercise Vital Sign     Days of Exercise per Week: 5 days     Minutes of Exercise per Session: 150+ min   Stress: No Stress Concern Present (9/30/2024)    [employer] - Occupational Stress Questionnaire     Feeling of Stress : Only a little   Social Connections: Unknown (9/30/2024)    Social Connection and Isolation Panel [NHANES]     Frequency of Communication with Friends and Family: Not on file     Frequency of Social Gatherings with Friends and Family: Once a week     Attends Zoroastrianism Services: Not on file     Active Member of Clubs or Organizations: Not on file     Attends Club or Organization Meetings: Not on file     Marital Status: Not on file   Interpersonal Safety: Low Risk  (2/21/2025)    Interpersonal Safety     Do you feel physically and emotionally safe where you currently live?: Yes     Within the past 12 months, have you been hit, slapped, kicked or otherwise physically hurt by someone?: No     Within the past 12 months, have you been humiliated or emotionally abused in other ways by your partner or ex-partner?: No   Housing Stability: High Risk (3/4/2025)    Housing Stability     Do you have housing? : No     Are you worried about losing your housing?: No       Family History  Family History   Problem Relation Age of Onset    No Known Problems Mother     Hypertension Father     Atrial fibrillation Father     Thyroid Disease Father     Nephrolithiasis Father     Pulmonary Embolism Father     C.A.D. Maternal Grandfather     Thyroid Cancer No family hx of     Diabetes No family hx of     Anesthesia Reaction No family hx of        Review of Systems  The complete review of systems is negative other than noted in the HPI or here.   Anesthesia Evaluation   Pt has had prior anesthetic. Type: General and Regional.    History of anesthetic complications  - difficult airway.      ROS/MED HX  ENT/Pulmonary:  - neg pulmonary ROS  (-) tobacco use and recent URI    Neurologic:  - neg neurologic ROS  (-) no seizures and no CVA   Cardiovascular:     (+)  - -   -  - -                                 No previous cardiac testing  (-) taking anticoagulants/antiplatelets, PALMA and arrhythmias   METS/Exercise Tolerance: 3 - Able to walk 1-2 blocks without stopping Comment: Post surgery able to stand but is non weight bearing on RLE   Hematologic: Comments: Bilateral peroneal vein thrombi 3/19/25    Thrombocytosis     (+) History of blood clots,    pt is anticoagulated, anemia, history of blood transfusion, no previous transfusion reaction,        Musculoskeletal: Comment: Chondrosarcoma of pelvis - s/p hemipelvectomy       GI/Hepatic:  - neg GI/hepatic ROS  (-) GERD   Renal/Genitourinary:     (+) renal disease,      Nephrolithiasis ,       Endo:     (+)          thyroid problem, thyroid nodule,           Psychiatric/Substance Use:  - neg psychiatric ROS     Infectious Disease:  - neg infectious disease ROS     Malignancy: Comment: History of bleomycin as part of chemotherapy for testicular cancer.  (+) Malignancy, History of Other.Other CA testicular cancer Remission status post Surgery and Chemo.    Other:  - neg other ROS          Virtual visit -  No vitals were obtained    Physical Exam  Constitutional: Awake, alert, cooperative, no apparent distress, and appears stated age.  Eyes: Pupils equal  HENT: Normocephalic  Respiratory: non labored breathing   Neurologic: Awake, alert, oriented to name, place and time.   Neuropsychiatric: Calm, cooperative. Normal affect.      Prior Labs/Diagnostic Studies   All labs and imaging pertinent to the visit personally reviewed     EKG/ stress test - if available please see in ROS above     The patient's records and results pertinent to the visit personally reviewed by this provider.     Outside records reviewed from: Care Everywhere      Assessment    Gilberto Lund is a 44 year old male seen as a PAC referral for risk assessment and  optimization for anesthesia.    Plan/Recommendations  Pt will be optimized for the proposed procedure.  See below for details on the assessment, risk, and preoperative recommendations    NEUROLOGY  - No history of TIA, CVA or seizure  - Chronic Pain: Oxycodone 5 pm at bedtime  On chronic opioids, morphine equivalent = 7.5 MME/day  -Post Op delirium risk factors:  No risk identified    ENT  - Patient has previous history of complicated airway.   Previously documented difficult airway on 6/29/22. Had general anesthesia with intubation on 2/14/25 with the use of Video laryngoscopy and listed as easy.     Mallampati: Unable to assess  TM: Unable to assess      Placement Date: 06/29/22; Placement Time: 0734 (created via procedure documentation); Mask Ventilation: 2 (easy mask); Induction Type: Intravenous; Ease of Intubation: Difficult (abnormal) (small mouth opening, possibly the result of inadequate paralytic); Technique: Direct laryngoscopy; ETT Type: Single; Tube Size: 8 mm; DL Blade Size: Saeed 2; Grade View: 1; Adjucts: Stylet; Placement Person: CRNA; Attempts: 1; Depth: 23 cm       Placement Date: 02/14/25; Placement Time: 0813 (created via procedure documentation); Mask Ventilation: 1; Induction Type: Intravenous; Ease of Intubation: Easy; Technique: Direct laryngoscopy, Video laryngoscopy; Tube Size: 8 mm; DL Blade Size: Saeed 1; VL Blade Size: Glide scope 4 (hyperangle); Grade View: 1; Adjucts: Stylet; Placement Person: CRNA; Attempts: 1       CARDIAC  - No history of CAD, Hypertension, and Afib  - METS (Metabolic Equivalents)  Patient performs 4 or more METS exercise without symptoms             Total Score: 0      RCRI-Low risk: Class 2 0.9% complication rate             Total Score: 1    RCRI: High Risk Surgery    ~ The patient had good exercise tolerance prior to his 2/2025 surgery and has been non weight bearing since then but is able to get around with self propelled wheelchair and stand with walker. He  "denies any cardiac symptoms.     PULMONARY  - Obstructive Sleep Apnea  ELLIE Low Risk             Total Score: 1    ELLIE: Male      Denies asthma, cough or use of inhaler  He did have Bleomycin as part of chemotherapy for previous testicular cancer. No respiratory issues.   - Tobacco History    History   Smoking Status    Former    Types: Cigarettes   Smokeless Tobacco    Former    Types: Chew       GI   Denies GERD  PONV Medium Risk  Total Score: 2           1 AN PONV: Patient is not a current smoker    1 AN PONV: Intended Post Op Opioids        /RENAL  - Baseline Creatinine  0.96  ~ History of nephrolithiasis       ENDOCRINE    - BMI: Estimated body mass index is 32.98 kg/m  as calculated from the following:    Height as of 5/27/25: 1.854 m (6' 1\").    Weight as of 2/21/25: 113.4 kg (250 lb).  Obesity (BMI >30)  - No history of Diabetes Mellitus  - Thyroid nodule. Has seen endocrinology but planning biopsy after surgeries for his chondrosarcoma.     HEME  VTE High Risk 3%             Total Score: 11    VTE: Male    VTE: Family Hx of VTE    VTE: Current cancer      - Coagulopathy second to Apixaban (Eliquis) - the patient was diagnosed with bilateral peroneal DVT post op on 3/19/25. Message was sent to surgical team as recommendation for minimal delay for elective surgery after DVT is 90 days which the patient is short by 2 weeks. Per Dr. Bravo recommendation for Eliquis hold is for 3 days prior to surgery.   - Chronic  anemia and thrombocytosis - seen on post op labs on 3/3/25.   Recommend perioperative use of blood conservation techniques intraoperatively and close monitoring for postoperative bleeding.  Iron infusion ordered preoperatively based on ferritin value  A type and screen has been ordered for this patient  ~ The patient had blood transfusion on 2/17/25.     MSK  Patient is NOT Frail             Total Score: 0        ONCOLOGY/IMMUNOLOGY  - History of testicular cancer s/p chemotherapy and orchiectomy   ~ " Chondrosarcoma s/p hemipelvectomy 2/14/25. Procedure as above.     Different anesthesia methods/types have been discussed with the patient, but they are aware that the final plan will be decided by the assigned anesthesia provider on the date of service.    The patient is optimized for their procedure. AVS with information on surgery time/arrival time, meds and NPO status given by nursing staff. No further diagnostic testing indicated.    Please refer to the physical examination documented by the anesthesiologist in the anesthesia record on the day of surgery.    Video-Visit Details    Type of service:  Video Visit    Provider received verbal consent for a Video Visit from the patient? Yes     Originating Location (pt. Location): Home    Distant Location (provider location):  Off-site  Mode of Communication:  Video Conference via [employer]    37 minutes were spent on the date of the encounter performing chart review, history and exam, documentation and/or discussion with other providers about the issues documented above.    Senia Shelby PA-C  Preoperative Assessment Center  [facility]  [facility]  Phone: [phone]  Fax: [phone]    Physical Exam  Airway  Mallampati: II  TM distance: >3 FB  Neck ROM: full  Upper bite lip test: unable to assess  Mouth opening: < 4 cm    Cardiovascular - normal exam   Dental   (+) Minor Abnormalities - some fillings, tiny chips      Pulmonary - normal exam      Neurological - normal exam  He appears awake, alert and oriented x3.    Other Findings     Anesthesia Plan    ASA Status:  3      NPO Status: NPO Appropriate   Anesthesia Type: General.  Airway: oral.  Induction: intravenous.  Maintenance: Balanced.   Techniques and Equipment:     - Airway:  Planned airway equipment includes video laryngoscope, fiberoptic scope and difficult airway cart requested.  Arterial Line Kit: Radial     - Monitoring Plan: standard ASA monitoring, train of  four monitoring, arterial line kit, processed EEG monitor     Consents            - Pt is DNR/DNI Status: no DNR          Postoperative Care         Comments:

## 2025-06-03 ENCOUNTER — APPOINTMENT (OUTPATIENT)
Dept: GENERAL RADIOLOGY | Facility: CLINIC | Age: 45
DRG: 559 | End: 2025-06-03
Attending: ORTHOPAEDIC SURGERY
Payer: COMMERCIAL

## 2025-06-03 ENCOUNTER — ANESTHESIA (OUTPATIENT)
Dept: SURGERY | Facility: CLINIC | Age: 45
End: 2025-06-03
Payer: COMMERCIAL

## 2025-06-03 ENCOUNTER — HOSPITAL ENCOUNTER (INPATIENT)
Facility: CLINIC | Age: 45
End: 2025-06-03
Attending: ORTHOPAEDIC SURGERY | Admitting: ORTHOPAEDIC SURGERY
Payer: COMMERCIAL

## 2025-06-03 PROCEDURE — 258N000003 HC RX IP 258 OP 636: Performed by: ORTHOPAEDIC SURGERY

## 2025-06-03 PROCEDURE — 999N000063 XR PELVIS PORT 1/2 VIEWS

## 2025-06-03 PROCEDURE — 258N000003 HC RX IP 258 OP 636: Performed by: NURSE ANESTHETIST, CERTIFIED REGISTERED

## 2025-06-03 PROCEDURE — 250N000011 HC RX IP 250 OP 636: Performed by: NURSE ANESTHETIST, CERTIFIED REGISTERED

## 2025-06-03 PROCEDURE — 999N000063 XR FEMUR PORT RIGHT 2 VIEWS: Mod: RT

## 2025-06-03 PROCEDURE — 250N000011 HC RX IP 250 OP 636: Performed by: ORTHOPAEDIC SURGERY

## 2025-06-03 PROCEDURE — 250N000009 HC RX 250: Performed by: NURSE ANESTHETIST, CERTIFIED REGISTERED

## 2025-06-03 PROCEDURE — 999N000182 XR SURGERY OARM

## 2025-06-03 PROCEDURE — 250N000011 HC RX IP 250 OP 636: Performed by: PHYSICIAN ASSISTANT

## 2025-06-03 PROCEDURE — P9045 ALBUMIN (HUMAN), 5%, 250 ML: HCPCS | Performed by: NURSE ANESTHETIST, CERTIFIED REGISTERED

## 2025-06-03 RX ORDER — LIDOCAINE HYDROCHLORIDE 20 MG/ML
INJECTION, SOLUTION INFILTRATION; PERINEURAL PRN
Status: DISCONTINUED | OUTPATIENT
Start: 2025-06-03 | End: 2025-06-04

## 2025-06-03 RX ORDER — DEXAMETHASONE SODIUM PHOSPHATE 4 MG/ML
INJECTION, SOLUTION INTRA-ARTICULAR; INTRALESIONAL; INTRAMUSCULAR; INTRAVENOUS; SOFT TISSUE PRN
Status: DISCONTINUED | OUTPATIENT
Start: 2025-06-03 | End: 2025-06-04

## 2025-06-03 RX ORDER — PROPOFOL 10 MG/ML
INJECTION, EMULSION INTRAVENOUS PRN
Status: DISCONTINUED | OUTPATIENT
Start: 2025-06-03 | End: 2025-06-04

## 2025-06-03 RX ORDER — SODIUM CHLORIDE, SODIUM LACTATE, POTASSIUM CHLORIDE, CALCIUM CHLORIDE 600; 310; 30; 20 MG/100ML; MG/100ML; MG/100ML; MG/100ML
INJECTION, SOLUTION INTRAVENOUS CONTINUOUS PRN
Status: DISCONTINUED | OUTPATIENT
Start: 2025-06-03 | End: 2025-06-04

## 2025-06-03 RX ORDER — SODIUM CHLORIDE, SODIUM GLUCONATE, SODIUM ACETATE, POTASSIUM CHLORIDE AND MAGNESIUM CHLORIDE 526; 502; 368; 37; 30 MG/100ML; MG/100ML; MG/100ML; MG/100ML; MG/100ML
INJECTION, SOLUTION INTRAVENOUS CONTINUOUS PRN
Status: DISCONTINUED | OUTPATIENT
Start: 2025-06-03 | End: 2025-06-04

## 2025-06-03 RX ORDER — CALCIUM CHLORIDE 100 MG/ML
INJECTION INTRAVENOUS; INTRAVENTRICULAR PRN
Status: DISCONTINUED | OUTPATIENT
Start: 2025-06-03 | End: 2025-06-04

## 2025-06-03 RX ORDER — FENTANYL CITRATE 50 UG/ML
INJECTION, SOLUTION INTRAMUSCULAR; INTRAVENOUS PRN
Status: DISCONTINUED | OUTPATIENT
Start: 2025-06-03 | End: 2025-06-04

## 2025-06-03 RX ORDER — KETAMINE HYDROCHLORIDE 10 MG/ML
INJECTION INTRAMUSCULAR; INTRAVENOUS PRN
Status: DISCONTINUED | OUTPATIENT
Start: 2025-06-03 | End: 2025-06-04

## 2025-06-03 RX ADMIN — Medication 10 MG: at 20:41

## 2025-06-03 RX ADMIN — Medication 2 G: at 11:25

## 2025-06-03 RX ADMIN — SODIUM CHLORIDE, SODIUM LACTATE, POTASSIUM CHLORIDE, AND CALCIUM CHLORIDE: .6; .31; .03; .02 INJECTION, SOLUTION INTRAVENOUS at 21:25

## 2025-06-03 RX ADMIN — PHENYLEPHRINE HYDROCHLORIDE 100 MCG: 10 INJECTION INTRAVENOUS at 16:23

## 2025-06-03 RX ADMIN — HYDROMORPHONE HYDROCHLORIDE 0.5 MG: 1 INJECTION, SOLUTION INTRAMUSCULAR; INTRAVENOUS; SUBCUTANEOUS at 19:34

## 2025-06-03 RX ADMIN — DEXAMETHASONE SODIUM PHOSPHATE 10 MG: 4 INJECTION, SOLUTION INTRAMUSCULAR; INTRAVENOUS at 11:38

## 2025-06-03 RX ADMIN — MIDAZOLAM 2 MG: 1 INJECTION INTRAMUSCULAR; INTRAVENOUS at 10:53

## 2025-06-03 RX ADMIN — SODIUM CHLORIDE, SODIUM GLUCONATE, SODIUM ACETATE, POTASSIUM CHLORIDE AND MAGNESIUM CHLORIDE: 526; 502; 368; 37; 30 INJECTION, SOLUTION INTRAVENOUS at 16:36

## 2025-06-03 RX ADMIN — CALCIUM CHLORIDE INJECTION 500 MG: 100 INJECTION, SOLUTION INTRAVENOUS at 20:35

## 2025-06-03 RX ADMIN — PHENYLEPHRINE HYDROCHLORIDE 0.2 MCG/KG/MIN: 10 INJECTION INTRAVENOUS at 12:37

## 2025-06-03 RX ADMIN — PHENYLEPHRINE HYDROCHLORIDE 100 MCG: 10 INJECTION INTRAVENOUS at 17:19

## 2025-06-03 RX ADMIN — Medication 50 MG: at 11:01

## 2025-06-03 RX ADMIN — Medication 20 MG: at 18:00

## 2025-06-03 RX ADMIN — DEXMEDETOMIDINE HYDROCHLORIDE 10 MCG: 100 INJECTION, SOLUTION INTRAVENOUS at 12:06

## 2025-06-03 RX ADMIN — FENTANYL CITRATE 25 MCG: 50 INJECTION INTRAMUSCULAR; INTRAVENOUS at 13:00

## 2025-06-03 RX ADMIN — PHENYLEPHRINE HYDROCHLORIDE 100 MCG: 10 INJECTION INTRAVENOUS at 12:35

## 2025-06-03 RX ADMIN — PROPOFOL 20 MG: 10 INJECTION, EMULSION INTRAVENOUS at 12:05

## 2025-06-03 RX ADMIN — Medication 30 MG: at 13:44

## 2025-06-03 RX ADMIN — DEXMEDETOMIDINE HYDROCHLORIDE 4 MCG: 100 INJECTION, SOLUTION INTRAVENOUS at 19:11

## 2025-06-03 RX ADMIN — HYDROMORPHONE HYDROCHLORIDE 0.25 MG: 1 INJECTION, SOLUTION INTRAMUSCULAR; INTRAVENOUS; SUBCUTANEOUS at 15:07

## 2025-06-03 RX ADMIN — HYDROMORPHONE HYDROCHLORIDE 0.25 MG: 1 INJECTION, SOLUTION INTRAMUSCULAR; INTRAVENOUS; SUBCUTANEOUS at 19:24

## 2025-06-03 RX ADMIN — Medication 10 MG: at 19:11

## 2025-06-03 RX ADMIN — Medication 10 MG: at 15:03

## 2025-06-03 RX ADMIN — Medication 20 MG: at 20:41

## 2025-06-03 RX ADMIN — PHENYLEPHRINE HYDROCHLORIDE 100 MCG: 10 INJECTION INTRAVENOUS at 12:10

## 2025-06-03 RX ADMIN — ALBUMIN HUMAN: 0.05 INJECTION, SOLUTION INTRAVENOUS at 15:55

## 2025-06-03 RX ADMIN — DEXMEDETOMIDINE HYDROCHLORIDE 8 MCG: 100 INJECTION, SOLUTION INTRAVENOUS at 15:03

## 2025-06-03 RX ADMIN — LIDOCAINE HYDROCHLORIDE 100 MG: 20 INJECTION, SOLUTION INFILTRATION; PERINEURAL at 10:59

## 2025-06-03 RX ADMIN — ALBUMIN HUMAN: 0.05 INJECTION, SOLUTION INTRAVENOUS at 11:46

## 2025-06-03 RX ADMIN — SODIUM CHLORIDE, SODIUM GLUCONATE, SODIUM ACETATE, POTASSIUM CHLORIDE AND MAGNESIUM CHLORIDE: 526; 502; 368; 37; 30 INJECTION, SOLUTION INTRAVENOUS at 11:15

## 2025-06-03 RX ADMIN — ALBUMIN HUMAN: 0.05 INJECTION, SOLUTION INTRAVENOUS at 15:48

## 2025-06-03 RX ADMIN — FENTANYL CITRATE 25 MCG: 50 INJECTION INTRAMUSCULAR; INTRAVENOUS at 13:03

## 2025-06-03 RX ADMIN — Medication 2 G: at 17:25

## 2025-06-03 RX ADMIN — ALBUMIN HUMAN: 0.05 INJECTION, SOLUTION INTRAVENOUS at 15:33

## 2025-06-03 RX ADMIN — PROPOFOL 200 MG: 10 INJECTION, EMULSION INTRAVENOUS at 11:00

## 2025-06-03 RX ADMIN — PHENYLEPHRINE HYDROCHLORIDE 100 MCG: 10 INJECTION INTRAVENOUS at 11:15

## 2025-06-03 RX ADMIN — Medication 50 MG: at 11:49

## 2025-06-03 RX ADMIN — Medication 20 MG: at 16:01

## 2025-06-03 RX ADMIN — GENTAMICIN SULFATE 469.2 MG: 40 INJECTION, SOLUTION INTRAMUSCULAR; INTRAVENOUS at 17:15

## 2025-06-03 RX ADMIN — SODIUM CHLORIDE, SODIUM LACTATE, POTASSIUM CHLORIDE, AND CALCIUM CHLORIDE: .6; .31; .03; .02 INJECTION, SOLUTION INTRAVENOUS at 16:57

## 2025-06-03 RX ADMIN — FENTANYL CITRATE 25 MCG: 50 INJECTION INTRAMUSCULAR; INTRAVENOUS at 13:44

## 2025-06-03 RX ADMIN — PHENYLEPHRINE HYDROCHLORIDE 100 MCG: 10 INJECTION INTRAVENOUS at 12:40

## 2025-06-03 RX ADMIN — CALCIUM CHLORIDE INJECTION 500 MG: 100 INJECTION, SOLUTION INTRAVENOUS at 22:10

## 2025-06-03 RX ADMIN — Medication 20 MG: at 16:42

## 2025-06-03 RX ADMIN — PHENYLEPHRINE HYDROCHLORIDE 100 MCG: 10 INJECTION INTRAVENOUS at 13:13

## 2025-06-03 RX ADMIN — Medication 20 MG: at 22:43

## 2025-06-03 RX ADMIN — Medication 2 G: at 21:26

## 2025-06-03 RX ADMIN — PHENYLEPHRINE HYDROCHLORIDE 100 MCG: 10 INJECTION INTRAVENOUS at 23:27

## 2025-06-03 RX ADMIN — PHENYLEPHRINE HYDROCHLORIDE 100 MCG: 10 INJECTION INTRAVENOUS at 16:45

## 2025-06-03 RX ADMIN — DEXMEDETOMIDINE HYDROCHLORIDE 10 MCG: 100 INJECTION, SOLUTION INTRAVENOUS at 13:16

## 2025-06-03 RX ADMIN — Medication 10 MG: at 14:14

## 2025-06-03 RX ADMIN — PHENYLEPHRINE HYDROCHLORIDE 100 MCG: 10 INJECTION INTRAVENOUS at 16:05

## 2025-06-03 RX ADMIN — Medication 20 MG: at 21:28

## 2025-06-03 RX ADMIN — Medication 2 G: at 15:20

## 2025-06-03 RX ADMIN — SODIUM CHLORIDE, SODIUM LACTATE, POTASSIUM CHLORIDE, AND CALCIUM CHLORIDE: .6; .31; .03; .02 INJECTION, SOLUTION INTRAVENOUS at 15:51

## 2025-06-03 RX ADMIN — SODIUM CHLORIDE, SODIUM LACTATE, POTASSIUM CHLORIDE, AND CALCIUM CHLORIDE: .6; .31; .03; .02 INJECTION, SOLUTION INTRAVENOUS at 10:53

## 2025-06-03 RX ADMIN — PHENYLEPHRINE HYDROCHLORIDE 100 MCG: 10 INJECTION INTRAVENOUS at 11:01

## 2025-06-03 RX ADMIN — Medication 10 MG: at 13:16

## 2025-06-03 RX ADMIN — PHENYLEPHRINE HYDROCHLORIDE 100 MCG: 10 INJECTION INTRAVENOUS at 11:36

## 2025-06-03 RX ADMIN — Medication 10 MG: at 21:47

## 2025-06-03 RX ADMIN — HYDROMORPHONE HYDROCHLORIDE 0.5 MG: 1 INJECTION, SOLUTION INTRAMUSCULAR; INTRAVENOUS; SUBCUTANEOUS at 21:14

## 2025-06-03 RX ADMIN — Medication 30 MG: at 19:07

## 2025-06-03 RX ADMIN — FENTANYL CITRATE 100 MCG: 50 INJECTION INTRAMUSCULAR; INTRAVENOUS at 10:59

## 2025-06-03 RX ADMIN — Medication 20 MG: at 15:09

## 2025-06-03 RX ADMIN — Medication 50 MG: at 12:54

## 2025-06-03 RX ADMIN — Medication 20 MG: at 20:08

## 2025-06-03 RX ADMIN — Medication 20 MG: at 14:42

## 2025-06-03 RX ADMIN — Medication 20 MG: at 19:43

## 2025-06-03 RX ADMIN — Medication 30 MG: at 12:06

## 2025-06-03 RX ADMIN — DEXMEDETOMIDINE HYDROCHLORIDE 10 MCG: 100 INJECTION, SOLUTION INTRAVENOUS at 14:16

## 2025-06-03 RX ADMIN — Medication 10 MG: at 22:43

## 2025-06-03 RX ADMIN — Medication 20 MG: at 17:19

## 2025-06-03 RX ADMIN — HYDROMORPHONE HYDROCHLORIDE 0.5 MG: 1 INJECTION, SOLUTION INTRAMUSCULAR; INTRAVENOUS; SUBCUTANEOUS at 22:35

## 2025-06-03 RX ADMIN — PHENYLEPHRINE HYDROCHLORIDE 100 MCG: 10 INJECTION INTRAVENOUS at 17:39

## 2025-06-03 RX ADMIN — DEXMEDETOMIDINE HYDROCHLORIDE 4 MCG: 100 INJECTION, SOLUTION INTRAVENOUS at 20:43

## 2025-06-03 RX ADMIN — SODIUM CHLORIDE, SODIUM LACTATE, POTASSIUM CHLORIDE, AND CALCIUM CHLORIDE: .6; .31; .03; .02 INJECTION, SOLUTION INTRAVENOUS at 13:01

## 2025-06-03 RX ADMIN — PHENYLEPHRINE HYDROCHLORIDE 100 MCG: 10 INJECTION INTRAVENOUS at 12:27

## 2025-06-03 RX ADMIN — DEXMEDETOMIDINE HYDROCHLORIDE 4 MCG: 100 INJECTION, SOLUTION INTRAVENOUS at 21:47

## 2025-06-03 RX ADMIN — HYDROMORPHONE HYDROCHLORIDE 0.5 MG: 1 INJECTION, SOLUTION INTRAMUSCULAR; INTRAVENOUS; SUBCUTANEOUS at 20:01

## 2025-06-03 RX ADMIN — PHENYLEPHRINE HYDROCHLORIDE 100 MCG: 10 INJECTION INTRAVENOUS at 11:12

## 2025-06-03 RX ADMIN — SODIUM CHLORIDE, SODIUM LACTATE, POTASSIUM CHLORIDE, CALCIUM CHLORIDE: 600; 310; 30; 20 INJECTION, SOLUTION INTRAVENOUS at 16:50

## 2025-06-03 RX ADMIN — Medication 20 MG: at 22:10

## 2025-06-03 RX ADMIN — FENTANYL CITRATE 25 MCG: 50 INJECTION INTRAMUSCULAR; INTRAVENOUS at 14:14

## 2025-06-03 RX ADMIN — SODIUM CHLORIDE: 900 INJECTION INTRAVENOUS at 23:50

## 2025-06-03 RX ADMIN — PHENYLEPHRINE HYDROCHLORIDE 100 MCG: 10 INJECTION INTRAVENOUS at 17:08

## 2025-06-03 ASSESSMENT — ACTIVITIES OF DAILY LIVING (ADL)
ADLS_ACUITY_SCORE: 46
ADLS_ACUITY_SCORE: 57
ADLS_ACUITY_SCORE: 46
ADLS_ACUITY_SCORE: 55
ADLS_ACUITY_SCORE: 46

## 2025-06-03 NOTE — ANESTHESIA PROCEDURE NOTES
Airway       Patient location during procedure: OR       Procedure Start/Stop Times: 6/3/2025 11:04 AM  Staff -        Anesthesiologist:  Galina Rodriguez MD       CRNA: Coleen Domínguez APRN CRNA       Performed By: CRNA  Consent for Airway        Urgency: elective  Indications and Patient Condition       Indications for airway management: palmira-procedural       Induction type:intravenous       Mask difficulty assessment: 2 - vent by mask + OA or adjuvant +/- NMBA    Final Airway Details       Final airway type: endotracheal airway       Successful airway: ETT - single  Endotracheal Airway Details        ETT size (mm): 8.0       Cuffed: yes       Inital cuff pressure (cm H2O): 26       Cuff volume (mL): 10       Successful intubation technique: video laryngoscopy       VL Blade Size: Glidescope 4 (small mouth opening; excess tissue)       Grade View of Cords: 1       Adjucts: stylet       Position: Left       Measured from: gums/teeth       Secured at (cm): 23       Bite block used: None    Post intubation assessment        Placement verified by: capnometry, equal breath sounds and chest rise        Number of attempts at approach: 1       Number of other approaches attempted: 0       Secured with: tape       Ease of procedure: easy       Dentition: Intact and Unchanged    Medication(s) Administered   Medication Administration Time: 6/3/2025 11:04 AM

## 2025-06-03 NOTE — ANESTHESIA PROCEDURE NOTES
Arterial Line Procedure Note    Pre-Procedure   Staff -        Anesthesiologist:  Galina Rodriguez MD       Performed By: anesthesiologist       Location: OR       Pre-Anesthestic Checklist: patient identified, IV checked, risks and benefits discussed, informed consent, monitors and equipment checked, pre-op evaluation and at physician/surgeon's request  Timeout:       Correct Patient: Yes        Correct Procedure: Yes        Correct Site: Yes        Correct Position: Yes   Line Placement:   This line was placed Post Induction  Procedure   Procedure: arterial line       Laterality: right       Insertion Site: radial.  Sterile Prep        Standard elements of sterile barrier followed       Skin prep: Chloraprep  Insertion/Injection        Technique: ultrasound guided        Medical Necessity: need to minimize puncture attempts       1. Ultrasound was used to evaluate the access site.       2. Artery evaluated via ultrasound for patency/adequacy.       3. Using real-time ultrasound the needle/catheter was observed entering the artery/vein.       Catheter Type/Size: 20 G, 1.75 in/4.5 cm quick cath (integral wire)  Narrative        Tegaderm dressing used.       Arterial waveform: Yes        IBP within 10% of NIBP: Yes

## 2025-06-04 ENCOUNTER — APPOINTMENT (OUTPATIENT)
Dept: GENERAL RADIOLOGY | Facility: CLINIC | Age: 45
DRG: 559 | End: 2025-06-04
Payer: COMMERCIAL

## 2025-06-04 ENCOUNTER — APPOINTMENT (OUTPATIENT)
Dept: GENERAL RADIOLOGY | Facility: CLINIC | Age: 45
DRG: 559 | End: 2025-06-04
Attending: STUDENT IN AN ORGANIZED HEALTH CARE EDUCATION/TRAINING PROGRAM
Payer: COMMERCIAL

## 2025-06-04 ENCOUNTER — APPOINTMENT (OUTPATIENT)
Dept: GENERAL RADIOLOGY | Facility: CLINIC | Age: 45
DRG: 559 | End: 2025-06-04
Attending: ORTHOPAEDIC SURGERY
Payer: COMMERCIAL

## 2025-06-04 ENCOUNTER — APPOINTMENT (OUTPATIENT)
Dept: CARDIOLOGY | Facility: CLINIC | Age: 45
DRG: 559 | End: 2025-06-04
Attending: STUDENT IN AN ORGANIZED HEALTH CARE EDUCATION/TRAINING PROGRAM
Payer: COMMERCIAL

## 2025-06-04 PROBLEM — Z96.641 STATUS POST HIP REPLACEMENT, RIGHT: Status: ACTIVE | Noted: 2025-06-04

## 2025-06-04 PROCEDURE — 258N000001 HC RX 258: Performed by: NURSE ANESTHETIST, CERTIFIED REGISTERED

## 2025-06-04 PROCEDURE — 999N000063 XR PELVIS 1/2 VIEWS

## 2025-06-04 PROCEDURE — 999N000208 ECHOCARDIOGRAM COMPLETE

## 2025-06-04 PROCEDURE — 250N000009 HC RX 250: Performed by: NURSE ANESTHETIST, CERTIFIED REGISTERED

## 2025-06-04 PROCEDURE — 93306 TTE W/DOPPLER COMPLETE: CPT | Mod: 26 | Performed by: INTERNAL MEDICINE

## 2025-06-04 PROCEDURE — 999N000065 XR ABDOMEN PORT 1 VIEW

## 2025-06-04 PROCEDURE — 999N000065 XR PELVIS AND HIP PORTABLE RIGHT 1 VIEW

## 2025-06-04 PROCEDURE — 250N000011 HC RX IP 250 OP 636: Performed by: NURSE ANESTHETIST, CERTIFIED REGISTERED

## 2025-06-04 PROCEDURE — 999N000065 XR CHEST PORT 1 VIEW

## 2025-06-04 PROCEDURE — 71045 X-RAY EXAM CHEST 1 VIEW: CPT | Mod: 26 | Performed by: RADIOLOGY

## 2025-06-04 PROCEDURE — 250N000011 HC RX IP 250 OP 636: Mod: JZ | Performed by: NURSE ANESTHETIST, CERTIFIED REGISTERED

## 2025-06-04 PROCEDURE — 250N000011 HC RX IP 250 OP 636: Performed by: PHYSICIAN ASSISTANT

## 2025-06-04 PROCEDURE — 74018 RADEX ABDOMEN 1 VIEW: CPT | Mod: 26 | Performed by: RADIOLOGY

## 2025-06-04 PROCEDURE — 258N000003 HC RX IP 258 OP 636: Performed by: NURSE ANESTHETIST, CERTIFIED REGISTERED

## 2025-06-04 RX ORDER — SODIUM CHLORIDE 9 MG/ML
INJECTION, SOLUTION INTRAVENOUS CONTINUOUS PRN
Status: DISCONTINUED | OUTPATIENT
Start: 2025-06-04 | End: 2025-06-04

## 2025-06-04 RX ORDER — PROPOFOL 10 MG/ML
INJECTION, EMULSION INTRAVENOUS CONTINUOUS PRN
Status: DISCONTINUED | OUTPATIENT
Start: 2025-06-04 | End: 2025-06-04

## 2025-06-04 RX ORDER — DEXTROSE MONOHYDRATE 25 G/50ML
INJECTION, SOLUTION INTRAVENOUS PRN
Status: DISCONTINUED | OUTPATIENT
Start: 2025-06-04 | End: 2025-06-04

## 2025-06-04 RX ORDER — ONDANSETRON 2 MG/ML
INJECTION INTRAMUSCULAR; INTRAVENOUS PRN
Status: DISCONTINUED | OUTPATIENT
Start: 2025-06-04 | End: 2025-06-04

## 2025-06-04 RX ADMIN — PROPOFOL 50 MCG/KG/MIN: 10 INJECTION, EMULSION INTRAVENOUS at 01:47

## 2025-06-04 RX ADMIN — HYDROMORPHONE HYDROCHLORIDE 0.5 MG: 1 INJECTION, SOLUTION INTRAMUSCULAR; INTRAVENOUS; SUBCUTANEOUS at 00:31

## 2025-06-04 RX ADMIN — ONDANSETRON 4 MG: 2 INJECTION INTRAMUSCULAR; INTRAVENOUS at 02:11

## 2025-06-04 RX ADMIN — PHENYLEPHRINE HYDROCHLORIDE 200 MCG: 10 INJECTION INTRAVENOUS at 01:56

## 2025-06-04 RX ADMIN — FENTANYL CITRATE 50 MCG: 50 INJECTION INTRAMUSCULAR; INTRAVENOUS at 01:30

## 2025-06-04 RX ADMIN — DEXTROSE 50 % IN WATER (D50W) INTRAVENOUS SYRINGE 12.5 G: at 01:04

## 2025-06-04 RX ADMIN — SODIUM CHLORIDE, SODIUM LACTATE, POTASSIUM CHLORIDE, AND CALCIUM CHLORIDE: .6; .31; .03; .02 INJECTION, SOLUTION INTRAVENOUS at 01:00

## 2025-06-04 RX ADMIN — Medication 2 G: at 01:26

## 2025-06-04 RX ADMIN — FENTANYL CITRATE 50 MCG: 50 INJECTION INTRAMUSCULAR; INTRAVENOUS at 01:10

## 2025-06-04 RX ADMIN — PHENYLEPHRINE HYDROCHLORIDE 100 MCG: 10 INJECTION INTRAVENOUS at 01:32

## 2025-06-04 RX ADMIN — Medication 30 MG: at 01:26

## 2025-06-04 ASSESSMENT — ACTIVITIES OF DAILY LIVING (ADL)
ADLS_ACUITY_SCORE: 62
ADLS_ACUITY_SCORE: 46
ADLS_ACUITY_SCORE: 62
ADLS_ACUITY_SCORE: 46
ADLS_ACUITY_SCORE: 62
ADLS_ACUITY_SCORE: 46
ADLS_ACUITY_SCORE: 46
ADLS_ACUITY_SCORE: 62
ADLS_ACUITY_SCORE: 46
ADLS_ACUITY_SCORE: 62
ADLS_ACUITY_SCORE: 46
ADLS_ACUITY_SCORE: 62
ADLS_ACUITY_SCORE: 62

## 2025-06-04 NOTE — PROGRESS NOTES
Brief Cardiology Note    I was called overnight due to ST-elevations on EKG. Patient is a 45 yo M with PMH chondrosarcoma of pelvis s/p total hip arthroplasty today complicated by undifferentiated shock. Patient is intubated and sedated on maryanne and unable to state if he is having symptoms. EKG obtained a few hours after the OR reveals ST-elevations of II, III, aVF, V2-V6 along with notable TN-depression. This EKG would appear consistent with possible pericarditis, however ischemia is not fully excluded. Case was discussed with cath lab attending on call. Would recommend STAT TTE, repeat EKG (to assess for progression), troponins with trend (now and then 2 hours later). Given his age and lack of cardiac comorbidities, including a lack of radiation for the treatment of his various cancers, he would be in a lower cardiac risk category. We would expect some troponin elevation in the setting of recent surgery and shock, making the trend important. Full consult note to follow this AM.    Silas Oliva MD  Cardiology Fellow

## 2025-06-04 NOTE — PLAN OF CARE
Problem: Adult Inpatient Plan of Care  Goal: Plan of Care Review  Description: The Plan of Care Review/Shift note should be completed every shift.  The Outcome Evaluation is a brief statement about your assessment that the patient is improving, declining, or no change.  This information will be displayed automatically on your shift  note.  6/4/2025 0540 by Dustin Jeffery RN  Outcome: Progressing  6/4/2025 0540 by Dustin Jeffery RN  Outcome: Progressing  Flowsheets (Taken 6/4/2025 0540)  Plan of Care Reviewed With:   patient   spouse  Overall Patient Progress: no change   Goal Outcome Evaluation:      Plan of Care Reviewed With: patient, spouse    Overall Patient Progress: no changeOverall Patient Progress: no change       ICU End of Shift Summary: Shift 0200-0700am    Admission from OR at 0200 am    Neuro: Sedated with Propofol and Fentanyl. RASS goal 0 to -1; able to follow commands. Afebrile.   Pulm/Resp:  ET size 8, 24 cm at the teeth connected to mechanical ventilator on Volume control mode, FiO2 40%, rate 18, volume 600, peep 8. Clear, diminished lung sounds.   CV: sinus tachycardia 100-120's; Hypotensive on Phenylephrine to keep MAP goal >65  GI/: With OG tube 59 at the teeth- clamped; NPO except meds. Abdomen is soft and non-distended. With mccord catheter in placed.   Access: peripheral IV on left upper forearm, left hand and right hand; left radial arterial line.  Skin: dry, cracked heels; blanchable redness on sacrum; abrasion on right upper arm  Gtts: Fentanyl 100 mcg/hr; Phenylephrine 0.5 mcg/kg/min  Propofol 35 mcg/kg/min  Drains: HUGO drain on anterior hip and inferior abdomen both to bulb suction; NPWT on anterior abdomen     Shift Events:   Critical lab result on lactic acid 6.2, 6.1 and 4.7- [facility] notified.   ml bolus given as ordered  OG tube placed- xray verified; with okay to used order from the [facility]  EKG changed, 12 lead EKG completed x 2, troponin level checked.;  Echo completed  Magnesium sulfate given         Plan:  Next troponin level check at 0725 am  MAP goal >65  Continue POC

## 2025-06-04 NOTE — PHARMACY-AMINOGLYCOSIDE DOSING SERVICE
Pharmacy Aminoglycoside Initial Note  Date of Service 2025  Patient's  1980  44 year old, male    Weight (Adjusted):  92.4 kg    Indication: Surgical Prophylaxis    Current estimated CrCl = Estimated Creatinine Clearance: 104.2 mL/min (based on SCr of 1.17 mg/dL).    Creatinine for last 3 days  2025:  1:00 AM Creatinine 1.10 mg/dL;  3:14 AM Creatinine 1.26 mg/dL;  5:25 AM Creatinine 1.17 mg/dL     Nephrotoxins and other renal medications (From now, onward)      Start     Dose/Rate Route Frequency Ordered Stop    25 1700  gentamicin (GARAMYCIN) 460 mg in sodium chloride 0.9 % 50 mL intermittent infusion         5 mg/kg × 92.4 kg (Adjusted)  over 60 Minutes Intravenous EVERY 24 HOURS 25 1136              Contrast Orders - past 72 hours (72h ago, onward)      Start     Dose/Rate Route Frequency Stop    25 0730  perflutren diluted 1mL to 2mL with saline (OPTISON) diluted injection 6 mL         6 mL Intravenous ONCE 25 0714            Aminoglycoside Levels - past 2 days  No results found for requested labs within last 2 days.    Aminoglycosides IV Administrations (past 72 hours)                     gentamicin (GARAMYCIN) 140 mg in sodium chloride 0.9 % 100 mL intermittent infusion (mg) 140 mg New Bag 25 0801    gentamicin (GARAMYCIN) 460 mg in sodium chloride 0.9 % 50 mL intermittent infusion (mg) 469.2 mg New Bag 25 1715                        Plan:  1.  Start Gentamicin 460 mg (5 mg/kg) IV q24h.   2.  Target goals based on extended interval dosing  3.  Goal peak level: 15-24 mg/L  4.  Goal trough level: <0.5mg/L  5.  Pharmacy will continue to follow and check levels as appropriate in 1-3 Days      Doretha Johnson, [facility]

## 2025-06-04 NOTE — H&P
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 On 6/3/2025 underwent explant hemipelvis spacer and conversion to R DEVONTE with custom hemipelvis implant 6/3/2025 with Dr. Bravo c/b bladder tear s/p repair and acute blood loss anemia. Admitted to ICU post operatively for management of undifferentiated shock and mechanical ventilatory support.      Intra-op: 2.7L EBL; received  8L crystalloid, 1L albumin, and 2 units PRBCs. Arrives to ICU intubated on 40% FiO2, 8 PEEP; on phenylephrine infusion, sedated with Propofol infusion        PLAN:     Neuro:  # Pain and sedation  - Propofol gtt  - Fentanyl gtt  - PTA Gabapentin 400 mg TID  - Tylenol 975mg Q8H  - Oxycodone PRN  - Robaxin PRN  - RASS goal 0 to -1     Pulmonary:  # Post-operative ventilatory support  - VC 16/600/40%/8  - CXR for ET tube confirmation  - Keep intubated overnight  - SAT/SBT in AM  - SpO2 goal > 92%  - Xopenex PRN     Cardiovascular:  # Shock, undifferentiated  Intra op EBL 2.7L, received 8 L crystalloid, 1L Albumin, 2 units PRBCs. Remains on phenylephrine infusion. Shock likely multifactorial 2/2 acute blood loss anemia/hypovolemia, possible sepsis with intra-op bladder tear s/p repair, vasoplegia    - On phenylephrine infusion, wean as able  - MAP goal > 65 mmHg  - Hgb, lactic acid Q4H    - No prior echocardiogram     GI/Nutrition:  - NPO  - OG for meds  - PPI daily   - Bowel regimen to start in AM      Renal/Fluids/Electrolytes:  # Hyperkalemia  # Metabolic acidosis  # Lactic acidosis  Baseline creat ~0.9. K 6.4 at end of case, shifted with insulin/dextrose; persistent lactic acidosis despite volume resuscitation intra op  - Trend BMP, lactic acid Q4H  - Strict I&O monitoring  - Daily weight  - Avoid nephrotoxic agents      # Bladder tear s/p repair  - Michelle catheter to remain in place, defer management to urology     Endocrine:  # Stress-induced hyperglycemia  - Start low intensity sliding scale insulin  - Hypoglycemia protocol     ID:  # Sepsis  Surgery complicated by bladder tear s/p repair. Leukocytosis, tachycardia, hypotension, and persistent lactic acidosis post op c/f possible septic etiology  - Follow surgical cultures   - Send blood cultures  - Check procal  - Follow WBC, fever curve  - Abx regimen: Ancef, Gentamicin               - Consider alternate regimen of Zosyn/Vanco but will defer to Ortho     Hematology:    # Acute blood loss anemia  # Bilateral peroneal DVTs, on Apixaban, 3/2025  # Hx testicular cancer s/p chemotherapy and orchiectomy  EBL 2.7L s/p 2 units PRBCs intra op.   - Hgb 10.4 on arrival to ICU  - Trend Hgb Q8H   - Transfuse for Hgb < 7 or active bleeding  - Start Apixaban 2.5 mg BID POD1 x 4 weeks per Ortho     Musculoskeletal:  # Chondrosarcoma of pelvis s/p hemipelvectomy 2/14/2025; explant hemipelvis cement spacer and conversion to total hip arthroplasty utilizing custom hemipelvis implant 6/4/2025 with Dr. Bravo  Activity: Up with assist. Anterior hip precautions  Weight bearing status: TTWB RLE x 3 months.  Antibiotics: continue Ancef and gentamicin post op  Diet: Progress diet as tolerated.  DVT prophylaxis: SCDs and mechanical while in the hospital. Eliquis 2.5 mg BID starting POD1 x 4 weeks.   Elevation: Elevate operative extremity as tolerated.   Wound Care: Keep dressing in place until POD #14  Drains: Please document output per shift, discontinue 15f HUGO drain in right  thigh per ortho, management of left flank 19f drain per urology  Mccord: Keep in place for 2 weeks, management of mccord per urology  Pain management: Utilize all oral meds first, IV meds for severe breakthrough pain after PO meds given adequate time to take effect.  X-rays: Completed in PACU.  Therapy: PT/OT evaluate prior to discharge.  Labs: Trend Hgb on POD #1.  Cultures: Pending, follow culture results closely.  Consults: PT, OT, medicine, ICU team, Urology  Disposition: Pending progress with therapies, pain control on orals, and medical stability. Anticipate discharge to TCU on POD #5-6.  Follow-up: Clinic with Dr. Bravo in 4 weeks.         General Cares/Prophylaxis:    DVT Prophylaxis: Resume DOAC POD1 per ortho  GI Prophylaxis: PPI  Restraints: None  Family Communication: Spouse, Mary Ellen  Code Status: Full Code     Lines/tubes/drains:  - PIVs  - Art line  - ET tube  - OG tube  - Mccord     Disposition:  - [facility] ICU      Judith Montoya CNP    55 minutes critical care time    Clinically Significant Risk Factors Present on Admission        # Hyperkalemia: Highest K = 6.4 mmol/L in last 2 days, will monitor as appropriate  # Hyponatremia: Lowest Na = 133 mmol/L in last 2 days, will monitor as appropriate   # Hypocalcemia: Lowest Ca = 8.1 mg/dL in last 2 days, will monitor and replace as appropriate  # Hypercalcemia: Highest iCa = 5.4 mg/dL in last 2 days, will monitor as appropriate    # Hypoalbuminemia: Lowest albumin = 3.4 g/dL at 6/4/2025  1:00 AM, will monitor as appropriate  # Coagulation Defect: INR = 1.18 (Ref range: 0.85 - 1.15) and/or PTT = 29 Seconds (Ref range: 22 - 38 Seconds), will monitor for bleeding      # Circulatory Shock: required vasopressors within past 24 hours       # Anemia: based on hgb <11       # Obesity: Estimated body mass index is 35.34 kg/m  as calculated from the following:    Height as of this encounter: 1.829 m (6').    Weight as of this encounter: 118.2 kg (260 lb 9.3 oz).        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 Bravo 2/14/2025, other remote hx testicular cancer s/p chemotherapy and orchiectomy 2008. Admitted 6/3/2025 s/p abx spacer removal and custom pelvic and hip replacement 6/3/2025 with Dr. Bravo c/b bladder tear s/p complex bladder repair and acute blood loss anemia. Admitted to ICU post operatively for management of shock and mechanical ventilatory support.     REVIEW OF SYSTEMS: Unable to assess; intubated/sedated    PAST MEDICAL HISTORY:   Past Medical History:   Diagnosis Date    Arthritis     Chondrosarcoma (H)     CTS (carpal tunnel syndrome)     Gynecomastia, male     H/O chondrosarcoma     Hard to intubate 06/29/2022    Infection due to 2019 novel coronavirus 12/2020    Kidney stone 2015    90% calcium oxalate monohydrate, and  10% calcium oxalate dihydrate.    Primary chondrosarcoma of bone of pelvis (H)     Psoriasis     Skin tag     Testicle cancer, left 2008    surgery and chemotherapy    Thyroid nodule 12/10/2024    high FDG, noted on PET     SURGICAL HISTORY:  Past Surgical History:   Procedure Laterality Date    ABDOMEN SURGERY      BIOPSY      BIOPSY BONE PELVIS Right 12/6/2024    Procedure: BIOPSY, BONE, PELVIS;  Surgeon: Juan Jose Bravo MD;  Location: [facility] OR    EXCISE EXOSTOSIS FOOT  10/25/2013    Procedure: EXCISE EXOSTOSIS FOOT;  Excision of calcaneal bone cyst left foot with allograft;  Surgeon: Marcello Jensen DPM;  Location: [facility] OR    LAPAROSCOPIC CHOLECYSTECTOMY N/A 06/29/2022    Procedure: CHOLECYSTECTOMY, LAPAROSCOPIC;  Surgeon: Cholo Agustin DO;  [place] [facility] [place]    [place] SYSTEM  HEMIPELVECTOMY Right 2/14/2025    Procedure: 1. HEMIPELVECTOMY, MODIFIED INTERNAL, 2. Right hip girdlestone resection, 3. Right  RETROPERITONEAL DISSECTION;  Surgeon: Juan Jose Bravo MD;  Location: [facility] OR    ORCHIECTOMY SCROTAL Left      SOCIAL HISTORY:  Social History     Socioeconomic History    Marital status:      Spouse name: None    Number of children: None    Years of education: None    Highest education level: None   Tobacco Use    Smoking status: Former     Types: Cigarettes     Passive exposure: Past    Smokeless tobacco: Former     Types: Chew   Vaping Use    Vaping status: Never Used   Substance and Sexual Activity    Alcohol use: Not Currently    Drug use: No    Sexual activity: Yes     Partners: Female   Other Topics Concern    Parent/sibling w/ CABG, MI or angioplasty before 65F 55M? No     Social Drivers of Health     Financial Resource Strain: Low Risk  (6/3/2025)    Financial Resource Strain     Within the past 12 months, have you or your family members you live with been unable to get utilities (heat, electricity) when it was really needed?: No   Food Insecurity: Low Risk  (6/3/2025)    Food Insecurity     Within the past 12 months, did you worry that your food would run out before you got money to buy more?: No     Within the past 12 months, did the food you bought just not last and you didn t have money to get more?: No   Transportation Needs: Low Risk  (6/3/2025)    Transportation Needs     Within the past 12 months, has lack of transportation kept you from medical appointments, getting your medicines, non-medical meetings or appointments, work, or from getting things that you need?: No   Physical Activity: Sufficiently Active (9/30/2024)    Exercise Vital Sign     Days of Exercise per Week: 5 days     Minutes of Exercise per Session: 150+ min   Stress: No Stress Concern Present (9/30/2024)    [employer] [place] [employer] - Occupational Stress Questionnaire     Feeling of  Stress : Only a little   Social Connections: Unknown (9/30/2024)    Social Connection and Isolation Panel [NHANES]     Frequency of Social Gatherings with Friends and Family: Once a week   Interpersonal Safety: High Risk (6/3/2025)    Interpersonal Safety     Do you feel physically and emotionally safe where you currently live?: No     Within the past 12 months, have you been hit, slapped, kicked or otherwise physically hurt by someone?: No     Within the past 12 months, have you been humiliated or emotionally abused in other ways by your partner or ex-partner?: No   Housing Stability: High Risk (6/3/2025)    Housing Stability     Do you have housing? : No     Are you worried about losing your housing?: No     FAMILY HISTORY:   Family History   Problem Relation Age of Onset    No Known Problems Mother     Hypertension Father     Atrial fibrillation Father     Thyroid Disease Father     Nephrolithiasis Father     Pulmonary Embolism Father     C.A.D. Maternal Grandfather     Thyroid Cancer No family hx of     Diabetes No family hx of     Anesthesia Reaction No family hx of      ALLERGIES:   No Known Allergies  MEDICATIONS:  Current Facility-Administered Medications   Medication Dose Route Frequency Provider Last Rate Last Admin    acetaminophen (TYLENOL) tablet 975 mg  975 mg Oral Q8H George Boone MD        [START ON 6/5/2025] apixaban ANTICOAGULANT (ELIQUIS) tablet 2.5 mg  2.5 mg Oral BID George Boone MD        bisacodyl (DULCOLAX) suppository 10 mg  10 mg Rectal Daily PRN George Boone MD        ceFAZolin (ANCEF) 2 g in dextrose 50 mL intermittent infusion  2 g Intravenous Q8H George Boone MD        chlorhexidine (PERIDEX) 0.12 % solution 15 mL  15 mL Mouth/Throat Q12H Judith Montoya APRN CNP        glucose gel 15-30 g  15-30 g Oral Q15 Min PRN Judith Montoya APRN CNP        Or    dextrose 50 % injection 25-50 mL  25-50 mL Intravenous Q15 Min PRN Judith Montoya APRN CNP        Or    glucagon  injection 1 mg  1 mg Subcutaneous Q15 Min PRN Judith Montoya APRN CNP        fentaNYL (SUBLIMAZE) 50 mcg/mL bolus from pump  50 mcg Intravenous Q1H PRN Judith Montoya APRN CNP        fentaNYL (SUBLIMAZE) infusion   mcg/hr Intravenous Continuous Judith Montoya APRN CNP 1 mL/hr at 06/04/25 0320 50 mcg/hr at 06/04/25 0320    gentamicin (GARAMYCIN) 280 mg in sodium chloride 0.9 % 100 mL intermittent infusion  3 mg/kg (Adjusted) Intravenous Q8H George Boone MD        [Held by provider] HYDROmorphone (DILAUDID) injection 0.2 mg  0.2 mg Intravenous Q2H PRN George Boone MD        Or    [Held by provider] HYDROmorphone (DILAUDID) injection 0.4 mg  0.4 mg Intravenous Q2H PRN George Boone MD        hydrOXYzine HCl (ATARAX) tablet 25 mg  25 mg Oral Q6H PRN George Boone MD        insulin aspart (NovoLOG) injection (RAPID ACTING)  1-4 Units Subcutaneous Q4H Judith Montoya APRN CNP        levalbuterol (XOPENEX) neb solution 1.25 mg  1.25 mg Nebulization Q4H PRN uJdith Montoya APRN CNP        lidocaine (LMX4) cream   Topical Q1H PRN George Boone MD        lidocaine 1 % 0.1-1 mL  0.1-1 mL Other Q1H PRN George Boone MD        magnesium hydroxide (MILK OF MAGNESIA) suspension 30 mL  30 mL Oral Daily PRN George Boone MD        propofol (DIPRIVAN) infusion  5-75 mcg/kg/min Intravenous Continuous Judith Montoya APRN CNP 17.7 mL/hr at 06/04/25 0245 25 mcg/kg/min at 06/04/25 0245    And    propofol (DIPRIVAN) bolus from bag or syringe pump  10 mg Intravenous Q15 Min PRN Judith Montoya APRN CNP        And    Medication Instruction   Does not apply Continuous PRN Judith Montoya APRN CNP        methocarbamol (ROBAXIN) tablet 750 mg  750 mg Oral Q6H PRN George Boone MD        naloxone (NARCAN) injection 0.2 mg  0.2 mg Intravenous Q2 Min PRN George Boone MD        Or    naloxone (NARCAN) injection 0.4 mg  0.4 mg Intravenous Q2 Min PRN George Boone MD        Or    naloxone (NARCAN)  injection 0.2 mg  0.2 mg Intramuscular Q2 Min PRN George Boone MD        Or    naloxone (NARCAN) injection 0.4 mg  0.4 mg Intramuscular Q2 Min PRN George Boone MD        ondansetron (ZOFRAN ODT) ODT tab 4 mg  4 mg Oral Q6H PRN George Boone MD        Or    ondansetron (ZOFRAN) injection 4 mg  4 mg Intravenous Q6H PRN George Boone MD        oxyCODONE (ROXICODONE) tablet 5 mg  5 mg Oral Q4H PRN George Boone MD        Or    oxyCODONE IR (ROXICODONE) tablet 10 mg  10 mg Oral Q4H PRN George Boone MD        pantoprazole (PROTONIX) 2 mg/mL suspension 40 mg  40 mg Per Feeding Tube QAM AC Judith Montoya APRN CNP        Or    pantoprazole (PROTONIX) IV push injection 40 mg  40 mg [facility] Judith Montoya APRN CNP        Patient is already receiving anticoagulation with heparin, enoxaparin (LOVENOX), warfarin (COUMADIN)  or other anticoagulant medication   Does not apply Continuous PRN Judith Montoya APRN CNP        phenylephrine (JOEL-SYNEPHRINE) 50 mg in NaCl 0.9 % 250 mL infusion PERIPHERAL  0.5-2 mcg/kg/min Intravenous Continuous Judith Montoya APRN CNP 28.4 mL/hr at 06/04/25 0300 0.8 mcg/kg/min at 06/04/25 0300    [START ON 6/5/2025] polyethylene glycol (MIRALAX) Packet 17 g  17 g Oral Daily George Boone MD        prochlorperazine (COMPAZINE) injection 10 mg  10 mg Intravenous Q6H PRN George Boone MD        Or    prochlorperazine (COMPAZINE) tablet 10 mg  10 mg Oral Q6H PRN George Boone MD        ROPivacaine (NAROPIN) 5 MG/ mg, ketorolac (TORADOL) 30 mg, EPINEPHrine (ADRENALIN) 0.6 mg in sodium chloride 0.9 % 100 mL (ORTHO APPLE STANDARD DOSE)   INTRA-ARTICULAR On Call to OR Angel Grajeda, PA-C        senna-docusate (SENOKOT-S/PERICOLACE) 8.6-50 MG per tablet 1 tablet  1 tablet Oral BID George Boone MD        sodium chloride (PF) 0.9% PF flush 3 mL  3 mL Intracatheter Q8H George Boone MD        sodium chloride (PF) 0.9% PF flush 3 mL  3 mL  Intracatheter q1 min prn George Boone MD        sodium chloride 0.9 % infusion   Intravenous Continuous George Boone MD 75 mL/hr at 06/04/25 0303 New Bag at 06/04/25 0303       PHYSICAL EXAMINATION:  Temp:  [97.5  F (36.4  C)] 97.5  F (36.4  C)  Pulse:  [108] 108  Resp:  [20] 20  BP: (139)/(96) 139/96  SpO2:  [98 %] 98 %  General: Sedated, NAD  HEENT: NC, AT, PERRL 2 mm  Neuro: Sedated, arouses briefly to voice  Pulm/Resp: Lungs clear, diminished bases, non-labored on mechanical ventilation  CV: RRR, S1S2, no murmur  Abdomen: Soft, non-distended, hypoactive  : mccord catheter in place, urine pink tinged and clear  Ext: Pulses 2+ radial, pedal, no edema.   Incisions/Skin: Warm, dry. Wound vac transverse lower abdomen and R anterior thigh; two HUGO drains in place with serosanguineous output    LABS: Reviewed.   Arterial Blood Gases   Recent Labs   Lab 06/04/25 0118 06/03/25 2038 06/03/25 1904 06/03/25  1657   PH 7.25* 7.31* 7.40 7.37   PCO2 42 46* 37 40   PO2 134* 92 112* 147*   HCO3 19* 23 23 23     Complete Blood Count   Recent Labs   Lab 06/04/25  0118 06/04/25  0100 06/03/25 2038 06/03/25  1904 06/03/25  1219 05/29/25  1050   WBC  --  23.2*  --   --   --  6.1   HGB 10.1* 10.4* 9.4* 9.6*   < > 14.4   PLT  --  305  --   --   --  336    < > = values in this interval not displayed.     Basic Metabolic Panel  Recent Labs   Lab 06/04/25  0212 06/04/25  0118 06/04/25 0100 06/03/25 2038 06/03/25  1904 06/03/25  0925 05/29/25  1050   NA  --  134* 133* 136 137   < > 140   POTASSIUM  --  5.4* 6.4* 5.1 4.6   < > 4.4   CHLORIDE  --   --  102  --   --   --  101   CO2  --   --  18*  --   --   --  26   BUN  --   --  16.8  --   --   --  18.0   CR  --   --  1.10  --   --   --  0.84   * 204* 171* 149* 150*   < > 127*    < > = values in this interval not displayed.     Liver Function Tests  Recent Labs   Lab 06/04/25  0100   AST 25   ALT 22   ALKPHOS 49   BILITOTAL 0.8   ALBUMIN 3.4*   INR 1.18*     Coagulation  Profile  Recent Labs   Lab 06/04/25  0100   INR 1.18*   PTT 29       IMAGING:  Recent Results (from the past 24 hours)   XR Pelvis Port 1/2 Views    Narrative    This exam was marked as non-reportable because it will not be read by a   radiologist or a [place] non-radiologist provider.         XR Pelvis 1/2 Views    Narrative    EXAM: XR PELVIS 1/2 VIEWS, XR FEMUR PORT RIGHT 2 VIEWS  LOCATION: [facility]  DATE: 6/4/2025    INDICATION: No count of instruments  COMPARISON: 05/01/2025      Impression    IMPRESSION: Status post hemipelvectomy with extensive postsurgical changes of the right pelvis. Screws are seen traversing the right sacroiliac joint. Interval right hip arthroplasty. Surgical clips overlie the right pelvis. Drain overlies the pelvis.   There are overlapping structures which limit the evaluation somewhat, however no discretely visualized retained surgical instrument. A single view femur from the prior day is also included in this dictation, with pre-arthroplasty hardware involving the   right proximal femur.   XR Femur Port Right 2 Views    Narrative    EXAM: XR PELVIS 1/2 VIEWS, XR FEMUR PORT RIGHT 2 VIEWS  LOCATION: [facility]  DATE: 6/4/2025    INDICATION: No count of instruments  COMPARISON: 05/01/2025      Impression    IMPRESSION: Status post hemipelvectomy with extensive postsurgical changes of the right pelvis. Screws are seen traversing the right sacroiliac joint. Interval right hip arthroplasty. Surgical clips overlie the right pelvis. Drain overlies the pelvis.   There are overlapping structures which limit the evaluation somewhat, however no discretely visualized retained surgical instrument. A single view femur from the prior day is also included in this dictation, with pre-arthroplasty hardware involving the   right proximal femur.

## 2025-06-04 NOTE — CONSULTS
Cardiology Inpatient Consultation  June 4, 2025    Reason for Consult:  A cardiology consult was requested by LJ Adorno from the [facility] ICU service to provide clinical guidance regarding ST elevations on EKG post R modified radical hemipelvectomy.    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 Bravo. Procedure complicated by bladder tear s/p repair and acute blood loss anemia (14.4 at pre-op visit -> 9.4 post op).   Patient admitted to ICU for close monitoring of hemodynamics and ventilator support. RN notified JELENA of new ST changes on EKG 6/5 early AM. EKG obtained which showed diffuse ST elevation to inferior, anterior and lateral leads. Patient otherwise hemodynamically stable, was intubated at that time but appears to have been extubated by the time of this note. Pertinent labs include high-sensitivity troponin 36 -> 32 (flat, thus ruling out ACS, likely mildly elevated due to demand ischemia in setting of acute blood loss anemia/lactic acidosis/major surgery), CRP 54.89, LA 4.7, WBC 22.    Patient has not followed with cardiology prior. He was a former smoker, no documented history of HTN, HLD, arrhythmias or DM II. At pre-op visit 5/27/25 patient performs 4 or more METS exercise without cardiopulmonary symptoms. CT chest 11/2024 without any evidence of coronary calcifications.     TTE 6/4 without effusion, per chart review, patient asymptomatic but diffuse ST elevation and elevated CRP raises concern for pericarditis. Would be reasonable to treat with short course of colchicine and could start ibuprofen if symptomatic.     Recommendation:  - No further ischemic evaluation needed  - Start Colchicine   - Once  extubated, if patient endorses chest pain and cleared from surgical standpoint, could start Ibuprofen for inflammation  - Continue daily CRP   - Treatment of other comorbid conditions per primary team    Patient discussed with Dr. Fya, who agrees with above plan.    Thank you for consulting the cardiovascular services at the [facility]. Please do not hesitate to call us with any questions.     Tesha Maradiaga APRN, CNP  [facility] Cardiology Consult Team  [phone]    I evaluated Mr Lund as part of a shared APRN/PA visit.    I personally reviewed the Clinical history and labs.     Key management decisions made by me and carried out under my direction include: Med Rx    Counseling and/or coordination of care performed by me:    I spent 29 minutes face-to-face and/or coordinating care. Over 50% of the time on the unit was spent counseling the patient and/or coordinating care as documented above. Date of service 6/4/25  JENNY Fay MD        HPI:   Gilberto Lund is a 44 year old male significant for ~4 months of right hip pain 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 On 6/3/2025 underwent explant hemipelvis spacer and conversion to R DEVONTE with custom hemipelvis implant 6/3/2025 with Dr. Bravo c/b bladder tear s/p repair and acute blood loss anemia. Admitted to ICU post operatively for management of undifferentiated shock and mechanical ventilatory support     Review of Systems:    Complete review of systems was performed and negative except per HPI.    PMH:  Past Medical History:   Diagnosis Date    Arthritis     Chondrosarcoma (H)     CTS (carpal tunnel syndrome)     Gynecomastia, male     H/O chondrosarcoma     Hard to intubate 06/29/2022    Infection due to 2019 novel coronavirus 12/2020     Kidney stone 2015    90% calcium oxalate monohydrate, and  10% calcium oxalate dihydrate.    Primary chondrosarcoma of bone of pelvis (H)     Psoriasis     Skin tag     Testicle cancer, left 2008    surgery and chemotherapy    Thyroid nodule 12/10/2024    high FDG, noted on PET     Active Problems:  Patient Active Problem List    Diagnosis Date Noted    Status post hip replacement, right 06/04/2025     Priority: Medium    Acute deep vein thrombosis (DVT) of both peroneal veins (H) 03/28/2025     Priority: Medium    Status post surgery 03/28/2025     Priority: Medium     R internal hemipelvectomy      Chondrosarcoma (H) 12/19/2024     Priority: Medium    Primary chondrosarcoma of bone of pelvis (H) 12/19/2024     Priority: Medium    Hard to intubate 06/29/2022     Priority: Medium    CARDIOVASCULAR SCREENING; LDL GOAL LESS THAN 160 09/08/2015     Priority: Medium    Calculus of ureter 09/02/2015     Priority: Medium     [facility] in [facility], 8-31-15. Stone sent for analysis.       CTS (carpal tunnel syndrome) 03/29/2013     Priority: Medium    DJD (degenerative joint disease), ankle and foot 03/29/2013     Priority: Medium     Left ankle.       Testicular cancer (H) 04/08/2011     Priority: Medium     Diagnosed in 2008. [facility] center in [place], [place].         Social History:  Social History     Tobacco Use    Smoking status: Former     Types: Cigarettes     Passive exposure: Past    Smokeless tobacco: Former     Types: Chew   Vaping Use    Vaping status: Never Used   Substance Use Topics    Alcohol use: Not Currently    Drug use: No     Family History:  Family History   Problem Relation Age of Onset    No Known Problems Mother     Hypertension Father     Atrial fibrillation Father     Thyroid Disease Father     Nephrolithiasis Father     Pulmonary Embolism Father     C.A.D. Maternal Grandfather     Thyroid Cancer No family hx of     Diabetes No family hx of     Anesthesia Reaction No  family hx of        Medications:  Current Facility-Administered Medications   Medication Dose Route Frequency Provider Last Rate Last Admin    acetaminophen (TYLENOL) tablet 975 mg  975 mg Oral Q8H George Boone MD   975 mg at 06/04/25 0518    [START ON 6/5/2025] apixaban ANTICOAGULANT (ELIQUIS) tablet 2.5 mg  2.5 mg Oral BID George Boone MD        ceFAZolin (ANCEF) 2 g in dextrose 50 mL intermittent infusion  2 g Intravenous Q8H George Boone MD        chlorhexidine (PERIDEX) 0.12 % solution 15 mL  15 mL Mouth/Throat Q12H Judith Montoya APRN CNP   15 mL at 06/04/25 0803    gentamicin (GARAMYCIN) 140 mg in sodium chloride 0.9 % 100 mL intermittent infusion  1.5 mg/kg (Adjusted) Intravenous Once Juan Jose Bravo MD   140 mg at 06/04/25 0801    insulin aspart (NovoLOG) injection (RAPID ACTING)  1-4 Units Subcutaneous Q4H Judith Montoya APRN CNP   1 Units at 06/04/25 0803    pantoprazole (PROTONIX) 2 mg/mL suspension 40 mg  40 mg Per Feeding Tube QAM AC Judith Montoya APRN CNP   40 mg at 06/04/25 0803    Or    pantoprazole (PROTONIX) IV push injection 40 mg  40 mg Intravenous QAM  Judith Montoya APRN CNP        pharmacy alert - intermittent dosing  1 each Other See Admin Instructions Juan Jose Bravo MD        [START ON 6/5/2025] polyethylene glycol (MIRALAX) Packet 17 g  17 g Oral Daily George Boone MD        senna-docusate (SENOKOT-S/PERICOLACE) 8.6-50 MG per tablet 1 tablet  1 tablet Oral BID George Boone MD   1 tablet at 06/04/25 0803    sodium chloride (PF) 0.9% PF flush 3 mL  3 mL Intracatheter Q8H George Boone MD   3 mL at 06/04/25 0333       Current Facility-Administered Medications   Medication Dose Route Frequency Provider Last Rate Last Admin    fentaNYL (SUBLIMAZE) infusion   mcg/hr Intravenous Continuous Judith Montoya APRN CNP 1 mL/hr at 06/04/25 0835 50 mcg/hr at 06/04/25 0835    propofol (DIPRIVAN) infusion  5-75 mcg/kg/min Intravenous Continuous Judith Montoya,  LJ CNP   Stopped at 06/04/25 0835    And    Medication Instruction   Does not apply Continuous PRN Judith Montoya APRN CNP        Patient is already receiving anticoagulation with heparin, enoxaparin (LOVENOX), warfarin (COUMADIN)  or other anticoagulant medication   Does not apply Continuous PRN Judith Montoya APRN CNP        phenylephrine (JOEL-SYNEPHRINE) 50 mg in NaCl 0.9 % 250 mL infusion PERIPHERAL  0.5-2 mcg/kg/min Intravenous Continuous Judith Montoya APRN CNP 17.7 mL/hr at 06/04/25 0800 0.5 mcg/kg/min at 06/04/25 0800    sodium chloride 0.9 % infusion   Intravenous Continuous George Boone MD 75 mL/hr at 06/04/25 0800 Rate Verify at 06/04/25 0800       Physical Exam:  Temp:  [97.5  F (36.4  C)-99.9  F (37.7  C)] 99  F (37.2  C)  Pulse:  [106-117] 115  Resp:  [18-20] 18  BP: (139)/(96) 139/96  MAP:  [59 mmHg-84 mmHg] 67 mmHg  Arterial Line BP: ()/(49-69) 98/52  FiO2 (%):  [35 %-40 %] 35 %  SpO2:  [98 %-100 %] 99 %    Intake/Output Summary (Last 24 hours) at 6/4/2025 0840  Last data filed at 6/4/2025 0835  Gross per 24 hour   Intake 25194.61 ml   Output 4135 ml   Net 7174.61 ml     Diagnostics:  All labs and imaging were reviewed, of note:    CMP  Recent Labs   Lab 06/04/25  0739 06/04/25  0525 06/04/25  0343 06/04/25  0314 06/04/25  0212 06/04/25  0118 06/04/25  0100 06/03/25  0925 05/29/25  1050   NA  --  137  --  133*  --  134* 133*   < > 140   POTASSIUM  --  4.7  4.7  --  5.0  --  5.4* 6.4*   < > 4.4   CHLORIDE  --  103  --  101  --   --  102  --  101   CO2  --  19*  --  18*  --   --  18*  --  26   ANIONGAP  --  15  --  14  --   --  13  --  13   * 149* 161* 168*   < > 204* 171*   < > 127*   BUN  --  18.6  --  18.5  --   --  16.8  --  18.0   CR  --  1.17  --  1.26*  --   --  1.10  --  0.84   GFRESTIMATED  --  78  --  72  --   --  84  --  >90   CANDICE  --  8.1*  --  8.0*  --   --  8.1*  --  9.6   MAG  --  1.4*  --   --   --   --  1.8  --   --    PHOS  --  3.4  --   --   --   --   --   --   " --    PROTTOTAL  --  4.5*  --   --   --   --  5.0*  --   --    ALBUMIN  --  3.1*  --   --   --   --  3.4*  --   --    BILITOTAL  --  0.5  --   --   --   --  0.8  --   --    ALKPHOS  --  44  --   --   --   --  49  --   --    AST  --  29  --   --   --   --  25  --   --    ALT  --  21  --   --   --   --  22  --   --     < > = values in this interval not displayed.     CBC  Recent Labs   Lab 06/04/25  0525 06/04/25  0500 06/04/25  0118 06/04/25  0100 06/03/25  1219 05/29/25  1050   WBC 22.0*  --   --  23.2*  --  6.1   RBC 3.19*  --   --  3.57*  --  5.16   HGB 9.1*  9.1* 9.3* 10.1* 10.4*   < > 14.4   HCT 26.6*  --   --  30.5*  --  44.2   MCV 83  83 84  --  85  --  86   MCH 28.5  --   --  29.1  --  27.9   MCHC 34.2  --   --  34.1  --  32.6   RDW 14.2  --   --  14.0  --  13.3     --   --  305  --  336    < > = values in this interval not displayed.     INR  Recent Labs   Lab 06/04/25  0100   INR 1.18*     Arterial Blood Gas  Recent Labs   Lab 06/04/25  0324 06/04/25  0118 06/03/25  2038 06/03/25  1904   PH 7.35 7.25* 7.31* 7.40   PCO2 34* 42 46* 37   PO2 114* 134* 92 112*   HCO3 19* 19* 23 23   O2PER 40 50.0 37.0 35.0       No results found for: \"TROPI\", \"TROPONIN\", \"TROPR\", \"TROPN\"      EKG 6/4/25:      Transthoracic echocardiogram 6/4/25:   Interpretation Summary  Global and regional left ventricular function is hyperkinetic with an EF of  65-70%.  Right ventricular function, chamber size, wall motion, and thickness are  normal.  No significant valvular abnormalities present.  Trivial pericardial effusion is present.  There is no prior study for direct comparison.     "

## 2025-06-04 NOTE — ANESTHESIA PROCEDURE NOTES
Arterial Line Procedure Note    Pre-Procedure   Staff -        Anesthesiologist:  Jacki Curran MD       Performed By: anesthesiologist       Location: OR       Pre-Anesthestic Checklist: patient identified, IV checked, risks and benefits discussed, informed consent, monitors and equipment checked, pre-op evaluation and at physician/surgeon's request  Timeout:       Correct Patient: Yes        Correct Procedure: Yes        Correct Site: Yes        Correct Position: Yes   Line Placement:   This line was placed Post Induction starting at 6/4/2025 12:30 AM and ending at 6/4/2025 12:30 AM  Procedure   Procedure: arterial line, new line and emergent       Diagnosis: right custom pelvic/hip arthroplasty       Laterality: left       Insertion Site: radial.  Sterile Prep        Standard elements of sterile barrier followed       Skin prep: Chloraprep  Insertion/Injection        Technique: ultrasound guided, Seldinger Technique and Valente's test completed        Medical Necessity: need to minimize puncture attempts and other - see comments (patient with failed/removed left radial arterial line)       1. Ultrasound was used to evaluate the access site.       2. Artery evaluated via ultrasound for patency/adequacy.       3. Using real-time ultrasound the needle/catheter was observed entering the artery/vein.       Catheter Type/Size: 20 G, 12 cm  Narrative         Secured by: suture       Tegaderm dressing used.       Complications: None apparent,        Arterial waveform: Yes        IBP within 10% of NIBP: Yes

## 2025-06-04 NOTE — PROGRESS NOTES
3915 Notified by RN of new ST changes on bedside telemetry. STAT EKG showing ST elevation to inferior, anterior, and lateral leads. Blood pressure stable, weaning down on low dose phenylephrine infusion.    STAT consult to cardiology, spoke with cards fellow Dr. Oliva. Concern for possible pericarditis given diffuse distribution though cannot rule out acute ischemia at this time.    Plan for STAT TTE, trend troponin Q2H, repeat EKG  No Heparin gtt given acute blood loss intra-op    Appreciate cardiology assistance.    Judith Montoya, CNP

## 2025-06-04 NOTE — PROGRESS NOTES
Admitted/transferred from: [facility] OR on 6/4/25 at 0200 am  Reason for admission/transfer: Post operative management of undifferentiated shock and mechanical ventilator support  2 RN skin assessment: completed by Ladarius Jeffery RN and Saundra PITTS RN  Result of skin assessment and interventions/actions: blanchable redness on sacrum; abrasions on right upper arm; dry and cracked heels  Height, weight, drug calc weight: Done  Patient belongings (see Flowsheet)  MDRO education added to care planN/A  ?

## 2025-06-04 NOTE — PROGRESS NOTES
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 On 6/3/2025 underwent explant hemipelvis spacer and conversion to R DEVONTE with custom hemipelvis implant 6/3/2025 with Dr. Bravo c/b bladder tear s/p repair and acute blood loss anemia. Admitted to ICU post operatively for management of undifferentiated shock and mechanical ventilatory support.     CHANGES and MAJOR THINGS TODAY:   - Extubated and on room air  - Stop fent drip, encourage PO pain meds  - Ok to remove art line with pressor support off and use cuff pressures  - Additional 500 mL bolus LR for borderline low MAP, mildly fluid responsive  - Bedside swallow by RN passed, ADAT ordered  - Decrease hemoglobin and lactic checks to Q8h, next at 1400  - Resume PTA gabapentin 400 mg TID    PLAN:    Neuro:  # Pain and sedation  - Propofol off this AM for extubation  - Give PO pain meds and turn fentanyl drip off  - Acetaminophen 975 mg Q6h  - Hydroxyzine 25 mg Q6h PRN  - Methocarbamol 750 mg Q6h PRN  - Oxycodone 5-10 mg Q4h PRN  - Hydromorphone 0.2-0.4 mg Q2h PRN  - Resume PTA gabapentin 400 mg TID     Pulmonary:  # Post-op ventilatory support   - Successful extubation, now on room air  - Goal SpO2 > 92%     Cardiovascular:  # Diffuse ST elevation, likely pericarditis   # Shock, hypovolemic  Intra op EBL 2.7L, received received  8L crystalloid, 1L albumin, and 2 units PRBCs. Shock likely multifactorial 2/2 acute blood loss anemia and hypovolemia; possible sepsis with intra-op bladder tear s/p repair Overnight developed diffuse ST elevation on EKG, troponin trended  and flat, stat ECHO and cardiology consult.    - Start colchicine 0.6mg daily per cardiology  - Can consider ibuprofen if patient symptomatic  - Trend daily CRP  - Phenylephrine drip for goal MAP > 65   - Echo with EF 65-70%, no valvular abnormalities, small pericardiac effusion; no prior imaging  - Troponin 36 --> 32; no longer need to trend unless acute EKG change or new chest pain  - 500 mL bolus LR overnight and additional 500 mL LR bolus this AM with borderline hypotension and vasopressor needs    GI/Nutrition:  # Constipation   - Polyethylene glycol 17 g daily   - Senna docusate 8.6-50 mg BID    - Other bowel regimen PRN   - ADAT to regular  - Daily PPI     Renal/Fluids/Electrolytes:  # NAYELY; resolved   # Electrolyte disturbance, improved  # Bladder tear s/p repair; intra-op  Baseline creat ~0.9. Hyperkalemic with K 6.4 at end of case, shifted with insulin/dextrose. Persistent lactic acidosis despite 8L volume resuscitation intra op.  - Creatinine peak 1.26 now 1.17  - UO improving, clear and yellow  - NaCl 75 mL/hr, continue until extubation and adequate PO fluid intake  - Lactate peak 6.2 --> 2.7; Q8h  - Daily BMP, Mg and Phos   - Monitor UO for hematuria/clots   - Maintain mccord for 2 weeks per urology recs    Endocrine:  # Stress induced hyperglycemia   - Low intensity sliding scale insulin Q4  - Q4 BG checks    - Hypoglycemia protocol ordered  - BG trend 150-160s    ID:  # Leukocytosis  Patient presents after prolonged surgical intervention with complication of bladder tear s/p repair. Leukocytosis, tachycardia, hypotension, and lactic acidosis.  - CRP 54.89; cards recommends daily checks  - Afebrile, WBC 22  - Cefazolin 2 g Q8  - Gentamicin 140 mg; verify with ortho to continue and if so place a pharmacy consult to dose    Cultures:  6/4 Blood cultures: in process.  6/3 Pelvic fluid cultures: in process/NGTD  6/3 Pelvic tissue cultures: NGTD    Antibiotics:  Cefazolin (6/3-)  Gentamicin  (6/3-)    Hematology:    # Acute blood loss anemia  # Bilateral peroneal DVTs, on Apixaban, 3/2025  # Hx testicular cancer s/p chemotherapy and orchiectomy  EBL 2.7L s/p 2 units PRBCs intra op.   - Hgb 10.4 --> 8.1; trend Q8h  - Transfuse for Hgb < 7 or active bleeding  - Start Apixaban 2.5 mg BID POD2 x 4 weeks per Ortho  - SCDs while in hospital     Musculoskeletal:  # Chondrosarcoma of pelvis s/p hemipelvectomy 2/14/2025  # Explant hemipelvis cement spacer and conversion to DEVONTE with custom hemipelvis implant 6/4/2025 with Dr. Bravo   - Ortho primary; plan as below:   - Activity: Up with assist. Anterior hip precautions  - Weight bearing status: TTWB RLE x 3 months.  - Antibiotics: continue Ancef and gentamicin post op  - Diet: Progress diet as tolerated.  - DVT prophylaxis: SCDs and mechanical while in the hospital. Eliquis 2.5 mg BID starting POD1 x 4 weeks.   - Elevation: Elevate operative extremity as tolerated.   - Wound Care: Keep dressing in place until POD #14  - Drains: Please document output per shift, discontinue 15f HUGO drain in right thigh per ortho, management of left flank 19f drain per urology  - Mccord: Keep in place for 2 weeks, management of mccord per urology  - Pain management: Utilize all oral meds first, IV meds for severe breakthrough pain after PO meds given adequate time to take effect.  - X-rays: Completed in PACU.  - Therapy: PT/OT evaluate prior to discharge.  - Labs: Trend Hgb on POD #1.  - Cultures: Pending, follow culture results closely.  - Consults: PT, OT, medicine, ICU team, Urology. Appreciate assistance in caring for this patient.  - Disposition: Pending progress with therapies, pain control on orals, and medical stability. Anticipate discharge to TCU on POD #5-6.  - Follow-up: Clinic with Dr. Bravo in 4 weeks.    Skin:  # Intraoperative incision; wound vac  # HUGO drain x2  - Wound vac in place over incisions   - Right thigh drain to be removed by ortho  - Left flank drain to be  managed by urology     General Cares/Prophylaxis:    DVT Prophylaxis: SCDs  GI Prophylaxis: PPI  Restraints: None  Family Communication: Wife Mary Ellen, updated at bedside  Code Status: Full Code    Lines/tubes/drains:  - Michelle   - Wound vac  - Drain x2  - PIV x3  - Art line     Disposition:  - ICU    Patient seen and findings/plan discussed with medical ICU staff, Dr. Chambers.    Christa Tipton NP Student        Attestation:  I, Roxie Elias NP, was present with the JELENA student who participated in the service and in the documentation of the note.  I have verified the history and personally performed the physical exam and medical decision making.  I agree with the assessment and plan of care as documented in the note.      Roxie Elias NP  Date of Service (when I saw the patient): 06/04/25    Critical care time spent on encounter excluding procedures: 52 mins    Clinically Significant Risk Factors Present on Admission        # Hyperkalemia: Highest K = 6.4 mmol/L in last 2 days, will monitor as appropriate  # Hyponatremia: Lowest Na = 133 mmol/L in last 2 days, will monitor as appropriate   # Hypocalcemia: Lowest iCa = 4.3 mg/dL in last 2 days, will monitor and replace as appropriate  # Hypercalcemia: Highest iCa = 5.4 mg/dL in last 2 days, will monitor as appropriate  # Hypomagnesemia: Lowest Mg = 1.4 mg/dL in last 2 days, will replace as needed   # Hypoalbuminemia: Lowest albumin = 3.1 g/dL at 6/4/2025  5:25 AM, will monitor as appropriate  # Coagulation Defect: INR = 1.18 (Ref range: 0.85 - 1.15) and/or PTT = 29 Seconds (Ref range: 22 - 38 Seconds), will monitor for bleeding      # Circulatory Shock: required vasopressors within past 24 hours       # Anemia: based on hgb <11       # Obesity: Estimated body mass index is 34.29 kg/m  as calculated from the following:    Height as of this encounter: 1.829 m (6').    Weight as of this encounter: 114.7 kg (252 lb 12.8 oz).       # Financial/Environmental  Concerns:        # Anemia: based on hgb <11             ====================================  INTERVAL HISTORY:   Admitted overnight post-op, intubated, sedated and on pressors.  This AM he is lightly sedated on the vent.  Denies pain, endorses wanting the breathing tube out.      OBJECTIVE:   1. VITAL SIGNS:   Temp:  [97.5  F (36.4  C)-99.9  F (37.7  C)] 99.5  F (37.5  C)  Pulse:  [106-117] 112  Resp:  [18-20] 18  BP: (139)/(96) 139/96  MAP:  [59 mmHg-84 mmHg] 75 mmHg  Arterial Line BP: ()/(49-69) 109/60  FiO2 (%):  [40 %] 40 %  SpO2:  [98 %-100 %] 100 %  FiO2 (%): 40 %, Resp: 18, Vent Mode: VC/AC, Resp Rate (Set): 18 breaths/min, Tidal Volume (Set, mL): 600 mL, PEEP (cm H2O): 8 cmH2O, Resp Rate (Set): 18 breaths/min, Tidal Volume (Set, mL): 600 mL, PEEP (cm H2O): 8 cmH2O  2. INTAKE/ OUTPUT:   I/O last 3 completed shifts:  In: 24165.64 [I.V.:8573.66; NG/GT:45; IV Piggyback:600.98]  Out: 4010 [Urine:1060; Drains:250; Blood:2700]    3. PHYSICAL EXAMINATION:  General: lying in bed, sedated and on the ventilator, in no acute distress   HEENT: Normocephalic, atraumatic, ETT secured and in place   Neuro: wakes to voice, able to answer questions yes/no, wants the tube removed, follows commands   Pulm/Resp: Coarse breath sounds on the left, minimal secretions through ET, copious oral secretions.   CV: s1 and s2 auscultated, regular rhythm, tachycardic 100-120s  Abdomen: Soft, non-distended, mild tenderness to the lower abdomen  : mccord catheter in place, urine yellow and clear  Incisions/Skin: wound vac in place over pelvic region, drains x2 with serosanguineous output     4. LABS:   Arterial Blood Gases   Recent Labs   Lab 06/04/25  0324 06/04/25  0118 06/03/25  2038 06/03/25  1904   PH 7.35 7.25* 7.31* 7.40   PCO2 34* 42 46* 37   PO2 114* 134* 92 112*   HCO3 19* 19* 23 23     Complete Blood Count   Recent Labs   Lab 06/04/25  0525 06/04/25  0500 06/04/25  0118 06/04/25  0100 06/03/25  1219 05/29/25  1050   WBC  22.0*  --   --  23.2*  --  6.1   HGB 9.1*  9.1* 9.3* 10.1* 10.4*   < > 14.4     --   --  305  --  336    < > = values in this interval not displayed.     Basic Metabolic Panel  Recent Labs   Lab 06/04/25  0525 06/04/25  0343 06/04/25  0314 06/04/25  0212 06/04/25  0118 06/04/25  0100 06/03/25  0925 05/29/25  1050     --  133*  --  134* 133*   < > 140   POTASSIUM 4.7  4.7  --  5.0  --  5.4* 6.4*   < > 4.4   CHLORIDE 103  --  101  --   --  102  --  101   CO2 19*  --  18*  --   --  18*  --  26   BUN 18.6  --  18.5  --   --  16.8  --  18.0   CR 1.17  --  1.26*  --   --  1.10  --  0.84   * 161* 168* 159* 204* 171*   < > 127*    < > = values in this interval not displayed.     Liver Function Tests  Recent Labs   Lab 06/04/25  0525 06/04/25  0100   AST 29 25   ALT 21 22   ALKPHOS 44 49   BILITOTAL 0.5 0.8   ALBUMIN 3.1* 3.4*   INR  --  1.18*     Coagulation Profile  Recent Labs   Lab 06/04/25  0100   INR 1.18*   PTT 29       5. RADIOLOGY:   Recent Results (from the past 24 hours)   XR Pelvis Port 1/2 Views    Narrative    This exam was marked as non-reportable because it will not be read by a   radiologist or a [place] non-radiologist provider.         XR Pelvis 1/2 Views    Narrative    EXAM: XR PELVIS 1/2 VIEWS, XR FEMUR PORT RIGHT 2 VIEWS  LOCATION: [facility]  DATE: 6/4/2025    INDICATION: No count of instruments  COMPARISON: 05/01/2025      Impression    IMPRESSION: Status post hemipelvectomy with extensive postsurgical changes of the right pelvis. Screws are seen traversing the right sacroiliac joint. Interval right hip arthroplasty. Surgical clips overlie the right pelvis. Drain overlies the pelvis.   There are overlapping structures which limit the evaluation somewhat, however no discretely visualized retained surgical instrument. A single view femur from the prior day is also included in this dictation, with pre-arthroplasty hardware involving  the   right proximal femur.   XR Femur Port Right 2 Views    Narrative    EXAM: XR PELVIS 1/2 VIEWS, XR FEMUR PORT RIGHT 2 VIEWS  LOCATION: [facility]  DATE: 6/4/2025    INDICATION: No count of instruments  COMPARISON: 05/01/2025      Impression    IMPRESSION: Status post hemipelvectomy with extensive postsurgical changes of the right pelvis. Screws are seen traversing the right sacroiliac joint. Interval right hip arthroplasty. Surgical clips overlie the right pelvis. Drain overlies the pelvis.   There are overlapping structures which limit the evaluation somewhat, however no discretely visualized retained surgical instrument. A single view femur from the prior day is also included in this dictation, with pre-arthroplasty hardware involving the   right proximal femur.   XR Chest Port 1 View    Impression    RESIDENT PRELIMINARY INTERPRETATION  Impression:   1.  Endotracheal tube tip projects over the mid thoracic trachea, 3.6  cm above the yamila.  2. Subtle left greater than right perihilar and basilar streaky  opacities, suggestive of atelectasis versus pulmonary edema.   XR Abdomen Port 1 View    Impression    RESIDENT PRELIMINARY INTERPRETATION  IMPRESSION: Enteric tube tip and sidehole project over the stomach.

## 2025-06-04 NOTE — ANESTHESIA POSTPROCEDURE EVALUATION
Patient: Gilberto Lund    Procedure: Procedure(s):  CUSTOM PELVIC AND HIP REPLACEMENT  REMOVAL, HARDWARE, PELVIS, ANTERIOR APPROACH  REMOVAL ANTIBIOTIC SPACER, Pelvis  COMPLEX BLADDER REPAIR       Anesthesia Type:  General    Note:  Disposition: ICU            ICU Sign Out: Anesthesiologist/ICU physician sign out WAS performed   Postop Pain Control:             Sign Out: Unable to assess, patient under sedation.   PONV: No   Neuro/Psych:             Sign Out: PLANNED postop sedation   Airway/Respiratory:             Sign Out: AIRWAY IN SITU/Resp. Support; O2 supplementation               Oxygen: Other (ETT)               Airway in situ/Resp. Support: ETT; PPV                 Reason: Unplanned (Patient with significant intra-op blood loss, hypotension and lactic acidosis)   CV/Hemodynamics:             Sign Out: Detailed CV status               Blood Pressure: HypOtension; Pressors               Rate/Rhythm: Tachycardia               Perfusion:  Adequate perfusion indices   Other NRE:             Miscellaneous: Lactic Acidosis; HypERkalemia   DID A NON-ROUTINE EVENT OCCUR? YES    Event details/Postop Comments:  Patient with significant blood loss from custom pelvic/hip replacement, intra-op urinary tract/bladder injury requiring extended repair urology and new onset  of hyperkalemia and lactic acidosis and requiring pressor to maintain BP.       Last vitals:  Vitals:    06/03/25 0917   BP: (!) 139/96   Pulse: 108   Resp: 20   Temp: 36.4  C (97.5  F)   SpO2: 98%       Electronically Signed By: Jacki Curran MD  June 4, 2025  2:41 AM

## 2025-06-04 NOTE — PROGRESS NOTES
Pt extubated to a 2LNC without complications. Breath sounds clear with a strong cough SPO2 95%. Will continue to monitor

## 2025-06-04 NOTE — BRIEF OP NOTE
[facility]    Brief Operative Note    Pre-operative diagnosis: H/O chondrosarcoma [Z85.830]  Post-operative diagnosis Same as pre-operative diagnosis    Procedure: CUSTOM PELVIC AND HIP REPLACEMENT, Right - Hip  REMOVAL, HARDWARE, PELVIS, ANTERIOR APPROACH, Right - Pelvis  REMOVAL ANTIBIOTIC SPACER, Pelvis, Right - Hip  COMPLEX BLADDER REPAIR, N/A - Bladder    Surgeon: Surgeons and Role:  Panel 1:     * Juan Jose Bravo MD - Primary     * Arvind Weber MD - Assisting     * Silas Shrestha PA-C - Assisting     * George Boone MD - Fellow - Assisting  Panel 2:     * Cheng Lyman MD - Primary     * Carissa Vanessa MD - Resident - Assisting     * Rolando Negron MD - Fellow - Assisting     * Gilberto Gomes MD  Anesthesia: General   Estimated Blood Loss: 2700 cc    Drains: Amarjit-Carr  Specimens:   ID Type Source Tests Collected by Time Destination   A : Right Pelvis #1 Body fluid, unsp Pelvis, Right ANAEROBIC BACTERIAL CULTURE ROUTINE, AEROBIC BACTERIAL CULTURE ROUTINE Juan Jose Bravo MD 6/3/2025  2:06 PM    B : Right Pelvis #2 Tissue Pelvis, Right ANAEROBIC BACTERIAL CULTURE ROUTINE, AEROBIC BACTERIAL CULTURE ROUTINE Juan Jose Bravo MD 6/3/2025  2:06 PM    C : Right Pelvis #3 Tissue Pelvis, Right ANAEROBIC BACTERIAL CULTURE ROUTINE, AEROBIC BACTERIAL CULTURE ROUTINE Juan Jose Bravo MD 6/3/2025  2:06 PM    D :  Blood Line, Other CREATININE Juan Jose Bravo MD 6/3/2025  4:09 PM      Findings:   Iatrogenic bladder injurry intra-operatively.  Complications: Unintended puncture/laceration of the bladder.  Implants:   Implant Name Type Inv. Item Serial No.  Lot No. LRB No. Used Action   MPS CURVED [id number] PLATE 250.5 MM 16 HOLE Metallic Hardware/[place]   MALU N/A Right 1 Explanted   4.0 X 30 MM CANCELLOUS SCREW FT Metallic Hardware/[place]   MALU N/A Right 1 Explanted   SCREW CANCELLOUS FT 4X50MM - [id number] Metallic Hardware/[place]  SCREW CANCELLOUS FT 4X50MM  [employer]ALU [employer]TH[employer] N/A Right 1 Explanted   SCREW CANCELLOUS FT 4X60MM - [id number] Metallic Hardware/[place] SCREW CANCELLOUS FT 4X60MM  [employer]ALU [employer]TH[employer] N/A Right 4 Explanted   SCR BONE 3.5MM 65MML SS RYAN HEXAGONA - [id number] Metallic Hardware/[place] SCR BONE 3.5MM 65MML SS RYAN HEXAGONA  MALU ORTHOPED[employer] N/A Right 2 Explanted   IMP BONE CEMENT STRK SIMPLEX TOBRAMYCIN 40CC 6197-9-001 - [id number] Cement, Bone IMP BONE CEMENT STRK SIMPLEX TOBRAMYCIN 40CC 6197-9-001  MALU ORTHOPED[employer] [id number] Right 5 Explanted   QLS RIGHT SUPRAPECTINEAL PLATE Metallic Hardware/[place]   MALU [id number] Right 1 Explanted   SCREW CANCELLOUS FT 4X40MM - [id number] Metallic Hardware/[place] SCREW CANCELLOUS FT 4X40MM  MALU ORTHOP[employer] N/A Right 1 Explanted   GW ORTH 450MM 2.8MM FLUT TIP STRL 03.333.006S - [id number] Wire GW ORTH 450MM 2.8MM FLUT TIP STRL 03.333.006S  [employer] [id number] Right 1 Used as a Supply   GW ORTH 450MM 2.8MM FLUT TIP STRL 03.333.006S - [id number] Wire GW ORTH 450MM 2.8MM FLUT TIP STRL 03.333.006S  [employer]STRAT[employer] [id number] Right 1 Used as a Supply   [place] [employer]trx Polyethylene Acetabular Liner 36mm ID - 66mm OD Metallic Hardware/[place]   Depuy  Right 1 Implanted   GW ORTH 300MM 2.8MM 03.333.005 - [id number] Wire GW ORTH 300MM 2.8MM 03.333.005  [employer]STRATEC  Right 3 Used as a Supply   ACTIS COLLARED HIGH SIZE 11 - [id number] Total Joint Component/Insert ACTIS COLLARED HIGH SIZE 11  [employer] [facility]- [id number] Right 1 Implanted   7.5mm x 35mm full thread cannulated screw Metallic Hardware/[place]   SYNTHES  Right 1 Implanted   IMP HEAD FEMORAL DEPUY CERAMIC 36MM +1.5MM 136536-310 - [id number] Total Joint Component/Insert IMP HEAD FEMORAL DEPUY CERAMIC 36MM +1.5MM 1365-  [facility]-  Right 1 Implanted   7.5mm x 100mm cannulate screw Metallic Hardware/[place]   [facility]  Right 1 Implanted   7.5mm x 110mm cannulate screw Metallic Hardware/[place]   [facility]  Right 2  Implanted   7.5mm x 130mm cannulated screw Metallic Hardware/[place]   SYNTHES  Right 1 Implanted   7.5mm x 140mm cannulate screw Metallic Hardware/[place]   SYNTHES  Right 1 Wasted   IMP SCR SYN CAN 7.3X20MM FULL THRD SS - [id number] Metallic Hardware/[place] IMP SCR SYN CAN 7.3X20MM FULL THRD SS  SYNTHES-STRATEC  Right 1 Implanted   IMP SCR CAN 6.5X20MM FT .460 - [id number] Metallic Hardware/[place] IMP SCR CAN 6.5X20MM FT .460  SYNTHES-STRATEC  Right 1 Wasted       Assessment: Gilberto Lund is a 44 year old male s/p Right explant of hemipelvis cement spacer and and conversion to total hip arthroplasty utilizing custom hemipelvis implant on 6/4/25 with Dr. Bravo.     Plan:    Activity: Up with assist. Anterior hip precautions  Weight bearing status: TTWB RLE x 3 months.  Antibiotics: continue Ancef and gentamicin post op  Diet: Progress diet as tolerated.  DVT prophylaxis: SCDs and mechanical while in the hospital. Eliquis 2.5 mg BID starting POD1 x 4 weeks.   Elevation: Elevate operative extremity as tolerated.   Wound Care: Keep dressing in place until POD #14  Drains: Please document output per shift, discontinue 15f HUGO drain in right thigh per ortho, management of left flank 19f drain per urology  Mccord: Keep in place for 2 weeks, management of mccord per urology  Pain management: Utilize all oral meds first, IV meds for severe breakthrough pain after PO meds given adequate time to take effect.  X-rays: Completed in PACU.  Therapy: PT/OT evaluate prior to discharge.  Labs: Trend Hgb on POD #1.  Cultures: Pending, follow culture results closely.  Consults: PT, OT, medicine, ICU team, Urology. Appreciate assistance in caring for this patient.  Disposition: Pending progress with therapies, pain control on orals, and medical stability. Anticipate discharge to TCU on POD #5-6.  Follow-up: Clinic with Dr. Bravo in 4 weeks.    George Boone MD  Adult Reconstruction Fellow

## 2025-06-04 NOTE — ANESTHESIA CARE TRANSFER NOTE
Patient: Gilberto Lund    Procedure: Procedure(s):  CUSTOM PELVIC AND HIP REPLACEMENT  REMOVAL, HARDWARE, PELVIS, ANTERIOR APPROACH  REMOVAL ANTIBIOTIC SPACER, Pelvis  COMPLEX BLADDER REPAIR       Diagnosis: H/O chondrosarcoma [Z85.830]  Diagnosis Additional Information: No value filed.    Anesthesia Type:   General     Note:    Oropharynx: ventilatory support  Level of Consciousness: iatrogenic sedation      Independent Airway: airway patency not satisfactory and stable  Dentition: dentition unchanged  Vital Signs Stable: post-procedure vital signs reviewed and stable  Report to RN Given: handoff report given  Patient transferred to: ICU    ICU Handoff: Call for PAUSE to initiate/utilize ICU HANDOFF, Identified Patient, Identified Responsible Provider, Reviewed the Pertinent Medical History, Discussed Surgical Course, Reviewed Intra-OP Anesthesia Management and Issues during Anesthesia, Set Expectations for Post Procedure Period and Allowed Opportunity for Questions and Acknowledgement of Understanding      Vitals:  Vitals Value Taken Time   /77    Temp 99.9    Pulse 122    Resp 18    SpO2 99.9        Electronically Signed By: LJ Elias CRNA  June 4, 2025  2:10 AM

## 2025-06-04 NOTE — PROGRESS NOTES
10A ICU End of Shift Summary.     Changes this shift:   -Extubated at 0914  -OG removed  -Phenylephrine stopped at 1015  -500 ml LR for low BP's     Neuro: Afebrile, A&OX4  Cardiac: ST, MAP >65  Respiratory: Sats > 92 on 2 LPM 02 via NC   GI:CLD, poor appetite CLD, poor appetite. NO BM senna given  : Mccord in place, adequate output   Pain: Managed with PRN Robaxin, oxycodone and IV dilaudid  Skin: No acute changes  LDA's: PIV x3, Left radial A-line, HUGO x2, wound vac and mccord  Activities: q2h turns in bed    Drips:   NS 75ml/hr       Plan: Continue optimal pain management

## 2025-06-04 NOTE — PROGRESS NOTES
Patient is doing well.  He is extubated and recovering in the ICU.  His pain is well-controlled.  He has Michelle and drain in the pelvis.  Michelle catheter has clear yellow urine.  Drain is serosanguineous.  Creatinine is 1.17, and is downtrending this morning.  Urine output was 1060/125 mL.  Drain output is 105 mL overnight.    Plan to continue Michelle catheter, for 2 weeks.  Continue to monitor urine output and drain output, when patient is getting better and may be closer to discharge, potentially get HUGO creatinine prior to removal, depending on the outputs.  Outpatient cystogram prior to trial void in 2 weeks with Dr. Lyman or Dr. Negron.  Urology will continue to follow

## 2025-06-04 NOTE — PROGRESS NOTES
Pt arrived intubated from OR with 8.0 ETT Secured 24@teeth. Pt placed on vent per order.    RT will cont to ernstwo.

## 2025-06-04 NOTE — PROGRESS NOTES
Orthopedic Surgery Progress Note 06/04/2025    S: Patient seen this morning. Intubated but interactive. Transferred to ICU last night from OR. Remained tachy overnight. Concern for ST elevations on EKG overnight--pending cardiology recs. BP s soft overnight, continues to be on phenylephrine.    O:  Temp: 99.5  F (37.5  C) Temp src: Axillary BP: (!) 139/96 Pulse: 114   Resp: 18 SpO2: 99 % O2 Device: Mechanical Ventilator      Exam:  Gen: alert, intubated but interactive  Resp: intubated, on ventilator  MSK:    RLE:  - Prevena dressing in place, maintaining suction, no output in canister  - SILT tibial/sural/saphenous/DP/SP nerves  - Fires TA, EHL, FHL, GaSC  - PT/DP pulses 2+, foot wwp    Drain output:   R thigh: 150 ml/12 hrs    Abdomen: 100 ml/12 hrs      Recent Labs   Lab 06/04/25  0525 06/04/25  0500 06/04/25  0118 06/04/25  0100 06/03/25  1219 05/29/25  1050   WBC 22.0*  --   --  23.2*  --  6.1   HGB 9.1*  9.1* 9.3* 10.1* 10.4*   < > 14.4     --   --  305  --  336    < > = values in this interval not displayed.       Culture results: NGTD    Assessment: Gilberto Lund is a 44 year old male s/p Right explant of hemipelvis cement spacer and and conversion to total hip arthroplasty utilizing custom hemipelvis implant on 6/4/25 with Dr. Bravo.      Plan:     Activity: Up with assist when extubated and able. Anterior hip precautions  Weight bearing status: TTWB RLE x 3 months.  Antibiotics: continue Ancef and gentamicin post op  Diet: Progress diet as tolerated.  DVT prophylaxis: SCDs and mechanical while in the hospital. Eliquis 2.5 mg BID starting POD1 x 4 weeks.   Elevation: Elevate operative extremity as tolerated.   Wound Care: Keep Prevena dressing in place until POD #14  Drains: Please document output per shift, discontinue 15f HUGO drain in right thigh per ortho, management of left flank 19f drain per urology  Mccord: Keep in place for 2 weeks, management of mccord per urology  Pain management:  Utilize all oral meds first, IV meds for severe breakthrough pain after PO meds given adequate time to take effect.  X-rays: Ordered, AP pelvis and cross-table lateral.  Therapy: PT/OT evaluate prior to discharge.  Cultures: Pending, follow culture results closely.  Consults: PT, OT, medicine, ICU team, Urology, Cardiology. Appreciate assistance in caring for this patient    George Boone MD  Adult Reconstruction Fellow

## 2025-06-05 ASSESSMENT — ACTIVITIES OF DAILY LIVING (ADL)
ADLS_ACUITY_SCORE: 62
DEPENDENT_IADLS:: CLEANING;COOKING;LAUNDRY;SHOPPING;MEAL PREPARATION;TRANSPORTATION
ADLS_ACUITY_SCORE: 62

## 2025-06-05 NOTE — PLAN OF CARE
Goal Outcome Evaluation:      Plan of Care Reviewed With: patient    Overall Patient Progress: improvingOverall Patient Progress: improving     Major shift events:    Neuro: Aox4. Afebrile   CV: Soft BP, MAP > 65 without intervention. ST. 1 unit blood given   Resp: 2L NC   GI: no BM overnight   : mccord in place with good output   Skin: pt refused full turn, unable to visualize posterior.    Drips: NS 75 ml/hr. Stopped at 0500 d/t tolerating oral intake    For additional details and vitals see flowsheets.

## 2025-06-05 NOTE — PROGRESS NOTES
Orthopedic Surgery Progress Note 06/05/2025    S: Patient seen this morning. Extubated, resting comfortably in bed. Overnight patient with symptoms of acute blood loss anemia, hemoglobin 6.7 thus 1u pRBCs ordered and running. Patient denies lightheadedness/dizziness at rest, endorses fatigue. Tolerating sips of clear liquids. Voiding via mccord. No concerns this AM.    O:  Temp: 98.2  F (36.8  C) Temp src: Oral BP: 122/79 Pulse: 109   Resp: 13 SpO2: 94 % O2 Device: Nasal cannula Oxygen Delivery: 2 LPM    Exam:  Gen: alert, intubated but interactive  Resp: intubated, on ventilator  MSK:    RLE:  - Prevena dressing in place, maintaining suction, no output in canister  - SILT tibial/sural/saphenous/DP/SP nerves  - Fires quad, hamstring, TA, EHL, FHL, GaSC  - PT/DP pulses 2+, foot wwp    Drain output:   Output by Drain (mL) 06/03/25 0700 - 06/03/25 1459 06/03/25 1500 - 06/03/25 2259 06/03/25 2300 - 06/04/25 0659 06/04/25 0700 - 06/04/25 1459 06/04/25 1500 - 06/04/25 2259 06/04/25 2300 - 06/05/25 0546   Negative Pressure Wound Therapy Abdomen Anterior   0   0   Drain Closed/Suction 1 Right;Anterior Hip Bulb 15 [place]   145 80 175 110   Drain Closed/Suction 2 Inferior Abdomen Bulb 19 [place]   105 20 35          Recent Labs   Lab 06/05/25  0126 06/04/25  2138 06/04/25  1344 06/04/25  1003 06/04/25  0525 06/04/25  0118 06/04/25  0100 06/03/25  1219 05/29/25  1050   WBC  --   --   --   --  22.0*  --  23.2*  --  6.1   HGB 6.7* 7.1* 7.6*   < > 9.1*  9.1*   < > 10.4*   < > 14.4   PLT  --   --   --   --  260  --  305  --  336    < > = values in this interval not displayed.       Culture results: NGTD    Assessment: Gilberto Lund is a 44 year old male s/p Right explant of hemipelvis cement spacer and and conversion to total hip arthroplasty utilizing custom hemipelvis implant on 6/4/25 with Dr. Bravo.      Plan:  Activity: Up with assist when extubated and able. Anterior hip precautions  Weight bearing status: TTWB RLE x 3  months.  Antibiotics: continue Ancef and gentamicin post op  Diet: Progress diet as tolerated.  DVT prophylaxis: SCDs and mechanical while in the hospital. Eliquis 2.5 mg BID starting POD1 x 4 weeks.   Elevation: Elevate operative extremity as tolerated.   Wound Care: Keep Prevena dressing in place until POD #14  Drains: Please document output per shift, discontinue 15f HUGO drain in right thigh per ortho, management of left flank 19f drain per urology  Mccord: Keep in place for 2 weeks, management of mccord per urology  Pain management: Utilize all oral meds first, IV meds for severe breakthrough pain after PO meds given adequate time to take effect.  X-rays: Ordered, AP pelvis and cross-table lateral.  Therapy: PT/OT evaluate prior to discharge.  Cultures: Pending, follow culture results closely.  Consults: PT, OT, medicine, ICU team, Urology, Cardiology. Appreciate assistance in caring for this patient    Shiva Toribio MD  Adult Reconstruction Resident  06/05/2025

## 2025-06-05 NOTE — PROGRESS NOTES
Patient is doing well.    His pain is well-controlled.    He has Michelle and drain    Drain is serosanguineous.    Recent Labs   Lab Test 06/05/25  1007 06/05/25  0608 06/05/25  0126 06/04/25  1003 06/04/25  0525   WBC 12.5* 13.2*  --   --  22.0*   HGB 7.0*  7.0* 7.1*  7.1* 6.7*   < > 9.1*  9.1*   CR 1.06 1.03  --   --  1.17    < > = values in this interval not displayed.         UOP 1500/325  Drain 125/25    Plan to continue Michelle catheter, for 2 weeks.  HUGO creatinine prior to removal, close to discharge.  Outpatient cystogram prior to trial void in 2 weeks with Dr. Lyman or Dr. Negron.  Urology will follow peripherally

## 2025-06-05 NOTE — PROGRESS NOTES
Cardiology Sign off Note:    Please see full consult note from 6/4 but in brief cardiology consulted for ST elevations and concern for STEMI. ST elevations diffuse and with elevated CRP, likely due to pericarditis. Patient started on colchicine. Agree with continuing colchicine for 3 months. If patient begins to have chest pain and ok from surgical standpoint, could start Ibuprofen 600 mg TID for 1-2 weeks (until pain resolves). Would then decrease Ibuprofen dose by 200 mg weekly until off (400 mg TID X 1 week -> 200 mg TID X 1 week -> off).     Cardiology will sign off at this time. Please reach out to our team if further questions/concerns. This plan was discussed with staff cardiologist Dr. Fay who is in agreement with plan.

## 2025-06-05 NOTE — CONSULTS
Care Management Initial Consult    General Information  Assessment completed with: Patient, Spouse or significant other, VM-chart review,    Type of CM/SW Visit: Initial Assessment    Primary Care Provider verified and updated as needed: Yes   Readmission within the last 30 days: planned readmission      Reason for Consult: discharge planning  Advance Care Planning: Advance Care Planning Reviewed: present on chart          Communication Assessment  Patient's communication style: spoken language (English or Bilingual)    Hearing Difficulty or Deaf: no   Wear Glasses or Blind: yes    Cognitive  Cognitive/Neuro/Behavioral: WDL  Level of Consciousness: alert  Arousal Level: opens eyes spontaneously  Orientation: oriented x 4  Mood/Behavior: behavior appropriate to situation  Best Language: 0 - No aphasia  Speech: clear, spontaneous, logical    Living Environment:   People in home: child(angelo), dependent, spouse     Current living Arrangements: house      Able to return to prior arrangements: yes       Family/Social Support:  Care provided by: spouse/significant other  Provides care for: no one, unable/limited ability to care for self  Marital Status:   Support system: Wife          Description of Support System: Supportive, Involved    Support Assessment: Adequate family and caregiver support    Current Resources:   Patient receiving home care services: No (Pt recieved  home PT/OT and nursing after last  ARU discharge  to home)        Community Resources:    Equipment currently used at home: walker, rolling, transfer board  Supplies currently used at home: None    Employment/Financial:  Employment Status: other (see comments) (long term disability.)        Financial Concerns: other (see comments) (savings are dwindling, wife says she has a good paying job.)   Referral to Financial Worker: No       Does the patient's insurance plan have a 3 day qualifying hospital stay waiver?  Yes     Which insurance plan 3 day  waiver is available? Alternative insurance waiver    Will the waiver be used for post-acute placement? Yes    Lifestyle & Psychosocial Needs:  Social Drivers of Health     Food Insecurity: Low Risk  (6/3/2025)    Food Insecurity     Within the past 12 months, did you worry that your food would run out before you got money to buy more?: No     Within the past 12 months, did the food you bought just not last and you didn t have money to get more?: No   Depression: Not at risk (2/6/2025)    PHQ-2     PHQ-2 Score: 0   Housing Stability: High Risk (6/3/2025)    Housing Stability     Do you have housing? : No     Are you worried about losing your housing?: No   Tobacco Use: Medium Risk (5/27/2025)    Patient History     Smoking Tobacco Use: Former     Smokeless Tobacco Use: Former     Passive Exposure: Past   Financial Resource Strain: Low Risk  (6/3/2025)    Financial Resource Strain     Within the past 12 months, have you or your family members you live with been unable to get utilities (heat, electricity) when it was really needed?: No   Alcohol Use: Not on file   Transportation Needs: Low Risk  (6/3/2025)    Transportation Needs     Within the past 12 months, has lack of transportation kept you from medical appointments, getting your medicines, non-medical meetings or appointments, work, or from getting things that you need?: No   Physical Activity: Sufficiently Active (9/30/2024)    Exercise Vital Sign     Days of Exercise per Week: 5 days     Minutes of Exercise per Session: 150+ min   Interpersonal Safety: High Risk (6/3/2025)    Interpersonal Safety     Do you feel physically and emotionally safe where you currently live?: No     Within the past 12 months, have you been hit, slapped, kicked or otherwise physically hurt by someone?: No     Within the past 12 months, have you been humiliated or emotionally abused in other ways by your partner or ex-partner?: No   Stress: No Stress Concern Present (9/30/2024)     [facility] of Occupational Health - Occupational Stress Questionnaire     Feeling of Stress : Only a little   Social Connections: Unknown (9/30/2024)    Social Connection and Isolation Panel [NHANES]     Frequency of Communication with Friends and Family: Not on file     Frequency of Social Gatherings with Friends and Family: Once a week     Attends [employer] Services: Not on file     Active Member of Clubs or Organizations: Not on file     Attends Club or Organization Meetings: Not on file     Marital Status: Not on file   Health Literacy: Not on file       Functional Status:  Prior to admission patient needed assistance:   Dependent ADLs:: Ambulation-walker, Bathing, Transfers, Positioning  Dependent IADLs:: Cleaning, Cooking, Laundry, Shopping, Meal Preparation, Transportation       Mental Health Status:  Mental Health Status: No Current Concerns       Chemical Dependency Status:  Chemical Dependency Status: No Current Concerns             Values/Beliefs:  Spiritual, Cultural Beliefs, [employer] Practices, Values that affect care: other (see comments) (none identified)               Discussed  Partnership in Safe Discharge Planning  document with patient/family: Yes:     Additional Information:  Gilberto Lund is a 44 year old male with a past medical history significant for ~4 months of right hip pain 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 On 6/3/2025 underwent explant hemipelvis spacer and conversion to R DEVONTE with custom hemipelvis implant 6/3/2025 with Dr. Bravo c/b bladder tear s/p repair and acute blood loss anemia. Admitted to ICU post operatively for management of undifferentiated shock and mechanical ventilatory support.     Pt is a 44 year old father who lives in a house with his wife in [place], [place].   Pt and spouse identified that after previous surgery Pt had an ARU stay which was helpful and allowed them to set up in home PT/OT and some nursing support. Pt's spouse identified that this would be helpful if it could be done again. Pt uses a walker and a slide board for transfers. Pt is on long term disability and wife has taken a leave from work. SW  provided  Pt's spouse with income requirements by family size  for some [facility] financial benefits as money is not going as far as previously with Pt's  medical bills.  Pt's spouse identified that while  money is starting to be tight they are still able to afford bills, food and utilities. Pt's spouse identified that she provides Pt's  with assistance and that he is independent with some activities. There are no mental health or substance use concerns identified during this assessment.  Pt  does not currently receive any home IV, Oxygen or home nursing services.     Next Steps:   ANA/RNCC will continue to follow.     Denny Barbosa, [facility] Medical CenterSW  [facility] ICU & [place] [facility]   Ph: [phone]

## 2025-06-05 NOTE — PROGRESS NOTES
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 On 6/3/2025 underwent explant hemipelvis spacer and conversion to R DEVONTE with custom hemipelvis implant 6/3/2025 with Dr. Bravo c/b bladder tear s/p repair and acute blood loss anemia. Admitted to ICU post operatively for management of undifferentiated shock and mechanical ventilatory support.     CHANGES and MAJOR THINGS TODAY:   - Levophed off for ~ 24 hrs  - 1 uPRBC given overnight  - Pain service consulted    PLAN:    Neuro:  # Pain and sedation  - Pain consult ordered  - Acetaminophen 975 mg Q6h  - Hydroxyzine 25 mg Q6h PRN  - Methocarbamol 750 mg Q6h PRN  - Oxycodone 5-10 mg Q4h PRN  - Hydromorphone 0.2-0.4 mg Q2h PRN  - Resume PTA gabapentin 400 mg TID     Pulmonary:  # No acute concerns  - Successful extubation, now on room air  - Goal SpO2 > 92%     Cardiovascular:  # Diffuse ST elevation, likely pericarditis   Intra op EBL 2.7L, received received  8L crystalloid, 1L albumin, and 2 units PRBCs palmira-op. Shock likely multifactorial 2/2 acute blood loss anemia and hypovolemia; possible sepsis with intra-op bladder tear s/p repair. Patient on 6/4 Overnight developed diffuse ST elevation on EKG, troponin trended and flat, stat ECHO and cardiology consult with concerns for pericarditis    - Colchicine ordered BID  - Trend daily CRP  - Echo with EF 65-70%, no valvular abnormalities, small pericardiac effusion; no prior imaging  - Troponin 36 --> 32; no longer need to trend unless acute EKG change or new chest pain    GI/Nutrition:  # Constipation   - Bowel regimen  increased  - ADAT to regular    Renal/Fluids/Electrolytes:  # Bladder tear s/p repair; intra-op  Baseline creat ~0.9. Hyperkalemic with K 6.4 at end of case, shifted with insulin/dextrose. Initial persistent lactic acidosis IVF resuscitation palmira-op; now resolved  - Creatinine peak 1.26 now 1.17  - UO adequate, clear and yellow  - Daily BMP, Mg and Phos   - Monitor UO for hematuria/clots   - Maintain mccord for 2 weeks per urology recs    Endocrine:  # Stress induced hyperglycemia   - Low intensity sliding scale insulin ordered  - Hypoglycemia protocol ordered    ID:  # Leukocytosis  Patient presents after prolonged surgical intervention with complication of bladder tear s/p repair. Leukocytosis, tachycardia, hypotension, and lactic acidosis.  - CRP uptrending now 227; cards recommends daily checks  - Afebrile, WBC 22  - Cefazolin 2 g Q8  - Gentamicin 140 mg; verify with ortho to continue and if so place a pharmacy consult to dose    Cultures:  6/4 Blood cultures: NGTD  6/3 Pelvic fluid cultures: in process/NGTD  6/3 Pelvic tissue cultures: NGTD    Antibiotics:  Cefazolin (6/3-)  Gentamicin (6/3-)    Hematology:    # Acute blood loss anemia  # Bilateral peroneal DVTs, on Apixaban, 3/2025  # Hx testicular cancer s/p chemotherapy and orchiectomy  EBL 2.7L s/p 2 units PRBCs intra op.   - 1 uPRBC given overnight serial hgbs ordered  - Transfuse for Hgb < 7 or active bleeding  - Continue Apixaban 2.5 mg BID POD2 x 4 weeks per Ortho  - SCDs while in hospital     Musculoskeletal:  # Chondrosarcoma of pelvis s/p hemipelvectomy 2/14/2025  # Explant hemipelvis cement spacer and conversion to DEVONTE with custom hemipelvis implant 6/4/2025 with Dr. Bravo     Ortho recs:  Activity: Up with assist when extubated and able. Anterior hip precautions  Weight bearing status: TTWB RLE x 3 months.  Antibiotics: continue Ancef and gentamicin post op  Diet: Progress diet as tolerated.  DVT prophylaxis: SCDs and mechanical while in the  [facility]. Eliquis 2.5 mg BID starting POD1 x 4 weeks.   Elevation: Elevate operative extremity as tolerated.   Wound Care: Keep Prevena dressing in place until POD #14  Drains: Please document output per shift, discontinue 15f HUGO drain in right thigh per ortho, management of left flank 19f drain per urology  Mccord: Keep in place for 2 weeks, management of mccord per urology  Pain management: Utilize all oral meds first, IV meds for severe breakthrough pain after PO meds given adequate time to take effect.  X-rays: Ordered, AP pelvis and cross-table lateral.  Therapy: PT/OT evaluate prior to discharge.  Cultures: Pending, follow culture results closely.  Consults: PT, OT, medicine, ICU team, Urology, Cardiology. Appreciate assistance in caring for this patient      Skin:  # Intraoperative incision; wound vac  # HUGO drain x2  - Wound vac in place over incisions   - Right thigh drain to be removed by ortho  - Left flank drain to be managed by urology     General Cares/Prophylaxis:    DVT Prophylaxis: SCDs  GI Prophylaxis: PPI  Restraints: None  Family Communication: Wife Mary Ellen, updated at bedside  Code Status: Full Code    Lines/tubes/drains:  - Mccord   - Wound vac  - Drain x2  - PIV x3      Disposition:  - Transfer to primary service  - Discussed with Ortho JELENA; will consult internal medicine    Patient seen and findings/plan discussed with medical ICU staff, Dr. Chambers    Billing: This patient is critically ill: No. Total subsequent care time today 45 min. This does not include time spent on procedures or teaching.     LJ English CNP   Clinically Significant Risk Factors        # Hyperkalemia: Highest K = 6.4 mmol/L in last 2 days, will monitor as appropriate  # Hyponatremia: Lowest Na = 133 mmol/L in last 2 days, will monitor as appropriate   # Hypocalcemia: Lowest iCa = 4.3 mg/dL in last 2 days, will monitor and replace as appropriate  # Hypercalcemia: Highest iCa = 5.4 mg/dL in last 2 days, will monitor  as appropriate  # Hypomagnesemia: Lowest Mg = 1.4 mg/dL in last 2 days, will replace as needed   # Hypoalbuminemia: Lowest albumin = 3.1 g/dL at 6/4/2025  5:25 AM, will monitor as appropriate    # Coagulation Defect: INR = 1.18 (Ref range: 0.85 - 1.15) and/or PTT = 29 Seconds (Ref range: 22 - 38 Seconds), will monitor for bleeding               # Obesity: Estimated body mass index is 34.29 kg/m  as calculated from the following:    Height as of this encounter: 1.829 m (6').    Weight as of this encounter: 114.7 kg (252 lb 12.8 oz)., PRESENT ON ADMISSION       # Financial/Environmental Concerns: other (see comments) (savings are dwindling, wife says she has a good paying job.)                  ====================================  INTERVAL HISTORY:   1 uPRBC given overnight. Clinical exam grossly unremarkable for hemorrhage     OBJECTIVE:   1. VITAL SIGNS:   Temp:  [98  F (36.7  C)-98.4  F (36.9  C)] 98.4  F (36.9  C)  Pulse:  [103-130] 111  Resp:  [12-21] 17  BP: (102-133)/(51-79) 111/56  MAP:  [54 mmHg-79 mmHg] 54 mmHg  Arterial Line BP: ()/(50-62) 58/50  FiO2 (%):  [35 %] 35 %  SpO2:  [93 %-99 %] 93 %  FiO2 (%): 35 %, Resp: 17, Vent Mode: (S) PS, Resp Rate (Set): 18 breaths/min, Tidal Volume (Set, mL): 600 mL, PEEP (cm H2O): (S) 5 cmH2O, Pressure Support (cm H2O): (S) 5 cmH2O, Resp Rate (Set): 18 breaths/min, Tidal Volume (Set, mL): 600 mL, PEEP (cm H2O): (S) 5 cmH2O  2. INTAKE/ OUTPUT:   I/O last 3 completed shifts:  In: 3433.4 [P.O.:630; I.V.:1858.4; NG/GT:60; IV Piggyback:250]  Out: 2045 [Urine:1500; Drains:545]    3. PHYSICAL EXAMINATION:  GEN: not in distress  EYES: PERRL, Anicteric sclera.   HEENT:  Normocephalic, atraumatic, trachea midline, Pupils PERRLA  CV: RRR, no gallops, rubs, or murmurs  PULM/CHEST: Clear breath sounds bilaterally without rhonchi, crackles or wheeze, symmetric chest rise  GI: normal bowel sounds, soft, obese non-tender, no rebound tenderness or guarding, no masses  : mccord  catheter in place, urine yellow and clear  EXTREMITIES: No peripheral edema, moving all extremities, peripheral pulses intact  NEURO: Cranial nerves II-XII grossly intact, no motor-sensory deficits noted  SKIN: Surgical incisions CDI  PSYCH:  Affect: appropriate        4. LABS:   Arterial Blood Gases   Recent Labs   Lab 06/05/25  1025 06/04/25  0324 06/04/25  0118 06/03/25  2038   PH 7.46* 7.35 7.25* 7.31*   PCO2 37 34* 42 46*   PO2 90 114* 134* 92   HCO3 26 19* 19* 23     Complete Blood Count   Recent Labs   Lab 06/05/25  1007 06/05/25  0608 06/05/25  0126 06/04/25  2138 06/04/25  1003 06/04/25  0525 06/04/25  0118 06/04/25  0100   WBC 12.5* 13.2*  --   --   --  22.0*  --  23.2*   HGB 7.0*  7.0* 7.1*  7.1* 6.7* 7.1*   < > 9.1*  9.1*   < > 10.4*    159  --   --   --  260  --  305    < > = values in this interval not displayed.     Basic Metabolic Panel  Recent Labs   Lab 06/05/25  1140 06/05/25  1007 06/05/25  0737 06/05/25  0610 06/05/25  0608 06/05/25  0359 06/05/25  0126 06/04/25  0739 06/04/25  0525 06/04/25  0343 06/04/25  0314   NA  --  138  --   --  134*  --   --   --  137  --  133*   POTASSIUM  --  4.2  --   --  4.3  --  4.2  --  4.7  4.7  --  5.0   CHLORIDE  --  105  --   --  103  --   --   --  103  --  101   CO2  --  24  --   --  25  --   --   --  19*  --  18*   BUN  --  16.2  --   --  18.4  --   --   --  18.6  --  18.5   CR  --  1.06  --   --  1.03  --   --   --  1.17  --  1.26*   * 143* 144* 146* 142*   < >  --    < > 149*   < > 168*    < > = values in this interval not displayed.     Liver Function Tests  Recent Labs   Lab 06/04/25  0525 06/04/25  0100   AST 29 25   ALT 21 22   ALKPHOS 44 49   BILITOTAL 0.5 0.8   ALBUMIN 3.1* 3.4*   INR  --  1.18*     Coagulation Profile  Recent Labs   Lab 06/04/25  0100   INR 1.18*   PTT 29       5. RADIOLOGY:   Recent Results (from the past 24 hours)   XR Pelvis w Hip Port Right 1 View    Narrative    EXAM: XR PELVIS AND HIP PORTABLE RIGHT 1  VIEW  LOCATION: [facility]  DATE: 6/4/2025    INDICATION: Status post Hip surgery  COMPARISON: Same day pelvis radiographs.       Impression    IMPRESSION: Postoperative changes of a right hemipelvectomy with custom pelvic and hip replacement. No evidence of immediate hardware complication. Surgical drains over the pelvis. A wound VAC is in place over the right thigh.

## 2025-06-05 NOTE — PLAN OF CARE
Goal Outcome Evaluation:      Plan of Care Reviewed With: patient, spouse    Overall Patient Progress: improvingOverall Patient Progress: improving    Outcome Evaluation: Pt downgraded to IMC status.      10A ICU End of Shift Summary.     Changes this shift: Art line removed. Downgraded to IMC. Worsening pain this afternoon with repositioning in bed, 10/10 pain whenever moved in bed - prns given and provider notified of poor pain control.     Neuro: A+Ox4, afebrile, baseline tingling to lower extremities.  Cardiac: Sinus tachycardia, rates 110-120. BP stable. EKG completed.   Respiratory: LS dim on 1 L O2 nasal cannula. Incentive spirometer encouraged. End tidal monitoring in place.  GI/: Michelle in place, good output. No BM, is passing gas.  Diet/appetite:  Full liquid diet, advancing as tolerated. Poor appetite.  Pain: Mod-severe pain to abdomen and right hip - increasingly worsening this afternoon with minor readjustments in bed. Provider notified that prn meds not effectively managing pain. Hip adduction pillow placed along with Corbin boots.   Skin: 2 surgical drains and wound vac to abdomen, scattered abrasions.   LDA's: Michelle, Wound Vac, 3 PIVs.   Activities: Encourage repositioning (limited by pain)    Drips: None       Plan: Continue plan of care, transfer to IMC floor when bed available.

## 2025-06-05 NOTE — PHARMACY-ADMISSION MEDICATION HISTORY
Pharmacist Admission Medication History    Admission medication history is complete. The information provided in this note is only as accurate as the sources available at the time of the update.    Information Source(s): Patient and CareEverywhere/SureScripts via in-person    Pertinent Information:   -Patient took his last dose of apixaban 5mg last Friday. On for DVT treatment from 3/19/25.  -Patient was taking oxycodone 5mg at bedtime PRN pain ~1x per week. Reports running out of the medication ~1 week ago.    Changes made to PTA medication list:  Added: None  Deleted: None  Changed: None    Allergies reviewed with patient and updates made in EHR: yes    Medication History Completed By: Kristal Mandel RPH 6/4/2025 8:31 PM    PTA Med List   Medication Sig Last Dose/Taking    acetaminophen (TYLENOL) 325 MG tablet Take 2 tablets (650 mg) by mouth every 4 hours as needed for mild pain. 6/3/2025 at  6:45 AM    apixaban ANTICOAGULANT (ELIQUIS) 5 MG tablet Take 5 mg by mouth 2 times daily. 5/30/2025    gabapentin (NEURONTIN) 400 MG capsule Take 1 capsule (400 mg) by mouth 3 times daily. 6/3/2025 at  6:45 AM    methocarbamol (ROBAXIN) 750 MG tablet Take 1 tablet (750 mg) by mouth 4 times daily as needed for muscle spasms. 6/3/2025 at  6:45 AM

## 2025-06-05 NOTE — CONSULTS
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 Bravo c/b bladder tear s/p repair and acute blood loss anemia. Admitted to ICU post operatively for management of undifferentiated shock and mechanical ventilatory support.    Patient feels that his pain is \"reasonably well controlled\" today. He does note that his pain increases at the tail end of his oxycodone doses, but that the pain overall has been tolerable.    Plan:   - Recommend increasing frequency of oxycodone to 5-10mg q3h.  - Recommend increasing gabapentin dose to 600mg TID  - Please feel free to contact the pain service with any further questions or concerns.    Thank you for the opportunity to participate in the care of Gilberto Lund  Pain Service will continue to follow.    Primary Service Contacted with Recommendations? 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 On 6/3/2025 underwent explant hemipelvis spacer and conversion to R DEVONTE with custom hemipelvis implant 6/3/2025 " "with Dr. Bravo c/jennifer bladder tear s/p repair and acute blood loss anemia. Admitted to ICU post operatively for management of undifferentiated shock and mechanical ventilatory support.  The pain is reported to be acute, located in the right hip.  Current pain is rated at 6-7/10 and goal is 2/10. The patient is with chronic pain. The patient has low opioid tolerance.     Pharmacological treatments (in past) have included oxycodone, robaxin, gabapentin, tylenol.    Pain Assessment:   Description of Pain: \"Reasonably well controlled\"  Location: right hip  Current Pain: 6-7/10  Goal: 2/10  Baseline Pain Level: 1-2/10  Onset:Chronic   - 4 months  Radiating: No   Localized: Yes  Constant: Yes  Intermittent: No    Past Medical History:  Past Medical History:   Diagnosis Date    Arthritis     Chondrosarcoma (H)     CTS (carpal tunnel syndrome)     Gynecomastia, male     H/O chondrosarcoma     Hard to intubate 06/29/2022    Infection due to 2019 novel coronavirus 12/2020    Kidney stone 2015    90% calcium oxalate monohydrate, and  10% calcium oxalate dihydrate.    Primary chondrosarcoma of bone of pelvis (H)     Psoriasis     Skin tag     Testicle cancer, left 2008    surgery and chemotherapy    Thyroid nodule 12/10/2024    high FDG, noted on PET         Family History:    Family History   Problem Relation Age of Onset    No Known Problems Mother     Hypertension Father     Atrial fibrillation Father     Thyroid Disease Father     Nephrolithiasis Father     Pulmonary Embolism Father     C.A.D. Maternal Grandfather     Thyroid Cancer No family hx of     Diabetes No family hx of     Anesthesia Reaction No family hx of        Social History:  Social History     Tobacco Use    Smoking status: Former     Types: Cigarettes     Passive exposure: Past    Smokeless tobacco: Former     Types: Chew   Substance Use Topics    Alcohol use: Not Currently             Review of Systems:  Complete ROS reviewed. Unless otherwise noted, all other " "systems found to be negative.        Laboratory Results:  Recent Labs   Lab Test 06/05/25  0608 06/04/25  0314 06/04/25  0100   PROTIME  --   --  15.6*   INR  --   --  1.18*      < > 305   PTT  --   --  29   BUN 18.4   < > 16.8    < > = values in this interval not displayed.       Allergies:  No Known Allergies      Current Pain Related Medications:  Medications related to Pain Management (From now, onward)      Start     Dose/Rate Route Frequency Ordered Stop    06/05/25 1400  gabapentin (NEURONTIN) capsule 600 mg        Note to Pharmacy: PTA Sig:Take 1 capsule (400 mg) by mouth 3 times daily.      600 mg Oral 3 TIMES DAILY 06/05/25 0925 06/05/25 0923  oxyCODONE (ROXICODONE) tablet 5 mg        Placed in \"Or\" Linked Group    5 mg Oral EVERY 3 HOURS PRN 06/05/25 0923      06/05/25 0923  oxyCODONE IR (ROXICODONE) tablet 10 mg        Placed in \"Or\" Linked Group    10 mg Oral EVERY 3 HOURS PRN 06/05/25 0923      06/05/25 0800  polyethylene glycol (MIRALAX) Packet 17 g         17 g Oral DAILY 06/04/25 0221 06/04/25 1513  opium-belladonna (B&O SUPPRETTES) 30-16.2 MG per suppository 1 suppository         30 mg Rectal EVERY 8 HOURS PRN 06/04/25 1513      06/04/25 0800  senna-docusate (SENOKOT-S/PERICOLACE) 8.6-50 MG per tablet 1 tablet         1 tablet Oral 2 TIMES DAILY 06/04/25 0221      06/04/25 0230  acetaminophen (TYLENOL) tablet 975 mg         975 mg Oral EVERY 8 HOURS 06/04/25 0221 06/07/25 0229    06/04/25 0221  methocarbamol (ROBAXIN) tablet 750 mg         750 mg Oral EVERY 6 HOURS PRN 06/04/25 0221      06/04/25 0221  hydrOXYzine HCl (ATARAX) tablet 25 mg         25 mg Oral EVERY 6 HOURS PRN 06/04/25 0221      06/04/25 0221  magnesium hydroxide (MILK OF MAGNESIA) suspension 30 mL         30 mL Oral DAILY PRN 06/04/25 0221      06/04/25 0221  bisacodyl (DULCOLAX) suppository 10 mg         10 mg Rectal DAILY PRN 06/04/25 0221 06/04/25 0221  HYDROmorphone (DILAUDID) injection 0.2 mg      " "  Placed in \"Or\" Linked Group    0.2 mg Intravenous EVERY 2 HOURS PRN 06/04/25 0221      06/04/25 0221  HYDROmorphone (DILAUDID) injection 0.4 mg        Placed in \"Or\" Linked Group    0.4 mg Intravenous EVERY 2 HOURS PRN 06/04/25 0221      06/04/25 0221  lidocaine 1 % 0.1-1 mL         0.1-1 mL Other EVERY 1 HOUR PRN 06/04/25 0221      06/04/25 0221  lidocaine (LMX4) cream          Topical EVERY 1 HOUR PRN 06/04/25 0221                Physical Exam:  Vitals: /65   Pulse 118   Temp 98.2  F (36.8  C) (Oral)   Resp 15   Ht 1.829 m (6')   Wt 114.7 kg (252 lb 12.8 oz)   SpO2 96%   BMI 34.29 kg/m      Physical Exam:   CONSTITUTIONAL/GENERAL APPEARANCE:  NAD  EYES: EOMI, sclera anicteric, PERRLA  ENT/NECK: atraumatic, lips and oral mucous membranes dry  RESPIRATORY: non-labored breathing. No cough, wheeze  CV: RRR, no audible rubs, gallops, or murmurs  ABDOMEN: Soft, non-tender, non-distended  MUSCULOSKELETAL/BACK/SPINE/EXTREMITIES: Moves all extremities purposefully.  No edema or obvious joint deformities   NEURO: Alert and Oriented x3. Answers questions appropriately  SKIN/VASCULAR EXAM:  No jaundice, no visible rashes or lesions        Acute Inpatient Pain Service [facility]  Hours of pain coverage 24/7   Please PAGE via [employer] - Link to [employer] Here - Search Pain  Page via [employer]- Please Page the Pain Team Via [employer]: \"PAIN MANAGEMENT ACUTE INPATIENT/ [facility]\"            "

## 2025-06-06 ENCOUNTER — APPOINTMENT (OUTPATIENT)
Dept: GENERAL RADIOLOGY | Facility: CLINIC | Age: 45
DRG: 559 | End: 2025-06-06
Attending: PHYSICIAN ASSISTANT
Payer: COMMERCIAL

## 2025-06-06 ENCOUNTER — APPOINTMENT (OUTPATIENT)
Dept: PHYSICAL THERAPY | Facility: CLINIC | Age: 45
DRG: 559 | End: 2025-06-06
Attending: ORTHOPAEDIC SURGERY
Payer: COMMERCIAL

## 2025-06-06 PROCEDURE — 71045 X-RAY EXAM CHEST 1 VIEW: CPT | Mod: 26 | Performed by: STUDENT IN AN ORGANIZED HEALTH CARE EDUCATION/TRAINING PROGRAM

## 2025-06-06 PROCEDURE — 71045 X-RAY EXAM CHEST 1 VIEW: CPT

## 2025-06-06 ASSESSMENT — ACTIVITIES OF DAILY LIVING (ADL)
ADLS_ACUITY_SCORE: 58

## 2025-06-06 NOTE — PROGRESS NOTES
"Pain Service Progress Note  [facility]  Date: 06/06/2025       Patient Name: Gilberto Lund  MRN: [id number]  Age: 44 year old  Sex: male      Assessment:  Gilberto Lund is a 44 year old male admitted on 6/3/2025. 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  He is now s/p  explant hemipelvis spacer and conversion to R DEVONTE with custom hemipelvis implant on 6/3/2025 with Dr Bravo complicated by bladder tear s/p repair, acute blood loss anemia, and pericarditis. Patient admitted to the ICU post op for undifferentiated shock and mechanical ventilatory support now stable for transfer to the floor since  6/5/25.     PTA, was using rare oxycdone 5mg, 1/day PRN, not everyday.    \"Jeremiah\" is seen with PT and RN at the bedside. He is AAOx3.  Feels more painful now due to completing PT-able to sit at edge of bed.  Pain level is 9/10.  PTA pain level was 3/10.  He feels oxycodone and IV Dilaudid are providing relief.  Pain medication regimen reviewed. We discussed indications, risks and benefits of the opioids. Discussed using the lowest most effective dose for the shortest duration of time in order to minimize side effects. Recommend using PO oxycodone before IV Dilaudid for longer lasting relief and eventual outside of hospital pain management.     Plan/Recommendations:  Acetaminophen 975mg PO 8 hours  Robaxin 750mg PO Q 6 hours PRN  Oxycodone 5-10mg PO Q 3 hours PRN    IV Dilaudid 0.2-0.4mg IV Q 2 hours PRN  Gabapentin 600mg PO TID (increased on 6/5/25)  Lidocaine patches 1-3 patches Q 24 hours, 12 hours on and 12 hours off  Menthol patches, 1 patch TD Q 8 hours  Bowel regimen     Pain Service will continue to follow.    Discussed with attending anesthesiologist- the context of our conversation was pain " "medication regimen as above, use oral oxycodone prior to IV Dilaudid.    Marlin Hastings PA-C  06/06/2025     Diet: Regular Diet Adult    Relevant Labs:  Recent Labs   Lab Test 06/06/25  0543 06/04/25  0314 06/04/25  0100   PROTIME  --   --  15.6*   INR  --   --  1.18*      < > 305   PTT  --   --  29   BUN 11.1   < > 16.8    < > = values in this interval not displayed.       Physical Exam:  Vitals: /67 (BP Location: Left arm)   Pulse 112   Temp 98.5  F (36.9  C) (Oral)   Resp 16   Ht 1.829 m (6')   Wt 114.7 kg (252 lb 12.8 oz)   SpO2 93%   BMI 34.29 kg/m      Physical Exam:   CONSTITUTIONAL/GENERAL APPEARANCE: Conversant.  EYES: EOMI, sclerae clear  ENT/NECK: neck is supple  RESPIRATORY:nasal cannula  CARDIOVASCULAR: HR within normal limits  GI:soft, nontender,   MUSCULOSKELETAL/BACK/SPINE/EXTREMITIES: Moving UE and LE     NEURO:  AAOx3.   SKIN/VASCULAR EXAM:  Dry and warm.  PSYCHIATRIC/BEHAVIORAL/OBSERVATIONS:     Judgment/Insight -fair   Orientation - x3   Memory -fair   Mood and affect - calm, pleasant, cooperative         Relevant Medications:  Current Pain Medications:  Medications related to Pain Management (From now, onward)      Start     Dose/Rate Route Frequency Ordered Stop    06/05/25 2000  polyethylene glycol (MIRALAX) Packet 17 g         17 g Oral 2 TIMES DAILY 06/05/25 1307      06/05/25 2000  senna-docusate (SENOKOT-S/PERICOLACE) 8.6-50 MG per tablet 2 tablet         2 tablet Oral 2 TIMES DAILY 06/05/25 1307      06/05/25 1400  gabapentin (NEURONTIN) capsule 600 mg        Note to Pharmacy: PTA Sig:Take 1 capsule (400 mg) by mouth 3 times daily.      600 mg Oral 3 TIMES DAILY 06/05/25 0925      06/05/25 0923  oxyCODONE (ROXICODONE) tablet 5 mg        Placed in \"Or\" Linked Group    5 mg Oral EVERY 3 HOURS PRN 06/05/25 0923      06/05/25 0923  oxyCODONE IR (ROXICODONE) tablet 10 mg        Placed in \"Or\" Linked Group    10 mg Oral EVERY 3 HOURS PRN 06/05/25 0923      06/04/25 1513  " "opium-belladonna (B&O SUPPRETTES) 30-16.2 MG per suppository 1 suppository         30 mg Rectal EVERY 8 HOURS PRN 06/04/25 1513      06/04/25 0230  acetaminophen (TYLENOL) tablet 975 mg         975 mg Oral EVERY 8 HOURS 06/04/25 0221 06/07/25 0229    06/04/25 0221  methocarbamol (ROBAXIN) tablet 750 mg         750 mg Oral EVERY 6 HOURS PRN 06/04/25 0221      06/04/25 0221  hydrOXYzine HCl (ATARAX) tablet 25 mg         25 mg Oral EVERY 6 HOURS PRN 06/04/25 0221      06/04/25 0221  magnesium hydroxide (MILK OF MAGNESIA) suspension 30 mL         30 mL Oral DAILY PRN 06/04/25 0221      06/04/25 0221  bisacodyl (DULCOLAX) suppository 10 mg         10 mg Rectal DAILY PRN 06/04/25 0221      06/04/25 0221  HYDROmorphone (DILAUDID) injection 0.2 mg        Placed in \"Or\" Linked Group    0.2 mg Intravenous EVERY 2 HOURS PRN 06/04/25 0221      06/04/25 0221  HYDROmorphone (DILAUDID) injection 0.4 mg        Placed in \"Or\" Linked Group    0.4 mg Intravenous EVERY 2 HOURS PRN 06/04/25 0221      06/04/25 0221  lidocaine 1 % 0.1-1 mL         0.1-1 mL Other EVERY 1 HOUR PRN 06/04/25 0221      06/04/25 0221  lidocaine (LMX4) cream          Topical EVERY 1 HOUR PRN 06/04/25 0221              Primary Service Contacted with Recommendations? Yes            Acute Inpatient Pain Service [facility]  Hours of pain coverage 24/7   Please Page via Vocera  - Link to Vocera Here - Search Pain  Page via Amcom- Please Page the Pain Team Via Amcom: \"PAIN MANAGEMENT ACUTE INPATIENT/ [facility]\"            "

## 2025-06-06 NOTE — PLAN OF CARE
Goal Outcome Evaluation:      Plan of Care Reviewed With: patient  Overall Patient Progress: improvingOverall Patient Progress: improving     Pt A&Ox4   On 1L NC   Up to the edge of bed with nursing staff and PT. PRN dilaudid given during session for breakthrough pain  Preventative mepilex on coccyx   Tele on ST for the duration of the shift   Prevena in place w/ x2 HUGO drains   LBM: 6/3. Joaquin commode ordered incase pt needs to have a BM. Passing gas   Michelle in for 2 weeks post op d/t bladder tear   Chest x-ray completed for tachycardia and hypoxia    Plan: Potential to go to ARU once medically ready

## 2025-06-06 NOTE — PLAN OF CARE
ADMISSION to [facility] UNIT:    Gilberto Lund was admitted from [facility] for post-op pelvinectomy.  2 RN skin assessment: completed by KARIS Barrett  Result of skin assessment and interventions/actions: see flowsheets  Height, weight:    Patient belongings & admission documents: see Flowsheets  MDRO education added to care plan: na

## 2025-06-06 NOTE — PROGRESS NOTES
Orthopedic Surgery Progress Note 06/06/2025    S: Patient seen this morning. Resting comfortably in bed. Patient denies lightheadedness/dizziness at rest, endorses fatigue. Tolerating diet. Passing flatus, no BM. Voiding via mccord. No concerns this AM.    O:  Temp: 100.7  F (38.2  C) Temp src: Oral BP: 121/67 Pulse: 116   Resp: 11 SpO2: (!) 91 % O2 Device: Nasal cannula Oxygen Delivery: 1 LPM    Exam:  Gen: alert, intubated but interactive  Resp: intubated, on ventilator  MSK:    RLE:  - Prevena dressing in place, maintaining suction, no output in canister  - SILT tibial/sural/saphenous/DP/SP nerves  - Fires quad, hamstring, TA, EHL, FHL, GaSC  - PT/DP pulses 2+, foot wwp    Drain output:   Output by Drain (mL) 06/04/25 0700 - 06/04/25 1459 06/04/25 1500 - 06/04/25 2259 06/04/25 2300 - 06/05/25 0659 06/05/25 0700 - 06/05/25 1459 06/05/25 1500 - 06/05/25 2259 06/05/25 2300 - 06/06/25 0613   Negative Pressure Wound Therapy Abdomen Anterior   0 0  0   Drain Closed/Suction 1 Right;Anterior Hip Bulb 15 Dutch 80 175 165 0  25   Drain Closed/Suction 2 Inferior Abdomen Bulb 19 Dutch 20 35 70 70  30         Recent Labs   Lab 06/06/25  0543 06/06/25  0023 06/05/25  1841 06/05/25  1007 06/05/25  0608   WBC 11.8*  --   --  12.5* 13.2*   HGB 7.7*  7.7* 8.0* 6.8* 7.0*  7.0* 7.1*  7.1*     --   --  159 159       Culture results: NGTD    Assessment: Gilberto Lund is a 44 year old male s/p Right explant of hemipelvis cement spacer and and conversion to total hip arthroplasty utilizing custom hemipelvis implant on 6/4/25 with Dr. Bravo.      Plan:  Activity: Up with assist when extubated and able. Anterior hip precautions  Weight bearing status: TTWB RLE x 3 months.  Antibiotics: continue Ancef and gentamicin post op  Diet: Progress diet as tolerated.  DVT prophylaxis: SCDs and mechanical while in the hospital. Eliquis 2.5 mg BID starting POD1 x 4 weeks.   Elevation: Elevate operative extremity as tolerated.   Wound  Care: Keep Prevena dressing in place until POD #14  Drains: Please document output per shift, discontinue 15f HUGO drain in right thigh per ortho, management of left flank 19f drain per urology  Mccord: Keep in place for 2 weeks, management of mccord per urology  Pain management: Utilize all oral meds first, IV meds for severe breakthrough pain after PO meds given adequate time to take effect.  X-rays: Ordered, AP pelvis and cross-table lateral.  Therapy: PT/OT evaluate prior to discharge.  Cultures: Pending, follow culture results closely.  Consults: PT, OT, medicine, ICU team, Urology, Cardiology. Appreciate assistance in caring for this patient    Shiva Toribio MD  Adult Reconstruction Resident  06/06/2025

## 2025-06-06 NOTE — PROGRESS NOTES
06/06/25 0900   Appointment Info   Signing Clinician's Name / Credentials (PT) Aurelia Dey DPT   Rehab Comments (PT) TTWB R hip, ant hip prec   Living Environment   People in Home spouse;child(angelo), dependent  (3 children - 12, twin 10 y/o)   Current Living Arrangements house   Home Accessibility stairs within home   Number of Stairs, Within Home, Primary greater than 10 stairs   Stair Railings, Within Home, Primary railings on both sides of stairs   Transportation Anticipated family or friend will provide;agency   Living Environment Comments staying on main floor now - for last 3 mo; has electric lift recliner that he sleeps in   Self-Care   Usual Activity Tolerance moderate  (NWB for 3 mo - mostly slide board transfers, stand w/ walker just to stretch)   Current Activity Tolerance fair  (below most recent baseline)   Equipment Currently Used at Home wheelchair, manual;walker, rolling;commode chair  (slide board, drop arm commode over toilet)   Fall history within last six months no   Activity/Exercise/[facility] - wife drives and pt shower self, spouse helping w/ dressing changes but pt dresses himself   General Information   Onset of Illness/Injury or Date of Surgery 06/03/25   Referring Physician George Boone MD   Patient/Family Therapy Goals Statement (PT) get back to wear I was or even better   Pertinent History of Current Problem (include personal factors and/or comorbidities that impact the POC) Gilberto Lund is a 44 year old male s/p Right explant of hemipelvis cement spacer and and conversion to total hip arthroplasty utilizing custom hemipelvis implant on 6/4/25 with Dr. Bravo.   Existing Precautions/Restrictions fall;no hip ER;no active hip ABD;no hip hyperextension;weight bearing;no pivoting or twisting   Weight-Bearing Status - LUE full weight-bearing   Weight-Bearing Status - RUE full weight-bearing   Weight-Bearing Status - LLE  full weight-bearing   Weight-Bearing Status - RLE toe touch weight-bearing   General Observations sup in bed, tachy - -117   Cognition   Affect/Mental Status (Cognition) WNL   Pain Assessment   Patient Currently in Pain Yes, see Vital Sign flowsheet  (5/10)   Integumentary/Edema   Integumentary/Edema Comments post op incision and dressing in place   Posture    Posture Forward head position;Protracted shoulders   Range of Motion (ROM)   Range of Motion ROM deficits secondary to surgical procedure;ROM deficits secondary to pain   Strength (Manual Muscle Testing)   Strength (Manual Muscle Testing) Deficits observed during functional mobility   Bed Mobility   Comment, (Bed Mobility) maxAx2 bed mob   Transfers   Comment, (Transfers) NT - unable to functionally progress to transfers d/t pain   Gait/Stairs (Locomotion)   Comment, (Gait/Stairs) NT - pt has been non-amb since initial surg 3 mo ago   Balance   Balance Comments needs significant UE support sitting EOB   Sensory Examination   Sensory Perception Comments some in the R leg but baseline   Clinical Impression   Criteria for Skilled Therapeutic Intervention Yes, treatment indicated   PT Diagnosis (PT) impaired functional mobility   Influenced by the following impairments pain, post op prec   Functional limitations due to impairments impaired bed mob, transfers, amb   Clinical Presentation (PT Evaluation Complexity) stable   Clinical Presentation Rationale per clinical judgment   Clinical Decision Making (Complexity) low complexity   Planned Therapy Interventions (PT) balance training;bed mobility training;gait training;home exercise program;patient/family education;ROM (range of motion);strengthening;transfer training;wheelchair management/propulsion training;progressive activity/exercise;risk factor education;home program guidelines   Risk & Benefits of therapy have been explained evaluation/treatment results reviewed;care plan/treatment goals  reviewed;risks/benefits reviewed;current/potential barriers reviewed;participants voiced agreement with care plan;participants included;patient   Clinical Impression Comments Pt significantly limited w/ mobility progression and tolerance d/t pain today and post op prec, would rec Ax2 for bed mob and pt desires ARU stay again prior to discharge home   PT Total Evaluation Time   PT Eval, Low Complexity Minutes (01343) 5   Physical Therapy Goals   PT Frequency 6x/week   PT Predicted Duration/Target Date for Goal Attainment 07/11/25   PT Goals Bed Mobility;Transfers;Gait;Wheelchair Mobility   PT: Bed Mobility Modified independent;Supine to/from sit;Within precautions   PT: Transfers Supervision/stand-by assist;Sit to/from stand;Bed to/from chair;Assistive device;Within precautions  (slide board v SPT w/ FWW)   PT: Gait Supervision/stand-by assist;Assistive device;Rolling walker;Within precautions;10 feet   PT: Wheelchair Mobility 50 feet;Caregiver SBA;manual wheelchair   Interventions   Interventions Quick Adds Therapeutic Activity   Therapeutic Activity   Therapeutic Activities: dynamic activities to improve functional performance Minutes (65807) 47   Symptoms Noted During/After Treatment Increased pain   Treatment Detail/Skilled Intervention Pt sup in bed w/ hip abd pillow in place on PT arrival, agreeable to PT though apprehensive d/t pain. Educated on post op prec and pt aware of these, educated on dif of TTWB v NWB as pt was NWB prior. Pt refuses to trial standing today, FWW still obtained for room. Pt agreeable to sit EOB only. Needs significantly increased time d/t pain levels and fear of mobility prior to movement occuring. Pt needs slow movement of LE to remove pillows and straps. W/ significantly increased time and segmental progression pt able to complete sup>sit EOB w/ maxAx1 to LEs and once partially upright additional modAx1 at trunk. Pt needs rest break segmentally for controlled breathing and to settle  through pain. Once seated EOB able to progress to lessening UE support but still having notable pain increase, nursing able to administer IV meds while seated. Pt tolerated sitting EOB for 10+ minutes, unable to progress to further activity. Pt maxAx2 for sit>sup and boost up in bed. Pt made comfortable w/ needs met and providers in room. Pt strongly prefers ARU at this time educated on need for OT services as well to qualify.   PT Discharge Planning   PT Plan increase ind w/ bed mob, trial SB placement/scooting EOB, attempt STS as appropriate   PT Discharge Recommendation (DC Rec) Acute Rehab Center-Motivated patient will benefit from intensive, interdisciplinary therapy.  Anticipate will be able to tolerate 3 hours of therapy per day   PT Rationale for DC Rec Pt significantly limited w/ mobility progression and tolerance d/t pain today and post op prec, would rec Ax2 for bed mob and pt desires ARU stay again prior to discharge home   PT Brief overview of current status Ax2 bed mob   PT Total Distance Amb During Session (feet) 0   Physical Therapy Time and Intention   Timed Code Treatment Minutes 47   Total Session Time (sum of timed and untimed services) 52

## 2025-06-06 NOTE — CONSULTS
[facility]  Consult Note - Hospitalist Service, GOLD TEAM   Date of Admission:  6/3/2025  Consult Requested by: orthopedics   Reason for Consult: medical co management     Assessment & Plan   Gilberto Lund is a 44 year old male admitted on 6/3/2025. 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 Patient admitted to the ICU post op for undifferentiated shock and mechanical ventilatory support now stable for transfer to the floor.     Interim history   Vitals reviewed. Patient with temp 100.8 overnight, 98.5 most recently. Has had persistent low grade tachycardia. Normotensive. Normal respiratory rate. Satting 93 % on 1L. Labs reviewed; hemoglobin 7.7 this morning. White count 11.8. BMP is largely unremarkable. Lactic acid is within normal limits. CRP is 283 up from 227 yesterday.     Patient seen with wife at bedside. No acute complaints today. Very painful working with PT today. Has not had much to eat since surgery and no BM but passing gas.     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 Bravo   - Activity: Up with assist. Anterior hip precautions  - Weight bearing status: TTWB RLE x 3 months.  - Antibiotics: continue Ancef and gentamicin post op per orthopedics   - Diet: Progress diet as tolerated to regular diet.  - DVT prophylaxis: SCDs and mechanical while in the  [facility]. Eliquis 2.5 mg BID starting POD1 x 4 weeks.   - Wound Care: Keep Prevena dressing in place until POD #14  - Drains: HUGO drain to right hip per ortho, pelvic drain per urology (see below), and indwelling mccord in place   - Therapy: PT/OT following and recommending Acute Rehab    # Acute pain 2/2 above   - Pain consult ordered  - Acetaminophen 975 mg Q6h  - Hydroxyzine 25 mg Q6h PRN  - Methocarbamol 750 mg Q6h PRN  - Oxycodone 5-10 mg Q4h PRN  - Hydromorphone 0.2-0.4 mg Q2h PRN  - PTA gabapentin increased to 600 mg tid      # Diffuse ST elevation  # Probable pericarditis   Patient on 6/4 Overnight developed diffuse ST elevation on EKG, troponin trended and flat, stat ECHO and cardiology consult with concerns for pericarditis    - colchicine bid x 3 months   - If patient begins to have chest pain and ok from surgical standpoint, could start Ibuprofen 600 mg TID for 1-2 weeks (until pain resolves). Would then decrease Ibuprofen dose by 200 mg weekly until off (400 mg TID X 1 week -> 200 mg TID X 1 week -> off).   - Trend daily CRP    # Bladder tear s/p repair; intra-op  # Indwelling mccord in place  # Pelvic drain in place   Urology following peripherally.  - Plan to continue Mccord catheter, for 2 weeks.  - HUGO creatinine prior to removal, close to discharge.  - Outpatient cystogram prior to trial void in 2 weeks with Dr. Lyman or Dr. Negron.  - on Tolterodine     # Acute blood loss anemia  # Bilateral peroneal DVTs, on Apixaban, 3/2025 (still present on ultrasound 4/28/2025)  Estimated blood loss of 2.7L intra op s/p 2 units PRBC intra-op. Received additional 1 unit overnight on 6.8. PTA was on Apixaban 5 mg bid, ortho has reduced dose to 2.5 mg bid post operatively.   - Transfuse for Hgb < 7 or active bleeding  - Continue Apixaban 2.5 mg BID POD2 x 4 weeks per Ortho (high bleeding risk post op especially with addition of colchicine)   - would consider serial lower extremity dopplers prior to discharge to assess  for proximal extension of clot in which case a more therapeutic dose would be appropriate     # Acute hypoxic respiratory failure  # Sinus tachycardia   Requiring 1-2 NC since surgery. Possibly related to atelectasis. Patient also noted to have fairly persistent sinus tachycardia. Given that he was off of AC for a few days prior to procedure with known dvt, would be prudent to rule out pulmonary embolism. However, the patient declines CT at this time, given the severity of pain with transfers and re positioning. Of note, echo showed no evidence of right heart strain. Pt may be slightly volume up as well in the setting of aggressive fluid resuscitation.   - Chest x-ray pending   - PE study if tachycardia and hypoxia persist or worsen and pt is amenable     # Leukocytosis (improving)   # Lactic acidosis (resolved)   # Post op fever (T max 100.8 on 6/5/2025)   Patient with leucocytosis, tachycardia, hypotension and lactic acidosis post op. Has been weaned off of pressures. No sign of infection this admission. Intra-op cultures and blood cultures have been negative.  - follow final intra-op cultures from 6/3, and peripheral blood cultures from 6/4, and 6/6  - prophylactic antibiotics per ortho as above     # Constipation   - Miralax bid for now     # NAYELY (resolved)  # Hyperkalemia (resolved)   - Baseline creat ~0.9. Hyperkalemic with K 6.4 at end of case, shifted with insulin/dextrose with improvement.      # Stress induced hyperglycemia (resolved)   - has not required insulin for several days   - will stop insulin    # Hx testicular cancer s/p chemotherapy and orchiectomy  - noted            Clinically Significant Risk Factors         # Hyponatremia: Lowest Na = 134 mmol/L in last 2 days, will monitor as appropriate   # Hypocalcemia: Lowest iCa = 4.3 mg/dL in last 2 days, will monitor and replace as appropriate     # Hypoalbuminemia: Lowest albumin = 3.1 g/dL at 6/4/2025  5:25 AM, will monitor as appropriate  #  Coagulation Defect: INR = 1.18 (Ref range: 0.85 - 1.15) and/or PTT = 29 Seconds (Ref range: 22 - 38 Seconds), will monitor for bleeding               # Obesity: Estimated body mass index is 34.29 kg/m  as calculated from the following:    Height as of this encounter: 1.829 m (6').    Weight as of this encounter: 114.7 kg (252 lb 12.8 oz)., PRESENT ON ADMISSION     # Financial/Environmental Concerns: other (see comments) (savings are dwindling, wife says she has a good paying job.)         Cleveland Moran PA-C  Hospitalist Service, GOLD TEAM   Securely message with [employer] (more info)  Text page via [facility] Paging/Directory   See signed in provider for up to date coverage information  ______________________________________________________________________    Chief Complaint   Leg pain     History is obtained from the patient    History of Present Illness   See above under (interim history)     Past Medical History    Past Medical History:   Diagnosis Date    Arthritis     Chondrosarcoma (H)     CTS (carpal tunnel syndrome)     Gynecomastia, male     H/O chondrosarcoma     Hard to intubate 06/29/2022    Infection due to 2019 novel coronavirus 12/2020    Kidney stone 2015    90% calcium oxalate monohydrate, and  10% calcium oxalate dihydrate.    Primary chondrosarcoma of bone of pelvis (H)     Psoriasis     Skin tag     Testicle cancer, left 2008    surgery and chemotherapy    Thyroid nodule 12/10/2024    high FDG, noted on PET       Past Surgical History   Past Surgical History:   Procedure Laterality Date    ABDOMEN SURGERY      ARTHROPLASTY REVISION HIP Right 6/3/2025    Procedure: CUSTOM PELVIC AND HIP REPLACEMENT;  Surgeon: Juan Jose Bravo MD;  Location: [facility] OR    BIOPSY      BIOPSY BONE PELVIS Right 12/6/2024    Procedure: BIOPSY, BONE, PELVIS;  Surgeon: Juan Jose Bravo MD;  Location: [facility] OR    EXCISE EXOSTOSIS FOOT  10/25/2013    Procedure: EXCISE EXOSTOSIS FOOT;  Excision of calcaneal bone cyst left foot with  allograft;  Surgeon: Marcello Jensen DPM;  Location: [facility] OR    LAPAROSCOPIC CHOLECYSTECTOMY N/A 06/29/2022    Procedure: CHOLECYSTECTOMY, LAPAROSCOPIC;  Surgeon: Cholo Agustin DO;  Location: [facility] OR    OPTICAL TRACKING SYSTEM HEMIPELVECTOMY Right 2/14/2025    Procedure: 1. HEMIPELVECTOMY, MODIFIED INTERNAL, 2. Right hip girdlestone resection, 3. Right  RETROPERITONEAL DISSECTION;  Surgeon: Juan Jose Bravo MD;  Location: [facility] OR    ORCHIECTOMY SCROTAL Left     REMOVE ANTIBIOTIC CEMENT BEADS / SPACER HIP Right 6/3/2025    Procedure: REMOVAL ANTIBIOTIC SPACER, Pelvis;  Surgeon: Juan Jose Bravo MD;  [facility] OR    REMOVE HARDWARE ANTERIOR PELVIS Right 6/3/2025    Procedure: REMOVAL, HARDWARE, PELVIS, ANTERIOR APPROACH;  Surgeon: Juan Jose Bravo MD;  [facility] OR    REPAIR BLADDER N/A 6/3/2025    Procedure: COMPLEX BLADDER REPAIR;  Surgeon: Cheng Lyman MD;  Location: [facility] OR       Medications   I have reviewed this patient's current medications       Review of Systems    The 10 point Review of Systems is negative other than noted in the HPI or here.      Physical Exam   Vital Signs: Temp: 98.7  F (37.1  C) Temp src: Oral BP: 121/66 Pulse: 116   Resp: 11 SpO2: 93 % O2 Device: Nasal cannula Oxygen Delivery: 1 LPM  Weight: 252 lbs 12.8 oz    General Appearance: Alert, no acute distress   HEENT: Anicteric sclera, MMM   Respiratory: Lungs CTAB, No wheezing , no crackles,   Cardiovascular: Regular rate , Regular rhythm , no murmur   GI: Abdomen soft, non-tender, active bowel sounds. No guarding or rebound, +HUGO drains in place in right hip and pelvis   Skin: No rash or jaundice on exposed skin   Musculoskeletal: No lower extremity edema   Neurologic: Oriented X 3 , CN II-XII grossly intact, Moves extremities equally       Medical Decision Making       60 MINUTES SPENT BY ME on the date of service doing chart review, history, exam, documentation & further activities per the note.      Data    ------------------------- PAST 24 HR DATA REVIEWED -----------------------------------------------

## 2025-06-06 NOTE — PLAN OF CARE
Goal Outcome Evaluation:      Plan of Care Reviewed With: patient    Overall Patient Progress: improvingOverall Patient Progress: improving    Major shift events: sepsis protocol triggered, provider notified, directed staff to monitor.   Neuro: Aox4. Afebrile   CV: normotensive, ST. Small amount of serosanguinous drainage from HUGO drains.   Resp: 1L NC. Denies SOB   GI: no BM overnight. Denies constipation discomfort   : Michelle in place. Frances output    For additional details and vitals see flowsheets.

## 2025-06-06 NOTE — PHARMACY-AMINOGLYCOSIDE DOSING SERVICE
Pharmacy Aminoglycoside Follow-Up Note  Date of Service 2025  Patient's  1980   44 year old, male    Weight (Adjusted): 92.4 kg    Indication: Surgical Prophylaxis  Current Gentamicin regimen:  460 mg IV q24h  Day of therapy: started on     Target goals based on extended interval dosing  Goal Peak level: 15-24 mg/L  Goal Trough level: <0.5mg/L    Current estimated CrCl: Estimated Creatinine Clearance: 123.1 mL/min (based on SCr of 0.99 mg/dL).    Creatinine for last 3 days  2025:  1:00 AM Creatinine 1.10 mg/dL;  3:14 AM Creatinine 1.26 mg/dL;  5:25 AM Creatinine 1.17 mg/dL  2025:  6:08 AM Creatinine 1.03 mg/dL; 10:07 AM Creatinine 1.06 mg/dL  2025:  5:43 AM Creatinine 0.99 mg/dL    Nephrotoxins and other renal medications (From now, onward)      Start     Dose/Rate Route Frequency Ordered Stop    25 1700  gentamicin (GARAMYCIN) 460 mg in sodium chloride 0.9 % 50 mL intermittent infusion         5 mg/kg × 92.4 kg (Adjusted)  over 60 Minutes Intravenous EVERY 24 HOURS 25 1136              Contrast Orders - past 72 hours (72h ago, onward)      Start     Dose/Rate Route Frequency Stop    25 0730  perflutren diluted 1mL to 2mL with saline (OPTISON) diluted injection 6 mL         6 mL Intravenous ONCE 25 0714            Aminoglycoside Levels - past 2 days  2025:  6:41 PM Gentamicin 10.7 ug/mL  2025:  9:04 AM Gentamicin 1.5 ug/mL    Aminoglycosides IV Administrations (past 72 hours)                     gentamicin (GARAMYCIN) 460 mg in sodium chloride 0.9 % 50 mL intermittent infusion (mg) 460 mg New Bag 25 1548     460 mg New Bag 25 1650    gentamicin (GARAMYCIN) 140 mg in sodium chloride 0.9 % 100 mL intermittent infusion (mg) 140 mg New Bag 25 0801    gentamicin (GARAMYCIN) 460 mg in sodium chloride 0.9 % 50 mL intermittent infusion (mg) 469.2 mg New Bag 25 1715                    Pharmacokinetic Analysis  Dose Given 460 mg          Pre-Dose Level  mcg/ml         First Post-Dose Level 10.7 mcg/ml Drawn at: 2.00 Hours post-infusion   2nd Post-dose level 1.5  Drawn at: 16.25 Hours post-infusion   Time between levels 14.25 hours         Dosing Interval 24 hours                    Kd 0.138 hr-1 T 1/2 =  5.0 hours      Extrapolated Peak 14.1 mcg/ml         Extrapolated trough 0.59 mcg/ml         Volume of Distribution 31.6 L (uses extrapolated Cp min rather than measured)   L/Kg = 0.34 L/kg           Calculated Peak level: 14.1 mg/L  Calculated Trough level: 0.59 mg/L  Volume of distribution: 0.34 L/kg  Half-life: 5 hours    Interpretation of levels and current regimen:  Aminoglycoside levels are slightly outside of the goal range.    Has serum creatinine changed greater than 50% in the last 72 hours: No    Urine output:  unable to determine    Renal function: Stable    Plan  1. Increase slightly increase dose from 460 mg to 490 mg IV q24h.    2.  Method of evaluation: 2 post dose levels    3. Pharmacy will continue to follow and check levels  as appropriate in 1-3 Days    Consuelo Brumfield [facility]

## 2025-06-06 NOTE — PLAN OF CARE
Goal Outcome Evaluation:      Plan of Care Reviewed With: patient    Overall Patient Progress: improvingOverall Patient Progress: improving    Outcome Evaluation: Patient transferred to [facility].        Changes this shift:   - Hgb of 6.8 - 1 unit of PRBC transfused without transfusion reaction.   - 2300 - Patient transferred to [facility] - report given to Norma ROBLEDO RN.   - Wife updated with patient transfer.     Neuro: A/O x 4. Able to make needs known.   Cardiac: ST at 110s - 120s. Normotensive.   Respiratory: LS clear diminished. On 1L of NC maintaining O2 sats >92%.    GI/: No BM this shift. Michelle catheter present, draining yellow urine.   Diet/appetite: Full liquid diet.   Pain: Pain on hip and abdomen, PRN Oxycodone and PRN Dilaudid given this shift.   Skin: Visible skin intact.  LDA's: PIV x 3. Michelle. Wound vac x 1. HUGO drain x 2 to bulb suction.   Activities: Repositioning Q2H.     Drips:   None.    Electrolytes: None.        Plan:   - Manage pain.     For complete vital signs and assessments, please refer to flowsheets.

## 2025-06-07 ENCOUNTER — APPOINTMENT (OUTPATIENT)
Dept: PHYSICAL THERAPY | Facility: CLINIC | Age: 45
DRG: 559 | End: 2025-06-07
Attending: ORTHOPAEDIC SURGERY
Payer: COMMERCIAL

## 2025-06-07 ASSESSMENT — ACTIVITIES OF DAILY LIVING (ADL)
ADLS_ACUITY_SCORE: 58

## 2025-06-07 NOTE — PLAN OF CARE
Goal Outcome Evaluation:    Alert/Oriented x4  1 Liter 02 NC  Michelle- See flowsheet  2 HUGO drains-See flowsheet  Prevena wound vac-right abdomen  Tele-Tachy  Abductor pillow in place  Boot on right foot to keep leg neutral     Pain control-Tylenol, Robaxin, Atarax, Oxy and IV Dilaudid is available    Call light within reach, able to make needs known    Plan: ARU

## 2025-06-07 NOTE — PROGRESS NOTES
Orthopedic Surgery Progress Note 06/07/2025    S: Patient seen this morning. Resting comfortably in bed. Pain controlled at rest but with intermittent pain radiating down leg. Patient denies lightheadedness/dizziness at rest, endorses fatigue. Tolerating diet. Passing flatus, no BM. Voiding via mccord. No concerns this AM.    O:  Temp: 98.7  F (37.1  C) Temp src: Oral BP: 128/70 Pulse: 115   Resp: 16 SpO2: 97 % O2 Device: Nasal cannula Oxygen Delivery: 1 LPM    Exam:  Gen: alert, intubated but interactive  Resp: intubated, on ventilator  MSK:    RLE:  - Prevena dressing in place, maintaining suction, no output in canister  - SILT tibial/sural/saphenous/DP/SP nerves  - Fires quad, hamstring, TA, EHL, FHL, GaSC  - PT/DP pulses 2+, foot wwp    Drain output:   Output by Drain (mL) 06/05/25 0700 - 06/05/25 1459 06/05/25 1500 - 06/05/25 2259 06/05/25 2300 - 06/06/25 0659 06/06/25 0700 - 06/06/25 1459 06/06/25 1500 - 06/06/25 2259 06/06/25 2300 - 06/07/25 0659 06/07/25 0700 - 06/07/25 0738   Negative Pressure Wound Therapy Abdomen Anterior 0  0       Drain Closed/Suction 1 Right;Anterior Hip Bulb 15 Polish 0  25  110 80    Drain Closed/Suction 2 Inferior Abdomen Bulb 19 Polish 70  30  30 15          Recent Labs   Lab 06/07/25  0535 06/06/25  1407 06/06/25  0543 06/05/25  1841 06/05/25  1007   WBC 10.2  --  11.8*  --  12.5*   HGB 7.3* 8.0* 7.7*  7.7*   < > 7.0*  7.0*     --  179  --  159    < > = values in this interval not displayed.       Culture results: NGTD    Assessment: Gilberto Lund is a 44 year old male s/p Right explant of hemipelvis cement spacer and and conversion to total hip arthroplasty utilizing custom hemipelvis implant on 6/4/25 with Dr. Bravo.      Plan:  Activity: Up with assist when extubated and able. Anterior hip precautions  Weight bearing status: TTWB RLE x 3 months.  Antibiotics: continue Ancef and gentamicin post op  Diet: Progress diet as tolerated.  DVT prophylaxis: SCDs and  mechanical while in the hospital. Eliquis 2.5 mg BID starting POD1 x 4 weeks.   Elevation: Elevate operative extremity as tolerated.   Wound Care: Keep Prevena dressing in place until POD #14  Drains: Please document output per shift, discontinue 15f HUGO drain in right thigh per ortho, management of left flank 19f drain per urology  Mccord: Keep in place for 2 weeks, management of mccord per urology  Pain management: Utilize all oral meds first, IV meds for severe breakthrough pain after PO meds given adequate time to take effect.  X-rays: Ordered, AP pelvis and cross-table lateral.  Therapy: PT/OT evaluate prior to discharge.  Cultures: Pending, follow culture results closely.  Consults: PT, OT, medicine, ICU team, Urology, Cardiology. Appreciate assistance in caring for this patient    Shiva Toribio MD  Adult Reconstruction Resident  06/07/2025

## 2025-06-07 NOTE — PROGRESS NOTES
"Pain Service Progress Note  [facility]  Date: 06/07/2025       Patient Name: Gilberto Lund  MRN: [id number]  Age: 44 year old  Sex: male      Assessment:  Gilberto Lund is a 44 year old male admitted on 6/3/2025. 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  He is now s/p  explant hemipelvis spacer and conversion to R DEVONTE with custom hemipelvis implant on 6/3/2025 with Dr Bravo complicated by bladder tear s/p repair, acute blood loss anemia, and pericarditis. Patient admitted to the ICU post op for undifferentiated shock and mechanical ventilatory support now stable for transfer to the floor since  6/5/25.      PTA, was using rare oxycdone 5mg, 1/day PRN, not everyday.     Jermeiah was struggling with his pain after PT today and needed an IV dose of medications. He is taking oxy around the lock and likely suffering a little trying to avoid IV medications. He was encouraged to take IV meds for breakthrough pain. Discussed ways to help manage his pain has he feels like he isn't progressing normally because he is unable to do what pT is asking. This isn't a \"normal\" surgery and he was encouraged to push himself but not over do it.     Plan/Recommendations:  Acetaminophen 975mg PO 8 hours  Robaxin 750mg PO Q 6 hours PRN  Oxycodone 5-10mg PO Q 3 hours PRN  ADD an extra oral dose of oxycodone for prior to PT  Discussed with him about either using his IV meds or an extra oral dose to help him do daily functions and PT as needed     IV Dilaudid 0.2-0.4mg IV Q 2 hours PRN  Gabapentin 600mg PO TID (increased on 6/5/25)  Lidocaine patches 1-3 patches Q 24 hours, 12 hours on and 12 hours off    Pain Service will continue to follow.      Stephen Hairston,   06/07/2025     Overnight Events: none      Subjective:  pain " "limiting him   Nausea: No  Vomiting: No  Pruritus: No  Symptoms of LAST: No    Pain Location:  hip    Pain Intensity:    Pain at Rest: 8/10   Pain with Activity: 10/10  Comfort Goal: 5/10   Baseline Pain: 5/10   Satisfied with your level of pain control: Yes    Diet: Regular Diet Adult    Relevant Labs:  Recent Labs   Lab Test 06/07/25  0535 06/04/25  0314 06/04/25  0100   PROTIME  --   --  15.6*   INR  --   --  1.18*      < > 305   PTT  --   --  29   BUN 10.1   < > 16.8    < > = values in this interval not displayed.       Physical Exam:  Vitals: /70 (BP Location: Right arm)   Pulse 115   Temp 98.7  F (37.1  C) (Oral)   Resp 16   Ht 1.829 m (6')   Wt 114.7 kg (252 lb 12.8 oz)   SpO2 97%   BMI 34.29 kg/m      Physical Exam:   Orientation:  Alert, oriented, and in no acute distress: Yes  Sedation: No    Motor Examination:  5/5 Strength in lower extremities: Yes    Sensory Level:   Decrease in sensation: No    Relevant Medications:  Current Pain Medications:  Medications related to Pain Management (From now, onward)      Start     Dose/Rate Route Frequency Ordered Stop    06/05/25 2000  polyethylene glycol (MIRALAX) Packet 17 g         17 g Oral 2 TIMES DAILY 06/05/25 1307      06/05/25 2000  senna-docusate (SENOKOT-S/PERICOLACE) 8.6-50 MG per tablet 2 tablet         2 tablet Oral 2 TIMES DAILY 06/05/25 1307      06/05/25 1400  gabapentin (NEURONTIN) capsule 600 mg        Note to Pharmacy: PTA Sig:Take 1 capsule (400 mg) by mouth 3 times daily.      600 mg Oral 3 TIMES DAILY 06/05/25 0925      06/05/25 0923  oxyCODONE (ROXICODONE) tablet 5 mg        Placed in \"Or\" Linked Group    5 mg Oral EVERY 3 HOURS PRN 06/05/25 0923      06/05/25 0923  oxyCODONE IR (ROXICODONE) tablet 10 mg        Placed in \"Or\" Linked Group    10 mg Oral EVERY 3 HOURS PRN 06/05/25 0923      06/04/25 1513  opium-belladonna (B&O SUPPRETTES) 30-16.2 MG per suppository 1 suppository         30 mg Rectal EVERY 8 HOURS PRN 06/04/25 " "1513      06/04/25 0221  methocarbamol (ROBAXIN) tablet 750 mg         750 mg Oral EVERY 6 HOURS PRN 06/04/25 0221      06/04/25 0221  hydrOXYzine HCl (ATARAX) tablet 25 mg         25 mg Oral EVERY 6 HOURS PRN 06/04/25 0221      06/04/25 0221  magnesium hydroxide (MILK OF MAGNESIA) suspension 30 mL         30 mL Oral DAILY PRN 06/04/25 0221      06/04/25 0221  bisacodyl (DULCOLAX) suppository 10 mg         10 mg Rectal DAILY PRN 06/04/25 0221      06/04/25 0221  HYDROmorphone (DILAUDID) injection 0.2 mg        Placed in \"Or\" Linked Group    0.2 mg Intravenous EVERY 2 HOURS PRN 06/04/25 0221      06/04/25 0221  HYDROmorphone (DILAUDID) injection 0.4 mg        Placed in \"Or\" Linked Group    0.4 mg Intravenous EVERY 2 HOURS PRN 06/04/25 0221      06/04/25 0221  lidocaine 1 % 0.1-1 mL         0.1-1 mL Other EVERY 1 HOUR PRN 06/04/25 0221      06/04/25 0221  lidocaine (LMX4) cream          Topical EVERY 1 HOUR PRN 06/04/25 0221                Please see A&P for additional details of medical decision making.      Acute Inpatient Pain Service [facility]  Hours of pain coverage 24/7   Please Page via Vocera  - Link to Vocera Here - Search Pain  Page via [employer]- Please Page the Pain Team Via [employer]: \"PAIN MANAGEMENT ACUTE INPATIENT/ [facility] [place]\"            "

## 2025-06-07 NOTE — PROGRESS NOTES
[facility]    Medicine Progress Note - Hospitalist Service, GOLD TEAM 17    Date of Admission:  6/3/2025    Assessment & Plan     44 year old male admitted on 6/3/2025. 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 Patient admitted to the ICU post op for undifferentiated shock and mechanical ventilatory support now stable for transfer to the floor. Hospitalist team consulted for medical co-management.     Today's updates   -HGB 7.3 > 7.5   -No acute events overnight.   -Pt's wife at bedside.   -He was transferred to  MS.   -Patient worked with PT this morning.   -No hematuria. Patient has a mccord cath.   -No chest pain, palpitations, shortness of breath, nausea, vomit, fever or chills.   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 Bravo   -Ortho is primary   Activity: Up with assist when extubated and able. Anterior hip precautions  Weight bearing status: TTWB RLE x 3 months.  Antibiotics: continue Ancef and gentamicin post op  Diet: Progress diet as tolerated.  DVT prophylaxis: SCDs and mechanical while in the hospital. Eliquis 2.5 mg BID starting POD1 x 4 weeks.   Elevation: Elevate operative extremity as tolerated.   Wound Care: Keep Prevena dressing in place until POD #14  Drains: Please document output per shift, discontinue 15f HUGO drain in right thigh per ortho, management of left flank 19f drain per urology  Mccord: Keep in place for 2  weeks, management of mccord per urology  Pain management: Utilize all oral meds first, IV meds for severe breakthrough pain after PO meds given adequate time to take effect.  X-rays: Ordered, AP pelvis and cross-table lateral.  Therapy: PT/OT evaluate prior to discharge.  Cultures: Pending, follow culture results closely.     # Acute pain 2/2 above   - Pain consult ordered  - Acetaminophen 975 mg Q6h  - Hydroxyzine 25 mg Q6h PRN  - Methocarbamol 750 mg Q6h PRN  - Oxycodone 5-10 mg Q4h PRN  - Hydromorphone 0.2-0.4 mg Q2h PRN  - PTA gabapentin increased to 600 mg tid    # Bladder tear s/p repair; intra-op  # Indwelling mccord in place  # Pelvic drain in place   Urology following peripherally.  - Plan to continue Mccord catheter, for 2 weeks.  - HUGO creatinine prior to removal, close to discharge.  - Outpatient cystogram prior to trial void in 2 weeks with Dr. Lyman or Dr. Negron.  - on Tolterodine       # Acute blood loss anemia  # Bilateral peroneal DVTs, on Apixaban, 3/2025 (still present on ultrasound 4/28/2025)  Estimated blood loss of 2.7L intra op s/p 2 units PRBC intra-op. Received additional 1 unit overnight on 6.8. PTA was on Apixaban 5 mg bid, ortho has reduced dose to 2.5 mg bid post operatively.   - Transfuse for Hgb < 7 or active bleeding  - Continue Apixaban 2.5 mg BID POD2 x 4 weeks per Ortho (high bleeding risk post op especially with addition of colchicine)        Diet: Regular Diet Adult    DVT Prophylaxis: Defer to primary service  Mccord Catheter: PRESENT, indication: Surgical procedure  Lines: None     Cardiac Monitoring: ACTIVE order. Indication: ICU  Code Status: Full Code      Clinically Significant Risk Factors               # Hypoalbuminemia: Lowest albumin = 3.1 g/dL at 6/4/2025  5:25 AM, will monitor as appropriate                # Obesity: Estimated body mass index is 34.29 kg/m  as calculated from the following:    Height as of this encounter: 1.829 m (6').    Weight as of this encounter:  114.7 kg (252 lb 12.8 oz).      # Financial/Environmental Concerns: other (see comments) (savings are dwindling, wife says she has a good paying job.)         Social Drivers of Health    Housing Stability: High Risk (6/3/2025)    Housing Stability     Do you have housing? : No     Are you worried about losing your housing?: No   Tobacco Use: Medium Risk (5/27/2025)    Patient History     Smoking Tobacco Use: Former     Smokeless Tobacco Use: Former     Passive Exposure: Past   Interpersonal Safety: High Risk (6/3/2025)    Interpersonal Safety     Do you feel physically and emotionally safe where you currently live?: No     Within the past 12 months, have you been hit, slapped, kicked or otherwise physically hurt by someone?: No     Within the past 12 months, have you been humiliated or emotionally abused in other ways by your partner or ex-partner?: No   Social Connections: Unknown (9/30/2024)    Social Connection and Isolation Panel [NHANES]     Frequency of Social Gatherings with Friends and Family: Once a week          Disposition Plan     Medically Ready for Discharge: Anticipated in 2-4 Days             Srinivasan Sky MD  Hospitalist Service, GOLD TEAM 17  [facility]  Securely message with [employer] book (more info)  Text page via [employer] Paging/Directory   See signed in provider for up to date coverage information  ______________________________________________________________________    Interval History     Acute events as above.   Pleasant.     Physical Exam   Vital Signs: Temp: 98.7  F (37.1  C) Temp src: Oral BP: 128/70 Pulse: 115   Resp: 16 SpO2: 97 % O2 Device: Nasal cannula Oxygen Delivery: 1 LPM  Weight: 252 lbs 12.8 oz    General Appearance: Alert, no acute distress, on O 2 NC, obese    HEENT: Anicteric sclera, MMM   Respiratory: Lungs CTAB, No wheezing , no crackles,   Cardiovascular: Regular rate , Regular rhythm , no murmur   GI: Abdomen soft, non-tender,  active bowel sounds. No guarding or rebound, +HUGO drains in place in right hip and pelvis   Musculoskeletal: No lower extremity edema   Neurologic: Oriented X 3 , CN II-XII grossly intact, Moves extremities equally     Medical Decision Making       55 MINUTES SPENT BY ME on the date of service doing chart review, history, exam, documentation & further activities per the note.      Data     I have personally reviewed the following data over the past 24 hrs:    10.2  \   7.5 (L)   / 218     135 101 10.1 /  113 (H)   3.6 26 1.03 \     Procal: N/A CRP: 230.66 (H) Lactic Acid: N/A         Imaging results reviewed over the past 24 hrs:   No results found for this or any previous visit (from the past 24 hours).

## 2025-06-08 ENCOUNTER — APPOINTMENT (OUTPATIENT)
Dept: PHYSICAL THERAPY | Facility: CLINIC | Age: 45
DRG: 559 | End: 2025-06-08
Attending: ORTHOPAEDIC SURGERY
Payer: COMMERCIAL

## 2025-06-08 ENCOUNTER — APPOINTMENT (OUTPATIENT)
Dept: OCCUPATIONAL THERAPY | Facility: CLINIC | Age: 45
DRG: 559 | End: 2025-06-08
Attending: STUDENT IN AN ORGANIZED HEALTH CARE EDUCATION/TRAINING PROGRAM
Payer: COMMERCIAL

## 2025-06-08 ASSESSMENT — ACTIVITIES OF DAILY LIVING (ADL)
ADLS_ACUITY_SCORE: 58

## 2025-06-08 NOTE — PROGRESS NOTES
06/08/25 1040   Appointment Info   Signing Clinician's Name / Credentials (OT) Frances Marshall, OTR/L   Rehab Comments (OT) antterior/posterior hip prec, TTWB RLE   Living Environment   People in Home spouse;child(angelo), dependent   Current Living Arrangements house   Home Accessibility stairs within home   Number of Stairs, Within Home, Primary greater than 10 stairs   Stair Railings, Within Home, Primary railings on both sides of stairs   Living Environment Comments For the last 3 months, has been sleeping in a recliner on the main level   Self-Care   Usual Activity Tolerance moderate   Current Activity Tolerance poor   Equipment Currently Used at Home wheelchair, manual;walker, rolling;commode chair;other (see comments)  (slideboard, droparm commode, reacher, leg )   Fall history within last six months no   Activity/Exercise/Self-Care Comment Recently at ARU and wife assists as needed with ADLs at home[facility]   General Information   Onset of Illness/Injury or Date of Surgery 06/03/25   Referring Physician Dr. Bravo   Patient/Family Therapy Goal Statement (OT) to sit in the recliner   Additional Occupational Profile Info/Pertinent History of Current Problem per chart, 44 year old male admitted on 6/3/2025. 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 Patient admitted to the ICU post op for undifferentiated shock and mechanical ventilatory support now stable for transfer to the floor.   Existing Precautions/Restrictions no hip ER;no active hip ABD;no hip ADD past midline;no hip hyperextension;90 degree hip flexion;no  pivoting or twisting;weight bearing   Left Upper Extremity (Weight-bearing Status) full weight-bearing (FWB)   Right Upper Extremity (Weight-bearing Status) full weight-bearing (FWB)   Left Lower Extremity (Weight-bearing Status) full weight-bearing (FWB)   Right Lower Extremity (Weight-bearing Status) toe touch weight-bearing (TTWB)   Cognitive Status Examination   Orientation Status orientation to person, place and time   Affect/Mental Status (Cognitive) WFL   Follows Commands WFL   Visual Perception   Visual Impairment/Limitations corrective lenses for distance   Sensory   Sensory Quick Adds sensation intact   Pain Assessment   Patient Currently in Pain Yes, see Vital Sign flowsheet   Posture   Posture not impaired   Range of Motion Comprehensive   General Range of Motion no range of motion deficits identified   Strength Comprehensive (MMT)   General Manual Muscle Testing (MMT) Assessment no strength deficits identified   Muscle Tone Assessment   Muscle Tone Quick Adds No deficits were identified   Coordination   Upper Extremity Coordination No deficits were identified   Bed Mobility   Bed Mobility supine-sit   Supine-Sit [place] (Bed Mobility) maximum assist (25% patient effort);2 person assist   Transfers   Transfers bed-chair transfer;sit-stand transfer;toilet transfer;shower transfer;wheelchair transfer   Transfer Skill: Bed to Chair/Chair to Bed   Bed-Chair [place] (Transfers) moderate assist (50% patient effort);2 person assist   Shower Transfer   Shower Transfer Comments per clinical judgement - Total Ax2   Toilet Transfer   [place] Level (Toilet Transfer) 2 person assist;moderate assist (50% patient effort)   Wheelchair Transfer   Comments per clinical judgement - Mod Ax2   Balance   Balance Assessment no deficits identified   Activities of Daily Living   BADL Assessment/Intervention upper body dressing;lower body dressing;grooming;toileting   Upper Body Dressing Assessment/Training    Comment, (Upper Body Dressing) per clinical judgement Mod A   Lower Body Dressing Assessment/Training   Comment, (Lower Body Dressing) Per clinical judgement Max A   Grooming Assessment/Training   Comment, (Grooming) Per clinical judgement Mod A   Toileting   [place] Level (Toileting) dependent (less than 25% patient effort)   Clinical Impression   Criteria for Skilled Therapeutic Interventions Met (OT) Yes, treatment indicated   OT Diagnosis decreased ADL ind   Influenced by the following impairments s/p R DEVONTE with custom hemipelvis implant   OT Problem List-Impairments impacting ADL problems related to;activity tolerance impaired;pain;post-surgical precautions   Assessment of Occupational Performance 3-5 Performance Deficits   Identified Performance Deficits dressing, toileting, showering, mobility   Planned Therapy Interventions (OT) ADL retraining   Clinical Decision Making Complexity (OT) detailed assessment/moderate complexity   Risk & Benefits of therapy have been explained evaluation/treatment results reviewed;patient   OT Total Evaluation Time   OT Eval, Moderate Complexity Minutes (11354) 5   OT Goals   Therapy Frequency (OT) 5 times/week   OT Predicted Duration/Target Date for Goal Attainment 06/15/25   OT Goals Hygiene/Grooming;Lower Body Dressing;Upper Body Dressing;Toilet Transfer/Toileting;OT Goal 1   OT: Hygiene/Grooming supervision/stand-by assist;using adaptive equipment;within precautions   OT: Upper Body Dressing Supervision/stand-by assist;using adaptive equipment;within precautions   OT: Lower Body Dressing Supervision/stand-by assist;using adaptive equipment;within precautions   OT: Toilet Transfer/Toileting Supervision/stand-by assist;using adaptive equipment;within precautions   OT: Goal 1 Pt will recall all post surgical hip precautions while maintaining precautions t/o session   Interventions   Interventions Quick Adds Self-Care/Home Management;Therapeutic Activity   Self-Care/Home  Management   Self-Care/Home Mgmt/ADL, Compensatory, Meal Prep Minutes (75913) 55   Symptoms Noted During/After Treatment (Meal Preparation/Planning Training) fatigue;increased pain;dizziness   Treatment Detail/Skilled Intervention Co-tx with PT d/t skilled need of 2 disciplines to safely progress transfers within precautions and significant line management. Pt supine in bed upon OT arrival and agreeable to therapy. Pt reports significant pain but motivated to transfer to commode. Educ on importance of changing position and participation in therapy. Educ on hip precautions. Pt appears to be anxious about pain and precautions. Extensive cues needed t/o session for all movements and activities. Max Ax2 supine to sit bed mob with elevated HOB and ABD pillow placed between BLE. Educ on PLB for pain and dizziness while sitting EOB. Pt SBA sitting EOB balance. Educ on options for transfers slideboard/ SPT/modified squat pivtot lateral scooting from surface to surface. Pt declining slideboard d/t difficulty managing board and requesting to trial modified lateral scoot/squat pivot transfer. CGAx2 for safety with precautions and line management for modified scoot/squat pivot bed>droparm commode>recliner. Total A for toileting to complete posterior pericares while sitting on commode. Increased time and effort needed d/t pain, precautions, line management and to promote IND. Pt reports comfort in recliner. Pt left in recliner with sling placed underneath pt with needs in reach.   Therapeutic Activities   Therapeutic Activity Minutes (61253) 15   Symptoms noted during/after treatment fatigue;increased pain   Treatment Detail/Skilled Intervention Focus on progressing safe transfers within precautions. Returned this PM for pt and RN educ on use of OH lift for transfers within preacautions. Sling placed and pt in position for OH lift transfer. Lift battery beeping although appears to be charged but not able to lift pt at this time.  RN will put in a ticket for the OH lift to be looked at. Reinforced educ on precautions and importance of changing positions. Pt reports comfort in recliner and wanting to sleep in recliner. Educ on using OH lift with nursing staff and continue to complete transfers with therapy. Pt left in recliner with RN and wife present in room.   OT Discharge Planning   OT Plan transfers as able, seated ADLs and practice with AE, practice OH lift within precautions with nursing present   OT Discharge Recommendation (DC Rec) Transitional Care Facility;Acute Rehab Center-Motivated patient will benefit from intensive, interdisciplinary therapy.  Anticipate will be able to tolerate 3 hours of therapy per day   OT Rationale for DC Rec Pt recently at ARU then DC home. Pt is signficantly below ADL baseline and would benefit from continued skilled OT and TCU stay to increase ADL ind, act tolerance and safety. If pt's tolerance for therapy increases, pt would benefit from ARU stay.   OT Brief overview of current status Ax2   OT Total Distance Amb During Session (feet) 0   Total Session Time   Timed Code Treatment Minutes 70   Total Session Time (sum of timed and untimed services) 75

## 2025-06-08 NOTE — PROGRESS NOTES
Orthopedic Surgery Progress Note 06/08/2025    S: Patient seen this morning. Resting comfortably in bed. Pain improving daily. Endorses abdominal pain and constipation this AM. Patient denies lightheadedness/dizziness at rest. Tolerating diet. Passing flatus, no BM. Voiding via mccord. Discussed importance of bowel regimen and recommended suppository today.    O:  Temp: 98.8  F (37.1  C) Temp src: Oral BP: 125/66 Pulse: 105   Resp: 16 SpO2: 95 %   Oxygen Delivery: 1 LPM    Exam:  Gen: alert, intubated but interactive  Resp: intubated, on ventilator  MSK:    RLE:  - Prevena dressing in place, maintaining suction, no output in canister  - SILT tibial/sural/saphenous/DP/SP nerves  - Fires quad, hamstring, TA, EHL, FHL, GaSC  - PT/DP pulses 2+, foot wwp    Drain output:   Output by Drain (mL) 06/06/25 0700 - 06/06/25 1459 06/06/25 1500 - 06/06/25 2259 06/06/25 2300 - 06/07/25 0659 06/07/25 0700 - 06/07/25 1459 06/07/25 1500 - 06/07/25 2259 06/07/25 2300 - 06/08/25 0659 06/08/25 0700 - 06/08/25 0706   Negative Pressure Wound Therapy Abdomen Anterior          Drain Closed/Suction 1 Right;Anterior Hip Bulb 15 [place]  110 80  70 45    Drain Closed/Suction 2 Inferior Abdomen Bulb 19 [place]  30 15   25          Recent Labs   Lab 06/08/25  0610 06/07/25  1812 06/07/25  0535 06/06/25  1407 06/06/25  0543   WBC 8.3  --  10.2  --  11.8*   HGB 7.6* 7.5* 7.3*   < > 7.7*  7.7*     --  218  --  179    < > = values in this interval not displayed.       Culture results: NGTD    Assessment: Gilberto Lund is a 44 year old male s/p Right explant of hemipelvis cement spacer and and conversion to total hip arthroplasty utilizing custom hemipelvis implant on 6/4/25 with Dr. Bravo.      Plan:  Activity: Up with assist when extubated and able. Anterior hip precautions  Weight bearing status: TTWB RLE x 3 months.  Antibiotics: continue Ancef and gentamicin post op  Diet: Progress diet as tolerated.  DVT prophylaxis: SCDs and  mechanical while in the hospital. Eliquis 2.5 mg BID starting POD1 x 4 weeks.   Elevation: Elevate operative extremity as tolerated.   Wound Care: Keep Prevena dressing in place until POD #14  Drains: Please document output per shift, discontinue 15f HUGO drain in right thigh per ortho, management of left flank 19f drain per urology  Mccord: Keep in place for 2 weeks, management of mccord per urology  Pain management: Utilize all oral meds first, IV meds for severe breakthrough pain after PO meds given adequate time to take effect.  X-rays: Ordered, AP pelvis and cross-table lateral.  Therapy: PT/OT evaluate prior to discharge.  Cultures: Pending, follow culture results closely.  Consults: PT, OT, medicine, ICU team, Urology, Cardiology. Appreciate assistance in caring for this patient    Shiva Toribio MD  Adult Reconstruction Resident  06/08/2025

## 2025-06-08 NOTE — PROGRESS NOTES
Pain Service Progress Note  [facility]  Date: 06/08/2025       Patient Name: Gilberto Lund  MRN: [id number]  Age: 44 year old  Sex: male      Assessment:  Doing better today but still struggling with pain especially during PT.   Also having gas/abdominal pain/pressure as well.     Plan/Recommendations:  Start ketamine infusion at 10mg/hr  Likely for a day or two to help with getting OOB and PT  Restart acetaminophen 975mg q8  Continue robaxin  Can consider adding in an extra dose of oral medication for prior to PT but also can utilize IV medications  If pain is still an issue can consider going to Oxy 10-15mg (from 5-10) since he is consistently utilizing 10mg per dose  Urvashi 600/600/600  Bowel regimen per primary     Pain Service will continue to follow.      Stephen Hairston, DO  06/08/2025     Overnight Events: None    Subjective:  pain but able to get some sleep   Nausea: No  Vomiting: No  Pruritus: No  Symptoms of LAST: No    Pain Location:  hip    Pain Intensity:    Pain at Rest: 6/10   Pain with Activity: 8/10  Comfort Goal: 5/10     Diet: Regular Diet Adult    Relevant Labs:  Recent Labs   Lab Test 06/08/25  0610 06/04/25  0314 06/04/25  0100   PROTIME  --   --  15.6*   INR  --   --  1.18*      < > 305   PTT  --   --  29   BUN 10.5   < > 16.8    < > = values in this interval not displayed.       Physical Exam:  Vitals: BP (!) 146/76 (BP Location: Left arm)   Pulse 99   Temp 98.4  F (36.9  C) (Oral)   Resp 16   Ht 1.829 m (6')   Wt 114.7 kg (252 lb 12.8 oz)   SpO2 97%   BMI 34.29 kg/m      Physical Exam:   Orientation:  Alert, oriented, and in no acute distress: Yes  Sedation: No    Motor Examination:  5/5 Strength in lower extremities: Yes    Sensory Level:   Decrease in sensation: No      Relevant Medications:  Current Pain Medications:  Medications related to Pain Management (From now, onward)      Start     Dose/Rate Route Frequency Ordered Stop    06/08/25 0800   "ketamine (KETALAR) 2 mg/mL in sodium chloride 0.9 % 50 mL ANALGESIA infusion         10 mg/hr  5 mL/hr  Intravenous CONTINUOUS 06/08/25 0732      06/08/25 0731  acetaminophen (TYLENOL) tablet 975 mg         975 mg Oral EVERY 8 HOURS PRN 06/08/25 0732      06/05/25 2000  polyethylene glycol (MIRALAX) Packet 17 g         17 g Oral 2 TIMES DAILY 06/05/25 1307      06/05/25 2000  senna-docusate (SENOKOT-S/PERICOLACE) 8.6-50 MG per tablet 2 tablet         2 tablet Oral 2 TIMES DAILY 06/05/25 1307      06/05/25 1400  gabapentin (NEURONTIN) capsule 600 mg        Note to Pharmacy: [facility]:Take 1 capsule (400 mg) by mouth 3 times daily.      600 mg Oral 3 TIMES DAILY 06/05/25 0925      06/05/25 0923  oxyCODONE (ROXICODONE) tablet 5 mg        Placed in \"Or\" Linked Group    5 mg Oral EVERY 3 HOURS PRN 06/05/25 0923      06/05/25 0923  oxyCODONE IR (ROXICODONE) tablet 10 mg        Placed in \"Or\" Linked Group    10 mg Oral EVERY 3 HOURS PRN 06/05/25 0923      06/04/25 1513  opium-belladonna (B&O SUPPRETTES) 30-16.2 MG per suppository 1 suppository         30 mg Rectal EVERY 8 HOURS PRN 06/04/25 1513      06/04/25 0221  methocarbamol (ROBAXIN) tablet 750 mg         750 mg Oral EVERY 6 HOURS PRN 06/04/25 0221      06/04/25 0221  hydrOXYzine HCl (ATARAX) tablet 25 mg         25 mg Oral EVERY 6 HOURS PRN 06/04/25 0221      06/04/25 0221  magnesium hydroxide (MILK OF MAGNESIA) suspension 30 mL         30 mL Oral DAILY PRN 06/04/25 0221      06/04/25 0221  bisacodyl (DULCOLAX) suppository 10 mg         10 mg Rectal DAILY PRN 06/04/25 0221      06/04/25 0221  HYDROmorphone (DILAUDID) injection 0.2 mg        Placed in \"Or\" Linked Group    0.2 mg Intravenous EVERY 2 HOURS PRN 06/04/25 0221      06/04/25 0221  HYDROmorphone (DILAUDID) injection 0.4 mg        Placed in \"Or\" Linked Group    0.4 mg Intravenous EVERY 2 HOURS PRN 06/04/25 0221      06/04/25 0221  lidocaine 1 % 0.1-1 mL         0.1-1 mL Other EVERY 1 HOUR PRN 06/04/25 0221      " "06/04/25 0221  lidocaine (LMX4) cream          Topical EVERY 1 HOUR PRN 06/04/25 0221              Primary Service Contacted with Recommendations? Yes      Please see A&P for additional details of medical decision making.      Acute Inpatient Pain Service [facility]  Hours of pain coverage 24/7   Please Page via [facility]  - Link to [facility] Here - Search Pain  Page via [facility]- Please Page the Pain Team Via [employer]: \"PAIN MANAGEMENT ACUTE INPATIENT/ [facility]\"            "

## 2025-06-08 NOTE — PROGRESS NOTES
[facility]    Medicine Progress Note - Hospitalist Service, GOLD TEAM 17    Date of Admission:  6/3/2025    Assessment & Plan     44 year old male admitted on 6/3/2025. 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 Patient admitted to the ICU post op for undifferentiated shock and mechanical ventilatory support now stable for transfer to the floor. Hospitalist team consulted for medical co-management.     Today's updates   -HGB 7.6.   -No acute events overnight.   -Pt's wife at bedside.   -Patient worked with PT this morning.   -No hematuria but urine looks concentrated, I advised the patient to increase PO intake. Patient has a mccord cath.   -No chest pain, palpitations, shortness of breath, nausea, vomit, fever or chills.   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 Bravo   -Ortho is primary   Activity: Up with assist when extubated and able. Anterior hip precautions  Weight bearing status: TTWB RLE x 3 months.  Antibiotics: continue Ancef and gentamicin post op  Diet: Progress diet as tolerated.  DVT prophylaxis: SCDs and mechanical while in the hospital. Eliquis 2.5 mg BID starting POD1 x 4 weeks.   Elevation: Elevate operative extremity as tolerated.   Wound Care: Keep Prevena dressing in place until POD #14  Drains: Please document output per shift, discontinue 15f HUGO drain in right thigh per ortho, management of left flank 19f drain per  urology  Mccord: Keep in place for 2 weeks, management of mccord per urology  Pain management: Utilize all oral meds first, IV meds for severe breakthrough pain after PO meds given adequate time to take effect.  X-rays: Ordered, AP pelvis and cross-table lateral.  Therapy: PT/OT evaluate prior to discharge.  Cultures: Pending, follow culture results closely.     # Acute pain 2/2 above   - Pain team following the patient   - Acetaminophen 975 mg Q6h  - Hydroxyzine 25 mg Q6h PRN  - Methocarbamol 750 mg Q6h PRN  - Oxycodone 5-10 mg Q4h PRN  - Hydromorphone 0.2-0.4 mg Q2h PRN  - PTA gabapentin increased to 600 mg tid    # Bladder tear s/p repair; intra-op  # Indwelling mccord in place  # Pelvic drain in place   Urology following peripherally.  - Plan to continue Mccord catheter, for 2 weeks.  - HUGO creatinine prior to removal, close to discharge.  - Outpatient cystogram prior to trial void in 2 weeks with Dr. Lyman or Dr. Negron.  - on Tolterodine       # Acute blood loss anemia  # Bilateral peroneal DVTs, on Apixaban, 3/2025 (still present on ultrasound 4/28/2025)  Estimated blood loss of 2.7L intra op s/p 2 units PRBC intra-op. Received additional 1 unit overnight on 6.8. PTA was on Apixaban 5 mg bid, ortho has reduced dose to 2.5 mg bid post operatively.   - Transfuse for Hgb < 7 or active bleeding  - Continue Apixaban 2.5 mg BID POD2 x 4 weeks per Ortho (high bleeding risk post op especially with addition of colchicine)        Diet: Regular Diet Adult    DVT Prophylaxis: Defer to primary service  Mccord Catheter: PRESENT, indication: Surgical procedure  Lines: None     Cardiac Monitoring: None  Code Status: Full Code      Clinically Significant Risk Factors               # Hypoalbuminemia: Lowest albumin = 3.1 g/dL at 6/4/2025  5:25 AM, will monitor as appropriate                # Obesity: Estimated body mass index is 34.29 kg/m  as calculated from the following:    Height as of this encounter: 1.829 m (6').    Weight as  of this encounter: 114.7 kg (252 lb 12.8 oz).        # Financial/Environmental Concerns: other (see comments) (savings are dwindling, wife says she has a good paying job.)         Social Drivers of Health    Housing Stability: High Risk (6/3/2025)    Housing Stability     Do you have housing? : No     Are you worried about losing your housing?: No   Tobacco Use: Medium Risk (5/27/2025)    Patient History     Smoking Tobacco Use: Former     Smokeless Tobacco Use: Former     Passive Exposure: Past   Interpersonal Safety: High Risk (6/3/2025)    Interpersonal Safety     Do you feel physically and emotionally safe where you currently live?: No     Within the past 12 months, have you been hit, slapped, kicked or otherwise physically hurt by someone?: No     Within the past 12 months, have you been humiliated or emotionally abused in other ways by your partner or ex-partner?: No   Social Connections: Unknown (9/30/2024)    Social Connection and Isolation Panel [NHANES]     Frequency of Social Gatherings with Friends and Family: Once a week          Disposition Plan     Medically Ready for Discharge: Anticipated in 2-4 Days       Srinivasan Sky MD  Hospitalist Service, GOLD TEAM 17  [facility]  Securely message with [email or web address] (more info)  Text page via [employer] Paging/Directory   See signed in provider for up to date coverage information  ______________________________________________________________________    Interval History     Acute events as above.   Pleasant.   Other ROS negative     Physical Exam   Vital Signs: Temp: 98.4  F (36.9  C) Temp src: Oral BP: (!) 146/76 Pulse: 99   Resp: 16 SpO2: 97 %   Oxygen Delivery: 1 LPM  Weight: 252 lbs 12.8 oz    General Appearance: Alert, no acute distress, on O 2 NC, obese    HEENT: Anicteric sclera, MMM   Respiratory: Lungs CTAB, No wheezing , no crackles,   Cardiovascular: Regular rate , Regular rhythm , no murmur   GI: Abdomen  soft, non-tender, active bowel sounds. No guarding or rebound, +HUGO drains in place in right hip and pelvis   Musculoskeletal: No lower extremity edema   Neurologic: Oriented X 3 , CN II-XII grossly intact, Moves extremities equally     Medical Decision Making       51 MINUTES SPENT BY ME on the date of service doing chart review, history, exam, documentation & further activities per the note.      Data     I have personally reviewed the following data over the past 24 hrs:    8.3  \   7.6 (L)   / 265     136 98 10.5 /  123 (H)   3.6 27 0.93 \     Procal: N/A CRP: 162.39 (H) Lactic Acid: N/A         Imaging results reviewed over the past 24 hrs:   No results found for this or any previous visit (from the past 24 hours).

## 2025-06-08 NOTE — PLAN OF CARE
Goal Outcome Evaluation:    Patient alert and oriented x4  1 Liter NC  Michelle- See flowsheet  2 HUGO drains-See flowsheet  Prevena wound vac-right abdomen  Tele-Tachy  Abductor pillow in place  Boot on right foot to keep leg neutral       Pain controlled with Oxy, Robaxin, Atarax, Tylenol and IV Dilaudid

## 2025-06-08 NOTE — PLAN OF CARE
Goal Outcome Evaluation:           Pt A&Ox4   IV ketamine started at 10mg/hour for pain control   Worked with PT/OT today - patient able to slide to commode and have a bowel movement - patient then able to slide transfer to chair.   Attempted to move patient with overhead lift - battery was not working properly and unable to trial lift in the sling with OT/PT and nursing staff. Work order placed for ceiling lift   Patient prefers to sleep in the recliner overnight for increased comfort.  Per pharmacy, patient needs gentamicin levels drawn at 2100 and 0100 for accurate dosing (will notify oncoming RN)    PRN medications given when avalible. Pt reports significant reduction in pain with ketamine addition. Also appears to be in better spirits this evening        @IPHNDFIELD(1)@

## 2025-06-08 NOTE — PLAN OF CARE
Goal Outcome Evaluation:       Pt A&Ox4   Oxy given q3. IV dilaudid given prior to therapy to help maximize pain control with movement   Not able to mobilize with therapy or nursing staff today   PIV SL between abx   Michelle draining ap/pinkish tinged urine   Right thigh draining serosang fluid (stripped per orders), abodmen drain with minimal output   Prevena in place   Hgb redraw tonight WNL at 7.5             @[facility]@

## 2025-06-09 ENCOUNTER — APPOINTMENT (OUTPATIENT)
Dept: PHYSICAL THERAPY | Facility: CLINIC | Age: 45
DRG: 466 | End: 2025-06-09
Attending: ORTHOPAEDIC SURGERY
Payer: COMMERCIAL

## 2025-06-09 ENCOUNTER — APPOINTMENT (OUTPATIENT)
Dept: OCCUPATIONAL THERAPY | Facility: CLINIC | Age: 45
DRG: 466 | End: 2025-06-09
Attending: ORTHOPAEDIC SURGERY
Payer: COMMERCIAL

## 2025-06-09 ASSESSMENT — ACTIVITIES OF DAILY LIVING (ADL)
ADLS_ACUITY_SCORE: 54
ADLS_ACUITY_SCORE: 58
ADLS_ACUITY_SCORE: 54
ADLS_ACUITY_SCORE: 58
ADLS_ACUITY_SCORE: 54
ADLS_ACUITY_SCORE: 58
ADLS_ACUITY_SCORE: 54
ADLS_ACUITY_SCORE: 54

## 2025-06-09 NOTE — PROGRESS NOTES
Care Management Follow Up    Length of Stay (days): 6    Expected Discharge Date: 06/11/2025     Concerns to be Addressed: discharge planning     Patient plan of care discussed at interdisciplinary rounds: Yes    Anticipated Discharge Disposition: Acute Rehab      Anticipated Discharge Services: Home Care, Other (see comment) (therapies)  Anticipated Discharge DME: Other (see comment) (TBD)    Patient/family educated on Medicare website which has current facility and service quality ratings: other (see comments) (Pt and family open to  ARU)  Education Provided on the Discharge Plan: Yes  Patient/Family in Agreement with the Plan: yes    Referrals Placed by CM/SW:    Private pay costs discussed: Not applicable    Discussed  Partnership in Safe Discharge Planning  document with patient/family: No     Handoff Completed: No, handoff not indicated or clinically appropriate    Additional Information:  SW  requested that   ARU review Pt. [facility] liaison stated that they will review Pt and follow up with SW. SW will follow up with [facility]U liaison to explore Pt's appropriateness for placement.    Next Steps:   SW/RNCC to continue following.    Denny Barbosa, [facility] Community HospitalSW  [facility] [place] [facility]   Ph: [phone]

## 2025-06-09 NOTE — PROGRESS NOTES
Pain Service Progress Note  [facility]  Date: 06/09/2025       Patient Name: Gilberto Lund  MRN: [id number]  Age: 44 year old  Sex: male      Assessment:  Patient reports improved pain with the addition of Ketamine. He is able to get up and move around.   He feels increased gas and bloating. Patient have bowel movements.     Plan/Recommendations:  Continue ketamine infusion at 10mg/hr  Likely for a day or two to help with getting OOB and PT  Continue acetaminophen 975mg q8  Continue robaxin  Continue Gabapentin 600mg TID   Continue Oxycodone 5-10mg q3hr prn   Continue Dilaudid 0.2-0.4mg q2hr prn  Bowel regimen per primary     Pain Service will continue to follow.      Chey Munoz DO  Pain medicine Fellow  06/09/2025     Overnight Events: None    Subjective: Improved pain   Nausea: No  Vomiting: No  Pruritus: No  Symptoms of LAST: No    Pain Location:  hip    Pain Intensity:    Pain at Rest: 6/10   Pain with Activity: 8/10  Comfort Goal: 5/10     Diet: Regular Diet Adult    Relevant Labs:  Recent Labs   Lab Test 06/08/25  0610 06/04/25  0314 06/04/25  0100   PROTIME  --   --  15.6*   INR  --   --  1.18*      < > 305   PTT  --   --  29   BUN 10.5   < > 16.8    < > = values in this interval not displayed.       Physical Exam:  Vitals: /73 (BP Location: Right arm)   Pulse 95   Temp 98.1  F (36.7  C)   Resp 16   Ht 1.829 m (6')   Wt 114.7 kg (252 lb 12.8 oz)   SpO2 98%   BMI 34.29 kg/m      Physical Exam:   Orientation:  Alert, oriented, and in no acute distress: Yes  Sedation: No    Motor Examination:  5/5 Strength in lower extremities: Yes    Sensory Level:   Decrease in sensation: No      Relevant Medications:  Current Pain Medications:  Medications related to Pain Management (From now, onward)      Start     Dose/Rate Route Frequency Ordered Stop    06/08/25 0800  ketamine (KETALAR) 2 mg/mL in sodium chloride 0.9 % 50 mL ANALGESIA infusion         10 mg/hr  5  "mL/hr  Intravenous CONTINUOUS 06/08/25 0732      06/08/25 0731  acetaminophen (TYLENOL) tablet 975 mg         975 mg Oral EVERY 8 HOURS PRN 06/08/25 0732      06/05/25 2000  polyethylene glycol (MIRALAX) Packet 17 g         17 g Oral 2 TIMES DAILY 06/05/25 1307      06/05/25 2000  senna-docusate (SENOKOT-S/PERICOLACE) 8.6-50 MG per tablet 2 tablet         2 tablet Oral 2 TIMES DAILY 06/05/25 1307      06/05/25 1400  gabapentin (NEURONTIN) capsule 600 mg        Note to Pharmacy: PTA Sig:Take 1 capsule (400 mg) by mouth 3 times daily.      600 mg Oral 3 TIMES DAILY 06/05/25 0925      06/05/25 0923  oxyCODONE (ROXICODONE) tablet 5 mg        Placed in \"Or\" Linked Group    5 mg Oral EVERY 3 HOURS PRN 06/05/25 0923      06/05/25 0923  oxyCODONE IR (ROXICODONE) tablet 10 mg        Placed in \"Or\" Linked Group    10 mg Oral EVERY 3 HOURS PRN 06/05/25 0923      06/04/25 1513  opium-belladonna (B&O SUPPRETTES) 30-16.2 MG per suppository 1 suppository         30 mg Rectal EVERY 8 HOURS PRN 06/04/25 1513      06/04/25 0221  methocarbamol (ROBAXIN) tablet 750 mg         750 mg Oral EVERY 6 HOURS PRN 06/04/25 0221      06/04/25 0221  hydrOXYzine HCl (ATARAX) tablet 25 mg         25 mg Oral EVERY 6 HOURS PRN 06/04/25 0221      06/04/25 0221  magnesium hydroxide (MILK OF MAGNESIA) suspension 30 mL         30 mL Oral DAILY PRN 06/04/25 0221      06/04/25 0221  bisacodyl (DULCOLAX) suppository 10 mg         10 mg Rectal DAILY PRN 06/04/25 0221      06/04/25 0221  HYDROmorphone (DILAUDID) injection 0.2 mg        Placed in \"Or\" Linked Group    0.2 mg Intravenous EVERY 2 HOURS PRN 06/04/25 0221      06/04/25 0221  HYDROmorphone (DILAUDID) injection 0.4 mg        Placed in \"Or\" Linked Group    0.4 mg Intravenous EVERY 2 HOURS PRN 06/04/25 0221      06/04/25 0221  lidocaine 1 % 0.1-1 mL         0.1-1 mL Other EVERY 1 HOUR PRN 06/04/25 0221      06/04/25 0221  lidocaine (LMX4) cream          Topical EVERY 1 HOUR PRN 06/04/25 0221        " "      Primary Service Contacted with Recommendations? Yes      Please see A&P for additional details of medical decision making.      Acute Inpatient Pain Service [facility]  Hours of pain coverage 24/7   Please Page via [employer]  - Link to [employer] Here - Search Pain  Page via [employer]- Please Page the Pain Team Via [facility]: \"PAIN MANAGEMENT ACUTE INPATIENT/ [facility]\"            "

## 2025-06-09 NOTE — PLAN OF CARE
Goal Outcome Evaluation:    Patient alert/oriented x4  1Liter 02 NC  Patient in chair-Call light within each, able to make needs known.  BM this shift  Preventive Mepilex on coccyx    Pain managed with Ketamine, Oxy, Tylenol, Robaxin and Atarax, IV Dilaudid when transferring.

## 2025-06-09 NOTE — PLAN OF CARE
Goal Outcome Evaluation:  Pt has not wanted his abdominal drain removed. Output was 40cc reported to charge nurse . Plan to check with urology if drain should be removed. Pt has been up in chair all day except for standing with PT and transfering to commode. He had liguid stool but is concerned about passing gas and discomfort I with flatus. Medicated for pain as ordered Cont with post op care.                    @IPHNDFIELD(1)@

## 2025-06-09 NOTE — PROGRESS NOTES
Orthopedic Surgery Progress Note 06/09/2025    S: Patient seen this morning. Resting comfortably in chair. Pain improving daily, feels it is much improved since POD1. Endorses abdominal pain, gas pain this AM. Patient denies lightheadedness/dizziness at rest. Tolerating diet. Passing flatus, BM x2 yesterday. Voiding via mccord. Reviewed plan for bowel regimen and titration pending stools.    O:  Temp: 98  F (36.7  C) Temp src: Oral BP: 113/71 Pulse: 95   Resp: 16 SpO2: 98 % O2 Device: Nasal cannula Oxygen Delivery: 2 LPM    Exam:  Gen: alert, intubated but interactive  Resp: intubated, on ventilator  MSK:    RLE:  - Prevena dressing in place, maintaining suction, no output in canister  - SILT tibial/sural/saphenous/DP/SP nerves  - Fires quad, hamstring, TA, EHL, FHL, GaSC  - PT/DP pulses 2+, foot wwp    Drain output:   Output by Drain (mL) 06/07/25 0700 - 06/07/25 1459 06/07/25 1500 - 06/07/25 2259 06/07/25 2300 - 06/08/25 0659 06/08/25 0700 - 06/08/25 1459 06/08/25 1500 - 06/08/25 2259 06/08/25 2300 - 06/09/25 0621   Negative Pressure Wound Therapy Abdomen Anterior         Drain Closed/Suction 1 Right;Anterior Hip Bulb 15 [place]  70 45 40 50    Drain Closed/Suction 2 Inferior Abdomen Bulb 19 [place]   25            Recent Labs   Lab 06/08/25  0610 06/07/25  1812 06/07/25  0535 06/06/25  1407 06/06/25  0543   WBC 8.3  --  10.2  --  11.8*   HGB 7.6* 7.5* 7.3*   < > 7.7*  7.7*     --  218  --  179    < > = values in this interval not displayed.       Culture results: NGTD    Assessment: Gilberto Lund is a 44 year old male s/p Right explant of hemipelvis cement spacer and and conversion to total hip arthroplasty utilizing custom hemipelvis implant on 6/4/25 with Dr. Bravo.      Plan:  Activity: Up with assist when extubated and able. Anterior hip precautions  Weight bearing status: TTWB RLE x 3 months.  Antibiotics: continue Ancef and gentamicin post op  Diet: Progress diet as tolerated.  DVT prophylaxis:  SCDs and mechanical while in the hospital. Eliquis 2.5 mg BID starting POD1 x 4 weeks.   Elevation: Elevate operative extremity as tolerated.   Wound Care: Keep Prevena dressing in place until POD #14  Drains: Please document output per shift, discontinue 15f HUGO drain in right thigh per ortho, management of left flank 19f drain per urology  Mccord: Keep in place for 2 weeks, management of mccord per urology  Pain management: Utilize all oral meds first, IV meds for severe breakthrough pain after PO meds given adequate time to take effect.  X-rays: Ordered, AP pelvis and cross-table lateral.  Therapy: PT/OT evaluate prior to discharge.  Cultures: Pending, follow culture results closely.  Consults: PT, OT, medicine, ICU team, Urology, Cardiology. Appreciate assistance in caring for this patient    Shiva Toribio MD  Adult Reconstruction Resident  06/09/2025

## 2025-06-09 NOTE — PROGRESS NOTES
[facility]    Medicine Progress Note - Hospitalist Service, GOLD TEAM 17    Date of Admission:  6/3/2025    Assessment & Plan   44 year old male admitted on 6/3/2025. 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 Patient admitted to the ICU post op for undifferentiated shock and mechanical ventilatory support now stable for transfer to the floor. Hospitalist team consulted for medical co-management.     Today's updates   -HGB 7.7.   -No acute events overnight.   -No hematuria.   -He is complaining of gas pain and indigestion.   -No chest pain, palpitations, shortness of breath, nausea, vomit, fever or chills.     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 Bravo   -Ortho is primary   Activity: Up with assist when extubated and able. Anterior hip precautions  Weight bearing status: TTWB RLE x 3 months.  Antibiotics: continue Ancef and gentamicin post op  Diet: Progress diet as tolerated.  DVT prophylaxis: SCDs and mechanical while in the hospital. Eliquis 2.5 mg BID starting POD1 x 4 weeks.   Elevation: Elevate operative extremity as tolerated.   Wound Care: Keep Prevena dressing in place until POD #14  Drains: Please document output per shift, discontinue 15f HUGO drain in right thigh per ortho, management of left flank 19f drain per urology  Mccord: Keep in place for 2 weeks, management of mccord per urology  Pain management: Utilize all oral meds first, IV meds for severe  breakthrough pain after PO meds given adequate time to take effect.  X-rays: Ordered, AP pelvis and cross-table lateral.  Therapy: PT/OT evaluate prior to discharge.     # Acute pain 2/2 above   - Pain team following the patient.     # Bladder tear s/p repair; intra-op  # Indwelling mccord in place  # Pelvic drain in place   Urology following peripherally.       Drain output remains low with good UOP. Serum Cr normal. Drain Cr at serum level.     Ok to remove abdominal drain by bedside RN     Will arrange for follow up with cystogram prior to mccord removal        # Acute blood loss anemia  # Bilateral peroneal DVTs, on Apixaban, 3/2025 (still present on ultrasound 4/28/2025)  Estimated blood loss of 2.7L intra op s/p 2 units PRBC intra-op. Received additional 1 unit overnight on 6.8. PTA was on Apixaban 5 mg bid, ortho has reduced dose to 2.5 mg bid post operatively.   - HGB 7.7  - Transfuse for Hgb < 7 or active bleeding  - Continue Apixaban 2.5 mg BID POD2 x 4 weeks per Ortho (high bleeding risk post op especially with addition of colchicine)    Indigestion  - Start Pepcid. Gas X as needed.            Diet: Regular Diet Adult    DVT Prophylaxis: Defer to primary service  Mccord Catheter: PRESENT, indication: Surgical procedure  Lines: None     Cardiac Monitoring: None  Code Status: Full Code      Clinically Significant Risk Factors          # Hypochloremia: Lowest Cl = 97 mmol/L in last 2 days, will monitor as appropriate      # Hypoalbuminemia: Lowest albumin = 3.1 g/dL at 6/4/2025  5:25 AM, will monitor as appropriate                # Obesity: Estimated body mass index is 34.29 kg/m  as calculated from the following:    Height as of this encounter: 1.829 m (6').    Weight as of this encounter: 114.7 kg (252 lb 12.8 oz).        # Financial/Environmental Concerns: other (see comments) (savings are dwindling, wife says she has a good paying job.)         Social Drivers of Health    Housing Stability: High Risk  (6/3/2025)    Housing Stability     Do you have housing? : No     Are you worried about losing your housing?: No   Tobacco Use: Medium Risk (5/27/2025)    Patient History     Smoking Tobacco Use: Former     Smokeless Tobacco Use: Former     Passive Exposure: Past   Interpersonal Safety: High Risk (6/3/2025)    Interpersonal Safety     Do you feel physically and emotionally safe where you currently live?: No     Within the past 12 months, have you been hit, slapped, kicked or otherwise physically hurt by someone?: No     Within the past 12 months, have you been humiliated or emotionally abused in other ways by your partner or ex-partner?: No   Social Connections: Unknown (9/30/2024)    Social Connection and Isolation Panel [NHANES]     Frequency of Social Gatherings with Friends and Family: Once a week          Disposition Plan     Medically Ready for Discharge: Anticipated in 2-4 Days       Srinivasan Sky MD  Hospitalist Service, GOLD TEAM 17  [facility]  Securely message with [employer] (more info)  Text page via [facility] Paging/Directory   See signed in provider for up to date coverage information  ______________________________________________________________________    Interval History     Acute events as above.   Pleasant.   Other ROS negative     Physical Exam   Vital Signs: Temp: 97.9  F (36.6  C) Temp src: Oral BP: 119/78 Pulse: 96   Resp: 16 SpO2: 98 % O2 Device: Nasal cannula Oxygen Delivery: 2 LPM  Weight: 252 lbs 12.8 oz    General Appearance: Alert, no acute distress, on O 2 NC, obese    HEENT: Anicteric sclera, MMM   Respiratory: Lungs CTAB, No wheezing , no crackles,   Cardiovascular: Regular rate , Regular rhythm , no murmur   GI: Abdomen soft, non-tender, active bowel sounds. No guarding or rebound, +HUGO drains in place in right hip and pelvis   Musculoskeletal: No lower extremity edema   Neurologic: Oriented X 3 , CN II-XII grossly intact, Moves  extremities equally     Medical Decision Making       45 MINUTES SPENT BY ME on the date of service doing chart review, history, exam, documentation & further activities per the note.      Data     I have personally reviewed the following data over the past 24 hrs:    9.1  \   7.7 (L)   / 307     137 97 (L) 10.7 /  117 (H)   3.8 29 0.91 \     Procal: N/A CRP: 92.39 (H) Lactic Acid: N/A         Imaging results reviewed over the past 24 hrs:   No results found for this or any previous visit (from the past 24 hours).

## 2025-06-09 NOTE — PROGRESS NOTES
Drain output remains low with good UOP. Serum Cr normal. Drain Cr at serum level.    Ok to remove abdominal drain by bedside RN    Will arrange for follow up with cystogram prior to mccord removal    Urology to sign off

## 2025-06-10 ENCOUNTER — APPOINTMENT (OUTPATIENT)
Dept: PHYSICAL THERAPY | Facility: CLINIC | Age: 45
DRG: 466 | End: 2025-06-10
Attending: ORTHOPAEDIC SURGERY
Payer: COMMERCIAL

## 2025-06-10 ASSESSMENT — ACTIVITIES OF DAILY LIVING (ADL)
ADLS_ACUITY_SCORE: 54

## 2025-06-10 NOTE — PLAN OF CARE
Goal Outcome Evaluation:    Shift [id number]    Changes this shift: None    Neuro: Alert and orientedx4.    Cardiac: HR WNL, normotensive. Afebrile    Respiratory: O2 sat > 90% on 1 liter O2 via NC    GI/: Continent Bowel, mccord catheter in place    Diet: Regular diet, Denies N/V.     Pain: Pain managed with PRN Tylenol, oxycodone and Ketamine infusion    LDA's: 2 left PIV SL, 2 drains abdomen and right hip and mccord in place    Activity: Slept in recliner    Plan: Call light within reach, able to make needs known, plan of care ongoing.

## 2025-06-10 NOTE — PROGRESS NOTES
Pain Service Progress Note  [facility]  Date: 06/10/2025       Patient Name: Gilberto Lund  MRN: [id number]  Age: 44 year old  Sex: male      Assessment:  Gilberto Lund is a 44 year old male admitted on 6/3/2025. 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  He is now s/p  explant hemipelvis spacer and conversion to R DEVONTE with custom hemipelvis implant on 6/3/2025 with Dr Bravo complicated by bladder tear s/p repair, acute blood loss anemia, and pericarditis. Patient admitted to the ICU post op for undifferentiated shock and mechanical ventilatory support now stable for transfer to the floor since  6/5/25.      PTA, was using rare oxycdone 5mg, 1/day PRN, not everyday.    Jeremiah is seen sitting in the recliner. He reports well managed pain.  States pain level is 4/10. Ketamine infusion has been the most helpful for him.  He is preparing/anticipating discharge to ARU and is excited to improve his strength.  Understands need to taper and discontinue IV pain medications.  Pain recommendations as below and he is very agreeable.     Plan/Recommendations:  Acetaminophen 975mg PO 8 hours  Robaxin 750mg PO Q 6 hours PRN  Change ketamine infusion to 5mg/hour today. Plan to discontinue on 6/11/25.  Oxycodone 5-10mg PO Q 3 hours PRN     IV Dilaudid 0.2-0.4mg IV Q 2 hours PRN   Potentially discontinue on 6/11/25 afternoon.  Gabapentin 600mg PO TID (increased on 6/5/25)  Lidocaine patches 1-3 patches Q 24 hours, 12 hours on and 12 hours off  Menthol patches, 1 patch TD Q 8 hours  Bowel regimen    Pain Service will continue to follow.    Discussed with attending anesthesiologist- the context of our conversation was regarding de-escalating IV ketamine and other IV pain medications.    Marlin Hastings PA-C  06/10/2025        Diet: Regular Diet Adult  Snacks/Supplements Adult: Other; Ensure Max chocolate at BLD, peanut butter chocolate Special K bar at B; With Meals    Relevant Labs:  Recent Labs   Lab Test 06/10/25  0644 06/04/25  0314 06/04/25  0100   PROTIME  --   --  15.6*   INR  --   --  1.18*      < > 305   PTT  --   --  29   BUN 9.9   < > 16.8    < > = values in this interval not displayed.       Physical Exam:  Vitals: /78 (BP Location: Left arm)   Pulse 96   Temp 97.9  F (36.6  C) (Oral)   Resp 17   Ht 1.829 m (6')   Wt 114.7 kg (252 lb 12.8 oz)   SpO2 96%   BMI 34.29 kg/m      Physical Exam:   CONSTITUTIONAL/GENERAL APPEARANCE: Conversant.  EYES: EOMI, sclerae clear  ENT/NECK: neck is supple  RESPIRATORY:non labored breathing, on room air  CARDIOVASCULAR: HR within normal limits  MUSCULOSKELETAL/BACK/SPINE/EXTREMITIES: Moving UE and LE independently.     NEURO:  AAOx3.   SKIN/VASCULAR EXAM:  Dry and warm.  PSYCHIATRIC/BEHAVIORAL/OBSERVATIONS:  No objective signs of pain observed during our interview.   Judgment/Insight -fair   Orientation - x3   Memory -fair   Mood and affect - calm, pleasant, cooperative         Relevant Medications:  Current Pain Medications:  Medications related to Pain Management (From now, onward)      Start     Dose/Rate Route Frequency Ordered Stop    06/10/25 0800  senna-docusate (SENOKOT-S/PERICOLACE) 8.6-50 MG per tablet 2 tablet         2 tablet Oral DAILY 06/09/25 1249      06/09/25 1300  polyethylene glycol (MIRALAX) Packet 17 g         17 g Oral DAILY PRN 06/09/25 1249      06/09/25 0223  simethicone (MYLICON) chewable tablet 80 mg         80 mg Oral EVERY 6 HOURS PRN 06/09/25 0223      06/08/25 0800  ketamine (KETALAR) 2 mg/mL in sodium chloride 0.9 % 100 mL ANALGESIA infusion         5 mg/hr  2.5 mL/hr  Intravenous CONTINUOUS 06/08/25 0732      06/08/25 0731  acetaminophen (TYLENOL) tablet 975 mg         975 mg Oral EVERY 8 HOURS PRN 06/08/25 0732      06/05/25 1400   "gabapentin (NEURONTIN) capsule 600 mg        Note to Pharmacy: PTA Sig:Take 1 capsule (400 mg) by mouth 3 times daily.      600 mg Oral 3 TIMES DAILY 06/05/25 0925      06/05/25 0923  oxyCODONE (ROXICODONE) tablet 5 mg        Placed in \"Or\" Linked Group    5 mg Oral EVERY 3 HOURS PRN 06/05/25 0923      06/05/25 0923  oxyCODONE IR (ROXICODONE) tablet 10 mg        Placed in \"Or\" Linked Group    10 mg Oral EVERY 3 HOURS PRN 06/05/25 0923      06/04/25 1513  opium-belladonna (B&O SUPPRETTES) 30-16.2 MG per suppository 1 suppository         30 mg Rectal EVERY 8 HOURS PRN 06/04/25 1513      06/04/25 0221  methocarbamol (ROBAXIN) tablet 750 mg         750 mg Oral EVERY 6 HOURS PRN 06/04/25 0221      06/04/25 0221  hydrOXYzine HCl (ATARAX) tablet 25 mg         25 mg Oral EVERY 6 HOURS PRN 06/04/25 0221      06/04/25 0221  magnesium hydroxide (MILK OF MAGNESIA) suspension 30 mL         30 mL Oral DAILY PRN 06/04/25 0221      06/04/25 0221  bisacodyl (DULCOLAX) suppository 10 mg         10 mg Rectal DAILY PRN 06/04/25 0221      06/04/25 0221  HYDROmorphone (DILAUDID) injection 0.2 mg        Placed in \"Or\" Linked Group    0.2 mg Intravenous EVERY 2 HOURS PRN 06/04/25 0221      06/04/25 0221  HYDROmorphone (DILAUDID) injection 0.4 mg        Placed in \"Or\" Linked Group    0.4 mg Intravenous EVERY 2 HOURS PRN 06/04/25 0221      06/04/25 0221  lidocaine 1 % 0.1-1 mL         0.1-1 mL Other EVERY 1 HOUR PRN 06/04/25 0221      06/04/25 0221  lidocaine (LMX4) cream          Topical EVERY 1 HOUR PRN 06/04/25 0221              Primary Service Contacted with Recommendations? Yes            Acute Inpatient Pain Service [facility]  Hours of pain coverage 24/7   Please Page via Vocera  - Link to Vocera Here - Search Pain  Page via Amcom- Please Page the Pain Team Via Amcom: \"PAIN MANAGEMENT ACUTE INPATIENT/ [facility]\"            "

## 2025-06-10 NOTE — PROGRESS NOTES
Orthopedic Surgery Progress Note 06/10/2025    S: Patient seen this morning. Resting comfortably in chair. Pain improving daily. Abdominal/gas pain improved as well. Denies numbness/tingling of RLE. Tolerating diet. Passing flatus and stool. Voiding via mccord. Discussed plan to start weaning from IV meds with assistance from our pain team. Reviewed plan for discharge to acute rehab when appropriate.    O:  Temp: 98  F (36.7  C) Temp src: Oral BP: 117/74 Pulse: 96   Resp: 18 SpO2: 98 % O2 Device: Nasal cannula      Exam:  Gen: alert, intubated but interactive  Resp: intubated, on ventilator  MSK:    RLE:  - Prevena dressing in place, maintaining suction, no output in canister  - SILT tibial/sural/saphenous/DP/SP nerves  - Fires quad, hamstring, TA, EHL, FHL, GaSC  - PT/DP pulses 2+, foot wwp    Drain output:   Output by Drain (mL) 06/08/25 0700 - 06/08/25 1459 06/08/25 1500 - 06/08/25 2259 06/08/25 2300 - 06/09/25 0659 06/09/25 0700 - 06/09/25 1459 06/09/25 1500 - 06/09/25 2259 06/09/25 2300 - 06/10/25 0555   Negative Pressure Wound Therapy Abdomen Anterior         Drain Closed/Suction 1 Right;Anterior Hip Bulb 15 [place] 40 50  55 100 15   Drain Closed/Suction 2 Inferior Abdomen Bulb 19 [place]    55 200 45         Recent Labs   Lab 06/09/25  0610 06/08/25  0610 06/07/25  1812 06/07/25  0535   WBC 9.1 8.3  --  10.2   HGB 7.7* 7.6* 7.5* 7.3*    265  --  218       Culture results: NGTD    Assessment: Gilberto Lund is a 44 year old male s/p Right explant of hemipelvis cement spacer and and conversion to total hip arthroplasty utilizing custom hemipelvis implant on 6/4/25 with Dr. Bravo.      Today:  - Appreciate pain team assistance in weaning from IV meds  - Ongoing PT  - Titrate bowel regimen (currently having liquid stool)  - Dispo planning for prior ARU    Plan:  Activity: Up with assist when extubated and able. Anterior hip precautions  Weight bearing status: TTWB RLE x 3 months.  Antibiotics: continue  Ancef and ceftriaxone while admitted, discharge on extended oral antibiotics (2 weeks of cefadroxil + MRSA covering agent)  Diet: Progress diet as tolerated.  DVT prophylaxis: SCDs and mechanical while in the hospital. Eliquis 2.5 mg BID starting POD1 x 4 weeks.   Elevation: Elevate operative extremity as tolerated.   Wound Care: Keep Prevena dressing in place until POD #14  Drains: Please document output per shift, discontinue 15f HUGO drain in right thigh per ortho, management of left flank 19f drain per urology  Mccord: Keep in place for 2 weeks, management of mccord per urology  Pain management: Utilize all oral meds first, IV meds for severe breakthrough pain after PO meds given adequate time to take effect.  X-rays: Ordered, AP pelvis and cross-table lateral.  Therapy: PT/OT evaluate prior to discharge.  Cultures: Pending, follow culture results closely.  Consults: PT, OT, medicine, ICU team, Urology, Cardiology. Appreciate assistance in caring for this patient    Shiva Toribio MD  Adult Reconstruction Resident  06/10/2025

## 2025-06-10 NOTE — PLAN OF CARE
"Goal Outcome Evaluation:    Pt is alert and Oriented  x 4. Able to make needs known.   Denies SOB, CP, and N/V.  Managed pain with Ketamine, and PRN's (check Mar for details).  Preventive Mepilex on coccyx.  Pt was concerned about his abdominal drain being removed today  saying \"It shouldn't be removed  today.\" Paged ortho and they stated \"not concerned about it but we can monitor output overnight and reassess tomorrow.\"    Pt prefers to sleep in the recliner overnight for comfort.  Uses commode (able to slide)  for bowel movement.LBM 6/9/25  Call light within reach, and uses it property. Continue with POC.      "

## 2025-06-10 NOTE — PLAN OF CARE
"Goal Outcome Evaluation:                      VS: /79 (BP Location: Left arm, Patient Position: Semi-Dinero's)   Pulse 93   Temp 98  F (36.7  C) (Oral)   Resp 16   Ht 1.829 m (6')   Wt 114.7 kg (252 lb 12.8 oz)   SpO2 96%   BMI 34.29 kg/m     O2: Stable on RA, denied SOB & CP   Output: Michelle in place with adequate output, dark ap and sediment in bag, pushing fluids   Last BM: 6/10/25, continent   [facility]   Skin: Back surgical incision   Pain: Managed w/ ketamine drip and prn oxycodone & robaxin x2 today   CMS: A/O x4, \"light\" N/T baseline on LE   Dressing: Surgical incision dressings CDI   Diet: Reg/thin/whole pills w/ water   LDA: R PIV SL, R hip HUGO drain, wound vac to R hip running at 125 mmHg   Plan: Discharge to TCU or ARU pending placement and pain management   Additional Info:            "

## 2025-06-10 NOTE — PROGRESS NOTES
"CLINICAL NUTRITION SERVICES - ASSESSMENT NOTE    RECOMMENDATIONS FOR MDs/PROVIDERS TO ORDER:  Consider daily MVI-M in setting of poor intakes    Malnutrition Status:    Patient does not meet two of the established criteria necessary for diagnosing malnutrition but is at risk for malnutrition    Registered Dietitian Interventions:  Ensure Max TID Chocolate and Special K Bar Daily Chocolate peanut butter    Future/Additional Recommendations:  Monitor labs, stooling, weight trends, intakes, supplement acceptance     REASON FOR ASSESSMENT  LOS    PMH: bilateral peroneal DVT's on Apixaban, remote testicular cancer s/p chemotherapy and orchiectomy in 2008, 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     Respiratory Supplemental O2 2LNC    SUBJECTIVE INFORMATION  Assessed patient in room.    NUTRITION HISTORY  Normal nutrition, no concerns, TID meals and good appetite    CURRENT NUTRITION ORDERS  Diet: Regular since 6/06 1440  FLD 6/05-6/06  CLD 6/04-6/05    CURRENT INTAKE/TOLERANCE  Intake/Tolerance: Poor 25% per I/Os  Per [facility] meal order review: Pt has ordered 3 meals since admission  Pt reports bloating and gassiness along with soft/loose stools is limiting pt's ability to eat. Pt has been eating Glokalise protein bars (10 grams protein).   Pt agrees to Ensure Max supplements and Special K bars    LABS  Nutrition-relevant labs: CRP elevated, trending down    MEDICATIONS  Nutrition-relevant medications: Reviewed    ANTHROPOMETRICS  Height: 182.9 cm (6' 0\")  Admission Weight: 118.2 kg (260 lb 9.3 oz) (06/03/25 0917)   Most Recent Weight: 114.7 kg (252 lb 12.8 oz) (06/04/25 0300)  IBW: 81 kg  % IBW: 142%  Body mass index is 34.29 kg/m .  BMI (kg/m ): Obesity " Class I BMI 30-34.9  Weight History: No significant weight loss noted. Pt reports -230 lbs but has gained weight over the last few months d/t being weight non bearing and WCB.   Wt Readings from Last 15 Encounters:   06/04/25 114.7 kg (252 lb 12.8 oz)   02/21/25 113.4 kg (250 lb)   02/21/25 114.3 kg (251 lb 15.8 oz)   02/06/25 104.3 kg (230 lb)   01/13/25 104.3 kg (230 lb)   01/08/25 104.3 kg (230 lb)   01/07/25 102.1 kg (225 lb)   12/06/24 103.6 kg (228 lb 6.3 oz)   12/02/24 105.7 kg (233 lb)   11/21/24 104.3 kg (230 lb)   10/28/24 105.2 kg (232 lb)   10/02/24 100.2 kg (221 lb)   06/29/22 101.8 kg (224 lb 8 oz)   06/28/22 102.1 kg (225 lb)   04/07/21 105.4 kg (232 lb 6.4 oz)     Dosing Weight: 114.7 kg, based on Actual Body Weight    ASSESSED NUTRITION NEEDS  Estimated Energy Needs: 2485 kcals/day ([place] Jeor)  Justification: Obese  Estimated Protein Needs: 138 - 172 grams protein/day (1.2 - 1.5 grams of pro/kg)  Justification: Increased needs and Post-op  Estimated Fluid Needs: 2485 mL/day (1 mL/kcal)  Justification: Maintenance    SYSTEM FINDINGS      Skin/wounds: Surgical wound(s) present  GI symptoms: Bloating and gassiness, loose stools  Last BM: 6/10 x1, x2 6/09  Bowel regimen: Scheduled    RENAL  GFR >90 and No acute concerns    MALNUTRITION  % Intake: </= 50% for >/= 5 days (severe)  % Weight Loss: None noted  Subcutaneous Fat Loss: None observed  Muscle Loss: None observed  Fluid Accumulation/Edema: None noted  Malnutrition Diagnosis: Patient does not meet two of the established criteria necessary for diagnosing malnutrition but is at risk for malnutrition  Malnutrition Present on Admission: No    NUTRITION DIAGNOSIS  Inadequate oral intake related to bloating, gassiness, and loose stools as evidenced by limited po intakes per documentation and pt self report    INTERVENTIONS  Medical food supplement therapy  Vitamin and mineral supplement therapy    Goals  Patient to consume % of  nutritionally adequate meal trays TID, or the equivalent with supplements/snacks.     Monitoring/Evaluation  Progress toward goals will be monitored and evaluated per policy.    Marylu COOK  Clinical Dietitian  [facility] 5B/10A ICU Vocera, Teams, or desk [phone]  Weekend/Holiday Vocera: Weekend Holiday Clinical Dietitian [Multi Site Groups]

## 2025-06-10 NOTE — PROGRESS NOTES
[facility]    Medicine Progress Note - Hospitalist Service, GOLD TEAM 17    Date of Admission:  6/3/2025    Assessment & Plan   44 year old male admitted on 6/3/2025. 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 Patient admitted to the ICU post op for undifferentiated shock and mechanical ventilatory support now stable for transfer to the floor. Hospitalist team consulted for medical co-management.         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 Bravo   -Ortho is primary   Activity: Up with assist when extubated and able. Anterior hip precautions  Weight bearing status: TTWB RLE x 3 months.  Antibiotics: continue Ancef and gentamicin post op  Diet: Progress diet as tolerated.  DVT prophylaxis: SCDs and mechanical while in the hospital. Eliquis 2.5 mg BID starting POD1 x 4 weeks.   Elevation: Elevate operative extremity as tolerated.   Wound Care: Keep Prevena dressing in place until POD #14  Drains: Please document output per shift, discontinue 15f HUGO drain in right thigh per ortho, management of left flank 19f drain per urology  Abdominal drain to be discontinued today (6/10)  Mccord: Keep in place for 2 weeks, management of mccord per urology  Pain management: Utilize all oral meds first, IV meds for severe breakthrough pain after PO meds given adequate time to take effect.  X-rays: Ordered, AP pelvis and cross-table lateral.  Therapy: PT/OT evaluate prior to discharge.      # Acute pain 2/2 above   - Pain team following the patient.     # Bladder tear s/p repair; intra-op  # Indwelling mccord in place  # Pelvic drain in place   Urology following peripherally.  OK to remove abdominal drain today       # Acute blood loss anemia  # Bilateral peroneal DVTs, on Apixaban, 3/2025 (still present on ultrasound 4/28/2025)  Estimated blood loss of 2.7L intra op s/p 2 units PRBC intra-op. Received additional 1 unit overnight on 6.8. PTA was on Apixaban 5 mg bid, ortho has reduced dose to 2.5 mg bid post operatively.   - HGB 7.7  - Transfuse for Hgb < 7 or active bleeding  - Continue Apixaban 2.5 mg BID POD2 x 4 weeks per Ortho (high bleeding risk post op especially with addition of colchicine)    Indigestion  - Start Pepcid. Gas X as needed.         Hypokalemia:  Potassium supplement ordered   Recheck AM          Diet: Regular Diet Adult  Snacks/Supplements Adult: Other; Ensure Max chocolate at BLD, peanut butter chocolate Special K bar at B; With Meals    DVT Prophylaxis: Defer to primary service  Mccord Catheter: PRESENT, indication: Surgical procedure  Lines: None     Cardiac Monitoring: None  Code Status: Full Code           # Obesity: Estimated body mass index is 34.29 kg/m  as calculated from the following:    Height as of this encounter: 1.829 m (6').    Weight as of this encounter: 114.7 kg (252 lb 12.8 oz).        # Financial/Environmental Concerns: other (see comments) (savings are dwindling, wife says she has a good paying job.)                Disposition Plan     Medically Ready for Discharge: Anticipated in 2-4 Days       Gino Vidal MD  Hospitalist Service, GOLD TEAM 17  [facility]  Securely message with [email or web address] (more info)  Text page via [employer] Paging/Directory   See signed in provider for up to date coverage information  ______________________________________________________________________    Interval History   Charts  reviewed and patient was examined  Patient was on the commode, and had a bowel movement  Notes that his pain is well controlled  No fever or chills       Physical Exam   Vital Signs: Temp: 97.9  F (36.6  C) Temp src: Oral BP: 126/78 Pulse: 96   Resp: 17 SpO2: 96 % O2 Device: None (Room air)    Weight: 252 lbs 12.8 oz    General Appearance: Alert, no acute distress, on O 2 NC, obese    HEENT: Anicteric sclera, MMM   Respiratory: Lungs CTAB, No wheezing , no crackles,   Cardiovascular: Regular rate , Regular rhythm , no murmur   GI: Abdomen soft, non-tender, active bowel sounds. No guarding or rebound, +HUGO drains in place in right hip and pelvis   Musculoskeletal: No lower extremity edema. Provena dressings in place   Neurologic: Oriented X 3 , CN II-XII grossly intact, Moves extremities equally     Medical Decision Making       45 MINUTES SPENT BY ME on the date of service doing chart review, history, exam, documentation & further activities per the note.      Data     I have personally reviewed the following data over the past 24 hrs:    7.6  \   7.5 (L)   / 347     137 98 9.9 /  96   3.2 (L) 30 (H) 0.90 \     Procal: N/A CRP: 60.93 (H) Lactic Acid: N/A         Imaging results reviewed over the past 24 hrs:   No results found for this or any previous visit (from the past 24 hours).

## 2025-06-10 NOTE — PROGRESS NOTES
Care Management Follow Up    Length of Stay (days): 7    Expected Discharge Date: 06/11/2025     Concerns to be Addressed: Discharge planning     Patient plan of care discussed at interdisciplinary rounds: Yes    Anticipated Discharge Disposition: Acute Rehab, Transitional Care  Anticipated Discharge Services: None  Anticipated Discharge DME: None    Patient/family educated on Medicare website which has current facility and service quality ratings: No  Education Provided on the Discharge Plan: Yes  Patient/Family in Agreement with the Plan: Yes    Referrals Placed by CM/SW: Internal Clinic Care Coordination  Private pay costs discussed: Not applicable    Discussed  Partnership in Safe Discharge Planning  document with patient/family: No     Handoff Completed: Yes, [facility] PCP: Internal handoff referral completed    Additional Information:  Social work reached out to [facility] admissions to inquire about ARU placement as this appears to be a preference per previous care management notes. PT rec'ing TCU vs ARU. OT rec'ing ARU. Awaiting follow up from admissions. Care management continues to follow.     Next Steps:   -Follow up with [facility] admissions     WILBERT Suárez, LGSW  5 Med Surg   [facility]  Phone: [phone]

## 2025-06-11 ENCOUNTER — APPOINTMENT (OUTPATIENT)
Dept: PHYSICAL THERAPY | Facility: CLINIC | Age: 45
DRG: 466 | End: 2025-06-11
Attending: ORTHOPAEDIC SURGERY
Payer: COMMERCIAL

## 2025-06-11 ENCOUNTER — HOSPITAL ENCOUNTER (INPATIENT)
Facility: CLINIC | Age: 45
DRG: 560 | End: 2025-06-11
Attending: PHYSICAL MEDICINE & REHABILITATION | Admitting: PHYSICAL MEDICINE & REHABILITATION
Payer: COMMERCIAL

## 2025-06-11 DIAGNOSIS — C41.9 CHONDROSARCOMA (H): ICD-10-CM

## 2025-06-11 DIAGNOSIS — Z96.641 STATUS POST TOTAL HIP REPLACEMENT, RIGHT: Primary | ICD-10-CM

## 2025-06-11 DIAGNOSIS — Z98.890 S/P BLADDER REPAIR: ICD-10-CM

## 2025-06-11 DIAGNOSIS — C41.4 PRIMARY CHONDROSARCOMA OF BONE OF PELVIS (H): ICD-10-CM

## 2025-06-11 DIAGNOSIS — Z98.890 STATUS POST SURGERY: ICD-10-CM

## 2025-06-11 DIAGNOSIS — Z71.89 OTHER SPECIFIED COUNSELING: Chronic | ICD-10-CM

## 2025-06-11 PROCEDURE — F08G5YZ WOUND MANAGEMENT TREATMENT OF INTEGUMENTARY SYSTEM - LOWER BACK / LOWER EXTREMITY USING OTHER EQUIPMENT: ICD-10-PCS | Performed by: PHYSICIAN ASSISTANT

## 2025-06-11 PROCEDURE — 128N000003 HC R&B REHAB

## 2025-06-11 PROCEDURE — 99222 1ST HOSP IP/OBS MODERATE 55: CPT | Mod: AI | Performed by: PHYSICIAN ASSISTANT

## 2025-06-11 PROCEDURE — 250N000013 HC RX MED GY IP 250 OP 250 PS 637: Performed by: PHYSICIAN ASSISTANT

## 2025-06-11 RX ORDER — AMOXICILLIN 250 MG
1 CAPSULE ORAL 2 TIMES DAILY PRN
Status: DISCONTINUED | OUTPATIENT
Start: 2025-06-11 | End: 2025-06-18 | Stop reason: HOSPADM

## 2025-06-11 RX ORDER — OXYCODONE HYDROCHLORIDE 5 MG/1
10 TABLET ORAL
Refills: 0 | Status: DISCONTINUED | OUTPATIENT
Start: 2025-06-11 | End: 2025-06-18 | Stop reason: HOSPADM

## 2025-06-11 RX ORDER — NALOXONE HYDROCHLORIDE 0.4 MG/ML
0.2 INJECTION, SOLUTION INTRAMUSCULAR; INTRAVENOUS; SUBCUTANEOUS
Status: DISCONTINUED | OUTPATIENT
Start: 2025-06-11 | End: 2025-06-18 | Stop reason: HOSPADM

## 2025-06-11 RX ORDER — POLYETHYLENE GLYCOL 3350 17 G/17G
17 POWDER, FOR SOLUTION ORAL DAILY PRN
Status: DISCONTINUED | OUTPATIENT
Start: 2025-06-12 | End: 2025-06-18 | Stop reason: HOSPADM

## 2025-06-11 RX ORDER — HYDROXYZINE HYDROCHLORIDE 25 MG/1
25 TABLET, FILM COATED ORAL EVERY 6 HOURS PRN
Status: DISCONTINUED | OUTPATIENT
Start: 2025-06-11 | End: 2025-06-18 | Stop reason: HOSPADM

## 2025-06-11 RX ORDER — ACETAMINOPHEN 325 MG/1
975 TABLET ORAL EVERY 8 HOURS
Status: DISCONTINUED | OUTPATIENT
Start: 2025-06-11 | End: 2025-06-18 | Stop reason: HOSPADM

## 2025-06-11 RX ORDER — METHOCARBAMOL 750 MG/1
750 TABLET, FILM COATED ORAL 4 TIMES DAILY PRN
Status: DISCONTINUED | OUTPATIENT
Start: 2025-06-11 | End: 2025-06-18 | Stop reason: HOSPADM

## 2025-06-11 RX ORDER — OXYCODONE HYDROCHLORIDE 5 MG/1
10 TABLET ORAL EVERY 4 HOURS PRN
Status: DISCONTINUED | OUTPATIENT
Start: 2025-06-11 | End: 2025-06-11

## 2025-06-11 RX ORDER — SIMETHICONE 80 MG
80 TABLET,CHEWABLE ORAL EVERY 6 HOURS PRN
Status: DISCONTINUED | OUTPATIENT
Start: 2025-06-11 | End: 2025-06-18 | Stop reason: HOSPADM

## 2025-06-11 RX ORDER — COLCHICINE 0.6 MG/1
0.6 TABLET ORAL 2 TIMES DAILY
Status: DISCONTINUED | OUTPATIENT
Start: 2025-06-11 | End: 2025-06-18 | Stop reason: HOSPADM

## 2025-06-11 RX ORDER — FAMOTIDINE 10 MG
10 TABLET ORAL 2 TIMES DAILY
Status: DISCONTINUED | OUTPATIENT
Start: 2025-06-11 | End: 2025-06-18 | Stop reason: HOSPADM

## 2025-06-11 RX ORDER — OXYCODONE HYDROCHLORIDE 5 MG/1
5 TABLET ORAL
Refills: 0 | Status: DISCONTINUED | OUTPATIENT
Start: 2025-06-11 | End: 2025-06-18 | Stop reason: HOSPADM

## 2025-06-11 RX ORDER — GABAPENTIN 300 MG/1
600 CAPSULE ORAL 3 TIMES DAILY
Status: DISCONTINUED | OUTPATIENT
Start: 2025-06-11 | End: 2025-06-18 | Stop reason: HOSPADM

## 2025-06-11 RX ORDER — MULTIPLE VITAMINS W/ MINERALS TAB 9MG-400MCG
1 TAB ORAL DAILY
Status: DISCONTINUED | OUTPATIENT
Start: 2025-06-12 | End: 2025-06-18 | Stop reason: HOSPADM

## 2025-06-11 RX ORDER — OXYCODONE HYDROCHLORIDE 5 MG/1
5 TABLET ORAL EVERY 4 HOURS PRN
Status: DISCONTINUED | OUTPATIENT
Start: 2025-06-11 | End: 2025-06-11

## 2025-06-11 RX ORDER — TOLTERODINE 4 MG/1
4 CAPSULE, EXTENDED RELEASE ORAL DAILY
Status: DISCONTINUED | OUTPATIENT
Start: 2025-06-12 | End: 2025-06-18 | Stop reason: HOSPADM

## 2025-06-11 RX ORDER — NALOXONE HYDROCHLORIDE 0.4 MG/ML
0.4 INJECTION, SOLUTION INTRAMUSCULAR; INTRAVENOUS; SUBCUTANEOUS
Status: DISCONTINUED | OUTPATIENT
Start: 2025-06-11 | End: 2025-06-18 | Stop reason: HOSPADM

## 2025-06-11 RX ORDER — DOXYCYCLINE HYCLATE 50 MG/1
100 CAPSULE ORAL 2 TIMES DAILY
Status: DISCONTINUED | OUTPATIENT
Start: 2025-06-11 | End: 2025-06-18 | Stop reason: HOSPADM

## 2025-06-11 RX ORDER — POLYETHYLENE GLYCOL 3350 17 G/17G
17 POWDER, FOR SOLUTION ORAL DAILY
Status: DISCONTINUED | OUTPATIENT
Start: 2025-06-12 | End: 2025-06-11

## 2025-06-11 RX ADMIN — ACETAMINOPHEN 975 MG: 325 TABLET ORAL at 21:16

## 2025-06-11 RX ADMIN — CEPHALEXIN 250 MG: 250 CAPSULE ORAL at 21:17

## 2025-06-11 RX ADMIN — COLCHICINE 0.6 MG: 0.6 TABLET, FILM COATED ORAL at 21:17

## 2025-06-11 RX ADMIN — CEPHALEXIN 250 MG: 250 CAPSULE ORAL at 16:46

## 2025-06-11 RX ADMIN — DOXYCYCLINE HYCLATE 100 MG: 50 CAPSULE ORAL at 15:27

## 2025-06-11 RX ADMIN — OXYCODONE HYDROCHLORIDE 10 MG: 5 TABLET ORAL at 21:16

## 2025-06-11 RX ADMIN — GABAPENTIN 600 MG: 300 CAPSULE ORAL at 21:16

## 2025-06-11 RX ADMIN — GABAPENTIN 600 MG: 300 CAPSULE ORAL at 15:27

## 2025-06-11 RX ADMIN — APIXABAN 2.5 MG: 2.5 TABLET, FILM COATED ORAL at 21:18

## 2025-06-11 RX ADMIN — FAMOTIDINE 10 MG: 10 TABLET ORAL at 21:18

## 2025-06-11 RX ADMIN — DOXYCYCLINE HYCLATE 100 MG: 50 CAPSULE ORAL at 21:17

## 2025-06-11 ASSESSMENT — ACTIVITIES OF DAILY LIVING (ADL)
ADLS_ACUITY_SCORE: 59
ADLS_ACUITY_SCORE: 52
ADLS_ACUITY_SCORE: 54
ADLS_ACUITY_SCORE: 52
ADLS_ACUITY_SCORE: 54
ADLS_ACUITY_SCORE: 52
ADLS_ACUITY_SCORE: 54
ADLS_ACUITY_SCORE: 54
ADLS_ACUITY_SCORE: 52
ADLS_ACUITY_SCORE: 59
ADLS_ACUITY_SCORE: 54
ADLS_ACUITY_SCORE: 52
ADLS_ACUITY_SCORE: 54
ADLS_ACUITY_SCORE: 52
ADLS_ACUITY_SCORE: 54

## 2025-06-11 NOTE — DISCHARGE SUMMARY
ORTHOPAEDIC SURGERY DISCHARGE SUMMARY     Date of Admission: 6/3/2025  Date of Discharge: 6/11/2025  Disposition: Rehab  Staff Physician: Juan Jose Bravo MD  Primary Care Provider: Haile Pedraza    DISCHARGE DIAGNOSIS:  H/O chondrosarcoma [Z85.830]    PROCEDURES: Procedure(s):  CUSTOM PELVIC AND HIP REPLACEMENT  REMOVAL, HARDWARE, PELVIS, ANTERIOR APPROACH  REMOVAL ANTIBIOTIC SPACER, Pelvis  COMPLEX BLADDER REPAIR  on 6/3/2025 - 6/4/2025    BRIEF HISTORY:  Gilberto Lund is a 44 year old male with PMH including bilateral peroneal DVT's on Apixaban, remote testicular cancer s/p chemotherapy and orchiectomy in 2008, 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 Complicated by bladder tear s/p repair, acute blood loss anemia, and pericarditis.     HOSPITAL COURSE:    The patient was admitted to the ICU post op following the above listed procedures for undifferentiated shock and mechanical ventilatory support then transferred to the medical floor when stable for pain control and rehabilitation. Medicine was consulted post operatively to aid in management of medical co-morbidities. The patient received routine nursing cares and at the time of discharge was medically stable. Vital signs were stable throughout admission. The patient is tolerating a regular diet and has a mccord catheter. Urology for follow up with cystogram prior to mccord removal. Appointment Jun 23 XR CYSTOGRAM VOIDING 11:00 AM. All PT/OT goals have been met for safe mobility. Pain is now controlled on oral medications which will be available on discharge. Stool softeners have been used while taking pain medications to help prevent constipation. Gilberto Lund is deemed medically safe to discharge to ARU for further  rehabilitation    Antibiotics: Ancef given periop and 24 hours postop. Then discharge on Cefadroxil 500 mg po BID x 2 weeks and Doxycycline 100 mg po BID x 2 weeks for  MRSA coverage  DVT prophylaxis: initiated Eliquis 2.5 mg twice daily POD 1 for 4 weeks  PT Progress: Has met PT/OT goals for safe mobility.    Pain Meds: Weaned off all IV pain meds by discharge.  Inpatient Events: No significant events or complications. admitted to he ICU post op following the above listed procedures for undifferentiated shock and mechanical ventilatory support     PHYSICAL EXAM:    Gen: alert, NAD  MSK:  RLE:  - Prevena dressing in place, maintaining suction, no output in canister  - SILT tibial/sural/saphenous/DP/SP nerves  - Fires quad, hamstring, TA, EHL, FHL, GaSC  - PT/DP pulses 2+, foot wwp  Drain: Drain.   Type: HUGO. Keep HUGO site dressing clean dry and intact. Document output per shift and remove drain when output is <30 cc or less x 2 shifts.     FOLLOW UP:      7/3/2025 10:20 AM Juan Jose Bravo MD [facility]   Note:  *If the patient is still on ARU by post op follow up appointment day (7/3/25) please page/vocera ortho for wound check   *If discharging prior to the post op follow up appointment then, follow up at the clinic as scheduled      Future Appointments   Date Time Provider Department Center   6/11/2025  3:15 PM Roseann Baeza, OT [facility] [place]   6/12/2025  1:30 PM Frances Marshall, OTR [facility] [place]   6/12/2025  8:00 PM Flaquita Farley, PT [facility] [place]   6/23/2025 11:00 AM [id number]   6/23/2025 12:15 PM [facility]   7/3/2025 10:20 AM Juan Jose Bravo MD [facility]       Orthopaedic Surgery appointments are at the [facility] ([address] [place] [address]). Call [phone]  to schedule a follow-up appointment at this location with your provider.     PLANNED DISCHARGE ORDERS:      Current Discharge Medication List        START taking  these medications    Details   colchicine (COLCRYS) 0.6 MG tablet Take 1 tablet (0.6 mg) by mouth 2 times daily.    Associated Diagnoses: Chondrosarcoma (H)      famotidine (PEPCID) 10 MG tablet Take 1 tablet (10 mg) by mouth 2 times daily.    Associated Diagnoses: Chondrosarcoma (H)      hydrOXYzine HCl (ATARAX) 25 MG tablet Take 1 tablet (25 mg) by mouth every 6 hours as needed for other (adjuvant pain).    Associated Diagnoses: Chondrosarcoma (H)      !! methocarbamol (ROBAXIN) 750 MG tablet Take 1 tablet (750 mg) by mouth every 6 hours as needed for muscle spasms.    Associated Diagnoses: Chondrosarcoma (H)      multivitamin w/minerals (THERA-VIT-M) tablet Take 1 tablet by mouth daily.    Associated Diagnoses: Chondrosarcoma (H)      polyethylene glycol (MIRALAX) 17 GM/Dose powder Take 17 g by mouth daily.    Associated Diagnoses: Chondrosarcoma (H)      simethicone (MYLICON) 80 MG chewable tablet Take 1 tablet (80 mg) by mouth every 6 hours as needed for cramping.    Associated Diagnoses: Chondrosarcoma (H)      tolterodine ER (DETROL LA) 4 MG 24 hr capsule Take 1 capsule (4 mg) by mouth daily.    Associated Diagnoses: Chondrosarcoma (H)       !! - Potential duplicate medications found. Please discuss with provider.        CONTINUE these medications which have CHANGED    Details   gabapentin (NEURONTIN) 300 MG capsule Take 2 capsules (600 mg) by mouth 3 times daily.    Associated Diagnoses: Chondrosarcoma (H)           CONTINUE these medications which have NOT CHANGED    Details   acetaminophen (TYLENOL) 325 MG tablet Take 2 tablets (650 mg) by mouth every 4 hours as needed for mild pain.    Associated Diagnoses: Status post surgery; Primary chondrosarcoma of bone of pelvis (H)      apixaban ANTICOAGULANT (ELIQUIS) 5 MG tablet Take 5 mg by mouth 2 times daily.      !! methocarbamol (ROBAXIN) 750 MG tablet Take 1 tablet (750 mg) by mouth 4 times daily as needed for muscle spasms.  Qty: 360 tablet, Refills: 1     Associated Diagnoses: Status post surgery; Primary chondrosarcoma of bone of pelvis (H)       !! - Potential duplicate medications found. Please discuss with provider.            Discharge Procedure Orders   XR Cystogram Voiding   Standing Status: Future Standing Exp. Date: 06/09/26     Order Specific Question Answer Comments   Does the patient have a catheter? Yes    Priority Routine    Clinical Info for the Interpreting Provider s/p bladder injury repair, assess for leak      Primary Care - Care Coordination Referral   Standing Status: Future   Referral Priority: Routine: Next available opening Referral Type: Care Coordination   Number of Visits Requested: 1       Juwan Milton DNP-CNP  Orthopaedic Surgery

## 2025-06-11 NOTE — PROGRESS NOTES
"Pain Service Progress Note  [facility]  Date: 06/11/2025       Patient Name: Gilberto Lund  MRN: [id number]  Age: 44 year old  Sex: male      Assessment:  Gilberto Lund is a 44 year old male admitted on 6/3/2025. 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  He is now s/p  explant hemipelvis spacer and conversion to R DEVONTE with custom hemipelvis implant on 6/3/2025 with Dr Bravo complicated by bladder tear s/p repair, acute blood loss anemia, and pericarditis. Patient admitted to the ICU post op for undifferentiated shock and mechanical ventilatory support now stable for transfer to the floor since  6/5/25.      PTA, was using rare oxycodone 5mg, 1/day PRN, not everyday.    Jeremiah is seen with RN at the bedside.  Reports manageable pain level at 4/10 even after PT.  He states he feels \"good.\"  Discussed pain plan as below which he is agreeable.  He is ready for ARU.         Plan/Recommendations:  Acetaminophen 975mg PO 8 hours  Robaxin 750mg PO Q 6 hours PRN  discontinue ketamine infusion to 5mg/hour.   Oxycodone 5-10mg PO Q 3 hours PRN     To taper, discharge on Oxycodone 5-10mg PO Q 3 hours PRN x 1-4 days and start tapering by 1dose/day every 1-4 days.    Gabapentin 600mg PO TID (increased on 6/5/25)  Lidocaine patches 1-3 patches Q 24 hours, 12 hours on and 12 hours off  Menthol patches, 1 patch TD Q 8 hours  Bowel regimen       Pain Service will sign off.    Discussed with attending anesthesiologist- the context of our conversation was regarding de-escalation of IV pain medications and disposition.    Marlin Hastings PA-C  06/11/2025         Diet: Regular Diet Adult  Snacks/Supplements Adult: Other; Ensure Max chocolate at [employer], peanut butter chocolate Special K bar at B; With Meals    Relevant " Labs:  Recent Labs   Lab Test 06/11/25  0559 06/04/25  0314 06/04/25  0100   PROTIME  --   --  15.6*   INR  --   --  1.18*      < > 305   PTT  --   --  29   BUN 10.5   < > 16.8    < > = values in this interval not displayed.       Physical Exam:  Vitals: /73 (BP Location: Right arm)   Pulse 98   Temp 98.5  F (36.9  C) (Oral)   Resp 16   Ht 1.829 m (6')   Wt 114.7 kg (252 lb 12.8 oz)   SpO2 98%   BMI 34.29 kg/m      Physical Exam:   CONSTITUTIONAL/GENERAL APPEARANCE: Conversant.  EYES: EOMI, sclerae clear  ENT/NECK: neck is supple  RESPIRATORY:non labored breathing, on room air  CARDIOVASCULAR: HR within normal limits    MUSCULOSKELETAL/BACK/SPINE/EXTREMITIES: Moving UE and LE independently.     NEURO:  AAOx3.   SKIN/VASCULAR EXAM:  Dry and warm.  PSYCHIATRIC/BEHAVIORAL/OBSERVATIONS:  No objective signs of pain observed during our interview.   Judgment/Insight -fair   Orientation - x3   Memory -fair   Mood and affect - calm, pleasant, cooperative         Relevant Medications:  Current Pain Medications:  Medications related to Pain Management (From now, onward)      Start     Dose/Rate Route Frequency Ordered Stop    06/11/25 1200  acetaminophen (TYLENOL) tablet 975 mg         975 mg Oral EVERY 8 HOURS SCHEDULED 06/11/25 1112      06/11/25 0000  gabapentin (NEURONTIN) 300 MG capsule         600 mg Oral 3 TIMES DAILY 06/11/25 1216      06/11/25 0000  polyethylene glycol (MIRALAX) 17 GM/Dose powder         17 g Oral DAILY 06/11/25 1216      06/11/25 0000  simethicone (MYLICON) 80 MG chewable tablet         80 mg Oral EVERY 6 HOURS PRN 06/11/25 1216      06/11/25 0000  methocarbamol (ROBAXIN) 750 MG tablet         750 mg Oral EVERY 6 HOURS PRN 06/11/25 1216      06/11/25 0000  hydrOXYzine HCl (ATARAX) 25 MG tablet         25 mg Oral EVERY 6 HOURS PRN 06/11/25 1216      06/10/25 0800  senna-docusate (SENOKOT-S/PERICOLACE) 8.6-50 MG per tablet 2 tablet         2 tablet Oral DAILY 06/09/25 3450       "06/09/25 1300  polyethylene glycol (MIRALAX) Packet 17 g         17 g Oral DAILY PRN 06/09/25 1249      06/09/25 0223  simethicone (MYLICON) chewable tablet 80 mg         80 mg Oral EVERY 6 HOURS PRN 06/09/25 0223      06/05/25 1400  gabapentin (NEURONTIN) capsule 600 mg        Note to Pharmacy: PTA Sig:Take 1 capsule (400 mg) by mouth 3 times daily.      600 mg Oral 3 TIMES DAILY 06/05/25 0925      06/05/25 0923  oxyCODONE (ROXICODONE) tablet 5 mg        Placed in \"Or\" Linked Group    5 mg Oral EVERY 3 HOURS PRN 06/05/25 0923      06/05/25 0923  oxyCODONE IR (ROXICODONE) tablet 10 mg        Placed in \"Or\" Linked Group    10 mg Oral EVERY 3 HOURS PRN 06/05/25 0923      06/04/25 1513  opium-belladonna (B&O SUPPRETTES) 30-16.2 MG per suppository 1 suppository         30 mg Rectal EVERY 8 HOURS PRN 06/04/25 1513      06/04/25 0221  methocarbamol (ROBAXIN) tablet 750 mg         750 mg Oral EVERY 6 HOURS PRN 06/04/25 0221      06/04/25 0221  hydrOXYzine HCl (ATARAX) tablet 25 mg         25 mg Oral EVERY 6 HOURS PRN 06/04/25 0221      06/04/25 0221  magnesium hydroxide (MILK OF MAGNESIA) suspension 30 mL         30 mL Oral DAILY PRN 06/04/25 0221      06/04/25 0221  bisacodyl (DULCOLAX) suppository 10 mg         10 mg Rectal DAILY PRN 06/04/25 0221      06/04/25 0221  lidocaine 1 % 0.1-1 mL         0.1-1 mL Other EVERY 1 HOUR PRN 06/04/25 0221      06/04/25 0221  lidocaine (LMX4) cream          Topical EVERY 1 HOUR PRN 06/04/25 0221              Primary Service Contacted with Recommendations? Yes            Acute Inpatient Pain Service [facility]  Hours of pain coverage 24/7   Please Page via Five minutesera  - Link to Vocera Here - Search Pain  Page via Amcom- Please Page the Pain Team Via Amcom: \"PAIN MANAGEMENT ACUTE INPATIENT/ [employer]\"            "

## 2025-06-11 NOTE — PLAN OF CARE
VS: /64 (BP Location: Right arm)   Pulse 97   Temp 98  F (36.7  C) (Oral)   Resp 16   Ht 1.829 m (6')   Wt 114.7 kg (252 lb 12.8 oz)   SpO2 96%   BMI 34.29 kg/m         O2: SPO2>96% and stable on RA. Denies chest pain or SOB.   Output: Michelle in place   Last BM: Continent    Activity: Assist of 2 to commode and back       Skin: Back surgical incision    Pain: Managed with continuous ketamine drip. Managed with , oxycodone PO,  Robaxin and Tylenol    CMS: A&OX4   Dressing: Surgical incision dressing CDI   Diet: Regular/thin    LDA: R PIV SL. R hip HUGO drain, wound vac to R hip running 125mmHg   Equipment: IV pole, personal belongings. Call light within reach, bed in a low position.    Plan: TCU   Additional Info: Pain management,hold laxative. Pt prefer to be on chair.               2021

## 2025-06-11 NOTE — PROGRESS NOTES
Pt A&Ox4   Up in the recliner for comfort   Pt able to make needs known  LBM: 6/11, mccord in place draining ap/pinkish urine with little bits of sediment   PRN medications given when available. Oxy q3 and robaxin/atarax q6  Prevena in place at -125mm Hg  Non-weight bearing on right lower extremity   Ketamine stopped at 11:30AM     Patient transported to ARU at ~1345 in a bed with transport staff. All belonging packed with patients and put onto the bed for transport. Report given

## 2025-06-11 NOTE — PROGRESS NOTES
Care Management Discharge Note    Discharge Date: 06/11/2025     Discharge Disposition: Acute Rehab    Discharge Services: None    Discharge DME: None    Discharge Transportation: other (see comments) (Roll over)    Private pay costs discussed: Not applicable    Does the patient's insurance plan have a 3 day qualifying hospital stay waiver?  No    PAS Confirmation Code: Not applicable for acute rehab  Patient/family educated on Medicare website which has current facility and service quality ratings: No    Education Provided on the Discharge Plan: Yes  Persons Notified of Discharge Plans: Yes  Patient/Family in Agreement with the Plan: Yes    Handoff Referral Completed: Yes, [facility] PCP: Internal handoff referral completed    Additional Information:  Patient will discharge to  ARU today. Met with patient at bedside to update him. He is agreeable to the discharge plan and will let his wife know. Admissions is requesting a roll over at 1:30pm. Informed charge RN, bedside RN and providers. IHO previously completed. No additional care management needs.     [facility]  [address]  [place]  [address]  P: [phone]  F: [phone]    WILBERT Suárez, LGSW  5 Med Surg   [facility]  Phone: [phone]

## 2025-06-11 NOTE — H&P
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 Bravo c/jennifer bladder tear s/p repair.    Hospital course was further complicated by multifactorial shock, ST elevations and elevated CRP concerning for possible pericarditis, acute post-operative pain, acute blood loss anemia, and indigestion.    During acute hospitalization, patient was seen and evaluated by PT and OT, who collectively recommended that patient would benefit from ongoing therapies in the acute inpatient rehabilitation setting.      In review of the therapy notes, [facility] max A x 2 to stand from commode after palmira-cares (dependent hygiene) for transfer back to recliner. Patient able to progress to min A x 2 for sit<>stands from recliner chair on 6/10 with ability to tolerate 45 seconds standing with FWW.     Upon arrival to the rehab unit, he reports that he is feeling ok overall.  His pain has been better controlled over the past 2 days, ranging from about 5-7/10, currently rated at 5/10.  Pain is primarily diffusely throughout his right leg and some across his lower abdomen at incision site.  He reports that he has been having loose stools 1-2 times per day.  His  "last good/formed BM was the morning prior to surgery.  He is feeling gassy and bloated today, though he does not believe he is constipated.  He denies abdominal pain other than at incision area.  He notes minimal nausea.  He reports \"nonexistent\" appetite for the first few days after surgery but seems to be returning.      PAST MEDICAL HISTORY   Reviewed and updated in Epic.  Past Medical History:   Diagnosis Date    Arthritis     Chondrosarcoma (H)     CTS (carpal tunnel syndrome)     Gynecomastia, male     H/O chondrosarcoma     Hard to intubate 06/29/2022    Infection due to 2019 novel coronavirus 12/2020    Kidney stone 2015    90% calcium oxalate monohydrate, and  10% calcium oxalate dihydrate.    Primary chondrosarcoma of bone of pelvis (H)     Psoriasis     Skin tag     Testicle cancer, left 2008    surgery and chemotherapy    Thyroid nodule 12/10/2024    high FDG, noted on PET       SURGICAL HISTORY  Reviewed and updated in Epic.  Past Surgical History:   Procedure Laterality Date    ABDOMEN SURGERY      ARTHROPLASTY REVISION HIP Right 6/3/2025    Procedure: CUSTOM PELVIC AND HIP REPLACEMENT;  Surgeon: Juan Jose Bravo MD;  Location: [facility] OR    BIOPSY      BIOPSY BONE PELVIS Right 12/6/2024    Procedure: BIOPSY, BONE, PELVIS;  Surgeon: Juan Jose Bravo MD;  Location: [facility] OR    EXCISE EXOSTOSIS FOOT  10/25/2013    Procedure: EXCISE EXOSTOSIS FOOT;  Excision of calcaneal bone cyst left foot with allograft;  Surgeon: Marcello Jensen DPM;  Location: [facility] OR    LAPAROSCOPIC CHOLECYSTECTOMY N/A 06/29/2022    Procedure: CHOLECYSTECTOMY, LAPAROSCOPIC;  Surgeon: Cholo Agustin DO;  Location: [facility] OR    [email or web address] SYSTEM HEMIPELVECTOMY Right 2/14/2025    Procedure: 1. HEMIPELVECTOMY, MODIFIED INTERNAL, 2. Right hip girdlestone resection, 3. Right  RETROPERITONEAL DISSECTION;  Surgeon: Juan Jose Bravo MD;  Location: [facility] OR    ORCHIECTOMY SCROTAL Left     REMOVE ANTIBIOTIC CEMENT BEADS / SPACER HIP Right " 6/3/2025    Procedure: REMOVAL ANTIBIOTIC SPACER, Pelvis;  Surgeon: Juan Jose Bravo MD;  [facility]: [facility] OR    REMOVE HARDWARE ANTERIOR PELVIS Right 6/3/2025    Procedure: REMOVAL, HARDWARE, PELVIS, ANTERIOR APPROACH;  Surgeon: Juan Jose Bravo MD;  [facility]: [facility] OR    REPAIR BLADDER N/A 6/3/2025    Procedure: COMPLEX BLADDER REPAIR;  Surgeon: Cheng Lyman MD;  Location: [facility] OR       SOCIAL HISTORY  Reviewed and updated in Epic.  Marital Status:   Living situation: lives with spouse and 3 dependent children in house with ramped entrance.  Full flight of stairs to bedroom.  Has been sleeping on main level in recliner since hemipelvectomy.  Family support: supportive, spouse works from home  Vocational History: was working in [employer], out since last surgery  Social History     Socioeconomic History    Marital status:      Spouse name: Not on file    Number of children: Not on file    Years of education: Not on file    Highest education level: Not on file   Occupational History    Not on file   Tobacco Use    Smoking status: Former     Types: Cigarettes     Passive exposure: Past    Smokeless tobacco: Former     Types: Chew   Vaping Use    Vaping status: Never Used   Substance and Sexual Activity    Alcohol use: Not Currently    Drug use: No    Sexual activity: Yes     Partners: Female   Other Topics Concern    Parent/sibling w/ CABG, MI or angioplasty before 65F 55M? No   Social History Narrative    Not on file     Social Drivers of Health     Financial Resource Strain: Low Risk  (6/11/2025)    Financial Resource Strain     Within the past 12 months, have you or your family members you live with been unable to get utilities (heat, electricity) when it was really needed?: No   Food Insecurity: Low Risk  (6/11/2025)    Food Insecurity     Within the past 12 months, did you worry that your food would run out before you got money to buy more?: No     Within the past 12 months, did the food you bought just  not last and you didn t have money to get more?: No   Transportation Needs: Low Risk  (6/11/2025)    Transportation Needs     Within the past 12 months, has lack of transportation kept you from medical appointments, getting your medicines, non-medical meetings or appointments, work, or from getting things that you need?: No   Physical Activity: Sufficiently Active (9/30/2024)    Exercise Vital Sign     Days of Exercise per Week: 5 days     Minutes of Exercise per Session: 150+ min   Stress: No Stress Concern Present (9/30/2024)    [place] [employer] - Occupational Stress Questionnaire     Feeling of Stress : Only a little   Social Connections: Unknown (9/30/2024)    Social Connection and Isolation Panel [NHANES]     Frequency of Communication with Friends and Family: Not on file     Frequency of Social Gatherings with Friends and Family: Once a week     Attends Yarsanism Services: Not on file     Active Member of Clubs or Organizations: Not on file     Attends Club or Organization Meetings: Not on file     Marital Status: Not on file   Interpersonal Safety: Low Risk  (6/11/2025)    Interpersonal Safety     Do you feel physically and emotionally safe where you currently live?: Yes     Within the past 12 months, have you been hit, slapped, kicked or otherwise physically hurt by someone?: No     Within the past 12 months, have you been humiliated or emotionally abused in other ways by your partner or ex-partner?: No   Recent Concern: Interpersonal Safety - High Risk (6/3/2025)    Interpersonal Safety     Do you feel physically and emotionally safe where you currently live?: No     Within the past 12 months, have you been hit, slapped, kicked or otherwise physically hurt by someone?: No     Within the past 12 months, have you been humiliated or emotionally abused in other ways by your partner or ex-partner?: No   Housing Stability: Low Risk  (6/11/2025)    Housing Stability     Do you have housing?  : Yes     Are you worried about losing your housing?: No   Recent Concern: Housing Stability - High Risk (6/3/2025)    Housing Stability     Do you have housing? : No     Are you worried about losing your housing?: No       FAMILY HISTORY  Reviewed and updated in Epic.  Family History   Problem Relation Age of Onset    No Known Problems Mother     Hypertension Father     Atrial fibrillation Father     Thyroid Disease Father     Nephrolithiasis Father     Pulmonary Embolism Father     C.A.D. Maternal Grandfather     Thyroid Cancer No family hx of     Diabetes No family hx of     Anesthesia Reaction No family hx of          PRIOR FUNCTIONAL HISTORY   At last ARU discharge in 3/2025, was SBA for bathing/toileting, SBA for slide board transfer, mod I for wheelchair based mobility.  He notes that since then, he has remained wheelchair based for mobility and primarily using slide board for transfers, though also was able to stand on LLE and do some pivot transfers with walker.  He was show[facility] at [facility] and sleeping in recliner on main floor since his last surgery.    MEDICATIONS  Scheduled meds  Medications Prior to Admission   Medication Sig Dispense Refill Last Dose/Taking    acetaminophen (TYLENOL) 325 MG tablet Take 2 tablets (650 mg) by mouth every 4 hours as needed for mild pain.   Taking As Needed    apixaban ANTICOAGULANT (ELIQUIS) 2.5 MG tablet Take 1 tablet (2.5 mg) by mouth 2 times daily. Take Eliquis 2.5 mg by mouth twice daily x 4 weeks   6/11/2025 Morning    colchicine (COLCRYS) 0.6 MG tablet Take 1 tablet (0.6 mg) by mouth 2 times daily.   6/11/2025 Morning    famotidine (PEPCID) 10 MG tablet Take 1 tablet (10 mg) by mouth 2 times daily.   6/11/2025 Morning    gabapentin (NEURONTIN) 300 MG capsule Take 2 capsules (600 mg) by mouth 3 times daily.   6/11/2025 Morning    hydrOXYzine HCl (ATARAX) 25 MG tablet Take 1 tablet (25 mg) by mouth every 6 hours as needed for other (adjuvant pain).   Past Week     methocarbamol (ROBAXIN) 750 MG tablet Take 1 tablet (750 mg) by mouth 4 times daily as needed for muscle spasms. 360 tablet 1 Past Week    [START ON 6/12/2025] multivitamin w/minerals (THERA-VIT-M) tablet Take 1 tablet by mouth daily.   6/11/2025 Morning    oxyCODONE IR (ROXICODONE) 10 MG tablet Take 1 tablet (10 mg) by mouth every 3 hours as needed for severe pain. 30 tablet  Past Week    polyethylene glycol (MIRALAX) 17 GM/Dose powder Take 17 g by mouth daily.   Taking    simethicone (MYLICON) 80 MG chewable tablet Take 1 tablet (80 mg) by mouth every 6 hours as needed for cramping.   Taking As Needed    tolterodine ER (DETROL LA) 4 MG 24 hr capsule Take 1 capsule (4 mg) by mouth daily.   Taking    cefadroxil (DURICEF) 500 MG capsule Take 1 capsule (500 mg) by mouth 2 times daily for 14 days.  0     doxycycline hyclate (VIBRAMYCIN) 100 MG capsule Take 1 capsule (100 mg) by mouth 2 times daily for 14 days.  0        ALLERGIES   No Known Allergies      REVIEW OF SYSTEMS  A 10 point ROS was performed and negative unless otherwise noted in HPI.     Constitutional: Negative for fever/chills.  Eyes: Negative for vision changes.  Ears, Nose, Throat: Negative for nasal congestion, sore throat.  Cardiovascular: Negative for chest pain, palpitations.  Respiratory: Negative for shortness of breath, cough.  Gastrointestinal: Positive for bloating, gassiness, loose stools, decreased appetite.  Negative for abdominal pain (other than surgical site), nausea, vomiting.  Genitourinary: Negative for pain/urge to urinate.  Musculoskeletal: Positive for right leg pain.  Neurologic: Positive for weakness and decreased sensation in right leg.  Negative for headache, dizziness or lightheadedness.  Psychiatric: Negative for sleep disturbance.      PHYSICAL EXAM  VITAL SIGNS:  /68 (BP Location: Right arm, Patient Position: Semi-Dinero's, Cuff Size: Adult Regular)   Pulse 98   Temp 98.3  F (36.8  C) (Oral)   Resp 18   Ht 1.854 m  "(6' 1\")   Wt 122 kg (268 lb 15.4 oz)   SpO2 93%   BMI 35.49 kg/m    BMI:  Estimated body mass index is 35.49 kg/m  as calculated from the following:    Height as of this encounter: 1.854 m (6' 1\").    Weight as of this encounter: 122 kg (268 lb 15.4 oz).     General: NAD, lying in bed  HEENT: NC/AT, slightly dry mucous membranes  Pulmonary: non-labored on room air, lungs CTA bilaterally  Cardiovascular: RRR, no murmurs appreciated  Abdominal: soft, non-tender, non-distended, bowel sounds present, +mccord with dark/icteric urine  Extremities: warm, well perfused, +edema in RLE especially in foot, no edema in LLE.  RUE PIV.  MSK/neuro:   Mental Status:  alert and oriented x3    Cranial Nerves: grossly normal    Sensory: decreased to light touch diffusely throughout right lower extremity.  Intact to light touch in bilateral upper and left lower extremities.   Strength: 5/5 in all muscle groups of bilateral upper extremities. LLE HF at least 2/5 (some guarding due to pain), KE 4-/5, DF 4/5, PF 4/5.  RLE HF 0/5, KE 1/5, DF 3/5, PF 3/5.  BLE strength testing limited by pain.   Speech: clear/fluent   Cognition: intact to conversation   Gait: deferred  Skin: incision across lower abdomen and left thigh with vac sponge intact and active suction, no erythema extending beyond sponge borders.  Right thigh with HUGO drain, bulb with moderate serosanguinous drainage present.      LABS  CBC RESULTS:   Recent Labs   Lab Test 06/11/25  0559 06/10/25  0644 06/09/25  0610   WBC 8.4 7.6 9.1   RBC 2.62* 2.66* 2.69*   HGB 7.3* 7.5* 7.7*   HCT 22.9* 23.2* 24.0*   MCV 87 87 89   MCH 27.9 28.2 28.6   MCHC 31.9 32.3 32.1   RDW 14.4 14.5 14.5    347 307       Last Basic Metabolic Panel:  Recent Labs   Lab Test 06/11/25  0559 06/10/25  0644 06/09/25  0610    137 137   POTASSIUM 3.6 3.2* 3.8   CHLORIDE 99 98 97*   CO2 27 30* 29   ANIONGAP 11 9 11   GLC 95 96 117*   BUN 10.5 9.9 10.7   CR 0.96 0.90 0.91   GFRESTIMATED >90 >90 >90 "   CANDICE 8.6* 8.5* 8.6*       EXAM: XR CHEST PORT 1 VIEW 6/6/2025 4:16 PM       HISTORY: hypoxia and fever.     COMPARISON: Chest x-ray 6/4/2025.      TECHNIQUE: Frontal view of the chest.     FINDINGS: Trachea is midline. Stable cardiomediastinal silhouette..   Low lung volume. Possible trace left effusion. No discernible  pneumothorax. Prominence of bronchovascular markings                                                                      IMPRESSION: Mild prominence of bronchovascular markings, nonspecific,  may at  times be seen with pulmonary vascular congestion or  reactive/infective airway disease. Possible trace left pleural  effusion.    EXAM: XR PELVIS AND HIP PORTABLE RIGHT 1 VIEW  LOCATION: [facility]  DATE: 6/4/2025     INDICATION: Status post Hip surgery  COMPARISON: Same day pelvis radiographs.                                                                       IMPRESSION: Postoperative changes of a right hemipelvectomy with custom pelvic and hip replacement. No evidence of immediate hardware complication. Surgical drains over the pelvis. A wound VAC is in place over the right thigh.       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  He is now admitted to ARU on 06/11/25 for multidisciplinary rehabilitation and ongoing medical  management.      Admission to acute inpatient rehab 06/11/25.    Impairment group code: Orthopaedic Disorders 08.51 Unilateral Hip Replacement s/p Right explant of hemipelvis cement spacer and conversion to total hip arthroplasty utilizing custom hemipelvis implant on 6/4/25       PT and OT 90 minutes of each daily for 6 days per week for 16 days, in addition to rehab nursing and close management of physiatrist.      Impairment of ADL's: Noted to have impaired activity tolerance, impaired balance, impaired ROM, impaired strength, impaired weight shifting, TTWB RLE x 3 months, and pain, all affecting his ability to safely and independently perform basic ADLs.  Goal for mod I with basic ADLs.    Impairment of mobility:  Noted to have impaired activity tolerance, impaired balance, impaired ROM, impaired strength, impaired weight shifting, TTWB RLE x 3 months, and pain, all affecting his ability to safely and independently perform basic mobility.  Goal for mod I with basic mobility.    Medical Conditions    S/p explant hemipelvis cement spacer and conversion to total hip arthroplasty utilizing custom hemipelvis implant on 6/4/2025 with Dr. 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 Bravo   Acute post-op pain  - TTWB RLE x3 months  - Activity restrictions: anterior hip precautions  - Wound care: 14-day Prevena vac in place.  Per ortho: If the patient is still on ARU by post op follow up appointment day (7/3/25) please page/vocera ortho for wound check.   However, vac will run out prior to that date, so will plan to connect with them around that time for wound check and updated wound care orders.  - Continue palmira-op antibiotics with cefadroxil 500 mg BID x 2 weeks and Doxycycline 100 mg po BID x 2 weeks per ortho (starting 6/11, had been on Ancef palmira-op)   - Pain management: Tylenol 975 mg q8h, robaxin 750 mg q6h PRN, gabapentin 600 mg TID (increased from PTA dose on  6/5), atarax 25 mg q6h PRN, oxycodone 5-10 mg q3h PRN.  Wean opioids as able; per IP pain consult, plan to reduce by 1 dose/day every 1-4 days.  Of note, ketamine drip just discontinued on 6/11, day of ARU admission.  - R thigh HUGO drain:  Keep HUGO site dressing clean dry and intact. Document output per shift and remove drain when output is <30 cc or less x 2 shifts.   - DVT prophylaxis with apixaban 2.5 mg BID x4 wks post-op per ortho (hx BLE DVT 3/2025, high risk for bleeding)  - Continue PT/OT  - Follow up with Dr. Bravo as scheduled on 7/3    Suspected pericarditis  Noted to have ST elevations on EKG post-operatively.  Troponin mildly elevated but flat.  Evaluated by cardiology in consult.  Though patient asymptomatic, with diffuse ST elevation and elevated CRP, concern for possible pericarditis.  Recommended short course of colchicine and consider ibuprofen if symptomatic.  - Continue colchicine for 3 months  - Per cardiology: If patient begins to have chest pain and ok from surgical standpoint, could start Ibuprofen 600 mg TID for 1-2 weeks (until pain resolves). Would then decrease Ibuprofen dose by 200 mg weekly until off (400 mg TID X 1 week -> 200 mg TID X 1 week -> off).      Hx bilateral lower extremity DVT  Noted 3/19/25.  PTA on apixaban 5 mg BID.  - On POD1, started on apixaban 2.5 mg BID per ortho due to concern for elevated bleeding risk, especially in setting of colchicine as above.    Acute blood loss anemia  Pre-op Hgb ~13, dropped to 6.7 post-op and received 2 units pRBC.  Estimated intra-op blood loss 2.7L.  As of admission to ARU, Hgb stable at ~7.  - Transfuse for Hgb <7  - Trend CBC every M/Th    Intra-op bladder tear s/p repair  - Continue mccord   - Plan for outpatient follow up with urology in 2 weeks (scheduled 6/23) with cystogram prior to trial of void  - Continue tolterodine ER 4 mg daily    Hx testicular cancer (NSGCT) s/p left orchiectomy and chemotherapy (2008)   Chemo-induced  polyneuropathy  - Continue gabapentin (PTA dose increased as above)    Indigestion  Started on Pepcid while inpatient  - Continue Pepcid 10 mg BID.  - Gas X as needed.    - Monitor     Adjustment to disability:  Clinical psychology to eval and treat if indicated  FEN: regular diet, thin liquids  Bowel: continent.  At risk for constipation in setting of opioids, immobility; has been having loose stools lately. PRN senokot-S, Miralax, and bisacodyl suppository available.  Monitor and adjust regimen as indicated.  Bladder: mccord as above  DVT Prophylaxis: apixaban 2.5 mg BID for 4 wks post-op per ortho  GI Prophylaxis: pepcid  Code: full; confirmed on admission  Disposition: goal for home  ELOS:  18 days  Rehab prognosis: good  Follow up Appointments on Discharge: PCP in 1-2 weeks, ortho (Dr. Bravo) on 7/3, urology (scheduled 6/23 with cystogram)        Patient was discussed with Dr. Mina Macedo, PM&R staff physician     TAMARA Hyatt-C  Physical Medicine & Rehabilitation

## 2025-06-11 NOTE — PROGRESS NOTES
Orthopedic Surgery Progress Note 06/11/2025    S: Patient seen this morning. Resting comfortably in chair. Pain improving daily and tolerating wean well. Abdominal/gas pain improved as well. Denies numbness/tingling of RLE. Tolerating diet. Passing flatus and stool. Voiding via mccord. Reviewed plan for discharge to acute rehab when appropriate.    O:  Temp: 98  F (36.7  C) Temp src: Oral BP: 114/64 Pulse: 97   Resp: 16 SpO2: 96 % O2 Device: None (Room air)      Exam:  Gen: alert, intubated but interactive  Resp: intubated, on ventilator  MSK:    RLE:  - Prevena dressing in place, maintaining suction, no output in canister  - SILT tibial/sural/saphenous/DP/SP nerves  - Fires quad, hamstring, TA, EHL, FHL, GaSC  - PT/DP pulses 2+, foot wwp    Drain output:   Output by Drain (mL) 06/09/25 0700 - 06/09/25 1459 06/09/25 1500 - 06/09/25 2259 06/09/25 2300 - 06/10/25 0659 06/10/25 0700 - 06/10/25 1459 06/10/25 1500 - 06/10/25 2259 06/10/25 2300 - 06/11/25 0554   Negative Pressure Wound Therapy Abdomen Anterior     0 0   Drain Closed/Suction 1 Right;Anterior Hip Bulb 15 Lebanese 55 100 15   30         Recent Labs   Lab 06/10/25  0644 06/09/25  0610 06/08/25  0610   WBC 7.6 9.1 8.3   HGB 7.5* 7.7* 7.6*    307 265       Culture results: NGTD    Assessment: Gilberto Lund is a 44 year old male s/p Right explant of hemipelvis cement spacer and and conversion to total hip arthroplasty utilizing custom hemipelvis implant on 6/4/25 with Dr. Bravo.      Today:  - Appreciate pain team assistance in weaning from IV meds  - Ongoing PT  - Titrate bowel regimen (currently having liquid stool)  - Dispo planning for prior ARU    Plan:  Activity: Up with assist when extubated and able. Anterior hip precautions  Weight bearing status: TTWB RLE x 3 months.  Antibiotics: continue Ancef and ceftriaxone while admitted, discharge on extended oral antibiotics (2 weeks of cefadroxil + MRSA covering agent)  Diet: Progress diet as  tolerated.  DVT prophylaxis: SCDs and mechanical while in the hospital. Eliquis 2.5 mg BID starting POD1 x 4 weeks.   Elevation: Elevate operative extremity as tolerated.   Wound Care: Keep Prevena dressing in place until POD #14  Drains: Please document output per shift, discontinue 15f HUGO drain in right thigh per ortho, management of left flank 19f drain per urology  Mccord: Keep in place for 2 weeks, management of mccord per urology  Pain management: Utilize all oral meds first, IV meds for severe breakthrough pain after PO meds given adequate time to take effect.  X-rays: Ordered, AP pelvis and cross-table lateral.  Therapy: PT/OT evaluate prior to discharge.  Cultures: Pending, follow culture results closely.  Consults: PT, OT, medicine, ICU team, Urology, Cardiology. Appreciate assistance in caring for this patient    Shiva Toribio MD  Adult Reconstruction Resident  06/11/2025

## 2025-06-11 NOTE — PHARMACY-ADMISSION MEDICATION HISTORY
Please see Admission Medication History completed on 6/4/25 under previous encounter at [facility] for information regarding prior to admission medications. Please see the discharge summary on 6/11/25 for changes made to the PTA medications during the hospital stay.    Kristal Mandel, PharmD, BCPS

## 2025-06-11 NOTE — PROGRESS NOTES
[facility]    Medicine Progress Note - Hospitalist Service, GOLD TEAM 17    Date of Admission:  6/3/2025    Assessment & Plan   44 year old male admitted on 6/3/2025. 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 Patient admitted to the ICU post op for undifferentiated shock and mechanical ventilatory support now stable for transfer to the floor. Hospitalist team consulted for medical co-management.         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 Bravo   -Ortho is primary   Activity: Up with assist when extubated and able. Anterior hip precautions  Weight bearing status: TTWB RLE x 3 months.  Antibiotics: continue Ancef and gentamicin post op. Per ortho, continue Ancef and ceftriaxone while admitted, discharge on extended oral antibiotics (2 weeks of cefadroxil + MRSA covering agent)   Diet: Progress diet as tolerated.  DVT prophylaxis: SCDs and mechanical while in the hospital. Eliquis 2.5 mg BID starting POD1 x 4 weeks.   Elevation: Elevate operative extremity as tolerated.   Wound Care: Keep Prevena dressing in place until POD #14  Drains: Please document output per shift, discontinue 15f HUGO drain in right thigh per ortho, management of left flank 19f drain per urology  Abdominal drain to be discontinued today (6/10)  Michelle: See below)   Pain management:  on ketamine drip at 5 mls/ hr. This will likley be discontinued today and transition to oral opiates   X-rays:  Ordered, AP pelvis and cross-table lateral.  Therapy: PT/OT evaluate prior to discharge.     # Acute pain 2/2 above   - Pain team following the patient.     # Bladder tear s/p repair; intra-op  # Indwelling mccord in place  # Pelvic drain in place   Urology following peripherally.  OK to remove abdominal drain today   Mccord to remain in place. Urology will arrange for follow up with cystogram prior to mccord removal        # Acute blood loss anemia  # Bilateral peroneal DVTs, on Apixaban, 3/2025 (still present on ultrasound 4/28/2025)  Estimated blood loss of 2.7L intra op s/p 2 units PRBC intra-op. Received additional 1 unit overnight on 6.8. PTA was on Apixaban 5 mg bid, ortho has reduced dose to 2.5 mg bid post operatively.   - HGB 7.7  - Transfuse for Hgb < 7 or active bleeding  - Continue Apixaban 2.5 mg BID POD2 x 4 weeks per Ortho (high bleeding risk post op especially with addition of colchicine)    Indigestion  - Start Pepcid. Gas X as needed.         Hypokalemia:  Potassium supplement ordered   Recheck AM          Diet: Regular Diet Adult  Snacks/Supplements Adult: Other; Ensure Max chocolate at BLD, peanut butter chocolate Special K bar at B; With Meals    DVT Prophylaxis: Defer to primary service  Mccord Catheter: PRESENT, indication: Surgical procedure  Lines: None     Cardiac Monitoring: None  Code Status: Full Code             # Obesity: Estimated body mass index is 34.29 kg/m  as calculated from the following:    Height as of this encounter: 1.829 m (6').    Weight as of this encounter: 114.7 kg (252 lb 12.8 oz).        # Financial/Environmental Concerns: other (see comments) (savings are dwindling, wife says she has a good paying job.)                Disposition Plan     Medically Ready for Discharge: Anticipated in 2-4 Days       Gino Vidal MD  Hospitalist Service, GOLD TEAM [address] [place] [address]  Securely message with [employer] (more info)  Text  page via [facility] Paging/Directory   See signed in provider for up to date coverage information  ______________________________________________________________________    Interval History   Charts reviewed and patient was examined  No new complaints  Eating and drinking well   Passing gas    Pain reasonably well controlled     Physical Exam   Vital Signs: Temp: 98.5  F (36.9  C) Temp src: Oral BP: 119/73 Pulse: 98   Resp: 16 SpO2: 98 % O2 Device: None (Room air)    Weight: 252 lbs 12.8 oz    General Appearance: Alert, no acute distress, on O 2 NC, obese    HEENT: Anicteric sclera, MMM   Respiratory: Lungs CTAB, No wheezing , no crackles,   Cardiovascular: Regular rate , Regular rhythm , no murmur   GI: Abdomen soft, non-tender, active bowel sounds. No guarding or rebound, +HUGO drains in place in right hip and pelvis   Musculoskeletal: No lower extremity edema. Provena dressings in place   Neurologic: Oriented X 3 , CN II-XII grossly intact, Moves extremities equally     Medical Decision Making       45 MINUTES SPENT BY ME on the date of service doing chart review, history, exam, documentation & further activities per the note.      Data     I have personally reviewed the following data over the past 24 hrs:    8.4  \   7.3 (L)   / 422     137 99 10.5 /  95   3.6 27 0.96 \     Procal: N/A CRP: 59.59 (H) Lactic Acid: N/A         Imaging results reviewed over the past 24 hrs:   No results found for this or any previous visit (from the past 24 hours).

## 2025-06-11 NOTE — PLAN OF CARE
Physical Therapy Discharge Summary    Reason for therapy discharge:    Discharged to acute rehabilitation facility.    Progress towards therapy goal(s). See goals on Care Plan in [facility] electronic health record for goal details.  Goals partially met.  Barriers to achieving goals:   discharge from facility.    Therapy recommendation(s):    Continued therapy is recommended.  Rationale/Recommendations:  recommend continued PT services to ARU to progress pt's IND and safety with functional mobility. Pt has been able to perform STS with minAx2, was able to initiate hop steps within TTWB precautions with platform walker this date, PT with foot underneath pt's to ensure TTWB adherence. Pt able to performo direct scoot/pivot transfer recliner <> tiarra drop arm commode with CGAx2.

## 2025-06-11 NOTE — INTERIM SUMMARY
[facility] Acute Rehab [facility] Pre-Admission Screen    Referral Source:  [facility] MED SURG -44  Admit date to referring facility: 6/3/2025    Physical Medicine and Rehab Consult Completed: No    Rehab Diagnosis:    Orthopaedic Disorders 08.51 Unilateral Hip Replacement s/p Right explant of hemipelvis cement spacer and conversion to total hip arthroplasty utilizing custom hemipelvis implant on 6/4/25    Justification for Acute Inpatient Rehabilitation  Gilberto Lund is a 44 year old male with PMH including bilateral peroneal DVT's on Apixaban, remote testicular cancer s/p chemotherapy and orchiectomy in 2008, 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 He is now s/p Right explant of hemipelvis cement spacer and conversion to total hip arthroplasty utilizing custom hemipelvis implant on 6/4/25. His post-operative course has been complicated by bladder tear s/p repair, acute blood loss anemia, and pericarditis. Patient admitted to the ICU post op for undifferentiated shock and mechanical ventilatory support. The patient is now stable and ready for discharge to an ARC level of care for intensive therapies not available in a lesser level of care including OT/PT, ongoing medical management by PMR, and rehab nursing care. At baseline Jeremiah is modified independent from w/c level for functional mobility; he is provided light assist from spouse as needed for ADL. Currently, assist of 2 for mobility, assist of 1 for supine/seated ADL. The patient requires an intensive inpatient rehab program to address the following acute impairments: impaired activity tolerance, impaired balance, impaired ROM, impaired strength, impaired weight shifting, TTWB RLE x 3 months, and pain.    Current Active Medical Management Needs/Risks for Clinical Complications  The patient requires the high level of rehabilitation physician supervision that accompanies the provision of  intensive rehabilitation therapy.  The patient needs the services of the rehabilitation physician to assess the patient medically and functionally and to modify the course of treatment as needed to maximize the patient's capacity to benefit from the rehabilitation process.  Ortho: In setting of custom hemipelvis implant 6/4, training in TTWB during ADL and functional transfers. In setting of post-op incision care, prevena dressing to remain in place x 14 days with plan for takedown on 6/18, per ortho's discharge instructions. Assess for s/s of infection and/or dehiscence.   Hematology: In setting of acute blood loss anemia with h/o B peroneal DVTs (still present on  4/28/25), monitor for s/s of bleeding, as well as DVT. Estimated blood loss of 2.7L intra op s/p 2 units PRBC intra-op. Received additional 1 unit overnight on 6/8. PTA was on Apixaban 5 mg bid, ortho has reduced dose to 2.5 mg bid post operatively. Continue Apixaban 2.5 mg BID POD2 x 4 weeks per Ortho (high bleeding risk post op especially with addition of colchicine). Hgb 7.3 on 6/11.  Infectious disease: In setting of need for post-op infection risk, patient will require ongoing assessment of signs/symptoms of infection. Has received ancef and ceftriaxone and will continue IV with plan for discharge on extended oral antibiotics.  : In setting of bladder tear s/p repair, mccord to remain in place until OP follow-up with urology for cystogram.  Pain: Patient will need ongoing assessment and adjustment of pain medications for optimal participation in therapies. Assess for possible complications due to opioids including sedation, dizziness, nausea, vomiting, constipation, and respiratory depression.    Past Medical/Surgical History   Surgery in the past 100 days: Yes   Additional relevant past medical history: bilateral peroneal DVT's on Apixaban, remote testicular cancer s/p chemotherapy and orchiectomy in 2008, innominate bone chondrosarcoma s/p  right modified radical hemipelvectomy on 2/14/2025    Level of Functioning Prior to Admission:    LIVING ENVIRONMENT   People in Home: spouse, child(angelo), dependent   Current Living Arrangements: house   Home Accessibility: stairs within home    Number of Stairs, Within Home, Primary: greater than 10 stairs    Stair Railings, Within Home, Primary: railings on both sides of stairs   Transportation Anticipated: family or friend will provide, agency   Living Environment Comments: For the last 3 months, has been sleeping in a recliner on the main level    SELF-CARE   Usual Activity Tolerance: moderate     Equipment Currently Used at Home: wheelchair, manual, walker, rolling, commode chair, other (see comments) (slideboard, droparm commode, reacher, leg )   Activity/Exercise/Self-Care Comment: Recently at ARU and wife assists as needed with ADLs at home[facility]    Additional Comments: Patient previously on [facility] ARU 2/21/25 - 3/4/25. He discharged home at wheelchair level with assist from family and HH therapies.    Level of Function: GG Scale (Section GG Functional Ability and Goals; CMS's MONET Version 3.0 Manual effective 10.1.2019):  PT Current Function Goals for Rehab   Bed Rolling Not completed 6 Independent   Supine to Sit Not completed 6 Independent   Sit to Stand 1 Dependent (min A x 2) 6 Independent   Transfer 1 Dependent (max A x 2) 6 Independent   Ambulation Not completed 6 Independent   Stairs 88 Not attempted due to safety Not Applicable- patient lives on main level of home     OT Current Function Goals for Rehab   Feeding 6 Independent 6 Independent   Grooming 5 Setup or clean-up assistance 6 Independent   Bathing 3 Partial/moderate assistance 6 Independent   Upper Body Dressing 5 Setup or clean-up assistance 6 Independent   Lower Body Dressing 2 Substantial/maximal assistance 6 Independent   Toileting 1 Dependent 6 Independent   Toilet Transfer 1 Dependent (assist x 2) 6 Independent    Tub/Shower Transfer Not completed 6 Independent   Cognition Not Impaired Independent     SLP Current Function Goals for Rehab   Swallow Not Impaired Not applicable   Communication Not Impaired Not applicable     Current Diet:  0-Thin and 7-Regular    Summary Statement: Jeremiah Lund is a 45 y/o M s/p right explant of hemipelvis cement spacer and and conversion to total hip arthroplasty utilizing custom hemipelvis implant. He is participating and making progress in OT and PT. He requires SBA for lateral transfer to commode, but max A x 2 to stand from commode after palmira-cares (dependent hygiene) for transfer back to recliner. Patient able to progress to min A x 2 for sit<>stands from recliner chair on 6/10 with ability to tolerate 45 seconds standing with FWW. He requires the ongoing coordinated care of OT, PT, rehab nursing, and PMR to achieve maximal independence in ADL and functional mobility.     Expected Therapies and Services Required During Inpatient Rehab Admission  Intensity of Therapy: Patient requires intensive therapies not available in a lesser level of care. Patient is motivated, making gains, and can tolerate 3 hours of therapy a day.  Physical Therapy: 90 minutes per day, 6 days a week for 18 days  Occupational Therapy: 90 minutes per day, 6 days a week for 18 days  Speech and Language Therapy: Not clinically indicated at this time.  Rehabilitation Nursing Needs: Patient requires 24 hour Rehab Nursing to manage vitals, medication education, positioning, carryover of new rehab techniques, care coordination, pain management, post-surgical incision care to promote healing and prevent infection, and provide safe environment for patient at falls risk.    Precautions/restrictions/special needs:   Precautions: anterior hip precautions, fall precautions, and Weight bearing precautions (RLE TTWB x 3 months)   Restrictions: RIGHT anterior hip precautions- no hip external rotation, no hip flexion greater than  90 degrees, no pivoting or twisting on RLE, RLE TTWB x 3 months   Special Needs: lift- while maintaining precautions    Expected Level of Improvement: Anticipate that patient will achieve modified independence in functional transfers, short-distance gait, and ADL.  Expected Length of time to achieve: 18 days    Anticipated Discharge Needs:  Anticipated Discharge Destination: Home  Anticipated Discharge Support: Family member  24/7 support available : Yes  Identified caregiver(s):  spouse  Anticipated Discharge Needs: Home with homecare    Identified challenges/barriers: post-op pain    Payor source: [employer]    Liaison signature/date/time: MYRIAM Ruiz, OTR/L 6/11/2025 1:03 PM    Physician statement of review and agreement:  I have reviewed and am in agreement of the need for IRF stay to address above functional and medical needs. In addition to above statements address, Patient requires intensive active and ongoing therapeutic intervention and multiple therapies; Patient requires medical supervision; Expected to actively participate in the intensive rehab program; Sufficiently stable to actively participate; Expectation for measurable improvement in functional capacity or adaption to impairments.    MD signature/date/time:

## 2025-06-11 NOTE — PLAN OF CARE
Occupational Therapy Discharge Summary    Reason for therapy discharge:    Discharged to acute rehabilitation facility.    Progress towards therapy goal(s). See goals on Care Plan in [facility] electronic health record for goal details.  Goals partially met.  Barriers to achieving goals:   discharge from facility.    Therapy recommendation(s):    Continued therapy is recommended.  Rationale/Recommendations:  for progression of I/ADL IND.

## 2025-06-12 ENCOUNTER — APPOINTMENT (OUTPATIENT)
Dept: PHYSICAL THERAPY | Facility: CLINIC | Age: 45
DRG: 560 | End: 2025-06-12
Attending: PHYSICAL MEDICINE & REHABILITATION
Payer: COMMERCIAL

## 2025-06-12 ENCOUNTER — APPOINTMENT (OUTPATIENT)
Dept: OCCUPATIONAL THERAPY | Facility: CLINIC | Age: 45
DRG: 560 | End: 2025-06-12
Attending: PHYSICAL MEDICINE & REHABILITATION
Payer: COMMERCIAL

## 2025-06-12 VITALS
RESPIRATION RATE: 18 BRPM | OXYGEN SATURATION: 96 % | TEMPERATURE: 98.1 F | BODY MASS INDEX: 35.65 KG/M2 | WEIGHT: 268.96 LBS | DIASTOLIC BLOOD PRESSURE: 69 MMHG | SYSTOLIC BLOOD PRESSURE: 117 MMHG | HEIGHT: 73 IN | HEART RATE: 89 BPM

## 2025-06-12 LAB
ANION GAP SERPL CALCULATED.3IONS-SCNC: 10 MMOL/L (ref 7–15)
BUN SERPL-MCNC: 10 MG/DL (ref 6–20)
CALCIUM SERPL-MCNC: 8.7 MG/DL (ref 8.8–10.4)
CHLORIDE SERPL-SCNC: 99 MMOL/L (ref 98–107)
CREAT SERPL-MCNC: 0.95 MG/DL (ref 0.67–1.17)
EGFRCR SERPLBLD CKD-EPI 2021: >90 ML/MIN/1.73M2
ERYTHROCYTE [DISTWIDTH] IN BLOOD BY AUTOMATED COUNT: 14.4 % (ref 10–15)
GLUCOSE SERPL-MCNC: 106 MG/DL (ref 70–99)
HCO3 SERPL-SCNC: 26 MMOL/L (ref 22–29)
HCT VFR BLD AUTO: 24.5 % (ref 40–53)
HGB BLD-MCNC: 7.9 G/DL (ref 13.3–17.7)
MCH RBC QN AUTO: 28.4 PG (ref 26.5–33)
MCHC RBC AUTO-ENTMCNC: 32.2 G/DL (ref 31.5–36.5)
MCV RBC AUTO: 88 FL (ref 78–100)
PLATELET # BLD AUTO: 466 10E3/UL (ref 150–450)
POTASSIUM SERPL-SCNC: 3.6 MMOL/L (ref 3.4–5.3)
RBC # BLD AUTO: 2.78 10E6/UL (ref 4.4–5.9)
SODIUM SERPL-SCNC: 135 MMOL/L (ref 135–145)
WBC # BLD AUTO: 7.8 10E3/UL (ref 4–11)

## 2025-06-12 PROCEDURE — 97166 OT EVAL MOD COMPLEX 45 MIN: CPT | Mod: GO

## 2025-06-12 PROCEDURE — 36415 COLL VENOUS BLD VENIPUNCTURE: CPT | Performed by: PHYSICIAN ASSISTANT

## 2025-06-12 PROCEDURE — 80048 BASIC METABOLIC PNL TOTAL CA: CPT | Performed by: PHYSICIAN ASSISTANT

## 2025-06-12 PROCEDURE — 97535 SELF CARE MNGMENT TRAINING: CPT | Mod: GO

## 2025-06-12 PROCEDURE — 97530 THERAPEUTIC ACTIVITIES: CPT | Mod: GP

## 2025-06-12 PROCEDURE — 97110 THERAPEUTIC EXERCISES: CPT | Mod: GP

## 2025-06-12 PROCEDURE — 128N000003 HC R&B REHAB

## 2025-06-12 PROCEDURE — 85018 HEMOGLOBIN: CPT | Performed by: PHYSICIAN ASSISTANT

## 2025-06-12 PROCEDURE — 97161 PT EVAL LOW COMPLEX 20 MIN: CPT | Mod: GP

## 2025-06-12 PROCEDURE — 99232 SBSQ HOSP IP/OBS MODERATE 35: CPT | Performed by: PHYSICIAN ASSISTANT

## 2025-06-12 PROCEDURE — 97530 THERAPEUTIC ACTIVITIES: CPT | Mod: GO

## 2025-06-12 PROCEDURE — 250N000013 HC RX MED GY IP 250 OP 250 PS 637: Performed by: PHYSICIAN ASSISTANT

## 2025-06-12 RX ADMIN — DOXYCYCLINE HYCLATE 100 MG: 50 CAPSULE ORAL at 21:34

## 2025-06-12 RX ADMIN — GABAPENTIN 600 MG: 300 CAPSULE ORAL at 14:12

## 2025-06-12 RX ADMIN — FAMOTIDINE 10 MG: 10 TABLET ORAL at 07:49

## 2025-06-12 RX ADMIN — CEPHALEXIN 250 MG: 250 CAPSULE ORAL at 07:49

## 2025-06-12 RX ADMIN — GABAPENTIN 600 MG: 300 CAPSULE ORAL at 19:50

## 2025-06-12 RX ADMIN — COLCHICINE 0.6 MG: 0.6 TABLET, FILM COATED ORAL at 07:51

## 2025-06-12 RX ADMIN — OXYCODONE HYDROCHLORIDE 10 MG: 5 TABLET ORAL at 07:50

## 2025-06-12 RX ADMIN — METHOCARBAMOL 750 MG: 750 TABLET ORAL at 00:22

## 2025-06-12 RX ADMIN — COLCHICINE 0.6 MG: 0.6 TABLET, FILM COATED ORAL at 21:34

## 2025-06-12 RX ADMIN — ACETAMINOPHEN 975 MG: 325 TABLET ORAL at 19:49

## 2025-06-12 RX ADMIN — Medication 1 TABLET: at 11:20

## 2025-06-12 RX ADMIN — OXYCODONE HYDROCHLORIDE 10 MG: 5 TABLET ORAL at 14:12

## 2025-06-12 RX ADMIN — CEPHALEXIN 250 MG: 250 CAPSULE ORAL at 21:34

## 2025-06-12 RX ADMIN — APIXABAN 2.5 MG: 2.5 TABLET, FILM COATED ORAL at 21:35

## 2025-06-12 RX ADMIN — METHOCARBAMOL 750 MG: 750 TABLET ORAL at 07:50

## 2025-06-12 RX ADMIN — TOLTERODINE 4 MG: 4 CAPSULE, EXTENDED RELEASE ORAL at 07:50

## 2025-06-12 RX ADMIN — FAMOTIDINE 10 MG: 10 TABLET ORAL at 21:35

## 2025-06-12 RX ADMIN — ACETAMINOPHEN 975 MG: 325 TABLET ORAL at 04:05

## 2025-06-12 RX ADMIN — METHOCARBAMOL 750 MG: 750 TABLET ORAL at 14:06

## 2025-06-12 RX ADMIN — APIXABAN 2.5 MG: 2.5 TABLET, FILM COATED ORAL at 07:50

## 2025-06-12 RX ADMIN — SENNOSIDES AND DOCUSATE SODIUM 1 TABLET: 50; 8.6 TABLET ORAL at 14:12

## 2025-06-12 RX ADMIN — CEPHALEXIN 250 MG: 250 CAPSULE ORAL at 15:56

## 2025-06-12 RX ADMIN — OXYCODONE HYDROCHLORIDE 5 MG: 5 TABLET ORAL at 04:05

## 2025-06-12 RX ADMIN — OXYCODONE HYDROCHLORIDE 10 MG: 5 TABLET ORAL at 19:49

## 2025-06-12 RX ADMIN — HYDROXYZINE HYDROCHLORIDE 25 MG: 25 TABLET, FILM COATED ORAL at 21:41

## 2025-06-12 RX ADMIN — CEPHALEXIN 250 MG: 250 CAPSULE ORAL at 11:20

## 2025-06-12 RX ADMIN — OXYCODONE HYDROCHLORIDE 10 MG: 5 TABLET ORAL at 11:20

## 2025-06-12 RX ADMIN — ACETAMINOPHEN 975 MG: 325 TABLET ORAL at 11:20

## 2025-06-12 RX ADMIN — GABAPENTIN 600 MG: 300 CAPSULE ORAL at 07:50

## 2025-06-12 RX ADMIN — DOXYCYCLINE HYCLATE 100 MG: 50 CAPSULE ORAL at 07:49

## 2025-06-12 RX ADMIN — METHOCARBAMOL 750 MG: 750 TABLET ORAL at 21:41

## 2025-06-12 RX ADMIN — OXYCODONE HYDROCHLORIDE 10 MG: 5 TABLET ORAL at 00:23

## 2025-06-12 ASSESSMENT — ACTIVITIES OF DAILY LIVING (ADL)
IADLS,_PREVIOUS_FUNCTIONAL_LEVEL: PARTIAL ASSISTANCE
ADLS_ACUITY_SCORE: 62
ADLS_ACUITY_SCORE: 62
ADLS_ACUITY_SCORE: 52
ADLS_ACUITY_SCORE: 62
ADLS_ACUITY_SCORE: 56
ADLS_ACUITY_SCORE: 56
ADLS_ACUITY_SCORE: 62
ADLS_ACUITY_SCORE: 56
ADLS_ACUITY_SCORE: 56
ADLS_ACUITY_SCORE: 62
BADLS,_PREVIOUS_FUNCTIONAL_LEVEL: USES DEVICE OR EQUIPMENT
ADLS_ACUITY_SCORE: 62

## 2025-06-12 NOTE — PLAN OF CARE
Goal Outcome Evaluation:  [id number]    Plan of Care Reviewed With: patient    Overall Patient Progress: no change    Outcome Evaluation: Pt is able to make minor weight changes while lying in bed, low air loss pump connected to mattress, pt wearing prevalon boots on bilateral legs. Pt was not transferring when writer came but was instructed to aske for assistance when doing so. He uses the call light appropriately and able to make needs known.     Pt has complaints  of pain on back down to his legs and was managed with scheduled tylenol, gabapentin, and PRN oxycodone. Wound vac in place, dressing intact, no discharges noted. HUGO drain in place attached to a suction bulb with serosanguinos drainage and was emptied. No other complaints made.    [id number]    Slept in between cares. No other complaints made. Will continue with current POC.

## 2025-06-12 NOTE — PROGRESS NOTES
06/12/25 1100   Appointment Info   Signing Clinician's Name / Credentials (PT) Claudia Stephenson DPT   Living Environment   People in Home spouse;child(angelo), dependent  (3 children)   Current Living Arrangements house   Home Accessibility stairs within home   Number of Stairs, Within Home, Primary greater than 10 stairs   Stair Railings, Within Home, Primary railings on both sides of stairs  (both sides until half way up, then swtiches to just L side)   Transportation Anticipated family or friend will provide   Living Environment Comments Pt lives in [place] in a house with wife and 3 kids. Wife WFH and can provide physical assist as needed (also is a nurse). Has ramp for entrance. Flight of stairs up to the bedroom, but pt has been sleeping on main level. Has been sleeping in a recliner since last hospitalization. [facility] upstairs, half-bath on main floor. No adaptive equipment in main shower bathroom at this time. Grab bars by standard height toilet in half-bath, with OTC.   Self-Care   Usual Activity Tolerance moderate   Current Activity Tolerance fair   Equipment Currently Used at Home wheelchair, manual;hospital bed;commode chair;grab bar, toilet;other (see comments)  (leg , reacher)   Fall history within last six months no   Activity/Exercise/Self-Care Comment Pt was mod IND w/c based for ADL and IADL with some intermittent standing (+ a few steps as needed for transfers). Completed lateral transfers mostly.   Previous Level of Function/Home Environm   Bed Mobility, Previous Functional Level independent   Transfers, Previous Functional Level independent   Household Ambulation, Previous Functional Level uses wheelchair  (a few steps able)   Stairs, Previous Functional Level uses device or equipment  (ramp/w/c)   Community Ambulation, Previous Functional Level uses wheelchair   General Information   Onset of Illness/Injury or Date of Surgery 06/04/25   Referring Physician  "Lizy Macedo MD   Patient/Family Therapy Goals Statement (PT) get back to at least to where I was, if not more. 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 Bravo c/b bladder tear s/p repair.     Hospital course was further complicated by multifactorial shock, ST elevations and elevated CRP concerning for possible pericarditis, acute post-operative pain, acute blood loss anemia, and indigestion.\"   Existing Precautions/Restrictions no hip ER;no hip ADD past midline;no hip hyperextension;90 degree hip flexion   Weight-Bearing Status - RLE toe touch weight-bearing   Cognition   Affect/Mental Status (Cognition) WNL   Orientation Status (Cognition) oriented x 4   Follows Commands (Cognition) WFL   Pain Assessment   Patient Currently in Pain Yes, see Vital Sign flowsheet  (5-6/10 in R hip)   Integumentary/Edema   Integumentary/Edema other (describe)   Integumentary/Edema Comments swelling into thigh, no overt concerns   Range of Motion (ROM)   Range of Motion ROM deficits secondary to surgical procedure;ROM deficits secondary to pain   Strength (Manual Muscle Testing)   Strength (Manual Muscle Testing) Deficits observed during functional mobility   Bed Mobility   Comment, (Bed Mobility) see GG   Transfers   Comment, (Transfers) see GG   Gait/Stairs (Locomotion)   Comment, (Gait/Stairs) see GG   Sensory Examination   Sensory " Perception other (describe)   Sensory Perception Comments LT RLE decreased, numbness in thigh, decreased below knee   Coordination   Coordination no deficits were identified   Muscle Tone   Muscle Tone no deficits were identified   Clinical Impression   Criteria for Skilled Therapeutic Intervention Yes, treatment indicated   PT Diagnosis (PT) physical deconditioning, RLE weakness   Influenced by the following impairments decreased strength, impaired sensation, TTWB status, decreased activity tolerance, impaired balance d/t weightbearing status   Functional limitations due to impairments bed mobility, transfers, ambulation, stairs   Clinical Presentation (PT Evaluation Complexity) stable   Clinical Presentation Rationale clinical judgment   Clinical Decision Making (Complexity) low complexity   Planned Therapy Interventions (PT) balance training;bed mobility training;gait training;home exercise program;patient/family education;ROM (range of motion);strengthening;transfer training;wheelchair management/propulsion training;progressive activity/exercise;risk factor education;home program guidelines;neuromuscular re-education   Risk & Benefits of therapy have been explained evaluation/treatment results reviewed;care plan/treatment goals reviewed;risks/benefits reviewed;current/potential barriers reviewed;participants voiced agreement with care plan;participants included;patient   Clinical Impression Comments Pt presents below functional baseline following planned explant of hemipelvis cement spacer and conversion to DEVONTE w/ custom hemipelvis. At baseline, pt is Malik for all mobility. Goals for Malik standing/transfers with FWW, mixed mobility with w/c, wife WFH and is available to assist with. Pt will benefit from skilled PT to promote improved safety and independence with mobility. ELOS 2 weeks.   PT Total Evaluation Time   PT Migel Low Complexity Minutes (14092) 30   Physical Therapy Goals   PT Frequency 6x/week   PT  Predicted Duration/Target Date for Goal Attainment 06/26/25   PT Goals Bed Mobility;Transfers;PT Goal 1;Wheelchair Mobility   PT: Bed Mobility Modified independent;Supine to/from sit;Rolling;Within precautions   PT: Transfers Modified independent;Sit to/from stand;Bed to/from chair;Assistive device;Within precautions  (FWW)   PT: Wheelchair Mobility Greater than 500 feet;manual wheelchair   PT: Goal 1 car transfer supv   Interventions   Interventions Quick Adds Therapeutic Activity   Therapeutic Activity   Therapeutic Activities: dynamic activities to improve functional performance Minutes (39460) 15   Treatment Detail/Skilled Intervention Time spent acquiring properly fitting w/c and adjusting per hip precautions. Education re: PT POC and goals in ARU setting. Pt opting to remain seated in w/c at end of session, call light and all needs in reach at end of session.   PT Discharge Planning   PT Plan hip isometrics, MMT, SPT practice   Physical Therapy Time and Intention   Timed Code Treatment Minutes 15   Total Session Time (sum of timed and untimed services) 45   PT - Acute Rehab Center Time   Individual Time (minutes) - PT 45   Group Time (minutes) - PT 0   Concurrent Time (minutes) - PT 0   Co-Treatment Time (minutes) - PT 0   ARC Total Session Time (minutes) - PT 45   ARC Daily Total Session Time   PT ARC Daily Total Session Time 45   ARC Daily Rehab Total Minutes 105   Roll Left and Right: flat no rail   Comment PT: pt reports sleeping in recliner at baseline   Reason if not Attempted Activity not applicable   Roll Left to Right CARE Score 9   Sit to Lying: flat no rail   Comment PT: pt reports sleeping in recliner at baseline   Reason if not Attempted Activity not applicable   Sit to Lying CARE Score 9   Lying to Sitting on Side of Bed: flat no rail   Comment PT: SBA with HOB elevated and heavy use of grab bars and leg  for RLE. Pt reports sleeping in recliner at baseline.   Reason if not Attempted  Activity not applicable   Lying to Sitting CARE Score 9   Sit to Stand: standard height (18 inches)   Physical Assistance Level Total assistance   Comment PT: [email or web address]   Sitting to Standing CARE Score 1   Walk 10 Feet   Reason if not Attempted Safety concerns   Walk 10 Ft. CARE Score 88   Walk 50 Feet with Two Turns   Reason if not Attempted Safety concerns   Walk 50 Ft. CARE Score 88   Walk 150 Feet   Reason if not Attempted Safety concerns   Walk 150 Ft. CARE Score 88   Walking 10 Feet on Uneven Surfaces: Ability to walk on various surfaces (consider using small ramp)   Reason if not Attempted Safety concerns   Walking 10 Feet on Uneven Surfaces CARE Score 88   Wheel 50 Feet with Two Turns   Assistance Needed Supervision   Comment supv, self-propelling with BUEs   Wheel 50 Feet with Two Turns CARE Score 4   Wheel 150 Feet   Assistance Needed Supervision   Comment supv, self-propelling with BUEs   Wheel 150 Feet CARE Score 4   1 Step (Curb): 6 inches no rail   Reason if not Attempted Safety concerns   1 Step CARE Score 88   4 Steps: 6 inches   Reason if not Attempted Safety concerns   4 Steps CARE Score 88   12 Steps: 6 inches   Reason if not Attempted Safety concerns   12 Steps CARE Score 88   Picking Up Object: Ability to bend/stoop from standing (consider using a reacher)   Reason if not Attempted Safety concerns    CARE Score 88   Car Transfer: Ability to transfer in/out of car or van   Comment PT: attempted with SBT w/c>car, was able to get hips onto seat of car, but unable to complete d/t concern of surgical site pain   Reason if not Attempted Safety concerns   Car Transfer CARE Score 88

## 2025-06-12 NOTE — CONSULTS
At last ARU discharge in 3/2025  Social Work: Initial Assessment with Discharge Plan    Patient Name: Gilberto Lund  : 1980  Age: 44 year old  MRN: [id number]  Completed assessment with: Chart review and interview with patient.   Admitted to ARU: 2025    Presenting Information   Date of SW assessment: 2025  Health Care Directive: copy in chart 2025  Primary Health Care Agent: Patient/self.   Secondary Health Care Agent: Mary Ellen [phone]   Living Situation:  lives with spouse and 3 dependent children in house with ramped entrance.  Full flight of stairs to bedroom.  Has been sleeping on main level in recliner since hemipelvectomy.   Previous Functional Status: At last ARU discharge in 3/2025, was SBA for bathing/toileting, SBA for slide board transfer, mod I for wheelchair based mobility.  He notes that since then, he has remained wheelchair based for mobility and primarily using slide board for transfers, though also was able to stand on LLE and do some pivot transfers with walker.  He was showering at [facility] and sleeping in recliner on main floor since his last surgery.  DME available: wheelchair. See therapy evaluation for more information   Patient and family understanding of hospitalization: Appropriate and Pleasant.  Cultural/Language/Spiritual Considerations: 44 year, , English Speaking    Physical Health  Reason for admission: Orthopaedic Disorders  Unilateral Hip Replacement s/p Right explant of hemipelvis cement spacer and conversion to total hip arthroplasty utilizing custom hemipelvis implant on 25. 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 Bravo c/jennifer bladder tear s/p repair.     Hospital course was further complicated by multifactorial shock, ST elevations and elevated CRP concerning for possible pericarditis, acute post-operative pain, acute blood loss anemia, and indigestion.     During acute hospitalization, patient was seen and evaluated by PT and OT, who collectively recommended that patient would benefit from ongoing therapies in the acute inpatient rehabilitation setting.        Provider Information   Primary Care Physician:Haile Pedraza [facility] will schedule PCP apt at discharge.   : ***    Mental Health/Chemical Dependency:   Diagnosis: ***  Alcohol/Tobacco/Narcotis: ***  Support/Services in Place: ***  Services Needed/Recommended: Theodore and Health Psychology support while on ARU available.   Sexuality/Intimacy: Not discussed     Support System  Marital Status:   Family support: ***  Other support available: ***  Areas of fulfillment/katy: Hunting, fishing, outdoors     Community Resources  Current in home services: ***  Previous services: Social support, individual psychotherapy, and medications     Financial/Employment/Education  Employment Status: ***  Income Source: ***  Education: High school and 1 year of community college   Financial Concerns:  ***  Insurance: MEDICA VANTAGE PLUS SELF INSURED     ------------------------------------------------------------------------------------------------------------  JORGE Pain Assessment    Pain Effect on Sleep  Over the past 5 days, how much of the time has pain made it hard for you to sleep at night?\"    {Post Acute Pain:636926}    Pain Interference with Therapy Activities  \"Over the past 5 days, how often have you limited your participation in rehabilitation therapy sessions due to pain?\"  {Post Acute Pain:561885}    Pain Interference with Day-to-Day Activities  \"Over the past 5 days, " "how often have you limited your day-to-day activities (excluding rehabilitation therapy sessions) because of pain?\"  {Post Acute Pain:413526}  -------------------------------------------------------------------------------------------------------------    TRANSPORTATION:    Has lack of transportation kept you from medical appointments, meetings, work, or from getting things needed for daily living?  A. Yes, it has kept me from medical appointments or from getting my medications  B. Yes, it has kept me from non-medical meetings, appointments, work or from getting things that I need  C. No  X. Patient Unable to respond  Y. Patient declines to respond  -------------------------------------------------------------------------------------------------------------  Health Literacy:   How Often do you need to have someone help you when you read instructions, pamphlets, or other written material from your doctor or pharmacy?  0.       Never  1.       Rarely  2.       Sometimes  3.       Often  4.       Always  5.       Patient declines to respond  6.       Patient unable to respond  ------------------------------------------------------------------------------------------------------------    SOCIAL ISOLATION  How often do you feel lonely or isolated from those around you?  0.       Never  1.       Rarely  2.       Sometimes  3.       Often  4.       Always  5.       Patient declines to respond  6.       Patient unable to respond  -------------------------------------------------------------------------------------------------------------    Discharge Plan     Patient and family discharge goal: To be determined, pending progress  Provided Education on discharge plan: Evaluations and discharge recommendations pending.   Patient agreeable to discharge plan:  Pending further discussion. Evaluations and discharge recommendations pending.   Provided education and attained signature for Medicare IM and IRF Patient Rights and " Privacy Information provided to patient : ***  Provided patient with [place] [facility] [place] Resources: ***  Barriers to discharge: to be determined    Discharge Recommendations   Disposition: TBD, pending therapy updates  Transportation Needs: Patient, family/friends, paid transport, insurance transport (if applicable)   Name of Transportation Company and Phone: TBD    Additional comments   Discharge TBD, ELOS 18 days. Evals and discharge needs pending.     SW met with pt at bedside and introduced self and role.     [facility]    SW will continue to remain available for patient and family support, discharge planning, and access to resources.      TERRIE Beltrán  [facility], Acute Inpatient Rehab Unit   [facility] [address] [place] [address]  Phone: [phone]  Fax: [phone]

## 2025-06-12 NOTE — PHARMACY-MEDICATION REGIMEN REVIEW
Pharmacy Medication Regimen Review  Gilberto Lund is a 44 year old male who is currently in the Acute Rehab Unit.    Assessment: Upon review of the medications and patient chart the following irregularities were found:   Medications without appropriate indications/durations:   Cefadroxil and doxycycline needs end date. Plan for 2 week duration per discharge notes (last day 6/24/25).   Other Recommendations:   Apixaban 2.5 mg twice daily for 4 weeks per ortho due to high bleed risk post-op. Patient was taking 5 mg twice daily PTA for history bilateral peroneal DVT's. Will need order to resume 5 mg twice daily 7/3 if still here.     Plan:   Continue current regimen. Please add stop date to cefadroxil and doxycycline.   Attending provider will be sent this note for review.  If there are any emergent issues noted above, pharmacist will contact provider directly by phone.      Pharmacy will periodically review the resident's medication regimen for any PRN medications not administered in > 72 hours and discontinue them. The pharmacist will discuss gradual dose reductions of psychopharmacologic medications with interdisciplinary team on a regular basis.    Please contact pharmacy if the above does not answer specific medication questions/concerns.  Ansley Newsome, PharmD   Available on [employer]     Background:  A pharmacist has reviewed all medications and pertinent medical history today.  Medications were reviewed for appropriate use and any irregularities found are listed with recommendations.      Current Facility-Administered Medications:     acetaminophen (TYLENOL) tablet 975 mg, 975 mg, Oral, Q8H, Alena Gomez PA-C, 975 mg at 06/12/25 0405    apixaban ANTICOAGULANT (ELIQUIS) tablet 2.5 mg, 2.5 mg, Oral, BID, Alena Gomez PA-C, 2.5 mg at 06/12/25 0750    cephALEXin (KEFLEX) capsule 250 mg, 250 mg, Oral, 4x Daily, Alena Gomez PA-C, 250 mg at 06/12/25 0749    colchicine (COLCRYS) tablet 0.6  mg, 0.6 mg, Oral, BID, Alena Gomez PA-C, 0.6 mg at 06/12/25 0751    doxycycline hyclate (VIBRAMYCIN) capsule 100 mg, 100 mg, Oral, BID, Alena Gomez PA-C, 100 mg at 06/12/25 0749    famotidine (PEPCID) tablet 10 mg, 10 mg, Oral, BID, Alena Gomez PA-C, 10 mg at 06/12/25 0749    gabapentin (NEURONTIN) capsule 600 mg, 600 mg, Oral, TID, Alena Gomez PA-C, 600 mg at 06/12/25 0750    hydrOXYzine HCl (ATARAX) tablet 25 mg, 25 mg, Oral, Q6H PRN, Alena Gomez PA-C    methocarbamol (ROBAXIN) tablet 750 mg, 750 mg, Oral, 4x Daily PRN, Alena Gomez PA-C, 750 mg at 06/12/25 0750    multivitamin w/minerals (THERA-VIT-M) tablet 1 tablet, 1 tablet, Oral, Daily, Alena Gomez PA-C    naloxone (NARCAN) injection 0.2 mg, 0.2 mg, Intravenous, Q2 Min PRN **OR** naloxone (NARCAN) injection 0.4 mg, 0.4 mg, Intravenous, Q2 Min PRN **OR** naloxone (NARCAN) injection 0.2 mg, 0.2 mg, Intramuscular, Q2 Min PRN **OR** naloxone (NARCAN) injection 0.4 mg, 0.4 mg, Intramuscular, Q2 Min PRN, Lizy Macedo MD    oxyCODONE (ROXICODONE) tablet 5 mg, 5 mg, Oral, Q3H PRN, 5 mg at 06/12/25 0405 **OR** oxyCODONE (ROXICODONE) tablet 10 mg, 10 mg, Oral, Q3H PRN, Alena Gomez PA-C, 10 mg at 06/12/25 0750    Patient is already receiving anticoagulation with heparin, enoxaparin (LOVENOX), warfarin (COUMADIN)  or other anticoagulant medication, , Does not apply, Continuous PRN, Alena Gomez PA-C    polyethylene glycol (MIRALAX) Packet 17 g, 17 g, Oral, Daily PRN, Lizy Macedo MD    senna-docusate (SENOKOT-S/PERICOLACE) 8.6-50 MG per tablet 1 tablet, 1 tablet, Oral, BID PRN, Alena Gomez PA-C    simethicone (MYLICON) chewable tablet 80 mg, 80 mg, Oral, Q6H PRN, Alena Gomez PA-C    sodium chloride (PF) 0.9% PF flush 3 mL, 3 mL, Intracatheter, Q8H, Lizy Macedo MD, 3 mL at 06/11/25 2121    tolterodine ER (DETROL LA) 24 hr capsule 4 mg, 4 mg, Oral,  Daily, Alena Gomez PA-C, 4 mg at 06/12/25 0750  No current outpatient prescriptions on file.   PMH: history of bilateral peroneal DVT's on Apixaban, remote testicular cancer s/p chemotherapy and orchiectomy in 2008, innominate bone chondrosarcoma

## 2025-06-12 NOTE — PROGRESS NOTES
[facility]   Acute Rehabilitation Unit  Daily progress note    INTERVAL HISTORY  Gilberto Lund was seen and examined at bedside this morning, with wife joining by phone.  No acute events reported overnight.  However, he reports that he had a rough night as he has been sleeping in recliner for several months at home as well as in the hospital, and there was not one available to him here last night.  Sleeping in a recliner allows him to better manage pain and to reposition as needed during the night.  His pain level is slightly increased this morning and he did not sleep well.  He rates pain at 6-7/10 currently.  He has been trying to take oxycodone regularly every 3 hours.  Despite this, he felt his first therapy session this morning went well.  He has not had a BM since coming to ARU, but had loose stool yesterday at hospital.  He notes that prior to admission, he was no longer using oral bowel meds as he did not find he needed once off oxycodone.  He does note loose stools at baseline.  He denies any abdominal pain, nausea.  He did have some gas this morning.  He feels his appetite is just starting to come back.  He is trying to increase his fluid intake.  He denies any dizziness or lightheadedness, chest pain/tightness, shortness of breath.  We reviewed plans for mccord, HUGO drain, wound care/vac, anticoagulation.  Spouse requests home care initially at discharge.  They deny other questions or concerns at this time.    Functionally, therapy evals underway today.    MEDICATIONS  Current Facility-Administered Medications   Medication Dose Route Frequency Provider Last Rate Last Admin    acetaminophen (TYLENOL) tablet 975 mg  975 mg Oral Q8H Alena Gomez PA-C   975 mg at 06/12/25 0405    apixaban ANTICOAGULANT (ELIQUIS) tablet 2.5 mg  2.5 mg Oral BID Alena Gomez PA-C   2.5 mg at 06/12/25 0750    cephALEXin (KEFLEX) capsule 250 mg  250 mg Oral 4x Daily Patricia  Alena ROBLEDO PA-C   250 mg at 06/12/25 0749    colchicine (COLCRYS) tablet 0.6 mg  0.6 mg Oral BID Alena Gomez PA-C   0.6 mg at 06/12/25 0751    doxycycline hyclate (VIBRAMYCIN) capsule 100 mg  100 mg Oral BID Alena Gomez PA-C   100 mg at 06/12/25 0749    famotidine (PEPCID) tablet 10 mg  10 mg Oral BID Alena Gomez PA-C   10 mg at 06/12/25 0749    gabapentin (NEURONTIN) capsule 600 mg  600 mg Oral TID Alena Gomez PA-C   600 mg at 06/12/25 0750    multivitamin w/minerals (THERA-VIT-M) tablet 1 tablet  1 tablet Oral Daily Alena Gomez PA-C        sodium chloride (PF) 0.9% PF flush 3 mL  3 mL Intracatheter Q8H Lizy Macedo MD   3 mL at 06/11/25 2121    tolterodine ER (DETROL LA) 24 hr capsule 4 mg  4 mg Oral Daily Alena Gomez PA-C   4 mg at 06/12/25 0750          Current Facility-Administered Medications   Medication Dose Route Frequency Provider Last Rate Last Admin    hydrOXYzine HCl (ATARAX) tablet 25 mg  25 mg Oral Q6H PRN Alena Gomez PA-C        methocarbamol (ROBAXIN) tablet 750 mg  750 mg Oral 4x Daily PRN Alena Gomez PA-C   750 mg at 06/12/25 0750    naloxone (NARCAN) injection 0.2 mg  0.2 mg Intravenous Q2 Min PRN Lizy Macedo MD        Or    naloxone (NARCAN) injection 0.4 mg  0.4 mg Intravenous Q2 Min PRN Lizy Macedo MD        Or    naloxone (NARCAN) injection 0.2 mg  0.2 mg Intramuscular Q2 Min PRN Lizy Macedo MD        Or    naloxone (NARCAN) injection 0.4 mg  0.4 mg Intramuscular Q2 Min PRN Lizy Macedo MD        oxyCODONE (ROXICODONE) tablet 5 mg  5 mg Oral Q3H PRN Alena Gomez PA-C   5 mg at 06/12/25 0405    Or    oxyCODONE (ROXICODONE) tablet 10 mg  10 mg Oral Q3H PRN Alena Gomez PA-C   10 mg at 06/12/25 0750    Patient is already receiving anticoagulation with heparin, enoxaparin (LOVENOX), warfarin (COUMADIN)  or other anticoagulant medication   Does not apply  "Continuous PRN Alena Gomez PA-C        polyethylene glycol (MIRALAX) Packet 17 g  17 g Oral Daily PRN Lizy Macedo MD        senna-docusate (SENOKOT-S/PERICOLACE) 8.6-50 MG per tablet 1 tablet  1 tablet Oral BID PRN Alena Gomez PA-C        simethicone (MYLICON) chewable tablet 80 mg  80 mg Oral Q6H PRN Alena Gomez PA-C            PHYSICAL EXAM  /71 (BP Location: Right arm, Patient Position: Semi-Dinero's, Cuff Size: Adult Regular)   Pulse 88   Temp 98.2  F (36.8  C) (Oral)   Resp 18   Ht 1.854 m (6' 1\")   Wt 122 kg (268 lb 15.4 oz)   SpO2 97%   BMI 35.49 kg/m    Gen: NAD, lying in bed  HEENT: NC/AT, MMM  Cardio: RRR, no murmurs  Pulm: non-labored on room air, lungs CTA bilaterally  Abd: soft, non-tender, non-distended, bowel sounds present  Ext: edema in L>>RLE throughout, mild calf tenderness on R.  HUGO drain R thigh with moderate serosanguinous output present in bulb.  Wound vac at abdomen/R thigh incision.  Neuro/MSK: awake, alert, responds appropriately, follows commands    LABS  CBC RESULTS:   Recent Labs   Lab Test 06/12/25  0540 06/11/25  0559 06/10/25  0644   WBC 7.8 8.4 7.6   RBC 2.78* 2.62* 2.66*   HGB 7.9* 7.3* 7.5*   HCT 24.5* 22.9* 23.2*   MCV 88 87 87   MCH 28.4 27.9 28.2   MCHC 32.2 31.9 32.3   RDW 14.4 14.4 14.5   * 422 347       Last Basic Metabolic Panel:  Recent Labs   Lab Test 06/12/25  0540 06/11/25  0559 06/10/25  0644    137 137   POTASSIUM 3.6 3.6 3.2*   CHLORIDE 99 99 98   CO2 26 27 30*   ANIONGAP 10 11 9   * 95 96   BUN 10.0 10.5 9.9   CR 0.95 0.96 0.90   GFRESTIMATED >90 >90 >90   CANDICE 8.7* 8.6* 8.5*       ASSESSMENT AND PLAN  Gilberto Lund is a 44 year old male with a past medical history of 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  He is now admitted to ARU on 06/11/25 for multidisciplinary rehabilitation and ongoing medical management.      Rehabilitation -   Admission to acute inpatient rehab 06/11/25.    Impairment group code: Orthopaedic Disorders 08.51 Unilateral Hip Replacement s/p Right explant of hemipelvis cement spacer and conversion to total hip arthroplasty utilizing custom hemipelvis implant on 6/4/25         PT and OT 90 minutes of each daily for 6 days per week for 16 days, in addition to rehab nursing and close management of physiatrist.       Impairment of ADL's: Noted to have impaired activity tolerance, impaired balance, impaired ROM, impaired strength, impaired weight shifting, TTWB RLE x 3 months, and pain, all affecting his ability to safely and independently perform basic ADLs.  Goal for mod I with basic ADLs.     Impairment of mobility:  Noted to have impaired activity tolerance, impaired balance, impaired ROM, impaired strength, impaired weight shifting, TTWB RLE x 3 months, and pain, all affecting his ability to safely and independently perform basic mobility.  Goal for mod I with basic mobility.    Continue comprehensive acute inpatient rehabilitation program with multidisciplinary approach including therapies, rehab nursing, and physiatry following. See interval history for updates.      Medical Conditions  New actions/orders/updates for today are in blue.     S/p explant hemipelvis cement spacer and conversion to total hip arthroplasty utilizing custom hemipelvis implant on 6/4/2025 with Dr. Bravo   Chondrosarcoma of right innominate bone s/p R internal  hemipelvectomy on 2/14/25 with Dr. Bravo   Acute post-op pain  - TTWB RLE x3 months  - Activity restrictions: anterior hip precautions  - Wound care: 14-day Prevena vac in place.  Per ortho: If the patient is still on ARU by post op follow up appointment day (7/3/25) please page/vocera ortho for wound check.   However, vac will run out prior to that date, so will plan to connect with them around that time for wound check and updated wound care orders.  - Continue palmira-op antibiotics with cefadroxil 500 mg BID x 2 weeks and Doxycycline 100 mg po BID x 2 weeks per ortho (starting 6/11, had been on Ancef palmira-op)   - Pain management: Tylenol 975 mg q8h, robaxin 750 mg q6h PRN, gabapentin 600 mg TID (increased from PTA dose on 6/5), atarax 25 mg q6h PRN, oxycodone 5-10 mg q3h PRN.  Wean opioids as able; per IP pain consult, plan to reduce by 1 dose/day every 1-4 days.   - R thigh HUGO drain:  Keep HUGO site dressing clean dry and intact.  Document output per shift and remove drain when output is <30 cc or less x 2 shifts.   - DVT prophylaxis with apixaban 2.5 mg BID x4 wks post-op per ortho (hx BLE DVT 3/2025, high risk for bleeding)  - Continue PT/OT  - Follow up with Dr. Bravo as scheduled on 7/3     Suspected pericarditis  Noted to have ST elevations on EKG post-operatively.  Troponin mildly elevated but flat.  Evaluated by cardiology in consult.  Though patient asymptomatic, with diffuse ST elevation and elevated CRP, concern for possible pericarditis.  Recommended short course of colchicine and consider ibuprofen if symptomatic.  - Continue colchicine for 3 months  - Per cardiology: If patient begins to have chest pain and ok from surgical standpoint, could start Ibuprofen 600 mg TID for 1-2 weeks (until pain resolves). Would then decrease Ibuprofen dose by 200 mg weekly until off (400 mg TID X 1 week -> 200 mg TID X 1 week -> off).      Hx bilateral lower extremity DVT  Noted 3/19/25 on U/S; bilateral occlusive  thrombi in peroneal veins; in setting of hemipelvectomy 2/14/25.   Remained unchanged on ultrasound.  PTA on apixaban 5 mg BID.  Per ortho recs, this was stopped 3 days prior to surgery and resumed at reduced dose post-op.  - On POD1, started on apixaban 2.5 mg BID per ortho due to concern for elevated bleeding risk, especially in setting of colchicine as above.  - Follow up with ortho to determine ongoing need/duration  - Monitor for s/sx     Acute blood loss anemia  Pre-op Hgb ~13, dropped to 6.7 post-op and received 2 units pRBC.  Estimated intra-op blood loss 2.7L.  As of admission to ARU, Hgb stable at ~7.  6/12: Hgb stable at 7.9  - Transfuse for Hgb <7  - Trend CBC every M/Th     Intra-op bladder tear s/p repair  - Continue mccord   - Plan for outpatient follow up with urology in 2 weeks (scheduled 6/23) with cystogram prior to trial of void.  May need to be rescheduled pending ARU course.  - Continue tolterodine ER 4 mg daily     Hx testicular cancer (NSGCT) s/p left orchiectomy and chemotherapy (2008)   Chemo-induced polyneuropathy  - Continue gabapentin (PTA dose increased as above)     Indigestion  Started on Pepcid while inpatient  - Continue Pepcid 10 mg BID.  - Gas X as needed.    - Monitor        Adjustment to disability:  Clinical psychology to eval and treat if indicated  FEN: regular diet, thin liquids  Bowel: continent.  At risk for constipation in setting of opioids, immobility; has been having loose stools lately. PRN senokot-S, Miralax, and bisacodyl suppository available.  Monitor and adjust regimen as indicated.  Bladder: mccord as above  DVT Prophylaxis: apixaban 2.5 mg BID for 4 wks post-op per ortho  GI Prophylaxis: pepcid  Code: full; confirmed on admission  Disposition: goal for home  ELOS: 18 days pending therapy evals  Follow up Appointments on Discharge: PCP in 1-2 weeks, ortho (Dr. Bravo) on 7/3, urology (scheduled 6/23 with cystogram)      35 minutes spent on the date of service doing  chart review, history and exam, documentation, and further activities as noted above.       Patient was discussed with Dr. Mina Macedo, PM&R Staff Physician    TAMARA Hyatt-C  Physical Medicine & Rehabilitation

## 2025-06-12 NOTE — PLAN OF CARE
Discharge Planner Post-Acute Rehab OT:     Discharge Plan: home     Precautions: RLE: no external rotation, no flexion >90 degrees, no hyperextension, no pivoting/twisting, TTWB per orders, anterior hip precautions per notes    Current Status:  ADLs:  Mobility: SBA lateral transfer; Mod Ax2 SPT FWW with therapy only  Grooming: IND seated, mod A standing   Dressing: UB IND seated; LB max A standing; Footwear total A  Bathing: TBD  Toileting: SBA lateral transfer to commode  IADLs: mod IND PTA  Vision/Cognition: WNL    Assessment: Pt presents below functional baseline, limited by BLE weakness, pain, and post op precautions impacting ADL IND and functional mobility. Goals for standing and pivoting with transfers and ADL/IADL. Pt would benefit from continued OT to progress ADL within precautions, functional transfer training, and facilitate safe discharge planning. ELOS ~ 2 weeks pending progress.    Other Barriers to Discharge (DME, Family Training, etc):   DME: w/c, hospital bed, ramped entrance, commode    FT TBD

## 2025-06-12 NOTE — PROGRESS NOTES
Discharge Planner Post-Acute Rehab PT:     Discharge Plan: home with assist, HHPT    Precautions: TTWB RLE, anterior hip pxns AND no flx past 90*, no pivot/twist, wound vac, HUGO drain, mccord    Current Status:  Bed Mobility: SBA with HOB elevated, use of grab bar and leg   Transfer: modAx2 STS, Hung pivot on LLE  Gait: NT d/t safety  Stairs: NT d/t safety  Balance: good sitting, requires 2 UE on FWW d/t TTWB    Outcome Measures:       Assessment:  Pt presents below functional baseline following planned explant of hemipelvis cement spacer and conversion to DEVONTE w/ custom hemipelvis. At baseline, pt is Malik for all mobility. Goals for Malik standing/transfers with FWW, mixed mobility with w/c, wife WFH and is available to assist with. Pt will benefit from skilled PT to promote improved safety and independence with mobility. ELOS 2 weeks.     Other Barriers to Discharge (DME, Family Training, etc):   Family training

## 2025-06-12 NOTE — PLAN OF CARE
Goal Outcome Evaluation:    Patient admitted today, arrived in the unit around 1400H. Patient had been in the ARU prior to his surgery and is already familiar with the unit. Admission orientation and assessments done, questions answered appropriately. FC in place, patent and draining well to urobag. Prevena wound vac and HUGO drain to suction bulb in place, dressing CDI, wound vac functioning appropriately. Serous drainage noted on HUGO drain bulb. Skin check not completed around the sacral/buttocks area d/t patient c/o pain and not able to turn to one side for now. No other acute concerns this shift. Endorsed accordingly to incoming NOD.

## 2025-06-12 NOTE — PROGRESS NOTES
"   06/12/25 0800   Appointment Info   Signing Clinician's Name / Credentials (OT) Juliannkatie Tomlinson, OTR/L   Living Environment   People in Home spouse;child(angelo), dependent   Current Living Arrangements house   Home Accessibility stairs within home   Number of Stairs, Within Home, Primary greater than 10 stairs   Living Environment Comments Pt reported he lives in a house with a ramped entrance, all needs met on main level except a shower, which is upstairs or downstairs. [facility]   Self-Care   Usual Activity Tolerance moderate   Current Activity Tolerance fair   Equipment Currently Used at Home wheelchair, manual;hospital bed;commode chair;grab bar, toilet   Fall history within last six months no   Activity/Exercise/Self-Care Comment Pt was mod IND w/c based for ADL and IADL with some intermittent standing. Completed lateral transfers.   Post-Acute Assessment Only   Post-Acute Functional Assessment See below   Previous Level of Function/Home Environm   Bathing, Previous Functional Level uses device or equipment   Grooming, Previous Functional Level independent   Dressing, Previous Functional Level uses device or equipment   Eating/Feeding, Previous Functional Level independent   Toileting, Previous Functional Level uses device or equipment   BADLs, Previous Functional Level uses device or equipment   IADLs, Previous Functional Level partial assistance   General Information   Onset of Illness/Injury or Date of Surgery 06/11/25   Referring Physician Alena Gomez PAHeatherC   Patient/Family Therapy Goal Statement (OT) to complete pivot transfers or even short distance ambulation for ADLs and IADLs   Additional Occupational Profile Info/Pertinent History of Current Problem Per chart, \"Gilberto Lund is a 44 year old male with past medical history of 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 Bravo c/b bladder tear s/p repair.     Hospital course was further complicated by multifactorial shock, ST elevations and elevated CRP concerning for possible pericarditis, acute post-operative pain, acute blood loss anemia, and indigestion.\"   Existing Precautions/Restrictions no hip ER;no hip ADD past midline;no active hip ABD;no hip hyperextension;no pivoting or twisting   Right Lower Extremity (Weight-bearing Status) toe touch weight-bearing (TTWB)   Cognitive Status Examination   Orientation Status orientation to person, place and time   Affect/Mental Status (Cognitive) WNL   Follows Commands WNL   Cognitive Status Comments WNL   Visual Perception   Visual Impairment/Limitations WFL   Sensory   Sensory Comments Numbness/tingling in RLE, otherwise sensation intact   Pain Assessment   Patient Currently in Pain Yes, see Vital Sign flowsheet   Posture   Posture not impaired   Range of Motion Comprehensive   Comment, General Range of Motion BUE WNL   Strength Comprehensive (MMT)   Comment, General Manual Muscle Testing (MMT) Assessment BUE 5/5, good endurance   Coordination   Upper Extremity Coordination No deficits were identified   Bed Mobility   Bed Mobility supine-sit;sit-supine   Supine-Sit [place] (Bed Mobility) minimum assist (75% patient effort)   Sit-Supine [place] (Bed Mobility) minimum assist (75% patient effort)   Comment (Bed Mobility) min A to manage RLE   Transfers   Transfers sit-stand transfer   Sit-Stand Transfer   Sit-Stand [place] (Transfers) moderate assist (50% patient effort);2 person assist   Assistive Device (Sit-Stand Transfers) walker, front-wheeled   Balance   Balance Comments BUE on FWW for standing balance   Activities of Daily Living   BADL " Assessment/Intervention   (See GG)   Clinical Impression   Criteria for Skilled Therapeutic Interventions Met (OT) Yes, treatment indicated   OT Diagnosis decreased ADL and IADL IND   OT Problem List-Impairments impacting ADL problems related to;activity tolerance impaired;balance;mobility;pain;post-surgical precautions   Assessment of Occupational Performance 5 or more Performance Deficits   Identified Performance Deficits ADL including dressing, toileting, bathing, functional mobility; IADLs   Planned Therapy Interventions (OT) ADL retraining;IADL retraining;balance training;transfer training;home program guidelines;progressive activity/exercise   Clinical Decision Making Complexity (OT) detailed assessment/moderate complexity   Risk & Benefits of therapy have been explained evaluation/treatment results reviewed;care plan/treatment goals reviewed;risks/benefits reviewed;current/potential barriers reviewed;participants voiced agreement with care plan;participants included;patient   Clinical Impression Comments Pt presents below functional baseline, limited by BLE weakness, pain, and post op precautions impacting ADL IND and functional mobility. Pt would benefit from continued OT to progress ADL within precautions, functional transfer training, and facilitate safe discharge planning.   OT Total Evaluation Time   OT Eval, Moderate Complexity Minutes (80564) 20   OT Goals   Therapy Frequency (OT) 6 times/week   OT Predicted Duration/Target Date for Goal Attainment 06/25/25   OT Goals Lower Body Dressing;Lower Body Bathing;Transfers;Toilet Transfer/Toileting;Meal Preparation;Home Management;OT Goal 1   OT: Lower Body Dressing Modified independent   OT: Lower Body Bathing Modified independent   OT: Toilet Transfer/Toileting Modified independent;toilet transfer;cleaning and garment management;within precautions   OT: Meal Preparation Modified independent;with simple meal preparation  (from standing)   OT: Home Management  Modified independent;with light demand household tasks  (from standing)   OT: Goal 1 Pt will complete standing pivot transfer during ADL using FWW mod IND while maintaining TTWB to LLE.   Self-Care/Home Management   Self-Care/Home Mgmt/ADL, Compensatory, Meal Prep Minutes (55265) 40   Symptoms Noted During/After Treatment (Meal Preparation/Planning Training) increased pain   Treatment Detail/Skilled Intervention OT: Facilitated ADL and transfers for increased IND within precautions. See GG below. Pt concerned about maintaining TTWB on RLE, utilized saltine crackers under RLE during STS transfer, pt able to complete with crackers intact. Pt reported increased confidence in maintaining TTWB.   OT Discharge Planning   OT Plan OT: shower - lateral transfer from w/c to tub bench CGA. STS with FWW, weight shifting in standing with ADL task   Total Session Time   Timed Code Treatment Minutes 40   Total Session Time (sum of timed and untimed services) 60   Post Acute Settings Only   What unit is patient on? Acute Rehab   OT - Acute Rehab Center Time   Individual Time (minutes) - OT 60   Group Time (minutes) - OT 0   Concurrent Time (minutes) - OT 0   Co-Treatment Time (minutes) - OT 0   ARC Total Session Time (minutes) - OT 60   ARC Daily Total Session Time   OT ARC Daily Total Session Time 60   ARC Daily Rehab Total Minutes 60   Oral Hygiene   Describe performance OT: max A standing per clinical judgement, IND seated   Grooming (except oral cares)   Grooming Comment OT: Max A standing   Upper Body Dressing   Describe performance OT: IND seated   Toileting Hygiene: Ability to maintain perineal hygiene and adjust clothes   Describe performance OT: Mod A standing per clinical judgement; SBA seated   Toilet Transfer: standard height (18 inches)   Describe performance OT: Max Ax2 SPT per clinical judgement, SBA lateral transfer to commode

## 2025-06-13 ENCOUNTER — APPOINTMENT (OUTPATIENT)
Dept: OCCUPATIONAL THERAPY | Facility: CLINIC | Age: 45
DRG: 560 | End: 2025-06-13
Attending: PHYSICAL MEDICINE & REHABILITATION
Payer: COMMERCIAL

## 2025-06-13 ENCOUNTER — APPOINTMENT (OUTPATIENT)
Dept: PHYSICAL THERAPY | Facility: CLINIC | Age: 45
DRG: 560 | End: 2025-06-13
Attending: PHYSICAL MEDICINE & REHABILITATION
Payer: COMMERCIAL

## 2025-06-13 PROCEDURE — 128N000003 HC R&B REHAB

## 2025-06-13 PROCEDURE — 97110 THERAPEUTIC EXERCISES: CPT | Mod: GP

## 2025-06-13 PROCEDURE — 250N000013 HC RX MED GY IP 250 OP 250 PS 637: Performed by: PHYSICAL MEDICINE & REHABILITATION

## 2025-06-13 PROCEDURE — 99231 SBSQ HOSP IP/OBS SF/LOW 25: CPT | Performed by: PHYSICIAN ASSISTANT

## 2025-06-13 PROCEDURE — 97530 THERAPEUTIC ACTIVITIES: CPT | Mod: GP

## 2025-06-13 PROCEDURE — 250N000013 HC RX MED GY IP 250 OP 250 PS 637: Performed by: PHYSICIAN ASSISTANT

## 2025-06-13 PROCEDURE — 97535 SELF CARE MNGMENT TRAINING: CPT | Mod: GO

## 2025-06-13 RX ORDER — AMOXICILLIN 250 MG
1 CAPSULE ORAL AT BEDTIME
Status: DISCONTINUED | OUTPATIENT
Start: 2025-06-13 | End: 2025-06-18 | Stop reason: HOSPADM

## 2025-06-13 RX ORDER — POLYETHYLENE GLYCOL 3350 17 G/17G
17 POWDER, FOR SOLUTION ORAL DAILY
Status: DISCONTINUED | OUTPATIENT
Start: 2025-06-13 | End: 2025-06-18 | Stop reason: HOSPADM

## 2025-06-13 RX ADMIN — COLCHICINE 0.6 MG: 0.6 TABLET, FILM COATED ORAL at 08:26

## 2025-06-13 RX ADMIN — CEPHALEXIN 250 MG: 250 CAPSULE ORAL at 08:26

## 2025-06-13 RX ADMIN — CEPHALEXIN 250 MG: 250 CAPSULE ORAL at 12:59

## 2025-06-13 RX ADMIN — OXYCODONE HYDROCHLORIDE 10 MG: 5 TABLET ORAL at 12:58

## 2025-06-13 RX ADMIN — ACETAMINOPHEN 975 MG: 325 TABLET ORAL at 12:59

## 2025-06-13 RX ADMIN — CEPHALEXIN 250 MG: 250 CAPSULE ORAL at 16:21

## 2025-06-13 RX ADMIN — GABAPENTIN 600 MG: 300 CAPSULE ORAL at 08:26

## 2025-06-13 RX ADMIN — OXYCODONE HYDROCHLORIDE 10 MG: 5 TABLET ORAL at 06:50

## 2025-06-13 RX ADMIN — COLCHICINE 0.6 MG: 0.6 TABLET, FILM COATED ORAL at 20:46

## 2025-06-13 RX ADMIN — POLYETHYLENE GLYCOL 3350 17 G: 17 POWDER, FOR SOLUTION ORAL at 08:25

## 2025-06-13 RX ADMIN — APIXABAN 2.5 MG: 2.5 TABLET, FILM COATED ORAL at 08:26

## 2025-06-13 RX ADMIN — TOLTERODINE 4 MG: 4 CAPSULE, EXTENDED RELEASE ORAL at 08:26

## 2025-06-13 RX ADMIN — METHOCARBAMOL 750 MG: 750 TABLET ORAL at 12:59

## 2025-06-13 RX ADMIN — GABAPENTIN 600 MG: 300 CAPSULE ORAL at 15:41

## 2025-06-13 RX ADMIN — FAMOTIDINE 10 MG: 10 TABLET ORAL at 20:46

## 2025-06-13 RX ADMIN — OXYCODONE HYDROCHLORIDE 10 MG: 5 TABLET ORAL at 20:47

## 2025-06-13 RX ADMIN — ACETAMINOPHEN 975 MG: 325 TABLET ORAL at 20:47

## 2025-06-13 RX ADMIN — HYDROXYZINE HYDROCHLORIDE 25 MG: 25 TABLET, FILM COATED ORAL at 22:20

## 2025-06-13 RX ADMIN — CEPHALEXIN 250 MG: 250 CAPSULE ORAL at 20:46

## 2025-06-13 RX ADMIN — OXYCODONE HYDROCHLORIDE 10 MG: 5 TABLET ORAL at 09:58

## 2025-06-13 RX ADMIN — SENNOSIDES AND DOCUSATE SODIUM 1 TABLET: 50; 8.6 TABLET ORAL at 20:50

## 2025-06-13 RX ADMIN — DOXYCYCLINE HYCLATE 100 MG: 50 CAPSULE ORAL at 20:46

## 2025-06-13 RX ADMIN — DOXYCYCLINE HYCLATE 100 MG: 50 CAPSULE ORAL at 08:26

## 2025-06-13 RX ADMIN — OXYCODONE HYDROCHLORIDE 10 MG: 5 TABLET ORAL at 16:21

## 2025-06-13 RX ADMIN — METHOCARBAMOL 750 MG: 750 TABLET ORAL at 06:51

## 2025-06-13 RX ADMIN — GABAPENTIN 600 MG: 300 CAPSULE ORAL at 20:46

## 2025-06-13 RX ADMIN — ACETAMINOPHEN 975 MG: 325 TABLET ORAL at 03:57

## 2025-06-13 RX ADMIN — FAMOTIDINE 10 MG: 10 TABLET ORAL at 08:26

## 2025-06-13 RX ADMIN — OXYCODONE HYDROCHLORIDE 10 MG: 5 TABLET ORAL at 03:56

## 2025-06-13 RX ADMIN — APIXABAN 2.5 MG: 2.5 TABLET, FILM COATED ORAL at 20:47

## 2025-06-13 RX ADMIN — METHOCARBAMOL 750 MG: 750 TABLET ORAL at 22:20

## 2025-06-13 RX ADMIN — Medication 1 TABLET: at 12:58

## 2025-06-13 ASSESSMENT — ACTIVITIES OF DAILY LIVING (ADL)
ADLS_ACUITY_SCORE: 62
ADLS_ACUITY_SCORE: 61
ADLS_ACUITY_SCORE: 62
ADLS_ACUITY_SCORE: 61
ADLS_ACUITY_SCORE: 62
ADLS_ACUITY_SCORE: 61
ADLS_ACUITY_SCORE: 62
ADLS_ACUITY_SCORE: 62
ADLS_ACUITY_SCORE: 61
ADLS_ACUITY_SCORE: 62
ADLS_ACUITY_SCORE: 61
ADLS_ACUITY_SCORE: 61
ADLS_ACUITY_SCORE: 62
ADLS_ACUITY_SCORE: 61
ADLS_ACUITY_SCORE: 61

## 2025-06-13 NOTE — PLAN OF CARE
Individualized Overall Plan Of Care (IOPOC)      Rehab diagnosis/Impairment Group Code: Orthopaedic disorders 08.51 unilateral hip replacement s/p right explant of hemipelvis cement spacer and conversion to total hip arthroplasty utilizing custom hemipelvis implant on 6/4/25  Status post total hip replacement, right     Expected functional outcome: Mod I for mobility and ADLs, primary WC based, but possibly ambulating short distances with walker.    Clinical Impression Comments:  Pt motivated    Mobility: Pt presents below functional baseline following planned explant of hemipelvis cement spacer and conversion to DEVONTE w/ custom hemipelvis. At baseline, pt is Malik for all mobility. Goals for Malik standing/transfers with FWW, mixed mobility with w/c, wife WFH and is available to assist with. Pt will benefit from skilled PT to promote improved safety and independence with mobility. ELOS 2 weeks.    ADL: Pt presents below functional baseline, limited by BLE weakness, pain, and post op precautions impacting ADL IND and functional mobility. Pt would benefit from continued OT to progress ADL within precautions, functional transfer training, and facilitate safe discharge planning.    Communication/Cognition/Swallow:  No concerns    Intensity of therapy:   PT 90 minutes, 6x/week, for 14 days  OT 90 minutes, 6 times/week, for 14 days    Orthotics None  Education wound care and drain/vac care if applicable  Neuropsychology Testing: No  Other:  None      Medical Prognosis: Good      Physician summary statement: Pt motivated to improve ambulatory status.  Anticipate will continue to be primarily WC based for mobility, but may be able to progress stand pivot transfers and some ambulation.     Discharge destination: prior home  Discharge rehabilitation needs: home care, RN, PT, and OT      Estimated length of stay: 14 days      Rehabilitation Physician Lizy Macedo MD

## 2025-06-13 NOTE — PLAN OF CARE
Goal Outcome Evaluation:    Patient given PRN meds prior to therapy sessions for pain management. He said he is able to participate better in the therapies because he keeps up to date with his pain meds. Recliner in his room, patient currently sitting there. He said he might sleep in the recliner tonight. No other acute concerns this shift. Endorsed accordingly to incoming NOD.

## 2025-06-13 NOTE — PROGRESS NOTES
ARU Social Work Progress Notes       Team huddle today;targeting a discharge on 06/25 with HC.      TERRIE Beltrán  [facility], Acute Inpatient Rehab Unit   [address] [place] [address], [address] [place] [address]  Phone: [phone]  Fax: [phone]

## 2025-06-13 NOTE — PROGRESS NOTES
Orthopedic Surgery Progress Note 06/13/2025    S: Patient seen this morning. Resting comfortably in chair. Pain improving daily. Passing flatus and voiding, BM 6/11. Denies numbness/tingling of RLE but does endorse burning pain radiating to the foot. Tolerating diet. Voiding via mccord.     O:  Temp: 98.1  F (36.7  C) Temp src: Oral BP: 117/69 Pulse: 89   Resp: 18 SpO2: 96 % O2 Device: None (Room air)      Exam:  Gen: alert, intubated but interactive  Resp: intubated, on ventilator  MSK:    RLE:  - Prevena dressing in place, maintaining suction, no output in canister  - SILT tibial/sural/saphenous/DP/SP nerves  - Fires quad, hamstring, TA, EHL, FHL, GaSC  - PT/DP pulses 2+, foot wwp    Drain output:   Output by Drain (mL) 06/11/25 0700 - 06/11/25 1459 06/11/25 1500 - 06/11/25 2259 06/11/25 2300 - 06/12/25 0659 06/12/25 0700 - 06/12/25 1459 06/12/25 1500 - 06/12/25 2259 06/12/25 2300 - 06/13/25 0654   Negative Pressure Wound Therapy Abdomen Anterior 0 0       Drain Closed/Suction 1 Right;Anterior Thigh Bulb  50  130 22.5          Recent Labs   Lab 06/12/25  0540 06/11/25  0559 06/10/25  0644   WBC 7.8 8.4 7.6   HGB 7.9* 7.3* 7.5*   * 422 347       Culture results: NGTD    Assessment: Gilberto Lund is a 44 year old male s/p Right explant of hemipelvis cement spacer and and conversion to total hip arthroplasty utilizing custom hemipelvis implant on 6/4/25 with Dr. Bravo.       Plan:  Activity: Up with assist when extubated and able. Anterior hip precautions  Weight bearing status: TTWB RLE x 3 months.  Antibiotics: extended oral antibiotics (2 weeks of cefadroxil + doxycycline)  Diet: Progress diet as tolerated.  DVT prophylaxis: SCDs and mechanical while in the hospital. Eliquis 2.5 mg BID starting POD1 x 4 weeks.   Elevation: Elevate operative extremity as tolerated.   Wound Care: Keep Prevena dressing in place until POD #14  Drains: Please document output per shift, discontinue 15f HUGO drain in right thigh  per ortho, management of left flank 19f drain per urology  Mccord: Keep in place for 2 weeks, management of mccord per urology  Pain management: Utilize all oral meds first, IV meds for severe breakthrough pain after PO meds given adequate time to take effect.  X-rays: Ordered, AP pelvis and cross-table lateral.  Therapy: PT/OT evaluate prior to discharge.  Cultures: Pending, follow culture results closely.  Consults: PT, OT, medicine, ICU team, Urology, Cardiology. Appreciate assistance in caring for this patient    Shiva Toribio MD  Adult Reconstruction Resident  06/13/2025

## 2025-06-13 NOTE — PROGRESS NOTES
Discharge Planner Post-Acute Rehab PT:     Discharge Plan: home with assist, HHPT    Precautions: TTWB RLE x3 months, anterior hip pxns AND no flx past 90*, no pivot/twist, wound vac, HUGO drain, mccord    Current Status:  Bed Mobility: SBA with HOB elevated, use of grab bar and leg   Transfer: modAx2 STS, Hung pivot on LLE  Gait: NT d/t safety  Stairs: NT d/t safety  Balance: good sitting, requires 2 UE on FWW d/t TTWB    Outcome Measures:       Assessment:  AM session:  multiple reps of lateral transfer without slide board to the left only as well as stand pivot transfer with RW.  Moved between recliner, commode, wheelchair and gym mat various times throughout this session.  Min A for room/wheelchair set-up, CGA for transfer and cues for precautions but demonstrates understanding. PM session:  focused on TE for gentle ROM to R LE following anterior hip precautions and no flexion >90* as still waiting for clarification.   Also listened to pt's concerns about B LE edema, R >>L and discussed with bedside RN.  Encouraged pt to stand with staff at the edge of the chair with RW as needed overnight to help with pain and positioning as pt sleeps in the recliner. RN also aware of this.     Other Barriers to Discharge (DME, Family Training, etc):   Family training

## 2025-06-13 NOTE — PLAN OF CARE
Goal Outcome Evaluation:  [id number]    Plan of Care Reviewed With: patient    Overall Patient Progress: improving    Outcome Evaluation: Pt can reposition himself in the recliner with minor assistance from staff, no new skin concerns noted. Pt continues to use the call light appropriately, waits for staff assistance with transfers and is able to make needs known.     Pt has complaints of pain on his rt hip and leg and was managed with scheduled pain meds, PRN oxycodone, atarax and robaxin. Wd vac in place, dressing CDI. HUGO drain in place attached to a suction bulb with serosanguinos drainage. Michelle catheter draining well, catheter cares done, securement device replaced. Pt will be sleeping on the recliner tonight. Offered PCD, he said that he can do it tomorrow.

## 2025-06-13 NOTE — PLAN OF CARE
Goal Outcome Evaluation:      Plan of Care Reviewed With: patient    Overall Patient Progress: improvingOverall Patient Progress: improving       Orientation: Alert/oriented x4.   Bowel: Continent. LBM  Bladder: Michelle in place, draining appropriately  Pain: Patient has pain to R leg/hip rated 7/10. Managed by scheduled medications and PRN oxy and robaxin. With some relief.   Ambulation/Transfers: A1 lateral transfer  Blood sugars: N/A  Diet/ Liquids: Regular diet, thin liquid, medication whole   Tubes/ Lines/ Drains:  Wound vac to R hip, and Abdomen. VAC in place with good seal, settings verified. HUGO drain, with bright red bloody drainage. Provider notified.  Tube Feeding: N/A  Oxygen: on Room Air, denies chest pain, N/V, and SOB.   Skin: Incision to R hip, and abdomen covered by wound vac dressing. HUGO drain to R hip to light suction. Edema to RLE.     Bed alarm on for safety, call light within reach. Continue with POC.

## 2025-06-13 NOTE — PLAN OF CARE
Discharge Planner Post-Acute Rehab OT:     Discharge Plan: home with spouse A, HCOT    Precautions: falls, HUGO drain & Michelle & wound vac, RLE (see below)    RLE DEVONTE and custom hemipelvis implant:  1) TTWB x 3 months  2) Anterior hip precautions (no ER, no hyperextension, no ABDuction, no pivoting/twisting) + no hip flexion > 90      Current Status:  ADLs:  Mobility: SBA lateral transfer; Mod Ax2 SPT FWW with therapy only  Grooming: IND seated.  Dressing: UB IND seated; LB max A standing; Footwear total A  Bathing: Transfer SBA lateral scoot wc <> ETB; A for set up and mgmt of multiple lines. Min A tasks.  Toileting: Michelle. SBA lateral transfer to commode.  IADLs: Mod IND PTA  Vision/Cognition: WNL; good at directing cares and set up.    Assessment: Facilitated shower ADL assessment; see GG. Extra time required for line mgmt and specific set up of environment for tasks. Plan to shower with OT vs nsg at this time d/t skilled A required in tasks at this time; will add to calendar. Pt noting RLE edema limiting and painful; plan to order and trial EdemaWear on RLE to assess functional impact.    Other Barriers to Discharge (DME, Family Training, etc):   Spouse providing A prn while working from home since prior hospitalization.  Showers at Y d/t non accessible bathroom.      DME: w/c, hospital bed, ramped entrance, platform commode    FT with spouse prior to discharge.

## 2025-06-13 NOTE — PROGRESS NOTES
[facility]   Acute Rehabilitation Unit  Daily progress note    INTERVAL HISTORY  Gilberto Lund was seen up in his room this morning.  No acute events reported overnight.  He notes that he had a better night in the recliner as compared to the night before.  He is having ongoing moderate pain, but staying on top of medications and pain has not been severe.  He had BM this morning, but does request to schedule Miralax in the morning and senna at night to stay regular.  He denies chest pain, shortness of breath, dizziness or lightheadedness, abdominal pain, nausea.  Reviewed anterior hip precautions, which were recommended by ortho.  Patient also notes that surgeon recommended he keep his knees apart when in chair/bed.  We reviewed tentative discharge date and plans for mccord, drain, wound vac.  He denies other questions or concerns at this time.    With therapies, on evaluation yesterday, he requires SBA for bed mobility with elevated HOB, mod Ax2 for sit<>stand, min A to pivot on LLE with therapy only.  He needed mod A for standing grooming, max A for lower body dressing in standing, [facility]     MEDICATIONS  Current Facility-Administered Medications   Medication Dose Route Frequency Provider Last Rate Last Admin    acetaminophen (TYLENOL) tablet 975 mg  975 mg Oral Q8H Alena Gomez PA-C   975 mg at 06/13/25 0357    apixaban ANTICOAGULANT (ELIQUIS) tablet 2.5 mg  2.5 mg Oral BID Alena Gomez PA-C   2.5 mg at 06/12/25 2135    cephALEXin (KEFLEX) capsule 250 mg  250 mg Oral 4x Daily Alena Gomez PA-C   250 mg at 06/12/25 2134    colchicine (COLCRYS) tablet 0.6 mg  0.6 mg Oral BID Alena Gomez PA-C   0.6 mg at 06/12/25 2134    doxycycline hyclate (VIBRAMYCIN) capsule 100 mg  100 mg Oral BID Alena Gomez PA-C   100 mg at 06/12/25 2134    famotidine (PEPCID) tablet 10 mg  10 mg Oral BID Alena Gomez PA-C    10 mg at 06/12/25 2135    gabapentin (NEURONTIN) capsule 600 mg  600 mg Oral TID Alena Gomez PA-C   600 mg at 06/12/25 1950    multivitamin w/minerals (THERA-VIT-M) tablet 1 tablet  1 tablet Oral Daily Alena Gomez PA-C   1 tablet at 06/12/25 1120    polyethylene glycol (MIRALAX) Packet 17 g  17 g Oral Daily Alena Gomez PA-C        senna-docusate (SENOKOT-S/PERICOLACE) 8.6-50 MG per tablet 1 tablet  1 tablet Oral At Bedtime Alena Gomez PA-C        tolterodine ER (DETROL LA) 24 hr capsule 4 mg  4 mg Oral Daily Alena Gomez PA-C   4 mg at 06/12/25 0750          Current Facility-Administered Medications   Medication Dose Route Frequency Provider Last Rate Last Admin    hydrOXYzine HCl (ATARAX) tablet 25 mg  25 mg Oral Q6H PRN Alena Gomez PA-C   25 mg at 06/12/25 2141    methocarbamol (ROBAXIN) tablet 750 mg  750 mg Oral 4x Daily PRN Alena Gomez PA-C   750 mg at 06/13/25 0651    naloxone (NARCAN) injection 0.2 mg  0.2 mg Intravenous Q2 Min PRN Lizy Macedo MD        Or    naloxone (NARCAN) injection 0.4 mg  0.4 mg Intravenous Q2 Min PRN Lizy Macedo MD        Or    naloxone (NARCAN) injection 0.2 mg  0.2 mg Intramuscular Q2 Min PRN Lizy Macedo MD        Or    naloxone (NARCAN) injection 0.4 mg  0.4 mg Intramuscular Q2 Min PRN Lizy Macedo MD        oxyCODONE (ROXICODONE) tablet 5 mg  5 mg Oral Q3H PRN Alena Gomez PA-C   5 mg at 06/12/25 0405    Or    oxyCODONE (ROXICODONE) tablet 10 mg  10 mg Oral Q3H PRN Alena Gomez PA-C   10 mg at 06/13/25 0650    Patient is already receiving anticoagulation with heparin, enoxaparin (LOVENOX), warfarin (COUMADIN)  or other anticoagulant medication   Does not apply Continuous PRN Alena Gomez PA-C        polyethylene glycol (MIRALAX) Packet 17 g  17 g Oral Daily PRN Lizy Macedo MD        senna-docusate (SENOKOT-S/PERICOLACE) 8.6-50 MG per tablet 1  "tablet  1 tablet Oral BID PRN Alena Gomez PA-C   1 tablet at 06/12/25 1412    simethicone (MYLICON) chewable tablet 80 mg  80 mg Oral Q6H PRN Alena Gomez PA-C            PHYSICAL EXAM  /69 (BP Location: Right arm)   Pulse 89   Temp 98.1  F (36.7  C) (Oral)   Resp 18   Ht 1.854 m (6' 1\")   Wt 122 kg (268 lb 15.4 oz)   SpO2 96%   BMI 35.49 kg/m    Gen: NAD, up in chair  HEENT: NC/AT, MMM  Cardio: RRR, no murmurs  Pulm: non-labored on room air, lungs CTA bilaterally  Abd: soft, non-tender, non-distended, bowel sounds present  Ext: edema in R>>LLE throughout, mild calf tenderness on R.  HUGO drain R thigh with scant serosanguinous output present in bulb.  Wound vac at abdomen/R thigh incision.  Neuro/MSK: awake, alert, responds appropriately, follows commands    LABS  CBC RESULTS:   Recent Labs   Lab Test 06/12/25  0540 06/11/25  0559 06/10/25  0644   WBC 7.8 8.4 7.6   RBC 2.78* 2.62* 2.66*   HGB 7.9* 7.3* 7.5*   HCT 24.5* 22.9* 23.2*   MCV 88 87 87   MCH 28.4 27.9 28.2   MCHC 32.2 31.9 32.3   RDW 14.4 14.4 14.5   * 422 347       Last Basic Metabolic Panel:  Recent Labs   Lab Test 06/12/25  0540 06/11/25  0559 06/10/25  0644    137 137   POTASSIUM 3.6 3.6 3.2*   CHLORIDE 99 99 98   CO2 26 27 30*   ANIONGAP 10 11 9   * 95 96   BUN 10.0 10.5 9.9   CR 0.95 0.96 0.90   GFRESTIMATED >90 >90 >90   CANDICE 8.7* 8.6* 8.5*       ASSESSMENT AND PLAN  Gilberto Lund is a 44 year old male with a past medical history of 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  He is now admitted to ARU on 06/11/25 for multidisciplinary rehabilitation and ongoing medical management.      Rehabilitation -   Admission to acute inpatient rehab 06/11/25.    Impairment group code: Orthopaedic Disorders 08.51 Unilateral Hip Replacement s/p Right explant of hemipelvis cement spacer and conversion to total hip arthroplasty utilizing custom hemipelvis implant on 6/4/25         PT and OT 90 minutes of each daily for 6 days per week for 16 days, in addition to rehab nursing and close management of physiatrist.       Impairment of ADL's: Noted to have impaired activity tolerance, impaired balance, impaired ROM, impaired strength, impaired weight shifting, TTWB RLE x 3 months, and pain, all affecting his ability to safely and independently perform basic ADLs.  Goal for mod I with basic ADLs.     Impairment of mobility:  Noted to have impaired activity tolerance, impaired balance, impaired ROM, impaired strength, impaired weight shifting, TTWB RLE x 3 months, and pain, all affecting his ability to safely and independently perform basic mobility.  Goal for mod I with basic mobility.    Continue comprehensive acute inpatient rehabilitation program with multidisciplinary approach including therapies, rehab nursing, and physiatry following. See interval history for updates.      Medical Conditions  New actions/orders/updates for today are in blue.     S/p explant hemipelvis cement spacer and conversion to total hip arthroplasty utilizing custom hemipelvis implant on 6/4/2025 with Dr. 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 Bravo   Acute post-op pain  - Ortho resident evaluated this morning, no changes to plan of care were noted.  They confirmed that should be on anterior hip precautions.  - TTWB RLE x3 months  - Activity restrictions:  anterior hip precautions  - Wound care: 14-day Prevena vac in place.  Per ortho: If the patient is still on ARU by post op follow up appointment day (7/3/25) please page/vocera ortho for wound check.   However, vac will run out prior to that date, so will plan to connect with them around that time for wound check and updated wound care orders.  - Continue palmira-op antibiotics with cefadroxil 500 mg BID x 2 weeks and Doxycycline 100 mg po BID x 2 weeks per ortho (starting 6/11, had been on Ancef palmira-op)   - Pain management: Tylenol 975 mg q8h, robaxin 750 mg q6h PRN, gabapentin 600 mg TID (increased from PTA dose on 6/5), atarax 25 mg q6h PRN, oxycodone 5-10 mg q3h PRN.  Wean opioids as able; per IP pain consult, plan to reduce by 1 dose/day every 1-4 days.   - R thigh HUGO drain:  Keep HUGO site dressing clean dry and intact.  Document output per shift and remove drain when output is <30 cc or less x 2 shifts.  RN did note that output this morning (15 mL over past ~3 hrs) was bright red blood.  Continue to monitor.  Output by Drain (mL) 06/11/25 0700 - 06/11/25 1459 06/11/25 1500 - 06/11/25 2259 06/11/25 2300 - 06/12/25 0659 06/12/25 0700 - 06/12/25 1459 06/12/25 1500 - 06/12/25 2259 06/12/25 2300 - 06/13/25 0659 06/13/25 0700 - 06/13/25 0803   Negative Pressure Wound Therapy Abdomen Anterior 0 0        Drain Closed/Suction 1 Right;Anterior Thigh Bulb  50  130 22.5 10    - DVT prophylaxis with apixaban 2.5 mg BID x4 wks post-op per ortho (hx BLE DVT 3/2025, high risk for bleeding)  - Continue PT/OT  - Follow up with Dr. Bravo as scheduled on 7/3     Suspected pericarditis  Noted to have ST elevations on EKG post-operatively.  Troponin mildly elevated but flat.  Evaluated by cardiology in consult.  Though patient asymptomatic, with diffuse ST elevation and elevated CRP, concern for possible pericarditis.  Recommended short course of colchicine and consider ibuprofen if symptomatic.  - Continue colchicine for 3 months  -  Per cardiology: If patient begins to have chest pain and ok from surgical standpoint, could start Ibuprofen 600 mg TID for 1-2 weeks (until pain resolves). Would then decrease Ibuprofen dose by 200 mg weekly until off (400 mg TID X 1 week -> 200 mg TID X 1 week -> off).      Hx bilateral lower extremity DVT  Noted 3/19/25 on U/S; bilateral occlusive thrombi in peroneal veins; in setting of hemipelvectomy 2/14/25.   Remained unchanged on ultrasound.  PTA on apixaban 5 mg BID.  Per ortho recs, this was stopped 3 days prior to surgery and resumed at reduced dose post-op.  - On POD1, started on apixaban 2.5 mg BID per ortho due to concern for elevated bleeding risk, especially in setting of colchicine as above.  - Follow up with ortho to determine ongoing need/duration  - Monitor for s/sx     Acute blood loss anemia  Pre-op Hgb ~13, dropped to 6.7 post-op and received 2 units pRBC.  Estimated intra-op blood loss 2.7L.  As of admission to ARU, Hgb stable at ~7.  6/12: Hgb stable at 7.9.  If increased sanguinous output noted at HUGO drain as above, may be reasonable to recheck prior to Monday  - Transfuse for Hgb <7  - Trend CBC every M/Th     Intra-op bladder tear s/p repair  - Continue mccord   - Plan for outpatient follow up with urology in 2 weeks (scheduled 6/23) with cystogram prior to trial of void.  May need to be rescheduled pending ARU course.  - Continue tolterodine ER 4 mg daily     Hx testicular cancer (NSGCT) s/p left orchiectomy and chemotherapy (2008)   Chemo-induced polyneuropathy  - Continue gabapentin (PTA dose increased as above)     Indigestion  Started on Pepcid while inpatient  - Continue Pepcid 10 mg BID.  - Gas X as needed.    - Monitor        Adjustment to disability:  Clinical psychology to eval and treat if indicated  FEN: regular diet, thin liquids  Bowel: continent.  At risk for constipation in setting of opioids, immobility; has been having loose stools lately.  Last BM on 6/13.  Per patient  request, Miralax and senokot-S scheduled daily.  Additional PRN senokot-S, Miralax, and bisacodyl suppository available.  Monitor and adjust regimen as indicated.  Bladder: mccord as above  DVT Prophylaxis: apixaban 2.5 mg BID for 4 wks post-op per ortho  GI Prophylaxis: pepcid  Code: full; confirmed on admission  Disposition: goal for home  ELOS: target discharge date 6/25/25  Follow up Appointments on Discharge: PCP in 1-2 weeks, ortho (Dr. Bravo) on 7/3, urology (scheduled 6/23 with cystogram)      30 minutes spent on the date of service doing chart review, history and exam, documentation, and further activities as noted above.       Patient was discussed with Dr. Mina Macedo, PM&R Staff Physician    Alena Gomez PA-C  Physical Medicine & Rehabilitation

## 2025-06-13 NOTE — PROGRESS NOTES
ARU Social Work Progress Notes     Referral sent to;    [facility]  Phone:  Toll Free: [phone]  Direct: [phone]  Fax: [phone]    [email or web address]        TERRIE Beltrán  [facility], Acute Inpatient Rehab Unit   [address] [place] [address], [address]th [place] [address]  Phone: [phone]  Fax: [phone]

## 2025-06-14 ENCOUNTER — APPOINTMENT (OUTPATIENT)
Dept: PHYSICAL THERAPY | Facility: CLINIC | Age: 45
DRG: 560 | End: 2025-06-14
Attending: PHYSICAL MEDICINE & REHABILITATION
Payer: COMMERCIAL

## 2025-06-14 ENCOUNTER — APPOINTMENT (OUTPATIENT)
Dept: OCCUPATIONAL THERAPY | Facility: CLINIC | Age: 45
DRG: 560 | End: 2025-06-14
Attending: PHYSICAL MEDICINE & REHABILITATION
Payer: COMMERCIAL

## 2025-06-14 PROCEDURE — 97530 THERAPEUTIC ACTIVITIES: CPT | Mod: GO

## 2025-06-14 PROCEDURE — 97530 THERAPEUTIC ACTIVITIES: CPT | Mod: GP

## 2025-06-14 PROCEDURE — 250N000013 HC RX MED GY IP 250 OP 250 PS 637: Performed by: PHYSICIAN ASSISTANT

## 2025-06-14 PROCEDURE — 97110 THERAPEUTIC EXERCISES: CPT | Mod: GP

## 2025-06-14 PROCEDURE — 97535 SELF CARE MNGMENT TRAINING: CPT | Mod: GO

## 2025-06-14 PROCEDURE — 128N000003 HC R&B REHAB

## 2025-06-14 PROCEDURE — 99231 SBSQ HOSP IP/OBS SF/LOW 25: CPT | Performed by: PHYSICAL MEDICINE & REHABILITATION

## 2025-06-14 PROCEDURE — 250N000013 HC RX MED GY IP 250 OP 250 PS 637: Performed by: PHYSICAL MEDICINE & REHABILITATION

## 2025-06-14 PROCEDURE — 97110 THERAPEUTIC EXERCISES: CPT | Mod: GO

## 2025-06-14 RX ADMIN — CEPHALEXIN 250 MG: 250 CAPSULE ORAL at 21:25

## 2025-06-14 RX ADMIN — SENNOSIDES AND DOCUSATE SODIUM 1 TABLET: 50; 8.6 TABLET ORAL at 21:24

## 2025-06-14 RX ADMIN — ACETAMINOPHEN 975 MG: 325 TABLET ORAL at 04:06

## 2025-06-14 RX ADMIN — CEPHALEXIN 250 MG: 250 CAPSULE ORAL at 16:37

## 2025-06-14 RX ADMIN — POLYETHYLENE GLYCOL 3350 17 G: 17 POWDER, FOR SOLUTION ORAL at 08:59

## 2025-06-14 RX ADMIN — CEPHALEXIN 250 MG: 250 CAPSULE ORAL at 08:59

## 2025-06-14 RX ADMIN — COLCHICINE 0.6 MG: 0.6 TABLET, FILM COATED ORAL at 08:59

## 2025-06-14 RX ADMIN — OXYCODONE HYDROCHLORIDE 10 MG: 5 TABLET ORAL at 07:11

## 2025-06-14 RX ADMIN — ACETAMINOPHEN 975 MG: 325 TABLET ORAL at 21:24

## 2025-06-14 RX ADMIN — OXYCODONE HYDROCHLORIDE 10 MG: 5 TABLET ORAL at 04:06

## 2025-06-14 RX ADMIN — FAMOTIDINE 10 MG: 10 TABLET ORAL at 21:24

## 2025-06-14 RX ADMIN — GABAPENTIN 600 MG: 300 CAPSULE ORAL at 15:37

## 2025-06-14 RX ADMIN — GABAPENTIN 600 MG: 300 CAPSULE ORAL at 08:59

## 2025-06-14 RX ADMIN — OXYCODONE HYDROCHLORIDE 10 MG: 5 TABLET ORAL at 00:25

## 2025-06-14 RX ADMIN — ACETAMINOPHEN 975 MG: 325 TABLET ORAL at 11:11

## 2025-06-14 RX ADMIN — OXYCODONE HYDROCHLORIDE 5 MG: 5 TABLET ORAL at 21:25

## 2025-06-14 RX ADMIN — DOXYCYCLINE HYCLATE 100 MG: 50 CAPSULE ORAL at 08:59

## 2025-06-14 RX ADMIN — COLCHICINE 0.6 MG: 0.6 TABLET, FILM COATED ORAL at 21:25

## 2025-06-14 RX ADMIN — FAMOTIDINE 10 MG: 10 TABLET ORAL at 08:59

## 2025-06-14 RX ADMIN — OXYCODONE HYDROCHLORIDE 10 MG: 5 TABLET ORAL at 15:38

## 2025-06-14 RX ADMIN — HYDROXYZINE HYDROCHLORIDE 25 MG: 25 TABLET, FILM COATED ORAL at 15:38

## 2025-06-14 RX ADMIN — METHOCARBAMOL 750 MG: 750 TABLET ORAL at 11:11

## 2025-06-14 RX ADMIN — DOXYCYCLINE HYCLATE 100 MG: 50 CAPSULE ORAL at 21:24

## 2025-06-14 RX ADMIN — TOLTERODINE 4 MG: 4 CAPSULE, EXTENDED RELEASE ORAL at 08:59

## 2025-06-14 RX ADMIN — APIXABAN 2.5 MG: 2.5 TABLET, FILM COATED ORAL at 08:59

## 2025-06-14 RX ADMIN — APIXABAN 2.5 MG: 2.5 TABLET, FILM COATED ORAL at 21:24

## 2025-06-14 RX ADMIN — GABAPENTIN 600 MG: 300 CAPSULE ORAL at 21:25

## 2025-06-14 RX ADMIN — OXYCODONE HYDROCHLORIDE 10 MG: 5 TABLET ORAL at 11:11

## 2025-06-14 RX ADMIN — CEPHALEXIN 250 MG: 250 CAPSULE ORAL at 13:44

## 2025-06-14 RX ADMIN — Medication 1 TABLET: at 13:44

## 2025-06-14 RX ADMIN — HYDROXYZINE HYDROCHLORIDE 25 MG: 25 TABLET, FILM COATED ORAL at 07:12

## 2025-06-14 RX ADMIN — METHOCARBAMOL 750 MG: 750 TABLET ORAL at 21:25

## 2025-06-14 ASSESSMENT — ACTIVITIES OF DAILY LIVING (ADL)
ADLS_ACUITY_SCORE: 59
ADLS_ACUITY_SCORE: 61
ADLS_ACUITY_SCORE: 59
ADLS_ACUITY_SCORE: 61
ADLS_ACUITY_SCORE: 59

## 2025-06-14 NOTE — PLAN OF CARE
Goal Outcome Evaluation:  [id number]    Plan of Care Reviewed With: patient    Overall Patient Progress: improvingOverall Patient Progress: improving    Outcome Evaluation: Pt is able to make weight shifting while on the recliner, no new skin issues noted. Pt continues to use the call light appropriately, makes his needs known and waits for staff assistance when transferring.     Slept in between cares. Pt continues to advocate for pain medications. Mccord catheter, wound vac and HUGO drain in place, dressings CDI. Catheter care done, mccord draining well. No other complaints made. Will continue with current POC.

## 2025-06-14 NOTE — PLAN OF CARE
Discharge Planner Post-Acute Rehab OT:     Discharge Plan: home with spouse A, HCOT    Precautions: falls, HUGO drain & Michelle & wound vac, RLE (see below)    RLE DEVONTE and custom hemipelvis implant:  1) TTWB x 3 months  2) Anterior hip precautions (no ER, no hyperextension, no ABDuction, no pivoting/twisting) + no hip flexion > 90      Current Status:  ADLs:  Mobility: SBA lateral transfer; Mod Ax2 SPT FWW with therapy only  Grooming: IND seated.  Dressing: UB IND seated; LB max A standing; Footwear total A  Bathing: Transfer SBA lateral scoot wc <> ETB; A for set up and mgmt of multiple lines. Min A tasks.  Toileting: Michelle. SBA lateral transfer to commode.  IADLs: Mod IND PTA  Vision/Cognition: WNL; good at directing cares and set up.    Assessment: patient participated in therapy, able to maintain prec. Provided with lb dressing equip. Overall min/cga with transfers    Other Barriers to Discharge (DME, Family Training, etc):   Spouse providing A prn while working from home since prior hospitalization.  [facility]/t non accessible bathroom.      DME: w/c, hospital bed, ramped entrance, platform commode    FT with spouse prior to discharge.

## 2025-06-14 NOTE — PROGRESS NOTES
Discharge Planner Post-Acute Rehab PT:     Discharge Plan: home with assist, HHPT    Precautions: TTWB RLE x3 months, anterior hip pxns AND no flx past 90*, no pivot/twist, wound vac, HUGO drain, mccord    Current Status:  Bed Mobility: SBA with HOB elevated, use of grab bar and leg  - sleeps in recliner at home  Transfer: CGA to stand and pivot FWW L - assist to advance RLE R  Gait: Unable to advance RLE  Stairs: Unable - not a goal this admission  Balance: Able to stand safely with UE support    Outcome Measures:   TUG 0 on 6/12    Assessment: Currently functioning at/near previous NWB level. Inability to advance RLE in standing primary barrier to functional progression. Affected by both weakness, but pain increase w/ attempts.    Other Barriers to Discharge (DME, Family Training, etc):   No PT barriers to return to previous w/c based status when pt was NWB prior to admission

## 2025-06-14 NOTE — PLAN OF CARE
"/70 (BP Location: Right arm, Patient Position: Semi-Dinero's, Cuff Size: Adult Regular)   Pulse 93   Temp 97.8  F (36.6  C) (Oral)   Resp 16   Ht 1.854 m (6' 1\")   Wt 122 kg (268 lb 15.4 oz)   SpO2 96%   BMI 35.49 kg/m    Patient is alert and oriented x4. Able to make needs known. On RA stable. Denies N/V, numbness/tingling or CP. Pain controlled with PRN oxycodone, atarax and robaxin.Pt up with assist of  1. Michelle catheter in place with adequate output. Wound vac C/D/I. HUGO drain in place with 15 ml output, bright red blood. Provider aware per report.  Pt sleeping in recliner.   Call light within reach. Continue with POC.   "

## 2025-06-15 ENCOUNTER — APPOINTMENT (OUTPATIENT)
Dept: PHYSICAL THERAPY | Facility: CLINIC | Age: 45
DRG: 560 | End: 2025-06-15
Attending: PHYSICAL MEDICINE & REHABILITATION
Payer: COMMERCIAL

## 2025-06-15 ENCOUNTER — APPOINTMENT (OUTPATIENT)
Dept: OCCUPATIONAL THERAPY | Facility: CLINIC | Age: 45
DRG: 560 | End: 2025-06-15
Attending: PHYSICAL MEDICINE & REHABILITATION
Payer: COMMERCIAL

## 2025-06-15 PROCEDURE — 128N000003 HC R&B REHAB

## 2025-06-15 PROCEDURE — 99231 SBSQ HOSP IP/OBS SF/LOW 25: CPT | Performed by: PHYSICAL MEDICINE & REHABILITATION

## 2025-06-15 PROCEDURE — 97530 THERAPEUTIC ACTIVITIES: CPT | Mod: GO

## 2025-06-15 PROCEDURE — 97110 THERAPEUTIC EXERCISES: CPT | Mod: GO

## 2025-06-15 PROCEDURE — 97530 THERAPEUTIC ACTIVITIES: CPT | Mod: GP

## 2025-06-15 PROCEDURE — 250N000013 HC RX MED GY IP 250 OP 250 PS 637: Performed by: PHYSICAL MEDICINE & REHABILITATION

## 2025-06-15 PROCEDURE — 97110 THERAPEUTIC EXERCISES: CPT | Mod: GP

## 2025-06-15 PROCEDURE — 250N000013 HC RX MED GY IP 250 OP 250 PS 637: Performed by: PHYSICIAN ASSISTANT

## 2025-06-15 RX ADMIN — OXYCODONE HYDROCHLORIDE 10 MG: 5 TABLET ORAL at 13:11

## 2025-06-15 RX ADMIN — GABAPENTIN 600 MG: 300 CAPSULE ORAL at 20:48

## 2025-06-15 RX ADMIN — SENNOSIDES AND DOCUSATE SODIUM 1 TABLET: 50; 8.6 TABLET ORAL at 20:48

## 2025-06-15 RX ADMIN — DOXYCYCLINE HYCLATE 100 MG: 50 CAPSULE ORAL at 20:48

## 2025-06-15 RX ADMIN — ACETAMINOPHEN 975 MG: 325 TABLET ORAL at 13:11

## 2025-06-15 RX ADMIN — CEPHALEXIN 250 MG: 250 CAPSULE ORAL at 17:04

## 2025-06-15 RX ADMIN — Medication 1 TABLET: at 13:11

## 2025-06-15 RX ADMIN — OXYCODONE HYDROCHLORIDE 5 MG: 5 TABLET ORAL at 20:50

## 2025-06-15 RX ADMIN — CEPHALEXIN 250 MG: 250 CAPSULE ORAL at 20:49

## 2025-06-15 RX ADMIN — HYDROXYZINE HYDROCHLORIDE 25 MG: 25 TABLET, FILM COATED ORAL at 08:07

## 2025-06-15 RX ADMIN — APIXABAN 2.5 MG: 2.5 TABLET, FILM COATED ORAL at 08:07

## 2025-06-15 RX ADMIN — COLCHICINE 0.6 MG: 0.6 TABLET, FILM COATED ORAL at 08:07

## 2025-06-15 RX ADMIN — GABAPENTIN 600 MG: 300 CAPSULE ORAL at 13:11

## 2025-06-15 RX ADMIN — FAMOTIDINE 10 MG: 10 TABLET ORAL at 20:48

## 2025-06-15 RX ADMIN — ACETAMINOPHEN 975 MG: 325 TABLET ORAL at 20:48

## 2025-06-15 RX ADMIN — CEPHALEXIN 250 MG: 250 CAPSULE ORAL at 13:11

## 2025-06-15 RX ADMIN — CEPHALEXIN 250 MG: 250 CAPSULE ORAL at 07:38

## 2025-06-15 RX ADMIN — OXYCODONE HYDROCHLORIDE 10 MG: 5 TABLET ORAL at 08:07

## 2025-06-15 RX ADMIN — TOLTERODINE 4 MG: 4 CAPSULE, EXTENDED RELEASE ORAL at 07:38

## 2025-06-15 RX ADMIN — METHOCARBAMOL 750 MG: 750 TABLET ORAL at 23:40

## 2025-06-15 RX ADMIN — APIXABAN 2.5 MG: 2.5 TABLET, FILM COATED ORAL at 20:49

## 2025-06-15 RX ADMIN — COLCHICINE 0.6 MG: 0.6 TABLET, FILM COATED ORAL at 20:49

## 2025-06-15 RX ADMIN — METHOCARBAMOL 750 MG: 750 TABLET ORAL at 07:38

## 2025-06-15 RX ADMIN — POLYETHYLENE GLYCOL 3350 17 G: 17 POWDER, FOR SOLUTION ORAL at 07:38

## 2025-06-15 RX ADMIN — GABAPENTIN 600 MG: 300 CAPSULE ORAL at 07:38

## 2025-06-15 RX ADMIN — OXYCODONE HYDROCHLORIDE 5 MG: 5 TABLET ORAL at 04:57

## 2025-06-15 RX ADMIN — ACETAMINOPHEN 975 MG: 325 TABLET ORAL at 04:57

## 2025-06-15 RX ADMIN — FAMOTIDINE 10 MG: 10 TABLET ORAL at 08:07

## 2025-06-15 RX ADMIN — DOXYCYCLINE HYCLATE 100 MG: 50 CAPSULE ORAL at 08:07

## 2025-06-15 ASSESSMENT — ACTIVITIES OF DAILY LIVING (ADL)
ADLS_ACUITY_SCORE: 59

## 2025-06-15 NOTE — PLAN OF CARE
"Goal Outcome Evaluation:  [id number]    ..Orientation:A&Ox4, able to make needs known  Bowel: Continent   LBM:6/13  Bladder: Mccord  Pain: PRN oxy and robaxin for 6/10 hip pain with some relief  Ambulation/Transfers:A1 lateral transfers   Diet/Liquids: Regular, thin, takes pills whole  Tubes/Lines/Drains: R HUGO drain , mccord, wound vac  Other: Pt declined PCD pumps at night stating \"I'm comfortable how I am right now\". Pt sleeps in recliner, call light within reach, no complaints at this time.                          "

## 2025-06-15 NOTE — PLAN OF CARE
Goal Outcome Evaluation:      Plan of Care Reviewed With: patient    Overall Patient Progress: improvingOverall Patient Progress: improving       Orientation: Alert/oriented x4.   Bowel: Continent. LBM 6/13  Bladder: Michelle in place, draining appropriately  Pain: Patient has pain to R leg/hip rated 7/10. Managed by scheduled medications and PRN oxy and robaxin.   Ambulation/Transfers: A1 lateral transfer  Blood sugars: N/A  Diet/ Liquids: Regular diet, thin liquid, medication whole   Tubes/ Lines/ Drains:  Wound vac to R hip, and Abdomen. VAC in place with good seal, settings verified. HUGO drain, with bright red bloody drainage. Provider notified.  Tube Feeding: N/A  Oxygen: on Room Air, denies chest pain, N/V, and SOB.   Skin: Incision to R hip, and abdomen covered by wound vac dressing. HUGO drain to R hip to light suction. Compression stockings to bilateral LE.      Bed alarm on for safety, call light within reach. Continue with POC.

## 2025-06-15 NOTE — PLAN OF CARE
Goal Outcome Evaluation:      Plan of Care Reviewed With: patient    Overall Patient Progress: improvingOverall Patient Progress: improving       Orientation: Alert/oriented x4.   Bowel: Continent. LBM 6/13  Bladder: Michelle in place, draining appropriately  Pain: Patient has pain to R leg/hip rated 7/10. Managed by scheduled medications and PRN oxy, atarax, and robaxin.   Ambulation/Transfers: SBA 15-20' FWW w/ R thigh strap to manage RLE   Blood sugars: N/A  Diet/ Liquids: Regular diet, thin liquid, medication whole   Tubes/ Lines/ Drains:  Wound vac to R hip, and Abdomen. VAC in place with good seal, settings verified. HUGO drain, with bright red bloody drainage. Provider notified.  Tube Feeding: N/A  Oxygen: on Room Air, denies chest pain, N/V, and SOB.   Skin: Incision to R hip, and abdomen covered by wound vac dressing. HUGO drain to R hip to light suction. Compression stockings to bilateral LE.      Bed alarm on for safety, call light within reach. Continue with POC.

## 2025-06-15 NOTE — PROGRESS NOTES
[facility]   Acute Rehabilitation Unit  Daily progress note    INTERVAL HISTORY  Gilberto Lund was seen up in his room this morning.  He was doing well. Denied any pain or discomfort. Discussed HUGO drain output and the plan to continue monitoring that. He had no concerns.         MEDICATIONS  Current Facility-Administered Medications   Medication Dose Route Frequency Provider Last Rate Last Admin    acetaminophen (TYLENOL) tablet 975 mg  975 mg Oral Q8H Alena Gomez PA-C   975 mg at 06/14/25 2124    apixaban ANTICOAGULANT (ELIQUIS) tablet 2.5 mg  2.5 mg Oral BID Alena Gomez PA-C   2.5 mg at 06/14/25 2124    cephALEXin (KEFLEX) capsule 250 mg  250 mg Oral 4x Daily Lizy Macedo MD   250 mg at 06/14/25 2125    colchicine (COLCRYS) tablet 0.6 mg  0.6 mg Oral BID Alena Gomez PA-C   0.6 mg at 06/14/25 2125    doxycycline hyclate (VIBRAMYCIN) capsule 100 mg  100 mg Oral BID Lizy Macedo MD   100 mg at 06/14/25 2124    famotidine (PEPCID) tablet 10 mg  10 mg Oral BID Alena Gomez PA-C   10 mg at 06/14/25 2124    gabapentin (NEURONTIN) capsule 600 mg  600 mg Oral TID Alena Gomez PA-C   600 mg at 06/14/25 2125    multivitamin w/minerals (THERA-VIT-M) tablet 1 tablet  1 tablet Oral Daily Alena Gomez PA-C   1 tablet at 06/14/25 1344    polyethylene glycol (MIRALAX) Packet 17 g  17 g Oral Daily Alena Gomez PA-C   17 g at 06/14/25 0859    senna-docusate (SENOKOT-S/PERICOLACE) 8.6-50 MG per tablet 1 tablet  1 tablet Oral At Bedtime Alena Gomez PA-C   1 tablet at 06/14/25 2124    tolterodine ER (DETROL LA) 24 hr capsule 4 mg  4 mg Oral Daily Alena Gomez PA-C   4 mg at 06/14/25 0859          Current Facility-Administered Medications   Medication Dose Route Frequency Provider Last Rate Last Admin    hydrOXYzine HCl (ATARAX) tablet 25 mg  25 mg Oral Q6H PRN Alena Gomez PA-C   25 mg at  "06/14/25 1538    methocarbamol (ROBAXIN) tablet 750 mg  750 mg Oral 4x Daily PRN Alena Gomez PA-C   750 mg at 06/14/25 2125    naloxone (NARCAN) injection 0.2 mg  0.2 mg Intravenous Q2 Min PRN Lizy Macedo MD        Or    naloxone (NARCAN) injection 0.4 mg  0.4 mg Intravenous Q2 Min PRN Lizy Macedo MD        Or    naloxone (NARCAN) injection 0.2 mg  0.2 mg Intramuscular Q2 Min PRN Lizy Macedo MD        Or    naloxone (NARCAN) injection 0.4 mg  0.4 mg Intramuscular Q2 Min PRN Lizy Macedo MD        oxyCODONE (ROXICODONE) tablet 5 mg  5 mg Oral Q3H PRN Alena Gomez PA-C   5 mg at 06/14/25 2125    Or    oxyCODONE (ROXICODONE) tablet 10 mg  10 mg Oral Q3H PRN Alena Gomez PA-C   10 mg at 06/14/25 1538    Patient is already receiving anticoagulation with heparin, enoxaparin (LOVENOX), warfarin (COUMADIN)  or other anticoagulant medication   Does not apply Continuous PRN Alena Gomez PA-C        polyethylene glycol (MIRALAX) Packet 17 g  17 g Oral Daily PRN Lizy Macedo MD        senna-docusate (SENOKOT-S/PERICOLACE) 8.6-50 MG per tablet 1 tablet  1 tablet Oral BID PRN Alena Gomez PA-C   1 tablet at 06/12/25 1412    simethicone (MYLICON) chewable tablet 80 mg  80 mg Oral Q6H PRN Alena Gomez PA-C            PHYSICAL EXAM  /72 (BP Location: Right arm, Patient Position: Sitting, Cuff Size: Adult Regular)   Pulse 87   Temp 98.1  F (36.7  C) (Oral)   Resp 18   Ht 1.854 m (6' 1\")   Wt 122 kg (268 lb 15.4 oz)   SpO2 99%   BMI 35.49 kg/m    Gen: NAD, up in chair  Pulm: non-labored on room air  Abd: soft, non-tender  Ext: edema in R>>LLE throughout, no calf tenderness today.  HUGO drain R thigh with  some sanguinous output present in bulb.  Wound vac at abdomen/R thigh incision.  Neuro/MSK: awake, alert, responds appropriately, follows commands    Michelle in place draining clear urine    LABS  CBC RESULTS:   Recent Labs   Lab " Test 06/12/25  0540 06/11/25  0559 06/10/25  0644   WBC 7.8 8.4 7.6   RBC 2.78* 2.62* 2.66*   HGB 7.9* 7.3* 7.5*   HCT 24.5* 22.9* 23.2*   MCV 88 87 87   MCH 28.4 27.9 28.2   MCHC 32.2 31.9 32.3   RDW 14.4 14.4 14.5   * 422 347       Last Basic Metabolic Panel:  Recent Labs   Lab Test 06/12/25  0540 06/11/25  0559 06/10/25  0644    137 137   POTASSIUM 3.6 3.6 3.2*   CHLORIDE 99 99 98   CO2 26 27 30*   ANIONGAP 10 11 9   * 95 96   BUN 10.0 10.5 9.9   CR 0.95 0.96 0.90   GFRESTIMATED >90 >90 >90   CANDICE 8.7* 8.6* 8.5*       ASSESSMENT AND PLAN  Gilberto Lund is a 44 year old male with a past medical history of 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  He is now admitted to ARU on 06/11/25 for multidisciplinary rehabilitation and ongoing medical management.      Rehabilitation -   Admission to acute inpatient rehab 06/11/25.    Impairment group code: Orthopaedic Disorders 08.51 Unilateral Hip Replacement s/p Right explant of hemipelvis cement spacer and conversion to total hip arthroplasty utilizing custom hemipelvis implant on 6/4/25         --Vitals stable.   --Labs: none today.  --Continue ongoing medical management.   -HUGO drain output has been very reasonable with some sanguinous drainage. Decided to monitor      --Continue therapies and plan of care. Participating in therapies and making progress; requires CGA for ADLs and mobility. Will have family  ashlyn before discharge.    Judy Ortiz MD  Physical Medicine & Rehabilitation

## 2025-06-15 NOTE — PROGRESS NOTES
Discharge Planner Post-Acute Rehab PT:     Discharge Plan: home with assist, HHPT    Precautions: TTWB RLE x3 months, anterior hip pxns AND no flx past 90*, no pivot/twist, wound vac, HUGO drain, mccord    Current Status:  Bed Mobility: SBA with HOB elevated, use of grab bar and leg  - sleeps in recliner at home  Transfer: SBA FWW - uses R thigh strap to manage RLE  Gait: SBA 15-20' FWW w/ R thigh strap to manage RLE  Stairs: Unable - not a goal this admission  Balance: Able to stand safely with UE support    Outcome Measures:   TUG 0 on 6/12    Assessment: Progressed to functional gait using R thigh strap to advance RLE with FWW, limited more by fatigue than pain. Anticipate moving discharge up at rounds as pt is trending more towards long-term goals than short-term goals. Family and pt in agreement of earlier discharge.    Other Barriers to Discharge (DME, Family Training, etc):   No PT barriers to return to previous w/c based status when pt was NWB prior to admission

## 2025-06-15 NOTE — PLAN OF CARE
Discharge Planner Post-Acute Rehab OT:     Discharge Plan: home with spouse A, HCOT    Precautions: falls, HUGO drain & Michelle & wound vac, RLE (see below)    RLE DEVONTE and custom hemipelvis implant:  1) TTWB x 3 months  2) Anterior hip precautions (no ER, no hyperextension, no ABDuction, no pivoting/twisting) + no hip flexion > 90    Current Status:  ADLs:  Mobility: SBA lateral transfer; Mod Ax2 SPT FWW with therapy only  Grooming: IND seated.  Dressing: UB IND seated; LB max A standing; Footwear total A  Bathing: Transfer SBA lateral scoot wc <> ETB; A for set up and mgmt of multiple lines. Min A tasks.  Toileting: Michelle. SBA lateral transfer to commode.  IADLs: Mod IND PTA  Vision/Cognition: WNL; good at directing cares and set up.    Assessment: patient participated in therapy, able to maintain prec. Upgraded to performing functional mobility with leg strap donned on RLE and use of FWW, short distances, ie. To bathroom, overall cga/sba, good progress.     Other Barriers to Discharge (DME, Family Training, etc):   Spouse providing A prn while working from home since prior hospitalization.  [facility] at [facility] d/[facility] non accessible bathroom.    DME: w/c, hospital bed, ramped entrance, platform commode    FT with spouse prior to discharge.

## 2025-06-16 ENCOUNTER — PATIENT OUTREACH (OUTPATIENT)
Dept: CARE COORDINATION | Facility: CLINIC | Age: 45
End: 2025-06-16
Payer: COMMERCIAL

## 2025-06-16 LAB
ALBUMIN UR-MCNC: 70 MG/DL
ANION GAP SERPL CALCULATED.3IONS-SCNC: 11 MMOL/L (ref 7–15)
APPEARANCE UR: ABNORMAL
BILIRUB UR QL STRIP: NEGATIVE
BUN SERPL-MCNC: 12.7 MG/DL (ref 6–20)
CALCIUM SERPL-MCNC: 8.7 MG/DL (ref 8.8–10.4)
CHLORIDE SERPL-SCNC: 100 MMOL/L (ref 98–107)
COLOR UR AUTO: ABNORMAL
CREAT SERPL-MCNC: 0.94 MG/DL (ref 0.67–1.17)
EGFRCR SERPLBLD CKD-EPI 2021: >90 ML/MIN/1.73M2
ERYTHROCYTE [DISTWIDTH] IN BLOOD BY AUTOMATED COUNT: 14.2 % (ref 10–15)
GLUCOSE SERPL-MCNC: 103 MG/DL (ref 70–99)
GLUCOSE UR STRIP-MCNC: NEGATIVE MG/DL
HCO3 SERPL-SCNC: 26 MMOL/L (ref 22–29)
HCT VFR BLD AUTO: 25.7 % (ref 40–53)
HGB BLD-MCNC: 8.2 G/DL (ref 13.3–17.7)
HGB UR QL STRIP: ABNORMAL
KETONES UR STRIP-MCNC: NEGATIVE MG/DL
LEUKOCYTE ESTERASE UR QL STRIP: ABNORMAL
MCH RBC QN AUTO: 28.1 PG (ref 26.5–33)
MCHC RBC AUTO-ENTMCNC: 31.9 G/DL (ref 31.5–36.5)
MCV RBC AUTO: 88 FL (ref 78–100)
MUCOUS THREADS #/AREA URNS LPF: PRESENT /LPF
NITRATE UR QL: NEGATIVE
PH UR STRIP: 6.5 [PH] (ref 5–7)
PLATELET # BLD AUTO: 581 10E3/UL (ref 150–450)
POTASSIUM SERPL-SCNC: 3.9 MMOL/L (ref 3.4–5.3)
RBC # BLD AUTO: 2.92 10E6/UL (ref 4.4–5.9)
RBC URINE: >182 /HPF
SODIUM SERPL-SCNC: 137 MMOL/L (ref 135–145)
SP GR UR STRIP: 1.02 (ref 1–1.03)
UROBILINOGEN UR STRIP-MCNC: NORMAL MG/DL
WBC # BLD AUTO: 7.2 10E3/UL (ref 4–11)
WBC URINE: 75 /HPF

## 2025-06-16 PROCEDURE — 250N000013 HC RX MED GY IP 250 OP 250 PS 637: Performed by: PHYSICAL MEDICINE & REHABILITATION

## 2025-06-16 PROCEDURE — 85027 COMPLETE CBC AUTOMATED: CPT | Performed by: PHYSICIAN ASSISTANT

## 2025-06-16 PROCEDURE — 80048 BASIC METABOLIC PNL TOTAL CA: CPT | Performed by: PHYSICIAN ASSISTANT

## 2025-06-16 PROCEDURE — 36415 COLL VENOUS BLD VENIPUNCTURE: CPT | Performed by: PHYSICIAN ASSISTANT

## 2025-06-16 PROCEDURE — 97110 THERAPEUTIC EXERCISES: CPT | Mod: GP

## 2025-06-16 PROCEDURE — 128N000003 HC R&B REHAB

## 2025-06-16 PROCEDURE — 97535 SELF CARE MNGMENT TRAINING: CPT | Mod: GO

## 2025-06-16 PROCEDURE — 81001 URINALYSIS AUTO W/SCOPE: CPT | Performed by: PHYSICAL MEDICINE & REHABILITATION

## 2025-06-16 PROCEDURE — 97530 THERAPEUTIC ACTIVITIES: CPT | Mod: GP

## 2025-06-16 PROCEDURE — 87086 URINE CULTURE/COLONY COUNT: CPT | Performed by: PHYSICAL MEDICINE & REHABILITATION

## 2025-06-16 PROCEDURE — 250N000013 HC RX MED GY IP 250 OP 250 PS 637: Performed by: PHYSICIAN ASSISTANT

## 2025-06-16 RX ADMIN — CEPHALEXIN 250 MG: 250 CAPSULE ORAL at 20:11

## 2025-06-16 RX ADMIN — TOLTERODINE 4 MG: 4 CAPSULE, EXTENDED RELEASE ORAL at 09:14

## 2025-06-16 RX ADMIN — OXYCODONE HYDROCHLORIDE 10 MG: 5 TABLET ORAL at 20:11

## 2025-06-16 RX ADMIN — OXYCODONE HYDROCHLORIDE 5 MG: 5 TABLET ORAL at 09:13

## 2025-06-16 RX ADMIN — ACETAMINOPHEN 975 MG: 325 TABLET ORAL at 04:47

## 2025-06-16 RX ADMIN — Medication 1 TABLET: at 12:34

## 2025-06-16 RX ADMIN — CEPHALEXIN 250 MG: 250 CAPSULE ORAL at 16:42

## 2025-06-16 RX ADMIN — COLCHICINE 0.6 MG: 0.6 TABLET, FILM COATED ORAL at 20:10

## 2025-06-16 RX ADMIN — ACETAMINOPHEN 975 MG: 325 TABLET ORAL at 12:34

## 2025-06-16 RX ADMIN — APIXABAN 2.5 MG: 2.5 TABLET, FILM COATED ORAL at 09:14

## 2025-06-16 RX ADMIN — GABAPENTIN 600 MG: 300 CAPSULE ORAL at 09:13

## 2025-06-16 RX ADMIN — CEPHALEXIN 250 MG: 250 CAPSULE ORAL at 12:34

## 2025-06-16 RX ADMIN — FAMOTIDINE 10 MG: 10 TABLET ORAL at 20:10

## 2025-06-16 RX ADMIN — METHOCARBAMOL 750 MG: 750 TABLET ORAL at 20:16

## 2025-06-16 RX ADMIN — GABAPENTIN 600 MG: 300 CAPSULE ORAL at 20:11

## 2025-06-16 RX ADMIN — DOXYCYCLINE HYCLATE 100 MG: 50 CAPSULE ORAL at 20:10

## 2025-06-16 RX ADMIN — GABAPENTIN 600 MG: 300 CAPSULE ORAL at 13:57

## 2025-06-16 RX ADMIN — FAMOTIDINE 10 MG: 10 TABLET ORAL at 09:14

## 2025-06-16 RX ADMIN — APIXABAN 2.5 MG: 2.5 TABLET, FILM COATED ORAL at 20:11

## 2025-06-16 RX ADMIN — DOXYCYCLINE HYCLATE 100 MG: 50 CAPSULE ORAL at 09:14

## 2025-06-16 RX ADMIN — POLYETHYLENE GLYCOL 3350 17 G: 17 POWDER, FOR SOLUTION ORAL at 09:16

## 2025-06-16 RX ADMIN — CEPHALEXIN 250 MG: 250 CAPSULE ORAL at 09:14

## 2025-06-16 RX ADMIN — OXYCODONE HYDROCHLORIDE 5 MG: 5 TABLET ORAL at 12:34

## 2025-06-16 RX ADMIN — ACETAMINOPHEN 975 MG: 325 TABLET ORAL at 20:11

## 2025-06-16 RX ADMIN — SENNOSIDES AND DOCUSATE SODIUM 1 TABLET: 50; 8.6 TABLET ORAL at 20:10

## 2025-06-16 RX ADMIN — METHOCARBAMOL 750 MG: 750 TABLET ORAL at 09:21

## 2025-06-16 RX ADMIN — COLCHICINE 0.6 MG: 0.6 TABLET, FILM COATED ORAL at 09:14

## 2025-06-16 ASSESSMENT — ACTIVITIES OF DAILY LIVING (ADL)
ADLS_ACUITY_SCORE: 56
ADLS_ACUITY_SCORE: 59
ADLS_ACUITY_SCORE: 59
ADLS_ACUITY_SCORE: 56
ADLS_ACUITY_SCORE: 59
ADLS_ACUITY_SCORE: 56
ADLS_ACUITY_SCORE: 56
ADLS_ACUITY_SCORE: 59
ADLS_ACUITY_SCORE: 56
ADLS_ACUITY_SCORE: 59
ADLS_ACUITY_SCORE: 59
ADLS_ACUITY_SCORE: 56
ADLS_ACUITY_SCORE: 56
ADLS_ACUITY_SCORE: 59
ADLS_ACUITY_SCORE: 56
ADLS_ACUITY_SCORE: 56
ADLS_ACUITY_SCORE: 59
ADLS_ACUITY_SCORE: 56
ADLS_ACUITY_SCORE: 59
ADLS_ACUITY_SCORE: 56
ADLS_ACUITY_SCORE: 59

## 2025-06-16 NOTE — PROGRESS NOTES
Discharge Planner Post-Acute Rehab PT:     Discharge Plan: home with assist, HHPT    Precautions: TTWB RLE x3 months, anterior hip pxns AND no flx past 90*, no pivot/twist, wound vac, HUGO drain, mccord    Current Status:  Bed Mobility: SBA with HOB elevated, use of grab bar and leg  - sleeps in recliner at home  Transfer: Mod-I FWW - uses R thigh strap to manage RLE  Gait: Mod-I FWW 25' w/ R thigh strap to manage RLE  Stairs: Unable - not a goal this admission  Balance: Able to stand safely with UE support    Assessment: Moving up discharge given progress and plateau in function until able to WB. Progressed to mod-I in room with thigh strap to manage RLE due to hip flexion weakness.    Other Barriers to Discharge (DME, Family Training, etc):   No PT barriers to return to previous w/c based status when pt was NWB prior to admission

## 2025-06-16 NOTE — PROGRESS NOTES
[facility]   Acute Rehabilitation Unit  Daily progress note    INTERVAL HISTORY  Weekend notes reviewed.  Gilberto Lund was seen in his room this morning.  Overnight developed hematuria in his Michelle catheter.  He denies any inciting trauma or pulling of the catheter, however acknowledges it is possible to have occurred while he was asleep.  He says he had the sensation of needing to urinate despite the Michelle in place.  He denies any chest pain, shortness of breath, lightheadedness, dizziness, fevers, or chills.  Hemoglobin and vital signs stable.  Discussed with urology, bladder scan was obtained which was 0 cc and will order UA, however otherwise no other interventions needed at this time.  Upon returning later in the morning, the hematuria in the tubing had resolved.  Functionally he is making progress, able to stand and ambulate short distances with walker.    Current functional status:  PT:  Bed Mobility: SBA with HOB elevated, use of grab bar and leg  - sleeps in recliner at home  Transfer: SBA FWW - uses R thigh strap to manage RLE  Gait: SBA 15-20' FWW w/ R thigh strap to manage RLE  Stairs: Unable - not a goal this admission  Balance: Able to stand safely with UE support     Outcome Measures:   TUG0 on 6/12     Assessment: Progressed to functional gait using R thigh strap to advance RLE with FWW, limited more by fatigue than pain. Anticipate moving discharge up at rounds as pt is trending more towards long-term goals than short-term goals. Family and pt in agreement of earlier discharge.    OT:  ADLs:  Mobility: Mod I FWW and R leg strap  Grooming: IND seated.  Dressing: UB IND seated; LB max A standing; Footwear total A  Bathing: Transfer SBA lateral scoot wc <> ETB; A for set up and mgmt of multiple lines. Min A tasks.  Toileting: Michelle. Transfer Mod I FWW and R leg strap <> commode overlay. Mod I cares.  IADLs: Mod IND PTA  Vision/Cognition: WNL; good at  directing cares and set up.     Assessment: Progressed to Mod I FWW and R leg strap in room. Adjusted earlier discharge date to Wed 6/18; no HCOT needs at this time.        MEDICATIONS  Current Facility-Administered Medications   Medication Dose Route Frequency Provider Last Rate Last Admin    acetaminophen (TYLENOL) tablet 975 mg  975 mg Oral Q8H Alena Gomez PA-C   975 mg at 06/16/25 1234    apixaban ANTICOAGULANT (ELIQUIS) tablet 2.5 mg  2.5 mg Oral BID Alena Gomez PA-C   2.5 mg at 06/16/25 0914    cephALEXin (KEFLEX) capsule 250 mg  250 mg Oral 4x Daily Lizy Macedo MD   250 mg at 06/16/25 1234    colchicine (COLCRYS) tablet 0.6 mg  0.6 mg Oral BID Alena Gomez PA-C   0.6 mg at 06/16/25 0914    doxycycline hyclate (VIBRAMYCIN) capsule 100 mg  100 mg Oral BID Lizy Macedo MD   100 mg at 06/16/25 0914    famotidine (PEPCID) tablet 10 mg  10 mg Oral BID Alena Gomez PA-C   10 mg at 06/16/25 0914    gabapentin (NEURONTIN) capsule 600 mg  600 mg Oral TID Alena Gomez PA-C   600 mg at 06/16/25 1357    multivitamin w/minerals (THERA-VIT-M) tablet 1 tablet  1 tablet Oral Daily Alena Gomez PA-C   1 tablet at 06/16/25 1234    polyethylene glycol (MIRALAX) Packet 17 g  17 g Oral Daily Alena Gomez PA-C   17 g at 06/16/25 0916    senna-docusate (SENOKOT-S/PERICOLACE) 8.6-50 MG per tablet 1 tablet  1 tablet Oral At Bedtime Alena Gomez PA-C   1 tablet at 06/15/25 2048    tolterodine ER (DETROL LA) 24 hr capsule 4 mg  4 mg Oral Daily Alena Gomez PA-C   4 mg at 06/16/25 0914          Current Facility-Administered Medications   Medication Dose Route Frequency Provider Last Rate Last Admin    hydrOXYzine HCl (ATARAX) tablet 25 mg  25 mg Oral Q6H PRN Alena Gomez PA-C   25 mg at 06/15/25 0807    methocarbamol (ROBAXIN) tablet 750 mg  750 mg Oral 4x Daily PRN Alena Gomez PA-C   750 mg at 06/16/25 0921    naloxone (NARCAN)  "injection 0.2 mg  0.2 mg Intravenous Q2 Min PRN Lizy Macedo MD        Or    naloxone (NARCAN) injection 0.4 mg  0.4 mg Intravenous Q2 Min PRN Lizy Macedo MD        Or    naloxone (NARCAN) injection 0.2 mg  0.2 mg Intramuscular Q2 Min PRN Lizy Macedo MD        Or    naloxone (NARCAN) injection 0.4 mg  0.4 mg Intramuscular Q2 Min PRN Lizy Macedo MD        oxyCODONE (ROXICODONE) tablet 5 mg  5 mg Oral Q3H PRN Alena Gomez PA-C   5 mg at 06/16/25 1234    Or    oxyCODONE (ROXICODONE) tablet 10 mg  10 mg Oral Q3H PRN Alena Gomez PA-C   10 mg at 06/15/25 1311    Patient is already receiving anticoagulation with heparin, enoxaparin (LOVENOX), warfarin (COUMADIN)  or other anticoagulant medication   Does not apply Continuous PRN Alena Gomez PA-C        polyethylene glycol (MIRALAX) Packet 17 g  17 g Oral Daily PRN Lizy Macedo MD        senna-docusate (SENOKOT-S/PERICOLACE) 8.6-50 MG per tablet 1 tablet  1 tablet Oral BID PRN Alena Gomez PA-C   1 tablet at 06/12/25 1412    simethicone (MYLICON) chewable tablet 80 mg  80 mg Oral Q6H PRN Alena Gomez PA-C            PHYSICAL EXAM  /67 (BP Location: Right arm)   Pulse 87   Temp 97.9  F (36.6  C) (Oral)   Resp 18   Ht 1.854 m (6' 1\")   Wt 122 kg (268 lb 15.4 oz)   SpO2 98%   BMI 35.49 kg/m    Gen: NAD, ambulating with PT  HEENT: NC/AT, MMM  Cardio: RRR, no murmurs  Pulm: non-labored on room air, lungs CTA bilaterally  Abd: soft, non-tender, non-distended, bowel sounds present  Ext: 2+ pitting edema to RLE, mild calf tenderness on R which is not acute.  Wound vac and HUGO drain in place  : Michelle catheter in place with hematuria present in bag and tubing  Neuro/MSK: awake, alert, responds appropriately, follows commands    LABS  CBC RESULTS:   Recent Labs   Lab Test 06/16/25  0604 06/12/25  0540 06/11/25  0559   WBC 7.2 7.8 8.4   RBC 2.92* 2.78* 2.62*   HGB 8.2* 7.9* 7.3*   HCT " 25.7* 24.5* 22.9*   MCV 88 88 87   MCH 28.1 28.4 27.9   MCHC 31.9 32.2 31.9   RDW 14.2 14.4 14.4   * 466* 422       Last Basic Metabolic Panel:  Recent Labs   Lab Test 06/16/25  0604 06/12/25  0540 06/11/25  0559    135 137   POTASSIUM 3.9 3.6 3.6   CHLORIDE 100 99 99   CO2 26 26 27   ANIONGAP 11 10 11   * 106* 95   BUN 12.7 10.0 10.5   CR 0.94 0.95 0.96   GFRESTIMATED >90 >90 >90   CANDICE 8.7* 8.7* 8.6*       ASSESSMENT AND PLAN  Gilberto Lund is a 44 year old male with a past medical history of 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  He is now admitted to ARU on 06/11/25 for multidisciplinary rehabilitation and ongoing medical management.      Rehabilitation -   Admission to acute inpatient rehab 06/11/25.    Impairment group code: Orthopaedic Disorders 08.51 Unilateral Hip Replacement s/p Right explant of hemipelvis cement spacer and conversion to total hip arthroplasty utilizing custom hemipelvis implant on 6/4/25         PT and OT 90 minutes of each daily for 6 days per week for 16 days, in addition to rehab nursing and close management of physiatrist.       Impairment of ADL's: Noted to have impaired activity tolerance, impaired balance, impaired ROM, impaired strength, impaired weight shifting, TTWB RLE x 3 months, and pain, all affecting his ability to safely and independently perform basic ADLs.  Goal for mod I with basic ADLs.     Impairment of  mobility:  Noted to have impaired activity tolerance, impaired balance, impaired ROM, impaired strength, impaired weight shifting, TTWB RLE x 3 months, and pain, all affecting his ability to safely and independently perform basic mobility.  Goal for mod I with basic mobility.    Continue comprehensive acute inpatient rehabilitation program with multidisciplinary approach including therapies, rehab nursing, and physiatry following. See interval history for updates.      Medical Conditions  New actions/orders/updates for today are in blue.     S/p explant hemipelvis cement spacer and conversion to total hip arthroplasty utilizing custom hemipelvis implant on 6/4/2025 with Dr. 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 Bravo   Acute post-op pain  - Ortho resident evaluated 6/13 no changes to plan of care were noted.  They confirmed that should be on anterior hip precautions.  - TTWB RLE x3 months  - Activity restrictions: anterior hip precautions  - Wound care: 14-day Prevena vac in place.  Per ortho: If the patient is still on ARU by post op follow up appointment day (7/3/25) please page/vocera ortho for wound check.   However, vac will run out prior to that date, so will plan to connect with them around that time for wound check and updated wound care orders.  - Continue palmira-op antibiotics with cefadroxil 500 mg BID x 2 weeks and Doxycycline 100 mg po BID x 2 weeks per ortho (starting 6/11, had been on Ancef palmira-op)   - Pain management: Tylenol 975 mg q8h, robaxin 750 mg q6h PRN, gabapentin 600 mg TID (increased from PTA dose on 6/5), atarax 25 mg q6h PRN, oxycodone 5-10 mg q3h PRN.  Wean opioids as able; per IP pain consult, plan to reduce by 1 dose/day every 1-4 days.   - R thigh HUGO drain:  Keep HUGO site dressing clean dry and intact.  Document output per shift and remove drain when output is <30 cc or less x 2 shifts.    Output by Drain (mL) 06/14/25 0700 -  06/14/25 1459 06/14/25 1500 - 06/14/25 2259 06/14/25 2300 - 06/15/25 0659 06/15/25 0700 - 06/15/25 1459 06/15/25 1500 - 06/15/25 2259 06/15/25 2300 - 06/16/25 0659 06/16/25 0700 - 06/16/25 1459 06/16/25 1500 - 06/16/25 1610   Negative Pressure Wound Therapy Abdomen Anterior           Drain Closed/Suction 1 Right;Anterior Thigh Bulb 15 30 15 30  40 35    - DVT prophylaxis with apixaban 2.5 mg BID x4 wks post-op per ortho (hx BLE DVT 3/2025, high risk for bleeding)  - Continue PT/OT  - Follow up with Dr. Bravo as scheduled on 7/3     Suspected pericarditis  Noted to have ST elevations on EKG post-operatively.  Troponin mildly elevated but flat.  Evaluated by cardiology in consult.  Though patient asymptomatic, with diffuse ST elevation and elevated CRP, concern for possible pericarditis.  Recommended short course of colchicine and consider ibuprofen if symptomatic.  - Continue colchicine for 3 months  - Per cardiology: If patient begins to have chest pain and ok from surgical standpoint, could start Ibuprofen 600 mg TID for 1-2 weeks (until pain resolves). Would then decrease Ibuprofen dose by 200 mg weekly until off (400 mg TID X 1 week -> 200 mg TID X 1 week -> off).      Hx bilateral lower extremity DVT  Noted 3/19/25 on U/S; bilateral occlusive thrombi in peroneal veins; in setting of hemipelvectomy 2/14/25.   Remained unchanged on ultrasound.  PTA on apixaban 5 mg BID.  Per ortho recs, this was stopped 3 days prior to surgery and resumed at reduced dose post-op.  - On POD1, started on apixaban 2.5 mg BID per ortho due to concern for elevated bleeding risk, especially in setting of colchicine as above.  - Follow up with ortho to determine ongoing need/duration  - Monitor for s/sx     Acute blood loss anemia  Pre-op Hgb ~13, dropped to 6.7 post-op and received 2 units pRBC.  Estimated intra-op blood loss 2.7L.  As of admission to ARU, Hgb stable at ~7.  6/16: Hgb stable at 8.2.    - Transfuse for Hgb <7  - Trend  CBC every M/Th     Intra-op bladder tear s/p repair  Hematuria  - Continue mccord   - Plan for outpatient follow up with urology in 2 weeks (scheduled 6/23) with cystogram prior to trial of void.  May need to be rescheduled pending ARU course.  - Continue tolterodine ER 4 mg daily  - Hematuria developed overnight 6/15.  No known trauma and Hgb stable as above.  Discussed with urology who reviewed photo of Mccord/tubing.  PVR checked, was 0 ml.  UA and UCx also ordered.  Otherwise, no other interventions at this time.  Continue to monitor.  Upon re-evaluation later in the morning, hematuria in Mccord tubing had resolved.      Hx testicular cancer (NSGCT) s/p left orchiectomy and chemotherapy (2008)   Chemo-induced polyneuropathy  - Continue gabapentin (PTA dose increased as above)     Indigestion  Started on Pepcid while inpatient  - Continue Pepcid 10 mg BID.  - Gas X as needed.    - Monitor        Adjustment to disability:  Clinical psychology to eval and treat if indicated  FEN: regular diet, thin liquids  Bowel: continent.  At risk for constipation in setting of opioids, immobility;   Per patient request, Miralax and senokot-S scheduled daily.  Additional PRN senokot-S, Miralax, and bisacodyl suppository available.  Monitor and adjust regimen as indicated.  Bladder: mccord as above  DVT Prophylaxis: apixaban 2.5 mg BID for 4 wks post-op per ortho  GI Prophylaxis: pepcid  Code: full; confirmed on admission  Disposition: goal for home  ELOS: target discharge date 6/25/25, or potentially sooner  Follow up Appointments on Discharge: PCP in 1-2 weeks, ortho (Dr. Bravo) on 7/3, urology (scheduled 6/23 with cystogram)      40 minutes spent on the date of service doing chart review, history and exam, documentation, and further activities as noted above.     Mina Macedo MD  Department of Rehabilitation Medicine

## 2025-06-16 NOTE — PLAN OF CARE
Discharge Planner Post-Acute Rehab OT:     Discharge Plan: home with spouse A, HC RN and PT    Precautions: falls, HUGO drain & Michelle & wound vac, RLE (see below)    RLE DEVONTE and custom hemipelvis implant:  1) TTWB x 3 months  2) Anterior hip precautions (no ER, no hyperextension, no ABDuction, no pivoting/twisting) + no hip flexion > 90    Current Status:  ADLs:  Mobility: Mod I FWW and R leg strap  Grooming: IND seated.  Dressing: UB IND seated; LB max A standing; Footwear total A  Bathing: Transfer SBA lateral scoot wc <> ETB; A for set up and mgmt of multiple lines. Min A tasks.  Toileting: Michelle. Transfer Mod I FWW and R leg strap <> commode overlay. Mod I cares.  IADLs: Mod IND PTA  Vision/Cognition: WNL; good at directing cares and set up.    Assessment: Progressed to Mod I FWW and R leg strap in room. Adjusted earlier discharge date to Wed 6/18; no HCOT needs at this time.    Other Barriers to Discharge (DME, Family Training, etc):   Spouse providing A prn while working from home since prior hospitalization.  Showers at Y d/t non accessible bathroom.    DME: W/c, hospital bed, ramped entrance, platform commode. Will need FWW.

## 2025-06-16 NOTE — PROGRESS NOTES
[facility]   Acute Rehabilitation Unit  Daily progress note    INTERVAL HISTORY  Gilberto Lund was seen up in his room this morning.  He was tired due to very busy schedule. His children visited for father's day. Denied any pain or discomfort. No dizziness or lightheadedness. Has been eating and drinking well. Michelle in place. BMs are regular and normal per his report.         MEDICATIONS  Current Facility-Administered Medications   Medication Dose Route Frequency Provider Last Rate Last Admin    acetaminophen (TYLENOL) tablet 975 mg  975 mg Oral Q8H Alena Gomez PA-C   975 mg at 06/15/25 2048    apixaban ANTICOAGULANT (ELIQUIS) tablet 2.5 mg  2.5 mg Oral BID Alena Gomez PA-C   2.5 mg at 06/15/25 2049    cephALEXin (KEFLEX) capsule 250 mg  250 mg Oral 4x Daily Lizy Macedo MD   250 mg at 06/15/25 2049    colchicine (COLCRYS) tablet 0.6 mg  0.6 mg Oral BID Alena Gomez PA-C   0.6 mg at 06/15/25 2049    doxycycline hyclate (VIBRAMYCIN) capsule 100 mg  100 mg Oral BID Lizy Macedo MD   100 mg at 06/15/25 2048    famotidine (PEPCID) tablet 10 mg  10 mg Oral BID Alena Gomez PA-C   10 mg at 06/15/25 2048    gabapentin (NEURONTIN) capsule 600 mg  600 mg Oral TID Alena Gomez PA-C   600 mg at 06/15/25 2048    multivitamin w/minerals (THERA-VIT-M) tablet 1 tablet  1 tablet Oral Daily Alena Gomez PA-C   1 tablet at 06/15/25 1311    polyethylene glycol (MIRALAX) Packet 17 g  17 g Oral Daily Alena Gomez PA-C   17 g at 06/15/25 0738    senna-docusate (SENOKOT-S/PERICOLACE) 8.6-50 MG per tablet 1 tablet  1 tablet Oral At Bedtime Alena Gomez PA-C   1 tablet at 06/15/25 2048    tolterodine ER (DETROL LA) 24 hr capsule 4 mg  4 mg Oral Daily Alena Gomez PA-C   4 mg at 06/15/25 0738          Current Facility-Administered Medications   Medication Dose Route Frequency Provider Last Rate Last Admin     "hydrOXYzine HCl (ATARAX) tablet 25 mg  25 mg Oral Q6H PRN Alena Gomez PA-C   25 mg at 06/15/25 0807    methocarbamol (ROBAXIN) tablet 750 mg  750 mg Oral 4x Daily PRN Alena Gomez PA-C   750 mg at 06/15/25 0738    naloxone (NARCAN) injection 0.2 mg  0.2 mg Intravenous Q2 Min PRN Lizy Macedo MD        Or    naloxone (NARCAN) injection 0.4 mg  0.4 mg Intravenous Q2 Min PRN Lizy Macedo MD        Or    naloxone (NARCAN) injection 0.2 mg  0.2 mg Intramuscular Q2 Min PRN Lizy Macedo MD        Or    naloxone (NARCAN) injection 0.4 mg  0.4 mg Intramuscular Q2 Min PRN Lizy Macedo MD        oxyCODONE (ROXICODONE) tablet 5 mg  5 mg Oral Q3H PRN Alena Gomez PA-C   5 mg at 06/15/25 2050    Or    oxyCODONE (ROXICODONE) tablet 10 mg  10 mg Oral Q3H PRN Alena Gomez PA-C   10 mg at 06/15/25 1311    Patient is already receiving anticoagulation with heparin, enoxaparin (LOVENOX), warfarin (COUMADIN)  or other anticoagulant medication   Does not apply Continuous PRN Alena Gomez PA-C        polyethylene glycol (MIRALAX) Packet 17 g  17 g Oral Daily PRN Lizy Macedo MD        senna-docusate (SENOKOT-S/PERICOLACE) 8.6-50 MG per tablet 1 tablet  1 tablet Oral BID PRN Alena Gomez PA-C   1 tablet at 06/12/25 1412    simethicone (MYLICON) chewable tablet 80 mg  80 mg Oral Q6H PRN Alena Gomez PA-C            PHYSICAL EXAM  /59 (BP Location: Right arm)   Pulse 95   Temp 98.3  F (36.8  C) (Oral)   Resp 16   Ht 1.854 m (6' 1\")   Wt 122 kg (268 lb 15.4 oz)   SpO2 98%   BMI 35.49 kg/m    Gen: NAD, up in chair  Pulm: non-labored on room air  Abd: soft, non-tender  Ext: edema in R>>LLE throughout, no calf tenderness today.  HUGO drain R thigh with  some sanguinous output present in bulb.  Wound vac at abdomen/R thigh incision.  Neuro/MSK: awake, alert, responds appropriately, follows commands    Michelle in place draining clear " urine    LABS  CBC RESULTS:   Recent Labs   Lab Test 06/12/25  0540 06/11/25  0559 06/10/25  0644   WBC 7.8 8.4 7.6   RBC 2.78* 2.62* 2.66*   HGB 7.9* 7.3* 7.5*   HCT 24.5* 22.9* 23.2*   MCV 88 87 87   MCH 28.4 27.9 28.2   MCHC 32.2 31.9 32.3   RDW 14.4 14.4 14.5   * 422 347       Last Basic Metabolic Panel:  Recent Labs   Lab Test 06/12/25  0540 06/11/25  0559 06/10/25  0644    137 137   POTASSIUM 3.6 3.6 3.2*   CHLORIDE 99 99 98   CO2 26 27 30*   ANIONGAP 10 11 9   * 95 96   BUN 10.0 10.5 9.9   CR 0.95 0.96 0.90   GFRESTIMATED >90 >90 >90   CANDICE 8.7* 8.6* 8.5*       ASSESSMENT AND PLAN  Gilberto Lund is a 44 year old male with a past medical history of 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  He is now admitted to ARU on 06/11/25 for multidisciplinary rehabilitation and ongoing medical management.      Rehabilitation -   Admission to acute inpatient rehab 06/11/25.    Impairment group code: Orthopaedic Disorders 08.51 Unilateral Hip Replacement s/p Right explant of hemipelvis cement spacer and conversion to total hip arthroplasty utilizing custom hemipelvis implant on 6/4/25         --Vitals stable.   --Labs: none today.  --Continue ongoing medical management.   -HUGO drain output has been very reasonable with some sanguinous drainage. Decided to monitor      --Continue therapies and plan of care. Participating in therapies and making progress; requires  CGA to SBA for ADLs and mobility. Will have family traning before discharge.    Judy Ortiz MD  Physical Medicine & Rehabilitation

## 2025-06-16 NOTE — PLAN OF CARE
Goal Outcome Evaluation:  [id number]    Plan of Care Reviewed With: patient    Overall Patient Progress: no changeOverall Patient Progress: no change     ..Orientation: A&Ox4, able to make needs known  Bowel: Continent   LBM: 6/13  Bladder: Mccord cath draining well  Pain: 6/10 hip and leg pain. PRN oxy and robaxin given with some relief  Ambulation/Transfers: A1 lateral transfers   Diet/Liquids: regular, thin, takes pills whole  Tubes/Lines/Drains: eileen drain, mccord, wound vac R hip  Skin: right leg and ankle edema +1, SCDs on overnight.  Other: SCDs on overnight, sleeps in recliner, chair alarm on, call light within reach, no complaints at this time.

## 2025-06-16 NOTE — PLAN OF CARE
Goal Outcome Evaluation:      Plan of Care Reviewed With: patient  Pt is A&Ox4. No respiratory distress noted at this shift and denies chest pain or SOB. Tolerated all medication orally. Pain is managed with scheduled and PRN med. Eating and drinking with no issue. Michelle intact. Urine appeared dark, bloody. Provider notified and writer sent urine image. Urine culture collected. HUGO and wound vac are intact. Spent most of the shift on recliner. Call light is within reach and Pt is able to call appropriately.

## 2025-06-16 NOTE — PROGRESS NOTES
ARU Social Work Progress Notes     Team huddle today; targeting a discharge on 06/18 with HC RN/PT. Previously had [facility]. Send New Referral.      [facility]   [address] [place] [address], [address] [place] [address]  Phone: [phone]  Fax: [phone]

## 2025-06-16 NOTE — PROGRESS NOTES
Clinic Care Coordination Contact  Care Coordination Transition Communication    Clinical Data: Patient was hospitalized at [facility] from 6/3/25 to 6/11/25 with diagnosis of H/O chondrosarcoma [Z85.830]     PROCEDURES: Procedure(s):  CUSTOM PELVIC AND HIP REPLACEMENT  REMOVAL, HARDWARE, PELVIS, ANTERIOR APPROACH  REMOVAL ANTIBIOTIC SPACER, Pelvis  COMPLEX BLADDER REPAIR  on 6/3/2025 - 6/4/2025.     Assessment: Patient has transitioned to TCU/ARU for short term rehabilitation:    Facility Name: [facility]  Transition Communication:  Will monitor via Epic    Plan: Care Coordinator will await notification from facility staff informing of patient's discharge plans/needs. Care Coordinator will review chart and outreach to facility staff every 4 weeks and as needed.     Per hospital chart review: Pt is a 44 year old father who lives in a house with his wife in [place], [place]. Pt and spouse identified that after previous surgery Pt had an ARU stay which was helpful and allowed them to set up in home PT/OT and some nursing support. Pt's spouse identified that this would be helpful if it could be done again. Pt uses a walker and a slide board for transfers. Pt is on long term disability and wife has taken a leave from work

## 2025-06-17 LAB — BACTERIA UR CULT: NO GROWTH

## 2025-06-17 PROCEDURE — 128N000003 HC R&B REHAB

## 2025-06-17 PROCEDURE — 97530 THERAPEUTIC ACTIVITIES: CPT | Mod: GP

## 2025-06-17 PROCEDURE — 250N000013 HC RX MED GY IP 250 OP 250 PS 637: Performed by: PHYSICAL MEDICINE & REHABILITATION

## 2025-06-17 PROCEDURE — 97535 SELF CARE MNGMENT TRAINING: CPT | Mod: GO

## 2025-06-17 PROCEDURE — 97110 THERAPEUTIC EXERCISES: CPT | Mod: GO

## 2025-06-17 PROCEDURE — 250N000013 HC RX MED GY IP 250 OP 250 PS 637: Performed by: PHYSICIAN ASSISTANT

## 2025-06-17 RX ORDER — AMOXICILLIN 250 MG
1 CAPSULE ORAL AT BEDTIME
Qty: 30 TABLET | Refills: 0 | Status: SHIPPED | OUTPATIENT
Start: 2025-06-17

## 2025-06-17 RX ORDER — POLYETHYLENE GLYCOL 3350 17 G/17G
17 POWDER, FOR SOLUTION ORAL DAILY
Qty: 510 G | Refills: 0 | Status: SHIPPED | OUTPATIENT
Start: 2025-06-17

## 2025-06-17 RX ORDER — MULTIPLE VITAMINS W/ MINERALS TAB 9MG-400MCG
1 TAB ORAL DAILY
Qty: 30 TABLET | Refills: 0 | Status: SHIPPED | OUTPATIENT
Start: 2025-06-17

## 2025-06-17 RX ORDER — METHOCARBAMOL 750 MG/1
750 TABLET, FILM COATED ORAL 4 TIMES DAILY PRN
Qty: 30 TABLET | Refills: 0 | Status: SHIPPED | OUTPATIENT
Start: 2025-06-17

## 2025-06-17 RX ORDER — OXYCODONE HYDROCHLORIDE 5 MG/1
5-10 TABLET ORAL EVERY 4 HOURS PRN
Qty: 25 TABLET | Refills: 0 | Status: SHIPPED | OUTPATIENT
Start: 2025-06-17

## 2025-06-17 RX ORDER — COLCHICINE 0.6 MG/1
0.6 TABLET ORAL 2 TIMES DAILY
Qty: 60 TABLET | Refills: 2 | Status: SHIPPED | OUTPATIENT
Start: 2025-06-17

## 2025-06-17 RX ORDER — DOXYCYCLINE 100 MG/1
100 CAPSULE ORAL 2 TIMES DAILY
Qty: 16 CAPSULE | Refills: 0 | Status: SHIPPED | OUTPATIENT
Start: 2025-06-18 | End: 2025-06-26

## 2025-06-17 RX ORDER — FAMOTIDINE 10 MG
10 TABLET ORAL 2 TIMES DAILY
Qty: 60 TABLET | Refills: 0 | Status: SHIPPED | OUTPATIENT
Start: 2025-06-17

## 2025-06-17 RX ORDER — HYDROXYZINE HYDROCHLORIDE 25 MG/1
25 TABLET, FILM COATED ORAL EVERY 6 HOURS PRN
Qty: 30 TABLET | Refills: 0 | Status: SHIPPED | OUTPATIENT
Start: 2025-06-17

## 2025-06-17 RX ORDER — COLCHICINE 0.6 MG/1
0.6 TABLET ORAL 2 TIMES DAILY
Qty: 60 TABLET | Refills: 0 | Status: SHIPPED | OUTPATIENT
Start: 2025-06-17 | End: 2025-06-17

## 2025-06-17 RX ORDER — GABAPENTIN 300 MG/1
600 CAPSULE ORAL 3 TIMES DAILY
Qty: 180 CAPSULE | Refills: 0 | Status: SHIPPED | OUTPATIENT
Start: 2025-06-17

## 2025-06-17 RX ORDER — TOLTERODINE 4 MG/1
4 CAPSULE, EXTENDED RELEASE ORAL DAILY
Qty: 30 CAPSULE | Refills: 0 | Status: SHIPPED | OUTPATIENT
Start: 2025-06-18

## 2025-06-17 RX ORDER — ACETAMINOPHEN 325 MG/1
975 TABLET ORAL EVERY 8 HOURS
COMMUNITY
Start: 2025-06-17

## 2025-06-17 RX ADMIN — GABAPENTIN 600 MG: 300 CAPSULE ORAL at 19:57

## 2025-06-17 RX ADMIN — OXYCODONE HYDROCHLORIDE 10 MG: 5 TABLET ORAL at 12:53

## 2025-06-17 RX ADMIN — HYDROXYZINE HYDROCHLORIDE 25 MG: 25 TABLET, FILM COATED ORAL at 12:53

## 2025-06-17 RX ADMIN — FAMOTIDINE 10 MG: 10 TABLET ORAL at 08:38

## 2025-06-17 RX ADMIN — TOLTERODINE 4 MG: 4 CAPSULE, EXTENDED RELEASE ORAL at 08:38

## 2025-06-17 RX ADMIN — ACETAMINOPHEN 975 MG: 325 TABLET ORAL at 19:58

## 2025-06-17 RX ADMIN — OXYCODONE HYDROCHLORIDE 10 MG: 5 TABLET ORAL at 23:59

## 2025-06-17 RX ADMIN — OXYCODONE HYDROCHLORIDE 10 MG: 5 TABLET ORAL at 16:51

## 2025-06-17 RX ADMIN — OXYCODONE HYDROCHLORIDE 5 MG: 5 TABLET ORAL at 19:58

## 2025-06-17 RX ADMIN — SENNOSIDES AND DOCUSATE SODIUM 1 TABLET: 50; 8.6 TABLET ORAL at 21:51

## 2025-06-17 RX ADMIN — CEPHALEXIN 250 MG: 250 CAPSULE ORAL at 15:30

## 2025-06-17 RX ADMIN — COLCHICINE 0.6 MG: 0.6 TABLET, FILM COATED ORAL at 21:51

## 2025-06-17 RX ADMIN — CEPHALEXIN 250 MG: 250 CAPSULE ORAL at 12:53

## 2025-06-17 RX ADMIN — FAMOTIDINE 10 MG: 10 TABLET ORAL at 21:51

## 2025-06-17 RX ADMIN — GABAPENTIN 600 MG: 300 CAPSULE ORAL at 15:30

## 2025-06-17 RX ADMIN — OXYCODONE HYDROCHLORIDE 10 MG: 5 TABLET ORAL at 05:08

## 2025-06-17 RX ADMIN — CEPHALEXIN 250 MG: 250 CAPSULE ORAL at 08:39

## 2025-06-17 RX ADMIN — DOXYCYCLINE HYCLATE 100 MG: 50 CAPSULE ORAL at 08:38

## 2025-06-17 RX ADMIN — CEPHALEXIN 250 MG: 250 CAPSULE ORAL at 19:58

## 2025-06-17 RX ADMIN — COLCHICINE 0.6 MG: 0.6 TABLET, FILM COATED ORAL at 08:40

## 2025-06-17 RX ADMIN — METHOCARBAMOL 750 MG: 750 TABLET ORAL at 05:08

## 2025-06-17 RX ADMIN — DOXYCYCLINE HYCLATE 100 MG: 50 CAPSULE ORAL at 21:51

## 2025-06-17 RX ADMIN — METHOCARBAMOL 750 MG: 750 TABLET ORAL at 16:51

## 2025-06-17 RX ADMIN — GABAPENTIN 600 MG: 300 CAPSULE ORAL at 08:38

## 2025-06-17 RX ADMIN — METHOCARBAMOL 750 MG: 750 TABLET ORAL at 23:59

## 2025-06-17 RX ADMIN — APIXABAN 2.5 MG: 2.5 TABLET, FILM COATED ORAL at 08:39

## 2025-06-17 RX ADMIN — Medication 1 TABLET: at 12:53

## 2025-06-17 RX ADMIN — APIXABAN 2.5 MG: 2.5 TABLET, FILM COATED ORAL at 21:51

## 2025-06-17 RX ADMIN — POLYETHYLENE GLYCOL 3350 17 G: 17 POWDER, FOR SOLUTION ORAL at 08:41

## 2025-06-17 RX ADMIN — ACETAMINOPHEN 975 MG: 325 TABLET ORAL at 05:07

## 2025-06-17 RX ADMIN — ACETAMINOPHEN 975 MG: 325 TABLET ORAL at 12:54

## 2025-06-17 ASSESSMENT — ACTIVITIES OF DAILY LIVING (ADL)
ADLS_ACUITY_SCORE: 56
ADLS_ACUITY_SCORE: 55
ADLS_ACUITY_SCORE: 55
ADLS_ACUITY_SCORE: 56
ADLS_ACUITY_SCORE: 55
ADLS_ACUITY_SCORE: 56
ADLS_ACUITY_SCORE: 56
ADLS_ACUITY_SCORE: 55
ADLS_ACUITY_SCORE: 56
ADLS_ACUITY_SCORE: 55

## 2025-06-17 NOTE — PROGRESS NOTES
Occupational Therapy Discharge Summary    Reason for therapy discharge:    Discharged to home with spouse A, home therapy RN and PT.    Progress towards therapy goal(s). See goals on Care Plan in [facility] electronic health record for goal details.  Goals met    Current Status:  ADLs:  Mobility: Mod I FWW and R leg strap  Grooming: IND seated.  Dressing: UB IND seated; LB - Mod I with AE. Footwear - Mod I with AE.  Bathing: Mod I with either lateral scoot or FWW pivot to ETB. Mod I tasks with set up.  Toileting: Michelle. Transfer Mod I FWW and R leg strap <> commode overlay. Mod I cares.  IADLs: Mod IND PTA; spouse A at discharge.  Vision/Cognition: WNL; good at directing cares and set up.    Therapy recommendation(s):    Pt discharged Mod I wc-based and short distance amb FWW with RLE leg strap. Has good support from spouse at home. Per pt preference and in agreement with therapy, plan for HC RN and PT at discharge. Aware that OP OT referral available through PCP if desired once weightbearing progressed and further IADL and return to community pursuits needs arise.       Precautions: falls, HUGO drain & Michelle & wound vac, RLE (see below)     RLE DEVONTE and custom hemipelvis implant:  1) TTWB x 3 months  2) Anterior hip precautions (no ER, no hyperextension, no ABDuction, no pivoting/twisting) + no hip flexion > 90    Spouse providing A prn while working from home since prior hospitalization.  Showers at Y d/t non accessible bathroom.     DME: W/c, hospital bed, ramped entrance, platform commode, FWW, leg strap.

## 2025-06-17 NOTE — PLAN OF CARE
Acute Rehab Care Conference/Team Rounds      Type: Team Rounds    Present: Dr. Macedo, Lizy Enriquez MD , Alena Gomez, Dr Agnes Horn Neuropsychologist, Kendrick Carnes PT,  Rachel Johnston OT, José De Leon SLP, Anna Russell , Barbie Laguna RD, Anibal Esqueda RN, Ashely Macias R Patient.      Discharge Barriers/Treatment/Education    Rehab Diagnosis: R DEVONTE with custom hemipelvis implant    Active Medical Co-morbidities/Prognosis: Intra-op bladder tear s/p repair and Michelle in place, suspected pericarditis, Hx DVTs, ABLA, post-operative pain, hematuria, Hx testicular Ca, chemo induced neuropathy    Safety: Is alert and oriented x4, calls appropriately for needs. Mod I w/ the walker. No bed alarms at this time, although safety checks continued by nursing.     Pain: With scheduled tylenol rtc. Requested for PRN oxycodone and Robaxin with it.     Medications, Skin, Tubes/Lines: Takes meds whole with water. Wound vac in place anterior hip, functional and w/o complications. HUGO drain with output of 15ml overnight.  Michelle catheter draining well.    Swallowing/Nutrition:    Bowel/Bladder: Continent of bowel, lbm 6/16. Michelle catheter emptied by staff every shift w/ adequate output.     Psychosocial:lives with spouse and 3 dependent children in house with ramped entrance. Full flight of stairs to bedroom. Patient shared he would like to keep; [facility] and is interested in a referral for . Set to discharge tomorrow with home care services.    ADLs/IADLs: Pt progressing well; in agreement with pt and spouse moved up discharge date to Wed 6/18. Progressed to Mod I FWW and RLE strap functional transfers, toileting; anticipate Mod I-supervision bathing with re-assessment today; will likely require A for footwear d/t precautions and pain. Pt has all necessary OT equipment from previous ARU stay; will source wide FWW prior to discharge. Plan to discharge home with spouse  A prn; in agreement with pt HC RN and PT only.     Mobility: Pt progressed well on unit. Quickly reaching previous w/c based mod-I level and able to progress towards functional gait using R thigh strap to advance RLE with FWW, limited more by fatigue than pain. Discharge moved up as functional goals met for home and pt has more towards long-term goals than short-term goals. Family and pt in agreement of earlier discharge. HH PT follow-up, OP more appropriate once able to full WB.    Cognition/Language:    Community Re-Entry: via w/c    Transportation: Not a , transfer not a barrier    Decision maker: self    Plan of Care and goals reviewed and updated.    Discharge Plan/Recommendations    Fall Precautions: continue    Patient/Family input to goals: Yes    Anticipated rehab needs following discharge:  PT, RN    Anticipated care giver support after discharge: Spouse    Estimated length of stay: 7 days    Overall plan for the patient: Patient making excellent progress, and has met goals for discharge home.  Will likely be at a plateau until can further progress weight bearing status.  Given progress, have moved up tentative discharge date to tomorrow.       Utilization Review and Continued Stay Justification    Medical Necessity Criteria:    For any criteria that is not met, please document reason and plan for discharge, transfer, or modification of plan of care to address.    Requires intensive rehabilitation program to treat functional deficits?: Yes    Requires 3x per week or greater involvement of rehabilitation physician to oversee rehabilitation program?: Yes    Requires rehabilitation nursing interventions?: Yes    Patient is making functional progress?: Yes    There is a potential for additional functional progress? Yes    Patient is participating in therapy 3 hours per day a minimum of 5 days per week or 15 hours per week in 7 day period?:Yes    Has discharge needs that require coordinated discharge  planning approach?:Yes      Barriers/Concerns related to meeting medical necessity criteria:  None    Team Plan to Address Concern:  N/A      Final Physician Sign off    Statement of Approval: I have reviewed and agree with the recommendations and documentation in this team rounds note.       Patient Goals    Social Work Goals: Confirm discharge recommendations with therapy, coordinate safe discharge plan and remain available to support and assist as needed.     OT Predicted Duration/Target Date for Goal Attainment: 06/25/25  Therapy Frequency (OT): 6 times/week  OT: Lower Body Dressing: Modified independent  OT: Lower Body Bathing: Modified independent  OT: Toilet Transfer/Toileting: Modified independent, toilet transfer, cleaning and garment management, within precautions  OT: Meal Preparation: Modified independent, with simple meal preparation (from standing)  OT: Home Management: Modified independent, with light demand household tasks (from standing)  OT: Goal 1: Pt will complete standing pivot transfer during ADL using FWW mod IND while maintaining TTWB to LLE.                         PT Predicted Duration/Target Date for Goal Attainment: 06/26/25  PT Frequency: 6x/week  PT: Bed Mobility: Completed  PT: Transfers: Completed  PT: Wheelchair Mobility: Completed  PT: Goal 1: Completed                    Goal: Skin Integrity: Patient will maintain intact skin integrity and show signs of wound healing while in the ARU.                    Goal: Safety Management: Patient will adhere to unit protocols regarding transfers and ambulation to ensure safety.           Goal: Falls: Patient will use the call light appropriately and wait for assistance to prevent falls and/or injuries.            Goal Outcome Evaluation:           Overall Patient Progress: no changeOverall Patient Progress: no change

## 2025-06-17 NOTE — PROGRESS NOTES
Discharge Plan     Discharge Date: 06/18/2025      Discharge Disposition:  Home        Discharge Services:  Home PT/RN    [facility]  [address]  Tel:[phone]  Fax:[phone]  Nurse, physical therapy  -You will get a call [facility] to schedule the first home care visit. The home health nurse should come out within the first 24-48 hours. If you don't get a call from them within the first 48 hours, please call to follow up.       ORDERS AND DISCHARGE SUMMARY WILL NEED TO BE FAXED TO      Discharge Supplies: All DME supplied by PT/OT         Discharge Transportation: Spouse will provide by 11:00 am.      TERRIE Beltrán  [facility], Acute Inpatient Rehab Unit   [address] [place] [address], [address]th [place] [address]  Phone: [phone]  Fax: [phone]

## 2025-06-17 NOTE — DISCHARGE INSTRUCTIONS
Follow up appointments      Cystogram X-Ray  You are scheduled to have a cystogram X-Ray on June 23, 2025 at 11:00 am.    Address [address] [place] [address]   Phone  [phone]     Urology  You are scheduled to see Dr. Negron on June 23, 2025 at 12:15 pm.    Address [address] [place], [place] [address]   Phone  [phone]     PCP  You are scheduled to see Dr. Pedraza on June 23, 2025 at 3:00 pm.    Address [address] [facility] [address]   Phone  [phone]     Orthopedics  You are scheduled to see Dr. Bravo on July 3, 2025 at 2025.    Address [address] [place] [address]   Phone  [phone]     [facility]  [address] [facility] [address]  Tel:[phone]  Fax:[phone]  Nurse, physical therapy  -You will get a call after [facility] to schedule the first home care visit. The home health nurse should come out within the first 24-48 hours. If you don't get a call from them within the first 48 hours, please call to follow up.

## 2025-06-17 NOTE — PLAN OF CARE
Goal Outcome Evaluation:      Plan of Care Reviewed With: patient    Overall Patient Progress: improvingOverall Patient Progress: improving     Pt alert and oriented x4. Denies chest pain and shortness of breath. Michelle catheter in place, continent of bowel, LBM 6/16. Michelle cares done. PRN oxycodone and robaxin given x1 for pain. HUGO drain emptied for 25mL this shift. Pt sleeping in recliner. Able to make needs known. Call light within reach.

## 2025-06-17 NOTE — PROGRESS NOTES
Physical Therapy Discharge Summary    Reason for therapy discharge:    Discharged to home with home therapy.    Progress towards therapy goal(s). See goals on Care Plan [facility] for goal details.  Goals met    Therapy recommendation(s):    Continued therapy is recommended.  Rationale/Recommendations:   Pt progressed well on unit. Quickly reaching previous w/c based mod-I level and able to progress towards functional gait using R thigh strap to advance RLE with FWW, limited more by fatigue than pain. Discharge moved up as functional goals met for home and pt has more towards long-term goals than short-term goals. Family and pt in agreement of earlier discharge. HH PT follow-up, OP more appropriate once able to full WB..    Bed Mobility: SBA with HOB elevated, use of grab bar and leg  - sleeps in recliner at home  Transfer: Mod-I FWW - uses R thigh strap to manage RLE  Gait: Mod-I FWW 25' w/ R thigh strap to manage RLE  Stairs: Unable - not a goal this admission  Balance: Able to stand safely with UE support

## 2025-06-17 NOTE — PROGRESS NOTES
"                                                           ARU Social Work Progress Notes      met with patient and completed IRF. Patient is set to discharge tomorrow with home care services. Home Care information was reviewed with patient. Spouse will be providing transportation at discharge around 11:00 am.      IRF-JORGE Pain Assessment    Pain Effect on Sleep  \"Over the past 5 days, how much of the time has pain made it hard for you to sleep at night?\"    3. Frequently     Pain Interference with Therapy Activities  \"Over the past 5 days, how often have you limited your participation in rehabilitation therapy sessions due to pain?\"   1. Rarely or not at all    Pain Interference with Day-to-Day Activities  \"Over the past 5 days, how often have you limited your day-to-day activities (excluding rehabilitation therapy sessions) because of pain?\"   1. Rarely or not at all         Anna Russell [facility], Acute Inpatient Rehab Unit   [address] [place] [address], [address]th [place] [address]  Phone: [phone]  Fax: [phone]    "

## 2025-06-17 NOTE — PROGRESS NOTES
ARU Social Work Progress Notes     Home Care Referral Accepted by    [facility]  [address]  Tel:[phone]  Fax:[phone]  Nurse, physical therapy  -You will get a call after [facility] to schedule the first home care visit. The home health nurse should come out within the first 24-48 hours. If you don't get a call from them within the first 48 hours, please call to follow up.     Anna Russell [facility], Acute Inpatient Rehab Unit   [address] [place] [address], [address]th [place] [address]  Phone: [phone]  Fax: [phone]

## 2025-06-17 NOTE — PROGRESS NOTES
Assessment: Gilberto Lund is a 44 year old male s/p Right explant of hemipelvis cement spacer and and conversion to total hip arthroplasty utilizing custom hemipelvis implant on 6/4/25 with Dr. Bravo. Currently on ARU for further rehabilitation.       Today:   Prevena vac: Informed that prevena has stopped working. On arrival, prevena dressing is still intact but no suctioning noted. The prevena wound vac removed without difficulty and wound checked. Incision on the lower abdomen and the anterior upper thigh intact, dry, with no dehiscence, erythema drainage or swelling noted. Patient indicated that he is progressing in therapy, pain is under control and looking forward to discharging tomorrow.   Drain: The HUGO drain on the right lateral thigh with about 15 ml sanguinous drainage. HUGO drain per report and documentation was 75 ml this morning after prior recordings were lower. Continue to monitor the drainage output and will probably remove it tomorrow before discharge.  The post op appointment is Thursday Jul 3, 2025 10:05 AM with Dr. Bravo at the clinic.    Juwan Milton DNP-[facility]   Orthopaedic Surgery

## 2025-06-17 NOTE — PLAN OF CARE
Goal Outcome Evaluation:  Overall Patient Progress: no changeOverall Patient Progress: no change    Patient expressed frustration of untimely intervention of pain last night at 12mn. RN did not receive any report from any staff who answered pt's call light of requesting for pain medication at that time. During Rn safety rounds at 0025, pt appears asleep. Pt claims they fell asleep waiting for it. This morning, oxycodone and robaxin was administered together with scheduled tylenol per patient request. Educated staff that answered the call light to inform any RN on the floor to any pain medication requests for any patient calling for it. Apologized to pt for the delay and will make sure this doesn't happen again.

## 2025-06-17 NOTE — PLAN OF CARE
Goal Outcome Evaluation:      Plan of Care Reviewed With: patient    Overall Patient Progress: no changeOverall Patient Progress: no change       Orientation: Alert/oriented x4.   Bowel: Continent. LBM 6/16  Bladder: Michelle in place, draining appropriately  Pain: Patient has pain to R leg/hip rated 7/10. PRN oxy twice on my shift, robaxin once, and atarax once.   Ambulation/Transfers: Mod-I FWW - uses R thigh strap to manage RLE    Blood sugars: N/A  Diet/ Liquids: Regular diet, thin liquid, medication whole   Tubes/ Lines/ Drains:  Wound vac to R hip, and Abdomen removed this shift by ortho. HUGO drain, dark red bloody drainage.  Tube Feeding: N/A  Oxygen: on Room Air, denies chest pain, N/V, and SOB.   Skin: Incision to R hip, and abdomen covered by primapore dressing. HUGO drain to R hip to light suction. Compression stockings to bilateral LE.      Bed alarm on for safety, call light within reach. Continue with POC.

## 2025-06-18 VITALS
SYSTOLIC BLOOD PRESSURE: 120 MMHG | DIASTOLIC BLOOD PRESSURE: 71 MMHG | WEIGHT: 268.96 LBS | HEIGHT: 73 IN | HEART RATE: 78 BPM | BODY MASS INDEX: 35.65 KG/M2 | TEMPERATURE: 98 F | RESPIRATION RATE: 16 BRPM | OXYGEN SATURATION: 96 %

## 2025-06-18 PROCEDURE — 250N000013 HC RX MED GY IP 250 OP 250 PS 637: Performed by: PHYSICIAN ASSISTANT

## 2025-06-18 PROCEDURE — 250N000013 HC RX MED GY IP 250 OP 250 PS 637: Performed by: PHYSICAL MEDICINE & REHABILITATION

## 2025-06-18 RX ADMIN — Medication 1 TABLET: at 12:30

## 2025-06-18 RX ADMIN — APIXABAN 2.5 MG: 2.5 TABLET, FILM COATED ORAL at 08:46

## 2025-06-18 RX ADMIN — ACETAMINOPHEN 975 MG: 325 TABLET ORAL at 12:30

## 2025-06-18 RX ADMIN — GABAPENTIN 600 MG: 300 CAPSULE ORAL at 13:30

## 2025-06-18 RX ADMIN — OXYCODONE HYDROCHLORIDE 10 MG: 5 TABLET ORAL at 10:39

## 2025-06-18 RX ADMIN — POLYETHYLENE GLYCOL 3350 17 G: 17 POWDER, FOR SOLUTION ORAL at 08:46

## 2025-06-18 RX ADMIN — GABAPENTIN 600 MG: 300 CAPSULE ORAL at 08:46

## 2025-06-18 RX ADMIN — HYDROXYZINE HYDROCHLORIDE 25 MG: 25 TABLET, FILM COATED ORAL at 10:39

## 2025-06-18 RX ADMIN — FAMOTIDINE 10 MG: 10 TABLET ORAL at 08:46

## 2025-06-18 RX ADMIN — OXYCODONE HYDROCHLORIDE 5 MG: 5 TABLET ORAL at 06:03

## 2025-06-18 RX ADMIN — CEPHALEXIN 250 MG: 250 CAPSULE ORAL at 12:30

## 2025-06-18 RX ADMIN — ACETAMINOPHEN 975 MG: 325 TABLET ORAL at 06:03

## 2025-06-18 RX ADMIN — OXYCODONE HYDROCHLORIDE 10 MG: 5 TABLET ORAL at 13:30

## 2025-06-18 RX ADMIN — DOXYCYCLINE HYCLATE 100 MG: 50 CAPSULE ORAL at 08:45

## 2025-06-18 RX ADMIN — TOLTERODINE 4 MG: 4 CAPSULE, EXTENDED RELEASE ORAL at 08:45

## 2025-06-18 RX ADMIN — CEPHALEXIN 250 MG: 250 CAPSULE ORAL at 08:45

## 2025-06-18 RX ADMIN — METHOCARBAMOL 750 MG: 750 TABLET ORAL at 13:30

## 2025-06-18 RX ADMIN — COLCHICINE 0.6 MG: 0.6 TABLET, FILM COATED ORAL at 08:46

## 2025-06-18 ASSESSMENT — ACTIVITIES OF DAILY LIVING (ADL)
ADLS_ACUITY_SCORE: 55

## 2025-06-18 NOTE — PROGRESS NOTES
Orthopedic Surgery Progress Note 06/18/2025    S: Patient seen this morning. Resting comfortably in chair. Pain improving daily. Passing flatus, stooling. Denies numbness/tingling of RLE but does endorse burning pain radiating to the foot. Tolerating diet. Voiding via mccord. Patient will likely be discharging to home today.    O:  Temp: 98.2  F (36.8  C) Temp src: Oral BP: 120/72 Pulse: 88   Resp: 16 SpO2: 96 % O2 Device: None (Room air)      Exam:  Gen: alert, intubated but interactive  Resp: intubated, on ventilator  MSK:    RLE:  - Prevena dressing in place, maintaining suction, no output in canister  - SILT tibial/sural/saphenous/DP/SP nerves  - Fires quad, hamstring, TA, EHL, FHL, GaSC  - PT/DP pulses 2+, foot wwp    Drain output:   Output by Drain (mL) 06/16/25 0700 - 06/16/25 1459 06/16/25 1500 - 06/16/25 2259 06/16/25 2300 - 06/17/25 0659 06/17/25 0700 - 06/17/25 1459 06/17/25 1500 - 06/17/25 2259 06/17/25 2300 - 06/18/25 0556   Negative Pressure Wound Therapy Abdomen Anterior  0       Drain Closed/Suction 1 Right;Anterior Thigh Bulb 35 25 15 70 80          Recent Labs   Lab 06/16/25  0604 06/12/25  0540 06/11/25  0559   WBC 7.2 7.8 8.4   HGB 8.2* 7.9* 7.3*   * 466* 422       Culture results: NGTD    Assessment: Gilberto Lund is a 44 year old male s/p Right explant of hemipelvis cement spacer and and conversion to total hip arthroplasty utilizing custom hemipelvis implant on 6/4/25 with Dr. Bravo.     Today:  - Will remove drain prior to discharge pending discussion with Dr. Bravo    Plan:  Activity: Up with assist when extubated and able. Anterior and posterior hip precautions  Weight bearing status: TTWB RLE x 3 months  Antibiotics: extended oral antibiotics (2 weeks of cefadroxil + doxycycline)  Diet: Progress diet as tolerated.  DVT prophylaxis: SCDs and mechanical while in the hospital. Eliquis 2.5 mg BID starting POD1 x 4 weeks.   Elevation: Elevate operative extremity as tolerated.   Wound  Care: Keep Prevena dressing in place until POD #14  Drains: Please document output per shift, discontinue 15f HUGO drain in right thigh per ortho, management of left flank 19f drain per urology  Mccord: Keep in place for 2 weeks, management of mccord per urology  Pain management: Utilize all oral meds first, IV meds for severe breakthrough pain after PO meds given adequate time to take effect.  X-rays: Ordered, AP pelvis and cross-table lateral.  Therapy: PT/OT evaluate prior to discharge.  Cultures: Pending, follow culture results closely.  Consults: PT, OT, medicine, ICU team, Urology, Cardiology. Appreciate assistance in caring for this patient    Shiva Toribio MD  Adult Reconstruction Resident  06/18/2025

## 2025-06-18 NOTE — PLAN OF CARE
Goal Outcome Evaluation:      Plan of Care Reviewed With: patient    Overall Patient Progress: improvingOverall Patient Progress: improving       Orientation: Patient is Alert/oriented x4.   Bowel: Continent. LBM 6/18  Bladder: Michelle in place draining appropriately.   Pain: Patient expressed pain to R hip/leg 7/10 PRN oxycodone provided x2 with good relief.   Ambulation/Transfers:Mod-I FWW - uses R thigh strap to manage RLE   Diet/ Liquids: Regular diet, thin liquds, medication whole.   Tubes/ Lines/ Drains:  Michelle in place. Catheter cares complete. Catheter care education provided to patient and family, verbalized understanding.   Oxygen: on Room Air, Denies chest pain, N/V, and SOB.   Skin:  Surgical incision to abdomen, and R leg SKIP. HUGO drain removed this shift by ortho.     Patient discharged home with ride by family at 1:45. All belongings left with patient. AVS printed and instructions provided to patient regarding medication. Patient and spouse verbalized understanding. No further questions at this time.

## 2025-06-18 NOTE — DISCHARGE SUMMARY
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   Acute post-operative pain  Suspected pericarditis  Hx bilateral lower extremity DVT (3/2025)  Acute blood loss anemia  Intra-op bladder tear s/p repair, mccord in place  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 Bravo c/b bladder tear s/p repair.     Hospital course was further complicated by multifactorial shock, ST elevations and elevated CRP concerning for possible pericarditis, acute post-operative pain, acute blood loss anemia, and indigestion.     During acute hospitalization, patient was seen and evaluated by PT and OT, who collectively recommended that patient would benefit from ongoing therapies in the acute inpatient rehabilitation setting.     He was admitted to ARU on 6/11/25 for multidisciplinary rehabilitation and ongoing medical management.  His ARU course was relatively uncomplicated though with some ongoing pain and recurrent hematuria (since resolved).  He made good  progress with therapies and is discharging home with home care nursing and PT.  See below for details regarding rehab and medical course at ARU.    REHABILITATION COURSE  PT: Discharged to home with home therapy.     Progress towards therapy goal(s). See goals on Care Plan in [facility] electronic health record for goal details.  Goals met     Therapy recommendation(s):    Continued therapy is recommended.  Rationale/Recommendations:   Pt progressed well on unit. Quickly reaching previous w/c based mod-I level and able to progress towards functional gait using R thigh strap to advance RLE with FWW, limited more by fatigue than pain. Discharge moved up as functional goals met for home and pt has more towards long-term goals than short-term goals. Family and pt in agreement of earlier discharge. HH PT follow-up, OP more appropriate once able to full WB..     Bed Mobility: SBA with HOB elevated, use of grab bar and leg  - sleeps in recliner at home  Transfer: Mod-I FWW - uses R thigh strap to manage RLE  Gait: Mod-I FWW 25' w/ R thigh strap to manage RLE  Stairs: Unable - not a goal this admission  Balance: Able to stand safely with UE support    OT: Discharged to home with spouse A, home therapy RN and PT.     Progress towards therapy goal(s). See goals on Care Plan in [facility] electronic health record for goal details.  Goals met     Current Status:  ADLs:  Mobility: Mod I FWW and R leg strap  Grooming: IND seated.  Dressing: UB IND seated; LB - Mod I with AE. Footwear - Mod I with AE.  Bathing: Mod I with either lateral scoot or FWW pivot to ETB. Mod I tasks with set up.  Toileting: Michelle. Transfer Mod I FWW and R leg strap <> commode overlay. Mod I cares.  IADLs: Mod IND PTA; spouse A at discharge.  Vision/Cognition: WNL; good at directing cares and set up.     Therapy recommendation(s):    Pt discharged Mod I wc-based and short distance amb FWW with RLE leg strap. Has good support from spouse at home. Per pt  "preference and in agreement with therapy, plan for HC RN and PT at discharge. Aware that OP OT referral available through PCP if desired once weightbearing progressed and further IADL and return to community pursuits needs arise.         Precautions: falls, HUGO drain & Michelle & wound vac, RLE (see below)     RLE DEVONTE and custom hemipelvis implant:  1) TTWB x 3 months  2) Anterior hip precautions (no ER, no hyperextension, no ABDuction, no pivoting/twisting) + no hip flexion > 90     Spouse providing A prn while working from home since prior hospitalization.  Showers at Y d/t non accessible bathroom.     DME: W/c, hospital bed, ramped entrance, platform commode, FWW, leg strap.      MEDICAL COURSE    S/p explant hemipelvis cement spacer and conversion to total hip arthroplasty utilizing custom hemipelvis implant on 6/4/2025 with Dr. 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 Bravo   Acute post-op pain  - Ortho resident evaluated 6/13 no changes to plan of care were noted.  They confirmed that should be on anterior hip precautions, but then modified note to say \"anterior and posterior\" precautions on 6/18.  - TTWB RLE x3 months  - Activity restrictions: anterior and posterior hip precautions  - Wound care: Prevena vac removed on 6/17.  Ok to leave open to air or cover with dry gauze for comfort  - Continue palmira-op antibiotics with cefadroxil 500 mg BID x 2 weeks and Doxycycline 100 mg po BID x 2 weeks per ortho (starting 6/11, had been on Ancef palmira-op)   - Pain management: Tylenol 975 mg q8h, robaxin 750 mg QID PRN, gabapentin 600 mg TID (increased from PTA dose on 6/5), atarax 25 mg q6h PRN, oxycodone 5-10 mg q4h PRN.  Wean opioids as able  - R thigh HUGO drain to be removed prior to discharge per ortho  - DVT prophylaxis with apixaban 2.5 mg BID x4 wks post-op per ortho (hx BLE DVT 3/2025, high risk for bleeding)  - Continue PT/OT  - Follow up with Dr. Bravo as " scheduled on 7/3     Suspected pericarditis  Noted to have ST elevations on EKG post-operatively.  Troponin mildly elevated but flat.  Evaluated by cardiology in consult.  Though patient asymptomatic, with diffuse ST elevation and elevated CRP, concern for possible pericarditis.  Recommended short course of colchicine and consider ibuprofen if symptomatic.  - Continue colchicine for 3 months  - Per cardiology: If patient begins to have chest pain and ok from surgical standpoint, could start Ibuprofen 600 mg TID for 1-2 weeks (until pain resolves). Would then decrease Ibuprofen dose by 200 mg weekly until off (400 mg TID X 1 week -> 200 mg TID X 1 week -> off).      Hx bilateral lower extremity DVT  Noted 3/19/25 on U/S; bilateral occlusive thrombi in peroneal veins; in setting of hemipelvectomy 2/14/25.   Remained unchanged on ultrasound.  PTA on apixaban 5 mg BID.  Per ortho recs, this was stopped 3 days prior to surgery and resumed at reduced dose post-op.  - On POD1, started on apixaban 2.5 mg BID per ortho due to concern for elevated bleeding risk, especially in setting of colchicine as above.  - Follow up with ortho to determine ongoing need/duration  - Monitor for s/sx of recurrent thrombi     Acute blood loss anemia  Pre-op Hgb ~13, dropped to 6.7 post-op and received 2 units pRBC.  Estimated intra-op blood loss 2.7L.  As of admission to ARU, Hgb stable at ~7.  Remains uptrending, 8.2 on last check 6/16.  - Follow up with PCP in 1-2 weeks, repeat CBC at that time     Intra-op bladder tear s/p repair  Hematuria  Hematuria developed overnight 6/15.  No known trauma and Hgb stable as above.  Discussed with urology who reviewed photo of Mccord/tubing.  PVR checked, was 0 ml.  UA and UCx also ordered.  Otherwise, no other interventions at this time.  Continue to monitor.  Upon re-evaluation later in the morning, hematuria in Mccord tubing had resolved and no further episodes since.   - Continue mccord   - Continue  tolterodine ER 4 mg daily  - Plan for outpatient follow up with urology in 2 weeks (scheduled 6/23) with cystogram prior to trial of void.       Hx testicular cancer (NSGCT) s/p left orchiectomy and chemotherapy (2008)   Chemo-induced polyneuropathy  - Continue gabapentin (PTA dose increased as above)     Indigestion  Started on Pepcid while inpatient  - Continue Pepcid 10 mg BID, can be stopped or changed to PRN if no ongoing symptoms    DISCHARGE MEDICATIONS  Current Discharge Medication List        START taking these medications    Details   apixaban ANTICOAGULANT (ELIQUIS) 2.5 MG tablet Take 1 tablet (2.5 mg) by mouth 2 times daily. Discuss with orthopedics at your appointment on 7/3/25 regarding increasing back up to 5 mg twice per day.  Qty: 30 tablet, Refills: 0    Associated Diagnoses: Status post total hip replacement, right      cephALEXin (KEFLEX) 250 MG capsule Take 1 capsule (250 mg) by mouth 4 times daily for 8 days.  Qty: 32 capsule, Refills: 0    Associated Diagnoses: Status post total hip replacement, right      oxyCODONE (ROXICODONE) 5 MG tablet Take 1-2 tablets (5-10 mg) by mouth every 4 hours as needed for moderate to severe pain.  Qty: 25 tablet, Refills: 0    Associated Diagnoses: Status post total hip replacement, right      senna-docusate (SENOKOT-S/PERICOLACE) 8.6-50 MG tablet Take 1 tablet by mouth at bedtime.  Qty: 30 tablet, Refills: 0    Associated Diagnoses: Status post total hip replacement, right           CONTINUE these medications which have CHANGED    Details   acetaminophen (TYLENOL) 325 MG tablet Take 3 tablets (975 mg) by mouth every 8 hours.    Associated Diagnoses: Status post total hip replacement, right      colchicine (COLCRYS) 0.6 MG tablet Take 1 tablet (0.6 mg) by mouth 2 times daily. Recommendation for 3 month course  Qty: 60 tablet, Refills: 2    Associated Diagnoses: Chondrosarcoma (H)      doxycycline hyclate (VIBRAMYCIN) 100 MG capsule Take 1 capsule (100 mg) by  mouth 2 times daily for 8 days.  Qty: 16 capsule, Refills: 0    Associated Diagnoses: Status post total hip replacement, right      famotidine (PEPCID) 10 MG tablet Take 1 tablet (10 mg) by mouth 2 times daily.  Qty: 60 tablet, Refills: 0    Associated Diagnoses: Chondrosarcoma (H)      gabapentin (NEURONTIN) 300 MG capsule Take 2 capsules (600 mg) by mouth 3 times daily.  Qty: 180 capsule, Refills: 0    Associated Diagnoses: Chondrosarcoma (H)      hydrOXYzine HCl (ATARAX) 25 MG tablet Take 1 tablet (25 mg) by mouth every 6 hours as needed for other (adjuvant pain).  Qty: 30 tablet, Refills: 0    Associated Diagnoses: Chondrosarcoma (H)      methocarbamol (ROBAXIN) 750 MG tablet Take 1 tablet (750 mg) by mouth 4 times daily as needed for muscle spasms.  Qty: 30 tablet, Refills: 0    Associated Diagnoses: Status post surgery; Primary chondrosarcoma of bone of pelvis (H)      multivitamin w/minerals (THERA-VIT-M) tablet Take 1 tablet by mouth daily.  Qty: 30 tablet, Refills: 0    Associated Diagnoses: Chondrosarcoma (H)      polyethylene glycol (MIRALAX) 17 GM/Dose powder Take 17 g by mouth daily.  Qty: 510 g, Refills: 0    Associated Diagnoses: Status post total hip replacement, right      tolterodine ER (DETROL LA) 4 MG 24 hr capsule Take 1 capsule (4 mg) by mouth daily.  Qty: 30 capsule, Refills: 0    Associated Diagnoses: Status post total hip replacement, right; S/P bladder repair           STOP taking these medications       cefadroxil (DURICEF) 500 MG capsule Comments:   Reason for Stopping:         simethicone (MYLICON) 80 MG chewable tablet Comments:   Reason for Stopping:                 DISCHARGE INSTRUCTIONS AND FOLLOW UP  Discharge Procedure Orders   Primary Care - Care Coordination Referral   Standing Status: Future   Referral Priority: Routine: Next available opening Referral Type: Care Coordination   Number of Visits Requested: 1     Home Care Referral   Referral Priority: Routine: Next available  opening Referral Type: Home Health Therapies & Aides   Number of Visits Requested: 1     When to contact your care team   Order Comments: Call your primary doctor if you have any of the following: Chest pain, shortness of breath, fever of 101 or greater, opening or drainage from your surgical incision, redness around your surgical incision, lightheadedness or dizziness, or any other symptom you may find concerning.     Discharge Instructions   Order Comments: You may call the [facility] unit at [phone] if you have any questions or concerns after you discharge home.     Tubes and Drains   Order Comments: Current Tubes and Drains:  Mccord catheter    CARING FOR YOUR INDWELLING (MCCORD) CATHETER     You have an indwelling urinary catheter (also known as a mccord catheter).    A catheter is a thin, flexible tube that has two parts.  The first part is   the catheter that empties your bladder.  The second part is the tubing and   drainage bag that connects to your catheter.      How to drain the urine from the drainage bag (leg bag or overnight bag)     Wash your hands with soap and water or an alcohol based hand    The urine collection bag has a drainage tube at the bottom of the bag.    Hold this drainage tube over a toilet or measuring container  Unclamp the tube and let the urine drain   Avoid touching the tip of drainage tube. Make sure tip of drainage tube   does not touch toilet, floor or other surfaces to prevent infection.   After urine is completely drained, re-clamp the tube     How to switch from overnight bag to leg bag  It is possible that you will be given two different drainage bags.  The   smaller leg bag should be attached to your calf and can easily be hidden   under your pants.  This small bag is best to use if walking.  The larger   bag (overnight bag) can hold more urine and is best for nighttime   drainage.  This large bag must hang off the side of the bed to drain by    gravity properly.   Disconnect the overnight bag from the mccord catheter by rolling the   catheter upward off of the plastic tubing with your thumb  Wipe the tip of the leg bag tubing with alcohol swab and insert it into   the mccord catheter.   Attach the leg bag to your calf with straps provided.  Do NOT attach leg   bag to your upper thigh  Assure the clamp on your leg bag is closed to prevent urine from leaking   out.      How to prevent infection and clean catheter and bags  Wash your hands when dealing with your catheter  It is ok to shower with a catheter. Remove any fabric straps to keep them   dry.   Wash the tip of the penis or vagina with soap and water daily   Use bacitracin ointment (over the counter) twice a day to the tip of the   penis for comfort  You do not need to clean the drainage bags if using them for a short time.    If using a catheter for greater than 1 month, a new catheter, tubing and   bag should be used.     When to seek medical care  Catheter is not draining into the bag and you have bladder fullness,   pressure or pain  Painful, uncontrolled bladder spasms   Blood clots passing around the catheter.  Blood in the urine after   catheter placement can be expected, but if the urine is so dark that it is   not transparent in the tubing, call your doctor.   Fever > 100.4, shaking chills, nausea or vomiting       Order Specific Question Answer Comments   If tubes and drains present: Continue at discharge      Reason for your hospital stay   Order Comments: You were admitted for rehabilitation after R total hip replacement     ADULT [facility] Specialty Follow-up and recommended labs and tests   Order Comments: Additional follow-up to include:  1) ortho (Dr. Bravo) on 7/3  2) urology (scheduled 6/23 with cystogram)    Appointments on [place] and/or [facility] (with [facility] or [facility] provider or service). Call [phone] if you haven't heard regarding these appointments within 7 days of  discharge.     Wound care and dressings   Order Comments: Instructions to care for your wound at home:   Site: lower abdomen, right leg  Instructions: ok to leave open to air or can be covered with dry gauze for comfort     Activity   Order Comments: Your activity upon discharge: activity as tolerated and no driving for today.  Toe-touch weight bearing to the right leg.  Continue your hip precautions until cleared by your surgeon.  These include: no external rotation (turning toes outward), no flexion >90 degrees (bending forward or bringing knee up past 90 degrees), no hyperextension (extending leg behind you), no pivoting/twisting.     Order Specific Question Answer Comments   Is discharge order? Yes      Diet   Order Comments: Follow this diet upon discharge: Regular Diet; Thin Liquids     Order Specific Question Answer Comments   Is discharge order? Yes      Hospital Follow-up with Existing Primary Care Provider (PCP)   Standing Status: Future Standing Exp. Date: 07/17/25   Order Comments:       Order Specific Question Answer Comments   Schedule Primary Care visit within 7 Days           PHYSICAL EXAMINATION    Most recent Vital Signs:   Vitals:    06/17/25 0706 06/17/25 0913 06/17/25 1519 06/18/25 0817   BP: 124/68 113/73 120/72 120/71   BP Location: Right arm Right arm Right arm Right arm   Pulse: 74 80 88 78   Resp: 16 16 16 16   Temp: 98.1  F (36.7  C) 98  F (36.7  C) 98.2  F (36.8  C) 98  F (36.7  C)   TempSrc: Oral Oral Oral Oral   SpO2: 99% 98% 96% 96%   Weight:       Height:           Gen: NAD, seated upright in chair  HEENT: NC/AT, MMM  Pulm: non-labored on room air  Abd: non-distended, mccord catheter in place draining yellow urine   Extr: 2+ edema in RLE, 1+ in LLE, HUGO drain present with serosanguinous output to bulb  Neuro/MSK: awake, alert, responds appropriately, follows commands    35 minutes spent in discharge, including >50% in counseling and coordination of care, medication review and plan of  care recommended on follow up.     Discharge summary was forwarded to Haile Pedraza (PCP) at the time of discharge, so as to bridge from hospital to outpatient care.     It was our pleasure to care for Gilberto Lund during this hospitalization. Please do not hesitate to contact me should there be questions regarding the hospital course or discharge plan.          Alena Gomez PA-C  Physical Medicine & Rehabilitation

## 2025-06-18 NOTE — PROGRESS NOTES
Assessment: Gilberto Lund is a 44 year old male s/p Right explant of hemipelvis cement spacer and and conversion to total hip arthroplasty utilizing custom hemipelvis implant on 6/4/25 with Dr. Bravo. Currently on ARU for further rehabilitation.     Today: Patient is alert and NAD. Spouse present.  Drain on the right lateral thigh removed without difficulty. Drain site is intact with no active drainage from site post drain removal. Dry dressing applied to old drain site.   All suture tails cut. Discharging home today.   The post op appointment is Thursday Jul 3, 2025 10:05 AM with Dr. Bravo at the clinic.     Juwan Milton DNP-[facility]   Orthopaedic Surgery

## 2025-06-18 NOTE — PLAN OF CARE
Goal Outcome Evaluation:  [id number]    Plan of Care Reviewed With: patient    Overall Patient Progress: no changeOverall Patient Progress: no change     ..Orientation: A&Ox4, able to make needs known  Bowel: Continent  LBM: 6/17 per pt report  Bladder: Mccord  Pain: 6/10 hip pain, PRN oxy and robaxin given with some relief  Ambulation/Transfers: MOD-I FWW  Diet/Liquids: Regular, thin, takes pills whole  Tubes/Lines/Drains: eileen drain, mccord  Skin: eileen drain, compression stockings BUE  Other: discharge 6/18 to home. Call light within reach, no complaints at this time.

## 2025-06-19 ENCOUNTER — PRE VISIT (OUTPATIENT)
Dept: UROLOGY | Facility: CLINIC | Age: 45
End: 2025-06-19
Payer: COMMERCIAL

## 2025-06-19 ENCOUNTER — PATIENT OUTREACH (OUTPATIENT)
Dept: CARE COORDINATION | Facility: CLINIC | Age: 45
End: 2025-06-19
Payer: COMMERCIAL

## 2025-06-19 NOTE — PROGRESS NOTES
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  He stated he has all of his medications and denies any questions.  He states he is managing his mccord catheter independently and also denies any questions.  He has not heard from Home Care yet.  Writer informed him I would Mychart him his agenices contact info as I couldn't locate it while we were on the phone.  If he didn't hear from them by Saturday to call them.  He stated an understanding.  After call ended writer Ryleeharted [facility]  Tel:[phone]  Jeremiah denied any CCC needs.  Confirmed all upcoming appointments.  How are your symptoms? (Red Flag symptoms escalate to triage hotline per guidelines): Improved  Do you know how to contact your clinic care team if you have future questions or changes to your health status? : Yes  Does the patient have their discharge instructions? : Yes  Does the patient have questions regarding their discharge instructions? : No  Were you started on any new medications or were there changes to any of your previous medications? : Yes  Does the patient have all of their  medications?: Yes  Do you have questions regarding any of your medications? : No  Do you have all of your needed medical supplies or equipment (DME)?  (i.e. oxygen tank, CPAP, cane, etc.): Yes    Post-op ([facility] CTA Only)  If the patient had a surgery or procedure, do they have any questions for a nurse?: No    Post-op (Clinicians Only)  Did the patient have surgery or a procedure: Yes  Incision: closed, healing  Drainage: No  Fever: No  Chills: No  Incision site pain: Yes  Closure: suture  Eating & Drinking: eating and drinking without complaints/concerns  PO Intake: regular diet  Bowel Function: normal  Urinary Status: indwelling urinary catheter        Follow up Plan     Discharge Follow-Up  Discharge follow up appointment scheduled in alignment with recommended follow up timeframe or Transitions of Risk Category? (Low = within 30 days; Moderate= within 14 days; High= within 7 days): Yes  Discharge Follow Up Appointment Date: 06/23/25  Discharge Follow Up Appointment Scheduled with?: Specialty Care Provider    Future Appointments   Date Time Provider Department Center   6/23/2025 11:00 AM [id number]   6/23/2025 12:15 PM Rolando Negron MD [facility]   6/23/2025  3:00 PM Haile Pedraza,  [facility]   7/3/2025 10:20 AM Juan Jose Bravo MD [facility]       Outpatient Plan as outlined on AVS reviewed with patient.    For any urgent concerns, please contact our 24 hour nurse triage line: [phone] ([phone])       Milagros Davison RN

## 2025-06-19 NOTE — OP NOTE
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 Bravo  Complex cystorrhaphy (81626-95)    Co-surgeon statement: Dr Lyman and Lawrence worked as co-surgeons on the hip and pelvis replacement. Dr. Bravo is an expert in inserting the prosthesis; however this patients prior surgery had made several pelvic organs adherent to the body wall. These were in the way of the path through which he needed to pass the prosthesis. Dr. Lyman's skills as a complex pelvic surgeon were required to make a safe passage for the prosthesis.     Modifier 22 for complex cystotomy repair. While 99222 denotes a complex cystorrhaphy, this was a particularly challenging repair even in the context of a reoperative case. Due to the prior hemipelvectomy, the patient's pelvic and retroperitoneal anatomy was highly distorted with extreme fibrosis of the bladder in an extra-anatomical position. This led in part to the injury and also significantly complicated our repair. We spent more than 2 hours solely mobilizing the bladder and exposing two distinct cystotomies sufficiently for primary repair, which was separate     STAFF SURGEON: Cheng Lyman MD  FELLOW: Rolando Negron MD; Gilberto Gomes MD  RESIDENT: Carissa Vanessa MD    ANESTHESIA: General  ESTIMATED BLOOD LOSS: 2700 mL; 200 mL for urologic portion  COMPLICATIONS: None.   SPECIMEN: None for urologic portion.  IMPLANTS: 20F urethral Michelle catheter, HUGO drain in pelvis    SIGNIFICANT FINDINGS:   Bladder densely adherent to the remaining pubis and right pelvic sidewall  Two separate cystotomies identified: (1) lateral puncture  from instrument passage (2) right anterior dome cystotomy during pelvic dissection  The two cystotomies were connected and the resultant defect closely primarily in two layers  Additional pelvic and right retroperitoneal dissection was performed to aid orthopedic surgery with positioning of custom pelvic/hip hardware    BRIEF OPERATIVE INDICATIONS: Gilberto Lund is a(n) 44 year old male who presented with with a remote history of testicular cancer s/p left orchiectomy (pathology NSGCT) and chemotherapy. He was recently found to have a chondrosarcoma of the right innominate bone and underwent a modified right hemipelvectomy with orthopedic surgery (2/14/25). Urology was present at that time to assist with mobilizing and protecting the pelvic contents including the bladder, prostate, urethra, spermatic cord, and external and internal iliac vasculature. He returns today for pelvic reconstruction with the orthopedic surgery team. We were consulted intraoperatively due to concern for a cystotomy. As such, we were unable to obtain informed consent preoperatively.    DESCRIPTION OF PROCEDURE:    We were called in to the case after the surgery had been underway for a few hours. The prior hardware and antibiotic spacer had been removed.     At the time of our involvement the patient was under general anesthesia in a modified supine position with access to the right lower extremity. A Pfannenstiel incision had been made with superficial exposure of the pelvis and rectus muscles. Through this incision a roughly 3 cm cystotomy was apparent at the right dome of the bladder through which the urethral Michelle catheter was visible. However, the bladder was notably highly adherent to the surrounding structures and exposure was limited.    We therefore began by extending the Pfannenstiel incision and taking down remaining attachments of the bladder to the posterior aspect of the rectus muscles. A Bookwalter retractor was set to  further expose the pelvis. The bladder was markedly adherent to the pubis and right pelvic sidewall. This appeared to have resulted from herniation to occupy the space previously occupied by the right pubic bone. We therefore meticulously dissected the bladder away from the right hemipelvis and internal iliac vasculature.     In the course of this mobilization we noted that there were two separate cystotomies - first, an anterior dome cystotomy made during entry via the Pfannenstiel incision. Secondly, the bladder had been punctured from the right pelvic window during passage of an instrument. The orientation of the puncture cystotomy was such that this would be quite difficult to repair primarily. As such, we incised the bladder in between the two to connect them and create one large cystotomy roughly 6 cm in size.     We subsequently closed the cystotomy in two layers with 3-0 and 2-0 PDS. The ureteral orifices were identified and spared during this closure. The bladder was backfilled and the repair was water-tight. The urethral Michelle catheter was exchanged for a 20F to allow for better drainage during the postoperative period.    We then continued our pelvic and retroperitoneal dissection to work with Dr. Bravo in making adequate space for a custom pelvic/hip prosthetic. Due to the intense fibrosis surrounding the internal iliac vasculature and right hemipelvis as a result of the prior dissection and resection, there was insufficient space for the custom prosthetic initially and this required both resecting part of the prosthetic and freeing significant scar. Much of this was over the iliac vasculature and along the pelvic sidewall, which required extensive expertise and Dr. Lyman as a co-surgeon, due to his skill in pelvic organ dissection. Ultimately, we were able to mobilize sufficient space to accommodate the prosthetic and left the orthopedic team to secure this in place. Throughout this process, we also  protected the prostate and newly repaired bladder from further injury. Please see their separately dictated operative report for further details.    Rolando Negron MD  Genitourinary Reconstructive Surgery Fellow    As the attending surgeon I, Cheng Lyman, was present and scrubbed throughout the procedure.

## 2025-06-19 NOTE — TELEPHONE ENCOUNTER
Reason for visit: post op     Relevant information: bladder repair     Records/imaging/labs/orders: all records available     Pt called: No need for a call    At Rooming: kandis Oliveros  6/19/2025  11:37 AM

## 2025-06-21 NOTE — PROGRESS NOTES
UROLOGY OUTPATIENT CLINIC NOTE    Name: Gilberto Lund    MRN: [id number]   YOB: 1980  Date of Encounter: 06/23/25              History of Present Illness:   Mr. Gilberto Lund is a 44 year old male seen for follow-up.    He has a remote history of left orchiectomy and chemotherapy for metastatic NSGCT. There are discrepancies in his record as to whether he received radiation as well - I confirmed with him that he did not receive radiation.    2/14/25: modified right hemipelvectomy, retroperitoneal dissection by orthopedics (Dr. Bravo). Urology was present to help with mobilization and protection of the bladder, prostate, seminal vesicles, and internal pelvic anatomy necessary to complete the procedure.    6/3/25: patient was brought back by orthopedics for custom right pelvic/hip replacement. Intraoperatively two cystotomies were made and Urology was consulted - we ended up helping with the majority of the pelvic dissection to re-establish the anatomy and repair the cystotomy.  Bladder densely adherent to the remaining pubis and right pelvic sidewall  Two separate cystotomies identified: (1) lateral puncture from instrument passage (2) right anterior dome cystotomy during pelvic dissection  The two cystotomies were connected and the resultant defect closely primarily in two layers  Additional pelvic and right retroperitoneal dissection was performed to aid orthopedic surgery with positioning of custom pelvic/hip hardware    6/23/25: here for cystogram and TOV. Images show a well-healed bladder with right vesicoureteral reflux. There is some irregularity to the bladder wall along the right side which likely reflects the dissection and nature of the repair - no leak. He voided without difficulty after catheter removal.         Past Medical History:     Past Medical History:   Diagnosis Date    Arthritis     Chondrosarcoma (H)     CTS (carpal tunnel syndrome)     Gynecomastia, male     H/O  chondrosarcoma     Hard to intubate 06/29/2022    Infection due to 2019 novel coronavirus 12/2020    Kidney stone 2015    90% calcium oxalate monohydrate, and  10% calcium oxalate dihydrate.    Primary chondrosarcoma of bone of pelvis (H)     Psoriasis     Skin tag     Testicle cancer, left 2008    surgery and chemotherapy    Thyroid nodule 12/10/2024    high FDG, noted on PET          Past Surgical History:     Past Surgical History:   Procedure Laterality Date    ABDOMEN SURGERY      ARTHROPLASTY REVISION HIP Right 6/3/2025    Procedure: CUSTOM PELVIC AND HIP REPLACEMENT;  Surgeon: Juan Jose Bravo MD;  Location: [facility]    BIOPSY      BIOPSY BONE PELVIS Right 12/6/2024    Procedure: BIOPSY, BONE, PELVIS;  Surgeon: Juan Jose Bravo MD;  Location: [facility]    EXCISE EXOSTOSIS FOOT  10/25/2013    Procedure: EXCISE EXOSTOSIS FOOT;  Excision of calcaneal bone cyst left foot with allograft;  Surgeon: Marcello Jensen DPM;  Location: [facility] OR    LAPAROSCOPIC CHOLECYSTECTOMY N/A 06/29/2022    Procedure: CHOLECYSTECTOMY, LAPAROSCOPIC;  Surgeon: Cholo Agustin DO;  Location: [facility]    OPTICAL TRACKING SYSTEM HEMIPELVECTOMY Right 2/14/2025    Procedure: 1. HEMIPELVECTOMY, MODIFIED INTERNAL, 2. Right hip girdlestone resection, 3. Right  RETROPERITONEAL DISSECTION;  Surgeon: Juan Jose Bravo MD;  Location: [facility]    ORCHIECTOMY SCROTAL Left     REMOVE ANTIBIOTIC CEMENT BEADS / SPACER HIP Right 6/3/2025    Procedure: REMOVAL ANTIBIOTIC SPACER, Pelvis;  Surgeon: Juan Jose Bravo MD;  [facility]: [facility]    REMOVE HARDWARE ANTERIOR PELVIS Right 6/3/2025    Procedure: REMOVAL, HARDWARE, PELVIS, ANTERIOR APPROACH;  Surgeon: Juan Jose Bravo MD;  [facility]: [facility]    REPAIR BLADDER N/A 6/3/2025    Procedure: COMPLEX BLADDER REPAIR;  Surgeon: Cheng Lyman MD;  Location: [facility] OR          Social History:     Social History     Tobacco Use    Smoking status: Former     Types: Cigarettes     Passive exposure: Past    Smokeless  tobacco: Former     Types: Chew   Substance Use Topics    Alcohol use: Not Currently          Family History:     Family History   Problem Relation Age of Onset    No Known Problems Mother     Hypertension Father     Atrial fibrillation Father     Thyroid Disease Father     Nephrolithiasis Father     Pulmonary Embolism Father     C.A.D. Maternal Grandfather     Thyroid Cancer No family hx of     Diabetes No family hx of     Anesthesia Reaction No family hx of           Allergies:   No Known Allergies       Medications:     Current Outpatient Medications   Medication Sig Dispense Refill    acetaminophen (TYLENOL) 325 MG tablet Take 3 tablets (975 mg) by mouth every 8 hours.      apixaban ANTICOAGULANT (ELIQUIS) 2.5 MG tablet Take 1 tablet (2.5 mg) by mouth 2 times daily. Discuss with orthopedics at your appointment on 7/3/25 regarding increasing back up to 5 mg twice per day. 30 tablet 0    cephALEXin (KEFLEX) 250 MG capsule Take 1 capsule (250 mg) by mouth 4 times daily for 8 days. 32 capsule 0    colchicine (COLCRYS) 0.6 MG tablet Take 1 tablet (0.6 mg) by mouth 2 times daily. Recommendation for 3 month course 60 tablet 2    doxycycline hyclate (VIBRAMYCIN) 100 MG capsule Take 1 capsule (100 mg) by mouth 2 times daily for 8 days. 16 capsule 0    famotidine (PEPCID) 10 MG tablet Take 1 tablet (10 mg) by mouth 2 times daily. 60 tablet 0    gabapentin (NEURONTIN) 300 MG capsule Take 2 capsules (600 mg) by mouth 3 times daily. 180 capsule 0    hydrOXYzine HCl (ATARAX) 25 MG tablet Take 1 tablet (25 mg) by mouth every 6 hours as needed for other (adjuvant pain). 30 tablet 0    methocarbamol (ROBAXIN) 750 MG tablet Take 1 tablet (750 mg) by mouth 4 times daily as needed for muscle spasms. 30 tablet 0    multivitamin w/minerals (THERA-VIT-M) tablet Take 1 tablet by mouth daily. 30 tablet 0    oxyCODONE (ROXICODONE) 5 MG tablet Take 1-2 tablets (5-10 mg) by mouth every 4 hours as needed for moderate to severe pain. 25  "tablet 0    polyethylene glycol (MIRALAX) 17 GM/Dose powder Take 17 g by mouth daily. 510 g 0    senna-docusate (SENOKOT-S/PERICOLACE) 8.6-50 MG tablet Take 1 tablet by mouth at bedtime. 30 tablet 0    tolterodine ER (DETROL LA) 4 MG 24 hr capsule Take 1 capsule (4 mg) by mouth daily. 30 capsule 0     No current facility-administered medications for this visit.          Review of Systems:    ROS: 14-point review of systems was negative aside from that noted in the HPI. Additional pertinent positives are listed below.          Physical Exam:   /86 (BP Location: Right arm, Patient Position: Sitting, Cuff Size: Adult Regular)   Pulse 97   Ht 1.854 m (6' 1\")   Wt 113.4 kg (250 lb)   SpO2 97%   BMI 32.98 kg/m    Estimated body mass index is 35.49 kg/m  as calculated from the following:    Height as of 6/11/25: 1.854 m (6' 1\").    Weight as of 6/11/25: 122 kg (268 lb 15.4 oz).  General: Pleasant male in no apparent distress.  Resp: No respiratory distress  CV: RRR  Abdomen: Soft, nontender, nondistended. Pfannenstiel incision healing well.       Imaging:    All imaging reviewed with patient.    Cystogram (6/23/25): no leak. Right VUR. Irregular contour of bladder but no leakage.      Outside records:    I spent 10 minutes reviewing outside records.         Assessment and Plan:   44 year old male with metastatic NSGCT of left testis s/p left orchiectomy and adjuvant chemotherapy, no radiation, now s/p right modified hemipelvectomy by Dr. Bravo (2/2025) and reconstruction (6/2025). The latter was c/b cystotomy which required Urology to help with mobilization and complex cystotomy repair.    Bladder appears well-healed on cystogram. Right VUR is reassuring as our dissection favored the right side of the bladder.    Follow up PRN  Stop Detrol now that catheter is out. Discussed that he may have LUTS compared to his baseline (frequency, urgency). We could restart an anticholinergic to help control these symptoms if " needed.    Rolando Negron MD  Genitourinary Reconstructive Surgery Fellow

## 2025-06-23 ENCOUNTER — MYC REFILL (OUTPATIENT)
Dept: FAMILY MEDICINE | Facility: CLINIC | Age: 45
End: 2025-06-23

## 2025-06-23 ENCOUNTER — VIRTUAL VISIT (OUTPATIENT)
Dept: FAMILY MEDICINE | Facility: CLINIC | Age: 45
End: 2025-06-23
Attending: PHYSICAL MEDICINE & REHABILITATION
Payer: COMMERCIAL

## 2025-06-23 ENCOUNTER — MYC MEDICAL ADVICE (OUTPATIENT)
Dept: ORTHOPEDICS | Facility: CLINIC | Age: 45
End: 2025-06-23

## 2025-06-23 ENCOUNTER — ANCILLARY PROCEDURE (OUTPATIENT)
Dept: GENERAL RADIOLOGY | Facility: CLINIC | Age: 45
End: 2025-06-23
Payer: COMMERCIAL

## 2025-06-23 ENCOUNTER — OFFICE VISIT (OUTPATIENT)
Dept: UROLOGY | Facility: CLINIC | Age: 45
End: 2025-06-23
Payer: COMMERCIAL

## 2025-06-23 VITALS
WEIGHT: 250 LBS | BODY MASS INDEX: 33.13 KG/M2 | OXYGEN SATURATION: 97 % | HEIGHT: 73 IN | SYSTOLIC BLOOD PRESSURE: 121 MMHG | DIASTOLIC BLOOD PRESSURE: 86 MMHG | HEART RATE: 97 BPM

## 2025-06-23 DIAGNOSIS — C41.4 PRIMARY CHONDROSARCOMA OF BONE OF PELVIS (H): Primary | ICD-10-CM

## 2025-06-23 DIAGNOSIS — C41.4 PRIMARY CHONDROSARCOMA OF BONE OF PELVIS (H): ICD-10-CM

## 2025-06-23 DIAGNOSIS — Z96.641 STATUS POST TOTAL HIP REPLACEMENT, RIGHT: ICD-10-CM

## 2025-06-23 DIAGNOSIS — M79.661 PAIN OF RIGHT LOWER LEG: ICD-10-CM

## 2025-06-23 PROCEDURE — 74455 X-RAY URETHRA/BLADDER: CPT | Mod: GC | Performed by: STUDENT IN AN ORGANIZED HEALTH CARE EDUCATION/TRAINING PROGRAM

## 2025-06-23 PROCEDURE — 99496 TRANSJ CARE MGMT HIGH F2F 7D: CPT | Mod: 95

## 2025-06-23 PROCEDURE — 51600 INJECTION FOR BLADDER X-RAY: CPT | Mod: GC | Performed by: STUDENT IN AN ORGANIZED HEALTH CARE EDUCATION/TRAINING PROGRAM

## 2025-06-23 RX ORDER — OXYCODONE HYDROCHLORIDE 5 MG/1
5 TABLET ORAL EVERY 4 HOURS PRN
Qty: 180 TABLET | Refills: 0 | Status: SHIPPED | OUTPATIENT
Start: 2025-06-23 | End: 2025-06-23

## 2025-06-23 RX ORDER — OXYCODONE HYDROCHLORIDE 5 MG/1
5-10 TABLET ORAL EVERY 4 HOURS PRN
Qty: 25 TABLET | Refills: 0 | Status: SHIPPED | OUTPATIENT
Start: 2025-06-23

## 2025-06-23 RX ORDER — OXYCODONE HYDROCHLORIDE 5 MG/1
5 TABLET ORAL EVERY 4 HOURS PRN
Qty: 180 TABLET | Refills: 0 | Status: SHIPPED | OUTPATIENT
Start: 2025-06-23

## 2025-06-23 RX ORDER — IOPAMIDOL 510 MG/ML
150 INJECTION, SOLUTION INTRAVASCULAR ONCE
Status: COMPLETED | OUTPATIENT
Start: 2025-06-23 | End: 2025-06-23

## 2025-06-23 RX ADMIN — IOPAMIDOL 200 ML: 510 INJECTION, SOLUTION INTRAVASCULAR at 11:41

## 2025-06-23 ASSESSMENT — PAIN SCALES - GENERAL: PAINLEVEL_OUTOF10: MILD PAIN (2)

## 2025-06-23 NOTE — NURSING NOTE
"Gilberto Lund is a 44 year old male patient that presents today in clinic for the following:    Chief Complaint   Patient presents with    Follow Up     Post op       The patient's allergies and medications were reviewed as noted. A set of vitals were recorded as noted without incident. The patient does not have any other questions for the provider.    Blood pressure 121/86, pulse 97, height 1.854 m (6' 1\"), weight 113.4 kg (250 lb), SpO2 97%. Body mass index is 32.98 kg/m .    Patient Active Problem List   Diagnosis    Testicular cancer (H)    CTS (carpal tunnel syndrome)    DJD (degenerative joint disease), ankle and foot    Calculus of ureter    CARDIOVASCULAR SCREENING; LDL GOAL LESS THAN 160    Hard to intubate    Chondrosarcoma (H)    Primary chondrosarcoma of bone of pelvis (H)    Acute deep vein thrombosis (DVT) of both peroneal veins (H)    Status post surgery    Status post hip replacement, right    Status post total hip replacement, right       No Known Allergies    Current Outpatient Medications   Medication Sig Dispense Refill    acetaminophen (TYLENOL) 325 MG tablet Take 3 tablets (975 mg) by mouth every 8 hours.      apixaban ANTICOAGULANT (ELIQUIS) 2.5 MG tablet Take 1 tablet (2.5 mg) by mouth 2 times daily. Discuss with orthopedics at your appointment on 7/3/25 regarding increasing back up to 5 mg twice per day. 30 tablet 0    cephALEXin (KEFLEX) 250 MG capsule Take 1 capsule (250 mg) by mouth 4 times daily for 8 days. 32 capsule 0    colchicine (COLCRYS) 0.6 MG tablet Take 1 tablet (0.6 mg) by mouth 2 times daily. Recommendation for 3 month course 60 tablet 2    doxycycline hyclate (VIBRAMYCIN) 100 MG capsule Take 1 capsule (100 mg) by mouth 2 times daily for 8 days. 16 capsule 0    famotidine (PEPCID) 10 MG tablet Take 1 tablet (10 mg) by mouth 2 times daily. 60 tablet 0    gabapentin (NEURONTIN) 300 MG capsule Take 2 capsules (600 mg) by mouth 3 times daily. 180 capsule 0    hydrOXYzine HCl " (ATARAX) 25 MG tablet Take 1 tablet (25 mg) by mouth every 6 hours as needed for other (adjuvant pain). 30 tablet 0    methocarbamol (ROBAXIN) 750 MG tablet Take 1 tablet (750 mg) by mouth 4 times daily as needed for muscle spasms. 30 tablet 0    multivitamin w/minerals (THERA-VIT-M) tablet Take 1 tablet by mouth daily. 30 tablet 0    oxyCODONE (ROXICODONE) 5 MG tablet Take 1-2 tablets (5-10 mg) by mouth every 4 hours as needed for moderate to severe pain. 25 tablet 0    polyethylene glycol (MIRALAX) 17 GM/Dose powder Take 17 g by mouth daily. 510 g 0    senna-docusate (SENOKOT-S/PERICOLACE) 8.6-50 MG tablet Take 1 tablet by mouth at bedtime. 30 tablet 0    tolterodine ER (DETROL LA) 4 MG 24 hr capsule Take 1 capsule (4 mg) by mouth daily. 30 capsule 0       Social History     Tobacco Use    Smoking status: Former     Types: Cigarettes     Passive exposure: Past    Smokeless tobacco: Former     Types: Chew   Vaping Use    Vaping status: Never Used   Substance Use Topics    Alcohol use: Not Currently    Drug use: No       Daisha Chaidez, EMT  6/23/2025  12:07 PM

## 2025-06-23 NOTE — LETTER
6/23/2025       RE: Gilberto Lund  [address]  [place] [facility] [address]     Dear Colleague,    Thank you for referring your patient, Gilberto Lund, to the [facility]OLOGY [facility] [place] at [facility]. Please see a copy of my visit note below.    UROLOGY OUTPATIENT CLINIC NOTE    Name: Gilberto Lund    MRN: [id number]   YOB: 1980  Date of Encounter: 06/23/25              History of Present Illness:   Mr. Gilberto Lund is a 44 year old male seen for follow-up.    He has a remote history of left orchiectomy and chemotherapy for metastatic NSGCT. There are discrepancies in his record as to whether he received radiation as well - I confirmed with him that he did not receive radiation.    2/14/25: modified right hemipelvectomy, retroperitoneal dissection by orthopedics (Dr. Bravo). Urology was present to help with mobilization and protection of the bladder, prostate, seminal vesicles, and internal pelvic anatomy necessary to complete the procedure.    6/3/25: patient was brought back by orthopedics for custom right pelvic/hip replacement. Intraoperatively two cystotomies were made and Urology was consulted - we ended up helping with the majority of the pelvic dissection to re-establish the anatomy and repair the cystotomy.  Bladder densely adherent to the remaining pubis and right pelvic sidewall  Two separate cystotomies identified: (1) lateral puncture from instrument passage (2) right anterior dome cystotomy during pelvic dissection  The two cystotomies were connected and the resultant defect closely primarily in two layers  Additional pelvic and right retroperitoneal dissection was performed to aid orthopedic surgery with positioning of custom pelvic/hip hardware    6/23/25: here for cystogram and TOV. Images show a well-healed bladder with right vesicoureteral reflux. There is some irregularity to the bladder wall along  the right side which likely reflects the dissection and nature of the repair - no leak. He voided without difficulty after catheter removal.         Past Medical History:     Past Medical History:   Diagnosis Date     Arthritis      Chondrosarcoma (H)      CTS (carpal tunnel syndrome)      Gynecomastia, male      H/O chondrosarcoma      Hard to intubate 06/29/2022     Infection due to 2019 novel coronavirus 12/2020     Kidney stone 2015    90% calcium oxalate monohydrate, and  10% calcium oxalate dihydrate.     Primary chondrosarcoma of bone of pelvis (H)      Psoriasis      Skin tag      Testicle cancer, left 2008    surgery and chemotherapy     Thyroid nodule 12/10/2024    high FDG, noted on PET          Past Surgical History:     Past Surgical History:   Procedure Laterality Date     ABDOMEN SURGERY       ARTHROPLASTY REVISION HIP Right 6/3/2025    Procedure: CUSTOM PELVIC AND HIP REPLACEMENT;  Surgeon: Juan Jose Bravo MD;  Location: [facility]     BIOPSY       BIOPSY BONE PELVIS Right 12/6/2024    Procedure: BIOPSY, BONE, PELVIS;  Surgeon: Juan Jose Bravo MD;  [facility]     EXCISE EXOSTOSIS FOOT  10/25/2013    Procedure: EXCISE EXOSTOSIS FOOT;  Excision of calcaneal bone cyst left foot with allograft;  Surgeon: Marcello Jensen DPM;  Location: [facility] OR     LAPAROSCOPIC CHOLECYSTECTOMY N/A 06/29/2022    Procedure: CHOLECYSTECTOMY, LAPAROSCOPIC;  Surgeon: Cholo Agustin DO;  Location: [facility] OR     OPTICAL TRACKING SYSTEM HEMIPELVECTOMY Right 2/14/2025    Procedure: 1. HEMIPELVECTOMY, MODIFIED INTERNAL, 2. Right hip girdlestone resection, 3. Right  RETROPERITONEAL DISSECTION;  Surgeon: Juan Jose Bravo MD;  [facility]     ORCHIECTOMY SCROTAL Left      REMOVE ANTIBIOTIC CEMENT BEADS / SPACER HIP Right 6/3/2025    Procedure: REMOVAL ANTIBIOTIC SPACER, Pelvis;  Surgeon: Juan Jose Bravo MD;  [facility]     REMOVE HARDWARE ANTERIOR PELVIS Right 6/3/2025    Procedure: REMOVAL, HARDWARE, PELVIS,  ANTERIOR APPROACH;  Surgeon: Juan Jose Bravo MD;  [facility]     REPAIR BLADDER N/A 6/3/2025    Procedure: COMPLEX BLADDER REPAIR;  Surgeon: Cheng Lyman MD;  Location: [facility] OR          Social History:     Social History     Tobacco Use     Smoking status: Former     Types: Cigarettes     Passive exposure: Past     Smokeless tobacco: Former     Types: Chew   Substance Use Topics     Alcohol use: Not Currently          Family History:     Family History   Problem Relation Age of Onset     No Known Problems Mother      Hypertension Father      Atrial fibrillation Father      Thyroid Disease Father      Nephrolithiasis Father      Pulmonary Embolism Father      C.A.D. Maternal Grandfather      Thyroid Cancer No family hx of      Diabetes No family hx of      Anesthesia Reaction No family hx of           Allergies:   No Known Allergies       Medications:     Current Outpatient Medications   Medication Sig Dispense Refill     acetaminophen (TYLENOL) 325 MG tablet Take 3 tablets (975 mg) by mouth every 8 hours.       apixaban ANTICOAGULANT (ELIQUIS) 2.5 MG tablet Take 1 tablet (2.5 mg) by mouth 2 times daily. Discuss with orthopedics at your appointment on 7/3/25 regarding increasing back up to 5 mg twice per day. 30 tablet 0     cephALEXin (KEFLEX) 250 MG capsule Take 1 capsule (250 mg) by mouth 4 times daily for 8 days. 32 capsule 0     colchicine (COLCRYS) 0.6 MG tablet Take 1 tablet (0.6 mg) by mouth 2 times daily. Recommendation for 3 month course 60 tablet 2     doxycycline hyclate (VIBRAMYCIN) 100 MG capsule Take 1 capsule (100 mg) by mouth 2 times daily for 8 days. 16 capsule 0     famotidine (PEPCID) 10 MG tablet Take 1 tablet (10 mg) by mouth 2 times daily. 60 tablet 0     gabapentin (NEURONTIN) 300 MG capsule Take 2 capsules (600 mg) by mouth 3 times daily. 180 capsule 0     hydrOXYzine HCl (ATARAX) 25 MG tablet Take 1 tablet (25 mg) by mouth every 6 hours as needed for other (adjuvant pain). 30  "tablet 0     methocarbamol (ROBAXIN) 750 MG tablet Take 1 tablet (750 mg) by mouth 4 times daily as needed for muscle spasms. 30 tablet 0     multivitamin w/minerals (THERA-VIT-M) tablet Take 1 tablet by mouth daily. 30 tablet 0     oxyCODONE (ROXICODONE) 5 MG tablet Take 1-2 tablets (5-10 mg) by mouth every 4 hours as needed for moderate to severe pain. 25 tablet 0     polyethylene glycol (MIRALAX) 17 GM/Dose powder Take 17 g by mouth daily. 510 g 0     senna-docusate (SENOKOT-S/PERICOLACE) 8.6-50 MG tablet Take 1 tablet by mouth at bedtime. 30 tablet 0     tolterodine ER (DETROL LA) 4 MG 24 hr capsule Take 1 capsule (4 mg) by mouth daily. 30 capsule 0     No current facility-administered medications for this visit.          Review of Systems:    ROS: 14-point review of systems was negative aside from that noted in the HPI. Additional pertinent positives are listed below.          Physical Exam:   /86 (BP Location: Right arm, Patient Position: Sitting, Cuff Size: Adult Regular)   Pulse 97   Ht 1.854 m (6' 1\")   Wt 113.4 kg (250 lb)   SpO2 97%   BMI 32.98 kg/m    Estimated body mass index is 35.49 kg/m  as calculated from the following:    Height as of 6/11/25: 1.854 m (6' 1\").    Weight as of 6/11/25: 122 kg (268 lb 15.4 oz).  General: Pleasant male in no apparent distress.  Resp: No respiratory distress  CV: RRR  Abdomen: Soft, nontender, nondistended. Pfannenstiel incision healing well.       Imaging:    All imaging reviewed with patient.    Cystogram (6/23/25): no leak. Right VUR. Irregular contour of bladder but no leakage.      Outside records:    I spent 10 minutes reviewing outside records.         Assessment and Plan:   44 year old male with metastatic NSGCT of left testis s/p left orchiectomy and adjuvant chemotherapy, no radiation, now s/p right modified hemipelvectomy by Dr. Bravo (2/2025) and reconstruction (6/2025). The latter was c/b cystotomy which required Urology to help with " mobilization and complex cystotomy repair.    Bladder appears well-healed on cystogram. Right VUR is reassuring as our dissection favored the right side of the bladder.    Follow up PRN  Stop Detrol now that catheter is out. Discussed that he may have LUTS compared to his baseline (frequency, urgency). We could restart an anticholinergic to help control these symptoms if needed.    Rolando Negron MD  Genitourinary Reconstructive Surgery Fellow    Again, thank you for allowing me to participate in the care of your patient.      Sincerely,    Rolando Negron MD

## 2025-06-23 NOTE — PROGRESS NOTES
Jeremiah is a 44 year old who is being evaluated via a billable video visit.          Assessment & Plan     Primary chondrosarcoma of bone of pelvis (H)  Status post total hip replacement, right  Pain and right lower leg    Patient is a 44-year-old male with a past medical history of primary chondrosarcoma of the right pelvis and testicular cancer  Patient was recently discharged from a rehab unit through [facility] following having an explant hemipelvis cement spacer and conversion to total hip arthroplasty plasty utilizing custom hemipelvis on 6/4/2025  To my knowledge patient was contacted within 48 hours of discharge to place schedule this appointment  He says that he is doing well but that he is mostly sore.  He does have some pain in his right lower leg which makes him concerned because he has a history of blood clots however patient is currently on apixaban  He also states that he is having a lot of neuropathy which the gabapentin helps  His biggest concern is that he feels as though he cannot lift up his leg and foot with the right quad.  I would suspect that this will come back slowly as he progresses through the therapy and the causes due to the nature of the operation  Will refill patient's pain medication.  If patient does begin to have worsening pain in his calf I would want him to get an ultrasound to ensure that the blood clot did not return which would complicate his recovery significantly.  Discussed that there are no heroes in this time and to stay on time with his pain meds as the body will heal if it is not back pain  Will follow-up as needed patient will need to be seen within 3 months but due to his recent surgery we can do virtual visits for now      - oxyCODONE (ROXICODONE) 5 MG tablet; Take 1 tablet (5 mg) by mouth every 4 hours as needed for moderate to severe pain.        MED REC REQUIRED  Post Medication Reconciliation Status:     BMI  Estimated body mass index is 32.98 kg/m  as  "calculated from the following:    Height as of an earlier encounter on 6/23/25: 1.854 m (6' 1\").    Weight as of an earlier encounter on 6/23/25: 113.4 kg (250 lb).     Xochilt Daniels is a 44 year old, presenting for the following health issues:  Hospital F/U (ORTHOPAEDIC SURGERY@ [facility]  6/3/2025 - 6/11/2025/[facility] /Acute Rehabilitation Unit @ [facility] 6/11/2025 - 6/18/2025)      6/23/2025     2:52 PM   Additional Questions   Roomed by DEWAYNE ROME     [facility]          6/19/2025   Post Discharge Outreach   How are you doing now that you are home? spoke with Jeremiah.  He stated he has all of his medications and denies any questions.  He states he is managing his mccord catheter independently and also denies any questions.  He has not heard from Home Care yet.  Writer informed him I would Mychart him his agenices contact info as I couldn't locate it while we were on the phone.  If he didn't hear from them by Saturday to call them.  He stated an understanding.  After call ended writer Mycharted [facility]  Tel:[phone]  Jeremiah denied any CCC needs.  Confirmed all upcoming appointments.   How are your symptoms? (Red Flag symptoms escalate to triage hotline per guidelines) Improved   Does the patient have their discharge instructions?  Yes   Does the patient have questions regarding their discharge instructions?  No   Were you started on any new medications or were there changes to any of your previous medications?  Yes   Does the patient have all of their medications? Yes   Do you have questions regarding any of your medications?  No   Do you have all of your needed medical supplies or equipment (DME)?  (i.e. oxygen tank, CPAP, cane, etc.) Yes   Discharge Follow Up Appointment Date 6/23/2025   Discharge Follow Up Appointment Scheduled with? Specialty Care Provider       Hospital Follow-up Visit:    Doing well  He is sore  There is some " pain in the leg  A lot of neuropathy  - Gabapentin helps   Patients bladder was knicked during the surgery.  Went to urology today and said he was good and Michelle was taken out  Is having some calf pain. He has a history of DVT    He can get up and walk with a walker  Working on strengthening his right side. Having a hard time lifting the foot with his quad and is concerned if that is going to go away  Has a follow up with the surgeon in about 10 days    Hospital/Nursing Home/IP Rehab Facility: [facility]  Most Recent Admission Date: 6/11/2025   Most Recent Admission Diagnosis:      Most Recent Discharge Date: 6/18/2025   Most Recent Discharge Diagnosis: Other specified counseling - Z71.89  Chondrosarcoma (H) - C41.9  Status post surgery - Z98.890  Primary chondrosarcoma of bone of pelvis (H) - C41.4  Status post total hip replacement, right - Z96.641  S/P bladder repair - Z98.890   Do you have any other stressors you would like to discuss with your provider? No    Problems taking medications regularly:  None  Medication changes since discharge: None  Problems adhering to non-medication therapy:  None    Summary of hospitalization:  [facility] discharge summary reviewed  Diagnostic Tests/Treatments reviewed.  Follow up needed: Urology for bladder check.  Had Michelle removed today  Follow-up with surgeon in approximately 10 days  Other Healthcare Providers Involved in Patient s Care:         Surgical follow-up appointment - orthopedic surgery7/3/2025  Update since discharge: improved.         Plan of care communicated with patient and family                 Objective             Physical Exam   GENERAL: alert and no distress  EYES: Eyes grossly normal to inspection.  No discharge or erythema, or obvious scleral/conjunctival abnormalities.  RESP: No audible wheeze, cough, or visible cyanosis.    SKIN: Visible skin clear. No significant rash, abnormal  pigmentation or lesions.  NEURO: Cranial nerves grossly intact.  Mentation and speech appropriate for age.  PSYCH: Appropriate affect, tone, and pace of words          Video-Visit Details    Type of service:  Video Visit   Originating Location (pt. Location): Home    Distant Location (provider location):  On-site  Platform used for Video Visit: Tomás  Signed Electronically by: Haile Pedraza DO

## 2025-06-25 NOTE — OP NOTE
Operative Note    DATE 6/4/2025     Pre-operative diagnosis:     s/p resection of  Chondrosarcoma, Intermediate grade 2, R pelvis innominate bone and placement of cement spacer with fixation.  Post-operative diagnosis        Same as pre-operative diagnosis     Procedure:        Removal of custom cement spacer with internal fixation of right pelvis innominate bone.    CUSTOM PELVIC INNOMINATE BONE REPLACEMENT   RIGHT TOTAL HIP REPLACEMENT, direct anterior approach  COMPLEX BLADDER REPAIR, N/A - Bladder (Dr Lyman)     Surgeon:         Surgeons and Role:  Panel 1:     * Juan Jose Bravo MD - Primary     * Arvind Weber MD - Assisting     * Silas Shrestha PA-ANYI - Assisting     * George Boone MD - Fellow - Assisting  Panel 2:     * Cheng Lyman MD - Primary     * Carissa Vanessa MD - Resident - Assisting     * Rolando Negron MD - Fellow - Assisting     * Gilberto Gomes MD  Anesthesia:     General             Estimated Blood Loss: 2700 cc     Drains: Amarjit-Carr  Specimens:       ID Type Source Tests Collected by Time Destination   A : Right Pelvis #1 Body fluid, unsp Pelvis, Right ANAEROBIC BACTERIAL CULTURE ROUTINE, AEROBIC BACTERIAL CULTURE ROUTINE Juan Jose Bravo MD 6/3/2025  2:06 PM     B : Right Pelvis #2 Tissue Pelvis, Right ANAEROBIC BACTERIAL CULTURE ROUTINE, AEROBIC BACTERIAL CULTURE ROUTINE Juan Jose Bravo MD 6/3/2025  2:06 PM     C : Right Pelvis #3 Tissue Pelvis, Right ANAEROBIC BACTERIAL CULTURE ROUTINE, AEROBIC BACTERIAL CULTURE ROUTINE Juan Jose Bravo MD 6/3/2025  2:06 PM     D :  Blood Line, Other CREATININE Juan Jose Bravo MD 6/3/2025  4:09 PM        Findings:                     Iatrogenic bladder injurry intra-operatively.  Complications:            Unintended puncture/laceration of the bladder.  Implants:           Implant Name Type Inv. Item Serial No.  Lot No. LRB No. Used Action   MPS CURVED [id number] PLATE 250.5 MM 16 HOLE Metallic Hardware/Minetto     coComment  [place]- numbness and tingling in lips and legs, abdominal pain 8/10, hard to breathe,  ----- Message from Breonna NAVAS sent at 2024  1:28 PM CST -----  Patient Name: Regina Roa    Specialist or PCP Name: Guy Sims    Symptoms: 36 [place] F [place]- numbness and tingling in lips and legs, abdominal pain 8/10, hard to breathe, dry mouth, burning sensation in your chest    Pregnant (females aged 13-60. If Yes, how long?) : no    Call Back # : [phone]        Which State are you currently located in?: [place]    Name of Clinic Site / Acct# : [facility] [address]    Call arrived during: Work Hours     N/A Right 1 Explanted   4.0 X 30 MM CANCELLOUS SCREW FT Metallic Hardware/Peterman     MALU N/A Right 1 Explanted   SCREW CANCELLOUS FT 4X50MM - [id number] Metallic Hardware/Peterman SCREW CANCELLOUS FT 4X50MM   MALU [employer]TH[employer] N/A Right 1 Explanted   SCREW CANCELLOUS FT 4X60MM - [id number] Metallic Hardware/Peterman SCREW CANCELLOUS FT 4X60MM   MALU ORTHOP[employer] N/A Right 4 Explanted   SCR BONE 3.5MM 65MML SS RYAN HEXAGONA - [id number] Metallic Hardware/Peterman SCR BONE 3.5MM 65MML SS RYAN HEXAGONA   MALU ORTHOPED[employer] N/A Right 2 Explanted   IMP BONE CEMENT STRK SIMPLEX TOBRAMYCIN 40CC 6197-9-001 - [id number] Cement, Bone IMP BONE CEMENT STRK SIMPLEX TOBRAMYCIN 40 6197-9-001   MALU ORTHOP[employer] [id number] Right 5 Explanted   QLS RIGHT SUPRAPECTINEAL PLATE Metallic Hardware/Peterman     MALU [id number] Right 1 Explanted   SCREW CANCELLOUS FT 4X40MM - [id number] Metallic Hardware/Peterman SCREW CANCELLOUS FT 4X40MM   MALU ORTHOPED[employer] N/A Right 1 Explanted   GW ORTH 450MM 2.8MM FLUT TIP STRL 03.333.006S - [id number] Wire GW ORTH 450MM 2.8MM FLUT TIP STRL 03.333.006S   [employer] [id number] Right 1 Used as a Supply   GW ORTH 450MM 2.8MM FLUT TIP STRL 03.333.006S - [id number] Wire GW ORTH 450MM 2.8MM FLUT TIP STRL 03.333.006S   Ioxus-OpowerTEC [id number] Right 1 Used as a Supply   [place] Altrx Polyethylene Acetabular Liner 36mm ID - 66mm OD Metallic Hardware/Peterman     Depuy   Right 1 Implanted   GW ORTH 300MM 2.8MM 03.333.005 - [id number] Wire GW ORTH 300MM 2.8MM 03.333.005   Ioxus[employer]OpowerTEC   Right 3 Used as a Supply   ACTIS COLLARED HIGH SIZE 11 - [id number] Total Joint Component/Insert ACTIS COLLARED HIGH SIZE 11   &[facility]- [id number] Right 1 Implanted   7.5mm x 35mm full thread cannulated screw Metallic Hardware/Peterman     SYNTHES   Right 1 Implanted   IMP HEAD FEMORAL DEPUY CERAMIC 36MM +1.5MM 8591- - [id number] Total Joint Component/Insert IMP HEAD FEMORAL DEPUY CERAMIC 36MM +1.5MM 3803-   [employer]J  [facility]-   Right 1 Implanted   7.5mm x 100mm cannulate screw Metallic Hardware/[place]     SYNTHES   Right 1 Implanted   7.5mm x 110mm cannulate screw Metallic Hardware/[place]     SYNTHES   Right 2 Implanted   7.5mm x 130mm cannulated screw Metallic Hardware/[place]     SYNTHES   Right 1 Implanted   7.5mm x 140mm cannulate screw Metallic Hardware/[place]     SYNTHES   Right 1 Wasted   IMP SCR SYN CAN 7.3X20MM FULL THRD SS - [id number] Metallic Hardware/[place] IMP SCR SYN CAN 7.3X20MM FULL THRD SS   SYNTHES-STRATEC   Right 1 Implanted   IMP SCR CAN 6.5X20MM FT .460 - [id number] Metallic Hardware/[place] IMP SCR CAN 6.5X20MM FT .460   SYNTHES-STRATEC   Right 1 Wasted         Assessment: Gilberto Lund is a 44 year old male s/p Right explant of hemipelvis cement spacer and and conversion to total hip arthroplasty utilizing custom hemipelvis implant on 6/4/25 with Dr. Bravo.      Plan:     Activity: Up with assist. Anterior hip precautions  Weight bearing status: TTWB RLE x 3 months.  Antibiotics: continue Ancef and gentamicin post op  Diet: Progress diet as tolerated.  DVT prophylaxis: SCDs and mechanical while in the hospital. Eliquis 2.5 mg BID starting POD1 x 4 weeks.   Elevation: Elevate operative extremity as tolerated.   Wound Care: Keep dressing in place until POD #14  Drains: Please document output per shift, discontinue 15f HUGO drain in right thigh per ortho, management of left flank 19f drain per urology  Mccord: Keep in place for 2 weeks, management of mccord per urology  Pain management: Utilize all oral meds first, IV meds for severe breakthrough pain after PO meds given adequate time to take effect.  X-rays: Completed in PACU.  Therapy: PT/OT evaluate prior to discharge.  Labs: Trend Hgb on POD #1.  Cultures: Pending, follow culture results closely.  Consults: PT, OT, medicine, ICU team, Urology. Appreciate assistance in caring for this patient.  Disposition: Pending progress with  therapies, pain control on orals, and medical stability. Anticipate discharge to TCU on POD #5-6.  Follow-up: Clinic with Dr. Bravo in 4 weeks.     George Boone MD   retroperitoneal contents in the bladder, there was evident that  the detrusor muscle was transgressed and urine leakage was encountered.  For this reason, the urology team led by Dr. Lyman was brought to the operating room to facilitate the dissection of creating the tunnel between the lateral incision and the left-sided abdominal incision in order to accommodate the prosthesis.  Furthermore, the extent of the detrusor muscle opening was assessed and repaired under direct vision.  For details please see his dictation.    Once the repair was completed, we worked together to facilitate fitting of the custom implant.  The jig for the custom implant was utilized for fitting purposes.  It was evident that a portion of the right sided inferior pubic portion of the prosthesis supporting the pelvis would not fit and therefore I utilized a carbide tip wheel to trim off a portion of the obturator panel on the prostheses.  With continued mobilization and soft tissue dissection, it was possible to eventually create enough volume to fit the prosthesis properly so that the fixation to the left pubic ramus would be appropriate as well as the remaining right iliac wing and SI joint fixation.    It should be noted that preoperatively extensive planning had been undertaken using a CT-based approach to design and create a custom made prostheses for this patient.  [facility] were undertaken to design and fabricate the custom device for this patient based upon the existing pelvic defect.    Once the fit was created, the real implant was placed on and within the patient and fitted onto the bony surfaces.  Soft tissues were mobilized off the bony surfaces to facilitate bony ingrowth.    The appropriate positioning for the left pubic fixation was assessed and using the guided drills, the fixation was anchored to the left superior pubic ramus and symphysis pubis.  In  similar fashion while the implant was held opposed to the SI joint and remaining right iliac wing the fixation screws on the lateral aspect of the iliac wing were used to compress the implant against the bone.  Furthermore, the dome screws were placed through the acetabulum into the posterior iliac wing and SI joint in order to further compress the implant against the remaining bone.  Excellent fixation and secure fixation was obtained in this manner.    Intraoperative CT scan was obtained in order to ensure proper placement of all screws and confirm that the position was appropriate.  Attention was paid particular to the left acetabulum as screw lengths were assessed and placed up to the subchondral bone of the left acetabulum.  Once the intraoperative scans have been completed confirmed this, I then proceeded with the remainder of the total hip replacement portion of the procedure.    The approach for the total hip was then facilitated by opening up the soft tissues over the remaining proximal femoral Girdlestone bone that was remaining after the patient had a previous resection of his pelvic chondrosarcoma.  I palpated the femoral neck region and dissected the soft tissues from the greater trochanter and medial femoral neck.  This allowed me to elevate the femur.  By lowering the foot of the table slightly we are then able to position the leg to allow placement of the femoral stem into the femur.  Appropriate broaching was undertaken and a bony growth implant was placed into the proximal femur.    Once this was completed, a trial reduction was performed by utilizing the appropriate size acetabular liner for the close custom pelvic implant which was designed using a [employer] [place] cup configuration.  The [place] cup liner was placed within the custom implant and femoral head was utilized and reduction was performed.  The above-mentioned size components was selected in order to obtain satisfactory soft tissue  tension and stability of the hip joint.  It was necessary to lengthen the leg in order to provide adequate soft tissue tension given the amount of soft tissue detachment from the iliac wing.  This was done to provide stability for the hip joint despite the leg length discrepancy created and deemed necessary.    The final implants were now placed and the hip joint was reduced.    Soft tissue reconstruction was undertaken by reapproximating the tissues about the hip joint including the sartorius muscle and the inguinal ligament to the gluteal musculature over the custom implant.  For possible, the soft tissues were anchored to the implant using the perforated holes in the implant and Ethibond sutures.  Adductor muscles as well as the rectus femoris muscles were attached in such manner.  The remainder of the gluteal musculature were anchored to the rim of the custom implant using Ethibond sutures through the perforated holes along the periphery of the iliac crest.  The remainder the wound was then closed in layers with absorbable sutures.    The left sided Pfannenstiel incision was reapproximated with Ethibond sutures and Vicryl and 3-0 PDS sutures on the skin.  Care was taken avoid injury to the underlying bladder structures after the repair had been completed.    2 drains were placed in the lateral incision as well as the left sided anesthesia incision around the implant in the region of the bladder reconstruction.    Sterile dressings were applied the patient was taken recovery in satisfactory condition    The 22 modifier is applied to the patient's unlisted procedure code due to the unique nature of these procedures involving custom pelvic implant reconstruction after removal of custom pelvis antibiotic cement spacer.      Assessment: Gilberto Lund is a 44 year old male s/p Right explant of hemipelvis cement spacer and and conversion to total hip arthroplasty utilizing custom hemipelvis implant on 6/4/25 with  Dr. Bravo.      Plan:     Activity: Up with assist. Anterior hip precautions  Weight bearing status: TTWB RLE x 3 months.  Antibiotics: continue Ancef and gentamicin post op  Diet: Progress diet as tolerated.  DVT prophylaxis: SCDs and mechanical while in the hospital. Eliquis 2.5 mg BID starting POD1 x 4 weeks.   Elevation: Elevate operative extremity as tolerated.   Wound Care: Keep dressing in place until POD #14  Drains: Please document output per shift, discontinue 15f HUGO drain in right thigh per ortho, management of left flank 19f drain per urology  Mccord: Keep in place for 2 weeks, management of mccord per urology  Pain management: Utilize all oral meds first, IV meds for severe breakthrough pain after PO meds given adequate time to take effect.  X-rays: Completed in PACU.  Therapy: PT/OT evaluate prior to discharge.  Labs: Trend Hgb on POD #1.  Cultures: Pending, follow culture results closely.  Consults: PT, OT, medicine, ICU team, Urology. Appreciate assistance in caring for this patient.  Disposition: Pending progress with therapies, pain control on orals, and medical stability. Anticipate discharge to TCU on POD #5-6.  Follow-up: Clinic with Dr. Bravo in 4 weeks.      MD Chuck Hummel Family Professor  Oncology and Adult Reconstructive Surgery  Dept Orthopaedic Surgery, [facility]  [phone] office, [phone] pager  [email or web address]     on POD #5-6.  Follow-up: Clinic with Dr. Bravo in 4 weeks.      Juan Jose Bravo MD  [facility] Family Professor  Oncology and Adult Reconstructive Surgery  Dept Orthopaedic Surgery, [facility]  [phone] office, [phone] pager  [email or web address]

## 2025-06-26 ENCOUNTER — TELEPHONE (OUTPATIENT)
Dept: ORTHOPEDICS | Facility: CLINIC | Age: 45
End: 2025-06-26
Payer: COMMERCIAL

## 2025-06-26 NOTE — TELEPHONE ENCOUNTER
Patient confirmed rescheduled appointment:  Date: 7/3/25  Time: 10:20am to 4:10pm evans Montoya  Visit type: Post Op MSK  Provider: Dr. Bravo  [place] [facility]

## 2025-06-27 NOTE — PROGRESS NOTES
[facility], Orthopaedic Oncology Surgery Consultation  by Juan Jose Bravo M.D.    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    TREATMENTS:  2008, Testicular CA excision and chemotherapy  10/25/2013, Excision of calcaneal bone cyst left foot with allograft,  [facility]  12/2/24, CT guided core needle biopsy R innominate bone (Jean-Pierre) [facility]  12/6/24, Right pelvis open biopsy, (Lawrence), [facility]  2/14/25, Right hip Girdlestone resection, right retroperitoneal dissection and modified internal hemipelvectomy, (Lawrence), surgical margins negative, [facility]   6/3/25, Custom Right hemipelvic and hip replacement (Onkos/DePuy), removal of hardware pelvis, removal of antibiotic spacer, and complex bladder repair, (Lawrence), [facility]     Jeremiah is seen for routine checkup 1 month postop after undergoing his custom pelvic placement.  Intraoperative complication related to bladder perforation and repair.  His Michelle catheters been removed and cystogram demonstrated competent bladder detrusor muscle without leakage.  He is voiding on his own now.    He notes that he is improving and has better strength and control of his right lower extremity compared to his preoperative status.  Furthermore he has been ambulating and toe-touch weightbearing and will do so for a total of 3 months after surgery.    Examination confirms healing incision along his abdominal Pfannenstiel incision and the extended right iliofemoral incision.  No redness, no drainage no tenderness or warmth.    No pain with motion of the right hip.    Pelvis is level when standing using a 1 cm lift underneath the left lower extremity.    Impression and plan:  Maintain toe-touch  weightbearing status for total of 3 months after surgery.  Afterwards he may progress to using a single crutch in his left upper extremity for 2 weeks and then a cane for 2 weeks and thereafter independent ambulation if he is able to attain this level of function.  Meanwhile, he should work on straight leg raising, hip flexion, knee extension, he may practice walking in an aquatic environment as long as the water level is at his thorax level.  He will use a heel lift in the left shoe 1 cm.  Using a off the shelf commercially available shoe insert would be acceptable.  If he wishes to use a custom made orthotic, then he will call in and we will write a prescription for him.  Ultrasound examination to check on his previous bilateral lower extremity clots.  If these are either resolved or have not progressed, that he may discontinue his Eliquis anticoagulation altogether.  He has been having some numbness and dysesthesias in the right lower extremity however this has been improving since a surgical date.  Nonetheless he still has some.  He is taking gabapentin.  Once this resolves, hopefully he may be able to discontinue his gabapentin and I have advised him to wean from this medication.  Prior PET/CT for staging demonstrated an abnormality in his thyroid and he was advised to follow-up with his primary care physician and pursue thyroid ultrasound or nodule biopsy as necessary.  Next follow-up on routine chondrosarcoma surveillance protocol with radiographs of pelvis and lung CT examination.  At his yearly follow-ups we should also obtain pelvics CT examination as well.    MD Chuck Hummel Family Professor  Oncology and Adult Reconstructive Surgery  Dept Orthopaedic Surgery, [facility]  [phone] office, [phone] pager  [email or web address]      Total combined visit time and work time before and after clinic visit, independent of trainee, on encounter date = 20 min

## 2025-06-30 ENCOUNTER — MYC REFILL (OUTPATIENT)
Dept: ORTHOPEDICS | Facility: CLINIC | Age: 45
End: 2025-06-30
Payer: COMMERCIAL

## 2025-06-30 DIAGNOSIS — Z96.641 STATUS POST TOTAL HIP REPLACEMENT, RIGHT: ICD-10-CM

## 2025-06-30 RX ORDER — OXYCODONE HYDROCHLORIDE 5 MG/1
5-10 TABLET ORAL EVERY 4 HOURS PRN
Qty: 25 TABLET | Refills: 0 | Status: SHIPPED | OUTPATIENT
Start: 2025-06-30

## 2025-07-03 ENCOUNTER — OFFICE VISIT (OUTPATIENT)
Dept: ORTHOPEDICS | Facility: CLINIC | Age: 45
End: 2025-07-03
Payer: COMMERCIAL

## 2025-07-03 DIAGNOSIS — Z96.641 STATUS POST TOTAL HIP REPLACEMENT, RIGHT: Primary | ICD-10-CM

## 2025-07-03 DIAGNOSIS — C41.4 PRIMARY CHONDROSARCOMA OF BONE OF PELVIS (H): ICD-10-CM

## 2025-07-03 DIAGNOSIS — Z53.9 DIAGNOSIS NOT YET DEFINED: Primary | ICD-10-CM

## 2025-07-03 PROCEDURE — G0180 MD CERTIFICATION HHA PATIENT: HCPCS

## 2025-07-03 NOTE — LETTER
7/3/2025      Gilberto Lund  [address]  [place] [address]      Dear Colleague,    Thank you for referring your patient, Gilberto Lund, to the [facility] [place]. Please see a copy of my visit note below.          [facility], Orthopaedic Oncology Surgery Consultation  by Juan Jose Bravo M.D.    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    TREATMENTS:  2008, Testicular CA excision and chemotherapy  10/25/2013, Excision of calcaneal bone cyst left foot with allograft,  [facility]  12/2/24, CT guided core needle biopsy R innominate bone (Jean-Pierre) [facility]  12/6/24, Right pelvis open biopsy, (Lawrence), [facility]  2/14/25, Right hip Girdlestone resection, right retroperitoneal dissection and modified internal hemipelvectomy, (Lawrence), surgical margins negative, [facility]   6/3/25, Custom Right hemipelvic and hip replacement (Onkos/DePuy), removal of hardware pelvis, removal of antibiotic spacer, and complex bladder repair, (Lawrence), [facility]     Jeremiah is seen for routine checkup 1 month postop after undergoing his custom pelvic placement.  Intraoperative complication related to bladder perforation and repair.  His Michelle catheters been removed and cystogram demonstrated competent bladder detrusor muscle without leakage.  He is voiding on his own now.    He notes that he is improving and has better strength and control of his right lower extremity compared to his preoperative status.  Furthermore he has been ambulating and toe-touch weightbearing and will do so for a total of 3 months after surgery.    Examination confirms healing incision along his abdominal Pfannenstiel incision and the extended  right iliofemoral incision.  No redness, no drainage no tenderness or warmth.    No pain with motion of the right hip.    Pelvis is level when standing using a 1 cm lift underneath the left lower extremity.    Impression and plan:  Maintain toe-touch weightbearing status for total of 3 months after surgery.  Afterwards he may progress to using a single crutch in his left upper extremity for 2 weeks and then a cane for 2 weeks and thereafter independent ambulation if he is able to attain this level of function.  Meanwhile, he should work on straight leg raising, hip flexion, knee extension, he may practice walking in an aquatic environment as long as the water level is at his thorax level.  He will use a heel lift in the left shoe 1 cm.  Using a off the shelf commercially available shoe insert would be acceptable.  If he wishes to use a custom made orthotic, then he will call in and we will write a prescription for him.  Ultrasound examination to check on his previous bilateral lower extremity clots.  If these are either resolved or have not progressed, that he may discontinue his Eliquis anticoagulation altogether.  He has been having some numbness and dysesthesias in the right lower extremity however this has been improving since a surgical date.  Nonetheless he still has some.  He is taking gabapentin.  Once this resolves, hopefully he may be able to discontinue his gabapentin and I have advised him to wean from this medication.  Prior PET/CT for staging demonstrated an abnormality in his thyroid and he was advised to follow-up with his primary care physician and pursue thyroid ultrasound or nodule biopsy as necessary.  Next follow-up on routine chondrosarcoma surveillance protocol with radiographs of pelvis and lung CT examination.  At his yearly follow-ups we should also obtain pelvics CT examination as well.    Juan Jose Bravo MD [facility] Family Professor  Oncology and Adult Reconstructive Surgery  Dept  Orthopaedic Surgery, [facility]  [phone] office, [phone] pager  [email or web address]      Total combined visit time and work time before and after clinic visit, independent of trainee, on encounter date = 20 min        Again, thank you for allowing me to participate in the care of your patient.        Sincerely,        Juan Jose Bravo MD    Electronically signed

## 2025-07-07 ENCOUNTER — MEDICAL CORRESPONDENCE (OUTPATIENT)
Dept: HEALTH INFORMATION MANAGEMENT | Facility: CLINIC | Age: 45
End: 2025-07-07

## 2025-07-08 ENCOUNTER — MYC REFILL (OUTPATIENT)
Dept: FAMILY MEDICINE | Facility: CLINIC | Age: 45
End: 2025-07-08
Payer: COMMERCIAL

## 2025-07-08 ENCOUNTER — MYC REFILL (OUTPATIENT)
Dept: ORTHOPEDICS | Facility: CLINIC | Age: 45
End: 2025-07-08
Payer: COMMERCIAL

## 2025-07-08 ENCOUNTER — HOSPITAL ENCOUNTER (OUTPATIENT)
Dept: ULTRASOUND IMAGING | Facility: CLINIC | Age: 45
Discharge: HOME | End: 2025-07-08
Attending: ORTHOPAEDIC SURGERY
Payer: COMMERCIAL

## 2025-07-08 DIAGNOSIS — C41.9 CHONDROSARCOMA (H): ICD-10-CM

## 2025-07-08 DIAGNOSIS — C41.4 PRIMARY CHONDROSARCOMA OF BONE OF PELVIS (H): ICD-10-CM

## 2025-07-08 DIAGNOSIS — Z96.641 STATUS POST TOTAL HIP REPLACEMENT, RIGHT: ICD-10-CM

## 2025-07-08 PROCEDURE — 93970 EXTREMITY STUDY: CPT

## 2025-07-08 RX ORDER — OXYCODONE HYDROCHLORIDE 5 MG/1
5-10 TABLET ORAL EVERY 4 HOURS PRN
Qty: 25 TABLET | Refills: 0 | Status: SHIPPED | OUTPATIENT
Start: 2025-07-08

## 2025-07-08 RX ORDER — HYDROXYZINE HYDROCHLORIDE 25 MG/1
25 TABLET, FILM COATED ORAL EVERY 6 HOURS PRN
Qty: 30 TABLET | Refills: 0 | Status: SHIPPED | OUTPATIENT
Start: 2025-07-08

## 2025-07-10 ENCOUNTER — TELEPHONE (OUTPATIENT)
Dept: ORTHOPEDICS | Facility: CLINIC | Age: 45
End: 2025-07-10
Payer: COMMERCIAL

## 2025-07-10 NOTE — TELEPHONE ENCOUNTER
Patient confirmed scheduled appointment:  Date: 10/9/25  Time: 3:40pm  Visit type: Return MSK Tumor  Provider: Dr. Bravo  Location: [facility]  Transferred to imaging scheduling

## 2025-07-14 ENCOUNTER — MYC REFILL (OUTPATIENT)
Dept: FAMILY MEDICINE | Facility: CLINIC | Age: 45
End: 2025-07-14
Payer: COMMERCIAL

## 2025-07-14 DIAGNOSIS — C41.9 CHONDROSARCOMA (H): ICD-10-CM

## 2025-07-14 RX ORDER — GABAPENTIN 300 MG/1
600 CAPSULE ORAL 3 TIMES DAILY
Qty: 180 CAPSULE | Refills: 0 | Status: SHIPPED | OUTPATIENT
Start: 2025-07-14

## 2025-07-22 ENCOUNTER — HOSPITAL ENCOUNTER (OUTPATIENT)
Dept: ULTRASOUND IMAGING | Facility: CLINIC | Age: 45
Discharge: HOME | End: 2025-07-22
Payer: COMMERCIAL

## 2025-07-22 DIAGNOSIS — E04.1 THYROID NODULE: ICD-10-CM

## 2025-07-22 DIAGNOSIS — R93.89 ABNORMAL IMAGING OF THYROID: ICD-10-CM

## 2025-07-22 PROCEDURE — 76536 US EXAM OF HEAD AND NECK: CPT

## 2025-07-23 DIAGNOSIS — E04.1 THYROID NODULE: Primary | ICD-10-CM

## 2025-07-23 DIAGNOSIS — R93.89 ABNORMAL IMAGING OF THYROID: ICD-10-CM

## 2025-08-08 ENCOUNTER — ANCILLARY PROCEDURE (OUTPATIENT)
Dept: ULTRASOUND IMAGING | Facility: CLINIC | Age: 45
End: 2025-08-08
Payer: COMMERCIAL

## 2025-08-08 DIAGNOSIS — E04.1 THYROID NODULE: ICD-10-CM

## 2025-08-08 DIAGNOSIS — R93.89 ABNORMAL IMAGING OF THYROID: ICD-10-CM

## 2025-08-08 PROCEDURE — 10005 FNA BX W/US GDN 1ST LES: CPT | Performed by: HEALTH CARE PROVIDER

## 2025-08-08 PROCEDURE — 88173 CYTOPATH EVAL FNA REPORT: CPT | Mod: TC

## 2025-08-11 ENCOUNTER — DOCUMENTATION ONLY (OUTPATIENT)
Dept: ORTHOPEDICS | Facility: CLINIC | Age: 45
End: 2025-08-11
Payer: COMMERCIAL

## 2025-08-11 DIAGNOSIS — R89.6 ABNORMAL CYTOLOGY: ICD-10-CM

## 2025-08-11 DIAGNOSIS — E04.1 THYROID NODULE: Primary | ICD-10-CM

## 2025-08-11 LAB
PATH REPORT.COMMENTS IMP SPEC: ABNORMAL
PATH REPORT.COMMENTS IMP SPEC: YES
PATH REPORT.FINAL DX SPEC: ABNORMAL
PATH REPORT.GROSS SPEC: ABNORMAL
PATH REPORT.MICROSCOPIC SPEC OTHER STN: ABNORMAL
PATH REPORT.RELEVANT HX SPEC: ABNORMAL

## 2025-08-19 ENCOUNTER — VIRTUAL VISIT (OUTPATIENT)
Dept: ENDOCRINOLOGY | Facility: CLINIC | Age: 45
End: 2025-08-19
Payer: COMMERCIAL

## 2025-08-19 DIAGNOSIS — R89.6 ABNORMAL CYTOLOGY: Primary | ICD-10-CM

## 2025-08-19 DIAGNOSIS — R93.89 ABNORMAL IMAGING OF THYROID: ICD-10-CM

## 2025-08-19 DIAGNOSIS — E04.1 THYROID NODULE: ICD-10-CM

## 2025-08-19 PROCEDURE — G2211 COMPLEX E/M VISIT ADD ON: HCPCS

## 2025-08-19 PROCEDURE — 98006 SYNCH AUDIO-VIDEO EST MOD 30: CPT | Mod: 24

## 2025-08-19 PROCEDURE — 1126F AMNT PAIN NOTED NONE PRSNT: CPT | Mod: 95

## 2025-08-19 ASSESSMENT — PAIN SCALES - GENERAL: PAINLEVEL_OUTOF10: NO PAIN (0)

## 2025-08-22 ENCOUNTER — ANCILLARY PROCEDURE (OUTPATIENT)
Dept: ULTRASOUND IMAGING | Facility: CLINIC | Age: 45
End: 2025-08-22
Payer: COMMERCIAL

## 2025-08-22 DIAGNOSIS — R89.6 ABNORMAL CYTOLOGY: ICD-10-CM

## 2025-08-22 DIAGNOSIS — E04.1 THYROID NODULE: ICD-10-CM

## 2025-08-22 PROCEDURE — 76536 US EXAM OF HEAD AND NECK: CPT | Mod: GC | Performed by: STUDENT IN AN ORGANIZED HEALTH CARE EDUCATION/TRAINING PROGRAM

## 2025-09-03 ENCOUNTER — PATIENT OUTREACH (OUTPATIENT)
Dept: CARE COORDINATION | Facility: CLINIC | Age: 45
End: 2025-09-03
Payer: COMMERCIAL

## 2025-09-04 ENCOUNTER — DOCUMENTATION ONLY (OUTPATIENT)
Dept: ORTHOPEDICS | Facility: CLINIC | Age: 45
End: 2025-09-04
Payer: COMMERCIAL

## 2025-09-19 ENCOUNTER — PRE VISIT (OUTPATIENT)
Dept: OTOLARYNGOLOGY | Facility: CLINIC | Age: 45
End: 2025-09-19

## (undated) DEVICE — CANISTER WOUND VAC W/GEL 500ML M8275063/5

## (undated) DEVICE — SUCTION IRR SYSTEM W/O TIP INTERPULSE HANDPIECE 0210-100-000

## (undated) DEVICE — ENDO TROCAR FIRST ENTRY KII FIOS Z-THRD 11X100MM CTF33

## (undated) DEVICE — LINEN BACK PACK 5440

## (undated) DEVICE — SUCTION TIP YANKAUER W/O VENT K86

## (undated) DEVICE — SU PROLENE 4-0 RB-1DA 36" 8557H

## (undated) DEVICE — SOL NACL 0.9% IRRIG 1000ML BOTTLE 2F7124

## (undated) DEVICE — DRSG TELFA 3X8" 1238

## (undated) DEVICE — ADH SKIN CLOSURE PREMIERPRO EXOFIN 1.0ML 3470

## (undated) DEVICE — SOL WATER IRRIG 1000ML BOTTLE 2F7114

## (undated) DEVICE — SU SILK 2-0 SH 30" K833H

## (undated) DEVICE — BLADE SAW SAGITTAL STRK 25X90X1.27MM HD SYS 6 6125-127-090

## (undated) DEVICE — STRAP KNEE/BODY 31143004

## (undated) DEVICE — ESU PENCIL W/HOLSTER E2350H

## (undated) DEVICE — BONE CEMENT MIXEVAC III HI VAC KIT  0206-015-000

## (undated) DEVICE — Device

## (undated) DEVICE — DRSG WND NEGATIVE PRESSURE PREVENA 90CM PRE4055US

## (undated) DEVICE — SU SILK 2-0 TIE 12X30" A305H

## (undated) DEVICE — SU PROLENE 6-0 RB-2DA 30" 8711H

## (undated) DEVICE — LINEN TOWEL PACK X5 5464

## (undated) DEVICE — DRAIN JACKSON PRATT 15FR ROUND SU130-1323

## (undated) DEVICE — DRAPE C-ARM W/STRAPS 42X72" 07-CA104

## (undated) DEVICE — TUBING SUCTION MED-VAC 7MMX20' N720A

## (undated) DEVICE — ESU PENCIL SMOKE EVAC W/ROCKER SWITCH 0703-047-000

## (undated) DEVICE — BLADE CLIPPER DISP 4406

## (undated) DEVICE — ESU ELEC BLADE 6" COATED E1450-6

## (undated) DEVICE — ENDO SHEARS RENEW LAP ENDOCUT SCISSOR TIP 16.5MM 3142

## (undated) DEVICE — LINEN GOWN OVERSIZE 5408

## (undated) DEVICE — SYSTEM LAPAROVUE VISIBILITY LAPVUE10

## (undated) DEVICE — STRAP STIRRUP W/SLIP 30187-030

## (undated) DEVICE — SU VICRYL 3-0 SH CR 8X18" J774

## (undated) DEVICE — DECANTER VIAL 2006S

## (undated) DEVICE — TUBING SMOKE EVAC PNEUMOCLEAR HIGH FLOW 0620050250

## (undated) DEVICE — SU VICRYL 2-0 CT-1 27" UND J259H

## (undated) DEVICE — BLADE SAW RECIP STRK 70X6X0.025MM 0277-096-250S5

## (undated) DEVICE — GLOVE BIOGEL PI ULTRATOUCH SZ 7.0 41170

## (undated) DEVICE — GLOVE BIOGEL PI MICRO INDICATOR UNDERGLOVE SZ 7.5 48975

## (undated) DEVICE — LINEN ORTHO PACK 5446

## (undated) DEVICE — ESU GROUND PAD UNIVERSAL W/O CORD

## (undated) DEVICE — ESU ELEC BLADE 6" COATED/INSULATED E1455-6

## (undated) DEVICE — DRAPE STOCKINETTE IMPERVIOUS 12" 1587

## (undated) DEVICE — ENDO TROCAR SLEEVE KII Z-THREADED 05X100MM CTS02

## (undated) DEVICE — SU PROLENE 3-0 SHDA 36" 8522H

## (undated) DEVICE — PREP DURAPREP 26ML APL 8630

## (undated) DEVICE — PLATE GROUNDING ADULT W/CORD 9165L

## (undated) DEVICE — LINEN MAYO STAND COVER OVERSIZE PACK 5458

## (undated) DEVICE — SUTURE VICRYL+ 4-0 UNDYED PS-2 VCP496H

## (undated) DEVICE — FASTENER CATH STAYFIX 685ME

## (undated) DEVICE — DRAPE IOBAN INCISE 23X17" 6650EZ

## (undated) DEVICE — DRAIN PENROSE 1/4"X18" LATEX  30416-025

## (undated) DEVICE — SU PDS II 3-0 PS-2 18" Z497G

## (undated) DEVICE — ESU LIGASURE OPEN SEALER/DIVIDER SM JAW 16.5MM LF1212A

## (undated) DEVICE — GOWN IMPERVIOUS SPECIALTY XLG/XLONG 32474

## (undated) DEVICE — GLOVE UNDER INDICATOR PI SZ 7.0 LF 41670

## (undated) DEVICE — BLADE KNIFE SURG 11 371111

## (undated) DEVICE — BONE CLEANING TIP INTERPULSE  0210-010-000

## (undated) DEVICE — CUSTOM PACK LAP CHOLE SBA5BLCHEA

## (undated) DEVICE — SU VICRYL+ 3-0 27IN SH UND VCP416H

## (undated) DEVICE — PREP POVIDONE-IODINE 7.5% SCRUB 4OZ BOTTLE MDS093945

## (undated) DEVICE — LINEN DRAPE 54X72" 5467

## (undated) DEVICE — NDL INSUFFLATION 13GA 120MM C2201

## (undated) DEVICE — TAPE MICROFOAM 4" 1528-4

## (undated) DEVICE — PACK TOTAL HIP W/U DRAPE RIVERSIDE LATEX FREE

## (undated) DEVICE — SU VICRYL+ 0 27 UR6 VLT VCP603H

## (undated) DEVICE — SU ETHILON 3-0 PS-2 18" 1669H

## (undated) DEVICE — TRAY PREP DRY SKIN SCRUB 067

## (undated) DEVICE — DRAIN JACKSON PRATT RESERVOIR 400ML SU130-1000

## (undated) DEVICE — SU VICRYL 0 CT-1 27" J340H

## (undated) DEVICE — CLIP HORIZON MED BLUE 002200

## (undated) DEVICE — PREP DURAPREP REMOVER 4OZ 8611

## (undated) DEVICE — DRSG TEGADERM 4X4 3/4" 1626W

## (undated) DEVICE — VESSEL LOOPS BLUE MAXI

## (undated) DEVICE — PREP CHLORAPREP 26ML TINTED HI-LITE ORANGE 930815

## (undated) DEVICE — SU DERMABOND ADVANCED .7ML DNX12

## (undated) DEVICE — SYR 30ML LL W/O NDL 302832

## (undated) DEVICE — CLIP APPLIER ENDO ROTATING 10MM MED/LG ER320

## (undated) DEVICE — SU SILK 0 TIE 6X30" A306H

## (undated) DEVICE — BONE WAX 2.5GM W31G

## (undated) DEVICE — SU SILK 3-0 RB-1 30" K872H

## (undated) DEVICE — DRAPE STOCKINETTE 8" 8586

## (undated) DEVICE — CLIP HORIZON SM RED WIDE SLOT 001201

## (undated) DEVICE — SUCTION MANIFOLD NEPTUNE 2 SYS 4 PORT 0702-020-000

## (undated) DEVICE — SU SILK 3-0 SH 30" K832H

## (undated) DEVICE — SU PDS II 1 CT-1 27"Z341H

## (undated) DEVICE — SU SILK 3-0 TIE 12X30" A304H

## (undated) DEVICE — SU ETHIBOND 1 CTX CR 8/18" CX30D

## (undated) DEVICE — SPONGE LAP 18X18" X8435

## (undated) DEVICE — BONE CEMENT MIXEVAC HI VAC W/CARTRIDGE 0306-536-000

## (undated) DEVICE — SU VICRYL 0 CTX 36" J370H

## (undated) DEVICE — ESU PENCIL W/SMOKE EVAC NEPTUNE STRYKER 0703-046-000

## (undated) DEVICE — TIP CAUTERY L HOOK 36CM E377336C

## (undated) DEVICE — PACK UNIVERSAL SPLIT 29131

## (undated) DEVICE — RX SURGIFLO HEMOSTATIC MATRIX W/THROMBIN 8ML 2994

## (undated) DEVICE — SPONGE SURGIFOAM 100 1974

## (undated) DEVICE — CATH TRAY FOLEY SURESTEP 16FR WDRAIN BAG STLK LATEX A300316A

## (undated) DEVICE — ENDO TROCAR FIRST ENTRY KII FIOS Z-THRD 05X100MM CTF03

## (undated) RX ORDER — CEFAZOLIN SODIUM 1 G/3ML
INJECTION, POWDER, FOR SOLUTION INTRAMUSCULAR; INTRAVENOUS
Status: DISPENSED
Start: 2025-02-14

## (undated) RX ORDER — SODIUM CHLORIDE, SODIUM LACTATE, POTASSIUM CHLORIDE, CALCIUM CHLORIDE 600; 310; 30; 20 MG/100ML; MG/100ML; MG/100ML; MG/100ML
INJECTION, SOLUTION INTRAVENOUS
Status: DISPENSED
Start: 2025-02-14

## (undated) RX ORDER — PROPOFOL 10 MG/ML
INJECTION, EMULSION INTRAVENOUS
Status: DISPENSED
Start: 2022-06-29

## (undated) RX ORDER — FENTANYL CITRATE 50 UG/ML
INJECTION, SOLUTION INTRAMUSCULAR; INTRAVENOUS
Status: DISPENSED
Start: 2022-06-29

## (undated) RX ORDER — FENTANYL CITRATE 50 UG/ML
INJECTION, SOLUTION INTRAMUSCULAR; INTRAVENOUS
Status: DISPENSED
Start: 2025-02-14

## (undated) RX ORDER — HYDROMORPHONE HYDROCHLORIDE 1 MG/ML
INJECTION, SOLUTION INTRAMUSCULAR; INTRAVENOUS; SUBCUTANEOUS
Status: DISPENSED
Start: 2025-02-14

## (undated) RX ORDER — LIDOCAINE HYDROCHLORIDE 10 MG/ML
INJECTION, SOLUTION EPIDURAL; INFILTRATION; INTRACAUDAL; PERINEURAL
Status: DISPENSED
Start: 2025-08-08

## (undated) RX ORDER — FENTANYL CITRATE 50 UG/ML
INJECTION, SOLUTION INTRAMUSCULAR; INTRAVENOUS
Status: DISPENSED
Start: 2024-12-06

## (undated) RX ORDER — DEXAMETHASONE SODIUM PHOSPHATE 10 MG/ML
INJECTION INTRAMUSCULAR; INTRAVENOUS
Status: DISPENSED
Start: 2022-06-29

## (undated) RX ORDER — PROPOFOL 10 MG/ML
INJECTION, EMULSION INTRAVENOUS
Status: DISPENSED
Start: 2025-02-14

## (undated) RX ORDER — LIDOCAINE HYDROCHLORIDE 10 MG/ML
INJECTION, SOLUTION EPIDURAL; INFILTRATION; INTRACAUDAL; PERINEURAL
Status: DISPENSED
Start: 2024-12-02

## (undated) RX ORDER — LIDOCAINE HYDROCHLORIDE 10 MG/ML
INJECTION, SOLUTION EPIDURAL; INFILTRATION; INTRACAUDAL; PERINEURAL
Status: DISPENSED
Start: 2022-06-29

## (undated) RX ORDER — TRANEXAMIC ACID 650 MG/1
TABLET ORAL
Status: DISPENSED
Start: 2025-02-14

## (undated) RX ORDER — LIDOCAINE HYDROCHLORIDE AND EPINEPHRINE 10; 10 MG/ML; UG/ML
INJECTION, SOLUTION INFILTRATION; PERINEURAL
Status: DISPENSED
Start: 2022-06-29

## (undated) RX ORDER — FENTANYL CITRATE 50 UG/ML
INJECTION, SOLUTION INTRAMUSCULAR; INTRAVENOUS
Status: DISPENSED
Start: 2024-12-02

## (undated) RX ORDER — KETAMINE HYDROCHLORIDE 10 MG/ML
INJECTION INTRAMUSCULAR; INTRAVENOUS
Status: DISPENSED
Start: 2022-06-29

## (undated) RX ORDER — CALCIUM CHLORIDE 100 MG/ML
INJECTION INTRAVENOUS; INTRAVENTRICULAR
Status: DISPENSED
Start: 2025-02-14

## (undated) RX ORDER — HYDROMORPHONE HYDROCHLORIDE 1 MG/ML
INJECTION, SOLUTION INTRAMUSCULAR; INTRAVENOUS; SUBCUTANEOUS
Status: DISPENSED
Start: 2024-12-06

## (undated) RX ORDER — ONDANSETRON 2 MG/ML
INJECTION INTRAMUSCULAR; INTRAVENOUS
Status: DISPENSED
Start: 2022-06-29

## (undated) RX ORDER — ACETAMINOPHEN 325 MG/1
TABLET ORAL
Status: DISPENSED
Start: 2025-02-14

## (undated) RX ORDER — EPHEDRINE SULFATE 50 MG/ML
INJECTION, SOLUTION INTRAMUSCULAR; INTRAVENOUS; SUBCUTANEOUS
Status: DISPENSED
Start: 2025-02-14

## (undated) RX ORDER — CELECOXIB 200 MG/1
CAPSULE ORAL
Status: DISPENSED
Start: 2025-02-14

## (undated) RX ORDER — ACETAMINOPHEN 325 MG/1
TABLET ORAL
Status: DISPENSED
Start: 2024-12-06

## (undated) RX ORDER — CEFAZOLIN SODIUM/WATER 2 G/20 ML
SYRINGE (ML) INTRAVENOUS
Status: DISPENSED
Start: 2025-02-14